# Patient Record
Sex: FEMALE | Race: WHITE | NOT HISPANIC OR LATINO | Employment: OTHER | URBAN - METROPOLITAN AREA
[De-identification: names, ages, dates, MRNs, and addresses within clinical notes are randomized per-mention and may not be internally consistent; named-entity substitution may affect disease eponyms.]

---

## 2017-04-08 ENCOUNTER — HOSPITAL ENCOUNTER (EMERGENCY)
Facility: HOSPITAL | Age: 77
Discharge: HOME | End: 2017-04-08
Attending: SURGERY
Payer: MEDICARE

## 2017-04-08 VITALS
TEMPERATURE: 99 F | SYSTOLIC BLOOD PRESSURE: 116 MMHG | HEART RATE: 88 BPM | WEIGHT: 189 LBS | BODY MASS INDEX: 28.74 KG/M2 | DIASTOLIC BLOOD PRESSURE: 58 MMHG

## 2017-04-08 DIAGNOSIS — K94.23 MALFUNCTION OF PERCUTANEOUS ENDOSCOPIC GASTROSTOMY (PEG) TUBE: Primary | ICD-10-CM

## 2017-04-08 PROCEDURE — 25500020 PHARM REV CODE 255: Performed by: SURGERY

## 2017-04-08 PROCEDURE — 43760 *HC REPLACEMENT GASTROS TUBE: CPT

## 2017-04-08 PROCEDURE — 99284 EMERGENCY DEPT VISIT MOD MDM: CPT | Mod: 25

## 2017-04-08 RX ORDER — PRAMIPEXOLE DIHYDROCHLORIDE 0.25 MG/1
0.25 TABLET ORAL 3 TIMES DAILY
Status: ON HOLD | COMMUNITY
End: 2017-05-22

## 2017-04-08 RX ORDER — TRAZODONE HYDROCHLORIDE 50 MG/1
50 TABLET ORAL NIGHTLY
Status: ON HOLD | COMMUNITY
End: 2017-05-22

## 2017-04-08 RX ADMIN — DIATRIZOATE MEGLUMINE AND DIATRIZOATE SODIUM 60 ML: 600; 100 SOLUTION ORAL; RECTAL at 08:04

## 2017-04-08 NOTE — ED AVS SNAPSHOT
[facility] [address] [place] [address]               Eddy Cunningham   2017  7:51 PM   ED    Description:  Female : 1940   Department:  [facility]           Your Care was Coordinated By:     Provider Role From To    Ivan Gutierres MD Attending Provider 17 --      Reason for Visit     PEG tube replacement           Diagnoses this Visit        Comments    Malfunction of percutaneous endoscopic gastrostomy (PEG) tube    -  Primary       ED Disposition     ED Disposition Condition Comment    Discharge             To Do List           Follow-up Information     Follow up with Ayaka Pollard MD. Schedule an appointment as soon as possible for a visit in 2 days.    Specialty:  Family Medicine    Contact information:    [address]  [place] [id number] [phone]        [facility]     [facility] -  Assistance  Unless otherwise directed by your provider, please contact [facility], our nurse care line that is available for  assistance.     Registered nurses in the [facility] provide: appointment scheduling, clinical advisement, health education, and other advisory services.  Call: [phone] (toll free)               Medications           Message regarding Medications     Verify the changes and/or additions to your medication regime listed below are the same as discussed with your clinician today.  If any of these changes or additions are incorrect, please notify your healthcare provider.        These medications were administered today        Dose Freq    gastroview 60 mL 60 mL IMG once as needed    Si mLs by Per G Tube route ONCE PRN for contrast.    Class: Normal    Route: Per G Tube      STOP taking these medications     LACTULOSE ORAL Take by mouth once daily.    metolazone (ZAROXOLYN) 5 MG tablet Take 5 mg by mouth once daily.      vitamin E 200 UNIT capsule Take 200  Units by mouth once daily.           Verify that the below list of medications is an accurate representation of the medications you are currently taking.  If none reported, the list may be blank. If incorrect, please contact your healthcare provider. Carry this list with you in case of emergency.           Current Medications     amphetamine-dextroamphetamine (ADDERALL XR) 20 MG 24 hr capsule Take 20 mg by mouth every morning.      aspirin 81 MG Chew Take 1 tablet by mouth Daily.    carbidopa-levodopa  mg (SINEMET)  mg per tablet Take 1 tablet by mouth 5 (five) times daily.     docusate sodium (COLACE) 100 MG capsule Take 100 mg by mouth once daily.    levothyroxine (SYNTHROID) 75 MCG tablet Take 75 mcg by mouth before breakfast.     potassium chloride (KLOR-CON) 10 MEQ TbSR Take 20 mEq by mouth 2 (two) times daily.     pramipexole (MIRAPEX) 0.25 MG tablet Take 0.25 mg by mouth 3 (three) times daily.    rivastigmine (EXELON) 4.6 mg/24 hour PT24 Place 1 patch onto the skin once daily.      senna (SENOKOT) 8.6 mg tablet Take 1 tablet by mouth once daily.    trazodone (DESYREL) 50 MG tablet Take 50 mg by mouth every evening.    carbidopa-levodopa-entacapone 18.75- mg (STALEVO) 18.75- mg Tab Take 1 tablet by mouth 5 (five) times daily.    hydrocodone-acetaminophen 10-325mg (NORCO)  mg Tab Take 1 tablet by mouth every 6 (six) hours as needed.     tramadol (ULTRAM) 50 mg tablet Take 50 mg by mouth every 6 (six) hours as needed for Pain.           Clinical Reference Information           Your Vitals Were     BP Pulse Temp Weight BMI    116/58 (BP Location: Left arm, Patient Position: Sitting) 88 99.4 °F (37.4 °C) (Oral) 85.7 kg (189 lb) 28.74 kg/m2      Allergies as of 4/8/2017        Reactions    Oxycodone     Hallucination    Sulfa (Sulfonamide Antibiotics)     Other reaction(s): when on contact      Immunizations Administered on Date of Encounter - 4/8/2017     None      ED Micro, Lab, POCT      None      ED Imaging Orders     Start Ordered       Status Ordering Provider    04/08/17 1953 04/08/17 1952  XR Gastric Tube Check With Contrast  1 time imaging      In process       Discharge References/Attachments     FEEDING TUBE CARE, GASTROSTOMY: FLUSHING (ENGLISH)      [email or web address] Sign-Up     Activating your [facility] account is as easy as 1-2-3!     1) Visit "[email or web address], select Sign Up Now, enter this activation code and your date of birth, then select Next.  [email or web address]  Expires: 5/23/2017  8:09 PM      2) Create a username and password to use when you visit [facility] in the future and select a security question in case you lose your password and select Next.    3) Enter your e-mail address and click Sign Up!    Additional Information  If you have questions, please e-mail [email or web address] or call [phone] to talk to our [facility] staff. Remember, [facility] is NOT to be used for urgent needs. For medical emergencies, dial 911.         Smoking Cessation     If you would like to quit smoking:   You may be eligible for free services if you are a [place] resident and started smoking cigarettes before September 1, 1988.  Call the Smoking Cessation Trust (SCT) toll free at (269) 710-4715 or (169) 683-8931.   Call -800-QUIT-NOW if you do not meet the above criteria.   Contact us via email: [email or web address]   View our website for more information: [email or web address]         [facility] complies with applicable Federal civil rights laws and does not discriminate on the basis of race, color, national origin, age, disability, or sex.        Language Assistance Services     ATTENTION: Language assistance services are available, free of charge. Please call [phone].      ATENCIÓN: Si habla español, tiene a verma disposición servicios gratuitos de asistencia lingüística. Llame al [phone].     CHÚ Ý: N?u b?n nói Ti?ng Vi?t, có các d?ch v? h? tr? ngôn  ng? mi?n phí dành cho b?n. G?i s? [phone].

## 2017-04-09 NOTE — ED PROVIDER NOTES
[facility]                                        April 8, 2017                   Chief Complaint  76 y.o. female with PEG tube replacement (PEG tube came out)    History of Present Illness  Eddy Cunningham presents to the emergency room for a PEG tube replacement  Patient's PEG tube accidentally felt the nursing home was no complication reported  Patient presents the ER for PEG tube replacement, replaced without issue tonight  X-ray confirms appropriate placement, afebrile stable with a soft abdomen exam    The history is provided by the patient    Past Medical History   -- COPD (chronic obstructive pulmonary disease)    -- Dementia    -- Depression    -- Elevated C-reactive protein (CRP)    -- Falls frequently    -- Homocystinuria    -- Hypertension    -- Hypopotassemia    -- Hypothyroidism    -- Narcolepsy without cataplexy    -- Parkinson disease    -- RLS (restless legs syndrome)    -- Stress incontinence    -- Venous stasis ulcers      Past Surgical History   -- CARPAL TUNNEL RELEASE     -- CHOLECYSTECTOMY     -- PARTIAL HYSTERECTOMY     -- ROTATOR CUFF REPAIR     -- TONSILLECTOMY, ADENOIDECTOMY     -- TOTAL KNEE ARTHROPLASTY        Review of patient's allergies   -- Oxycodone    -- Sulfa (sulfonamide antibiotics)       Review of Systems and Physical Exam     Review of Systems  -- Constitution - no fever, denies fatigue, no weakness, no chills  -- Eyes - no tearing or redness, no visual disturbance  -- Ear, Nose - no tinnitus or earache, no nasal congestion or discharge  -- Mouth,Throat - no sore throat, no toothache, normal voice, normal swallowing  -- Respiratory - denies cough and congestion, no shortness of breath, no PEREZ  -- Cardiovascular - denies chest pain, no palpitations, denies claudication  -- Gastrointestinal - PEG tube fell out, denies abdominal pain, nausea, vomiting  -- Musculoskeletal - denies back pain, negative for myalgias and arthralgias   -- Neurological - no  headache, denies weakness or seizure; no LOC  -- Skin - denies pallor, rash, or changes in skin. no hives or welts noted    Vital Signs  -- Her blood pressure is 116/58 (abnormal) and her pulse is 88.      Physical Exam  -- Nursing note and vitals reviewed  -- Constitutional: Appears well-developed and well-nourished  -- Head: Atraumatic. Normocephalic. No obvious abnormality  -- Eyes: Pupils are equal and reactive to light. Normal conjunctiva and lids  -- Cardiac: Normal rate, regular rhythm and normal heart sounds  -- Pulmonary: Normal respiratory effort, breath sounds clear to auscultation  -- Abdominal: PEG tube skin site is patient and clean and dry  -- Musculoskeletal: Normal range of motion, no effusions. Joints stable   -- Neurological: No focal deficits. Showed good interaction with staff    Emergency Room Course     Treatment and Evaluation  -- New PEG tube reinserted without issue  -- X-ray confirmed its placement  -- Site was verified before the start of the procedure  -- No complications were noted from the procedure  -- The patient tolerated the procedure well     Diagnosis  -- Malfunction of percutaneous endoscopic gastrostomy (PEG) tube.    Disposition and Plan  -- Disposition: home  -- Condition: stable  -- Follow-up: Patient to follow up with Ayaka Pollard MD in 1-2 days.  -- I advised the patient that we have found no life threatening condition today  -- At this time, I believe the patient is clinically stable for discharge.   -- The patient acknowledges that close follow up with a MD is required   -- Patient agrees to comply with all instruction and direction given in the ER    This note is dictated on Dragon Natural Speaking word recognition program.  There are word recognition mistakes that are occasionally missed on review.           Ivan Gutierres MD  04/08/17 [id number]

## 2017-05-16 ENCOUNTER — HOSPITAL ENCOUNTER (INPATIENT)
Facility: HOSPITAL | Age: 77
LOS: 4 days | DRG: 871 | End: 2017-05-22
Attending: EMERGENCY MEDICINE | Admitting: INTERNAL MEDICINE
Payer: MEDICARE

## 2017-05-16 DIAGNOSIS — G20.A1 PARKINSONS: ICD-10-CM

## 2017-05-16 DIAGNOSIS — R13.10 DYSPHAGIA, UNSPECIFIED TYPE: ICD-10-CM

## 2017-05-16 DIAGNOSIS — K57.32 DIVERTICULITIS OF LARGE INTESTINE WITHOUT PERFORATION OR ABSCESS WITHOUT BLEEDING: ICD-10-CM

## 2017-05-16 DIAGNOSIS — K57.92 DIVERTICULITIS OF INTESTINE WITHOUT PERFORATION OR ABSCESS WITHOUT BLEEDING, UNSPECIFIED PART OF INTESTINAL TRACT: ICD-10-CM

## 2017-05-16 DIAGNOSIS — K56.60 INTESTINAL OBSTRUCTION, UNSPECIFIED TYPE: Primary | ICD-10-CM

## 2017-05-16 PROBLEM — K56.609 INTESTINAL OBSTRUCTION: Status: ACTIVE | Noted: 2017-05-16

## 2017-05-16 LAB
ALBUMIN SERPL BCP-MCNC: 4 G/DL
ALP SERPL-CCNC: 85 U/L
ALT SERPL W/O P-5'-P-CCNC: 6 U/L
AMYLASE SERPL-CCNC: 69 U/L
ANION GAP SERPL CALC-SCNC: 15 MMOL/L
AST SERPL-CCNC: 15 U/L
BASOPHILS # BLD AUTO: 0.02 K/UL
BASOPHILS NFR BLD: 0.1 %
BILIRUB SERPL-MCNC: 0.6 MG/DL
BUN SERPL-MCNC: 20 MG/DL
CALCIUM SERPL-MCNC: 10.2 MG/DL
CHLORIDE SERPL-SCNC: 101 MMOL/L
CO2 SERPL-SCNC: 28 MMOL/L
CREAT SERPL-MCNC: 0.8 MG/DL
DIFFERENTIAL METHOD: ABNORMAL
EOSINOPHIL # BLD AUTO: 0 K/UL
EOSINOPHIL NFR BLD: 0.1 %
ERYTHROCYTE [DISTWIDTH] IN BLOOD BY AUTOMATED COUNT: 13.1 %
EST. GFR  (AFRICAN AMERICAN): >60 ML/MIN/1.73 M^2
EST. GFR  (NON AFRICAN AMERICAN): >60 ML/MIN/1.73 M^2
GLUCOSE SERPL-MCNC: 164 MG/DL
HCT VFR BLD AUTO: 42.2 %
HGB BLD-MCNC: 13.7 G/DL
LYMPHOCYTES # BLD AUTO: 0.5 K/UL
LYMPHOCYTES NFR BLD: 3.5 %
MCH RBC QN AUTO: 30.2 PG
MCHC RBC AUTO-ENTMCNC: 32.5 %
MCV RBC AUTO: 93 FL
MONOCYTES # BLD AUTO: 0.8 K/UL
MONOCYTES NFR BLD: 5.2 %
NEUTROPHILS # BLD AUTO: 14.1 K/UL
NEUTROPHILS NFR BLD: 91.1 %
PLATELET # BLD AUTO: 310 K/UL
PMV BLD AUTO: 9.6 FL
POTASSIUM SERPL-SCNC: 4.3 MMOL/L
PROT SERPL-MCNC: 7.5 G/DL
RBC # BLD AUTO: 4.54 M/UL
SODIUM SERPL-SCNC: 144 MMOL/L
WBC # BLD AUTO: 15.42 K/UL

## 2017-05-16 PROCEDURE — 80053 COMPREHEN METABOLIC PANEL: CPT

## 2017-05-16 PROCEDURE — G0378 HOSPITAL OBSERVATION PER HR: HCPCS

## 2017-05-16 PROCEDURE — 82150 ASSAY OF AMYLASE: CPT

## 2017-05-16 PROCEDURE — 99285 EMERGENCY DEPT VISIT HI MDM: CPT | Mod: 25

## 2017-05-16 PROCEDURE — 85025 COMPLETE CBC W/AUTO DIFF WBC: CPT

## 2017-05-16 PROCEDURE — 94640 AIRWAY INHALATION TREATMENT: CPT

## 2017-05-16 PROCEDURE — 96375 TX/PRO/DX INJ NEW DRUG ADDON: CPT

## 2017-05-16 PROCEDURE — 63600175 PHARM REV CODE 636 W HCPCS: Performed by: EMERGENCY MEDICINE

## 2017-05-16 PROCEDURE — 25000003 PHARM REV CODE 250

## 2017-05-16 PROCEDURE — 27000339 *HC DAILY SUPPLY KIT

## 2017-05-16 PROCEDURE — 96376 TX/PRO/DX INJ SAME DRUG ADON: CPT

## 2017-05-16 PROCEDURE — 36415 COLL VENOUS BLD VENIPUNCTURE: CPT

## 2017-05-16 PROCEDURE — 96365 THER/PROPH/DIAG IV INF INIT: CPT

## 2017-05-16 PROCEDURE — 25000003 PHARM REV CODE 250: Performed by: EMERGENCY MEDICINE

## 2017-05-16 PROCEDURE — 96361 HYDRATE IV INFUSION ADD-ON: CPT

## 2017-05-16 RX ORDER — LEVALBUTEROL INHALATION SOLUTION 1.25 MG/3ML
1.25 SOLUTION RESPIRATORY (INHALATION)
Status: DISCONTINUED | OUTPATIENT
Start: 2017-05-16 | End: 2017-05-16

## 2017-05-16 RX ORDER — ONDANSETRON 2 MG/ML
4 INJECTION INTRAMUSCULAR; INTRAVENOUS
Status: COMPLETED | OUTPATIENT
Start: 2017-05-16 | End: 2017-05-16

## 2017-05-16 RX ORDER — LEVALBUTEROL 1.25 MG/.5ML
1.25 SOLUTION, CONCENTRATE RESPIRATORY (INHALATION) ONCE
Status: COMPLETED | OUTPATIENT
Start: 2017-05-16 | End: 2017-05-16

## 2017-05-16 RX ORDER — LEVALBUTEROL 1.25 MG/.5ML
SOLUTION, CONCENTRATE RESPIRATORY (INHALATION)
Status: COMPLETED
Start: 2017-05-16 | End: 2017-05-16

## 2017-05-16 RX ORDER — CIPROFLOXACIN 2 MG/ML
400 INJECTION, SOLUTION INTRAVENOUS
Status: COMPLETED | OUTPATIENT
Start: 2017-05-17 | End: 2017-05-17

## 2017-05-16 RX ORDER — METRONIDAZOLE 500 MG/100ML
500 INJECTION, SOLUTION INTRAVENOUS
Status: COMPLETED | OUTPATIENT
Start: 2017-05-17 | End: 2017-05-17

## 2017-05-16 RX ADMIN — LEVALBUTEROL 1.25 MG: 1.25 SOLUTION, CONCENTRATE RESPIRATORY (INHALATION) at 09:05

## 2017-05-16 RX ADMIN — IOHEXOL 15 ML: 350 INJECTION, SOLUTION INTRAVENOUS at 10:05

## 2017-05-16 RX ADMIN — MORPHINE SULFATE 2 MG: 2 INJECTION, SOLUTION INTRAMUSCULAR; INTRAVENOUS at 08:05

## 2017-05-16 RX ADMIN — IOHEXOL 75 ML: 350 INJECTION, SOLUTION INTRAVENOUS at 11:05

## 2017-05-16 RX ADMIN — SODIUM CHLORIDE 500 ML: 0.9 INJECTION, SOLUTION INTRAVENOUS at 08:05

## 2017-05-16 RX ADMIN — MORPHINE SULFATE 2 MG: 2 INJECTION, SOLUTION INTRAMUSCULAR; INTRAVENOUS at 10:05

## 2017-05-16 RX ADMIN — ONDANSETRON 4 MG: 2 INJECTION INTRAMUSCULAR; INTRAVENOUS at 08:05

## 2017-05-17 PROBLEM — A41.50 SEPSIS DUE TO GRAM NEGATIVE BACTERIA: Status: ACTIVE | Noted: 2017-05-17

## 2017-05-17 PROBLEM — T17.908A ASPIRATION INTO AIRWAY: Status: ACTIVE | Noted: 2017-05-17

## 2017-05-17 PROBLEM — A41.89 SEPSIS, GRAM POSITIVE: Status: ACTIVE | Noted: 2017-05-17

## 2017-05-17 PROBLEM — A41.9 SEVERE SEPSIS: Status: ACTIVE | Noted: 2017-05-17

## 2017-05-17 PROBLEM — R13.19 ESOPHAGEAL DYSPHAGIA: Status: ACTIVE | Noted: 2017-05-17

## 2017-05-17 PROBLEM — R65.20 SEVERE SEPSIS: Status: ACTIVE | Noted: 2017-05-17

## 2017-05-17 PROBLEM — N30.90 CYSTITIS: Status: ACTIVE | Noted: 2017-05-17

## 2017-05-17 LAB
ALBUMIN SERPL BCP-MCNC: 3.2 G/DL
ALP SERPL-CCNC: 64 U/L
ALT SERPL W/O P-5'-P-CCNC: 10 U/L
ANION GAP SERPL CALC-SCNC: 9 MMOL/L
AST SERPL-CCNC: 12 U/L
BACTERIA #/AREA URNS HPF: ABNORMAL /HPF
BASOPHILS # BLD AUTO: 0.01 K/UL
BASOPHILS NFR BLD: 0.1 %
BILIRUB SERPL-MCNC: 0.7 MG/DL
BILIRUB UR QL STRIP: NEGATIVE
BUN SERPL-MCNC: 34 MG/DL
CALCIUM SERPL-MCNC: 9.1 MG/DL
CHLORIDE SERPL-SCNC: 107 MMOL/L
CLARITY UR: CLEAR
CO2 SERPL-SCNC: 26 MMOL/L
COLOR UR: YELLOW
CREAT SERPL-MCNC: 0.7 MG/DL
DIFFERENTIAL METHOD: ABNORMAL
EOSINOPHIL # BLD AUTO: 0 K/UL
EOSINOPHIL NFR BLD: 0 %
ERYTHROCYTE [DISTWIDTH] IN BLOOD BY AUTOMATED COUNT: 13.1 %
EST. GFR  (AFRICAN AMERICAN): >60 ML/MIN/1.73 M^2
EST. GFR  (NON AFRICAN AMERICAN): >60 ML/MIN/1.73 M^2
GLUCOSE SERPL-MCNC: 141 MG/DL
GLUCOSE UR QL STRIP: NEGATIVE
HCT VFR BLD AUTO: 38.6 %
HGB BLD-MCNC: 12 G/DL
HGB UR QL STRIP: ABNORMAL
KETONES UR QL STRIP: NEGATIVE
LACTATE SERPL-SCNC: 0.8 MMOL/L
LACTATE SERPL-SCNC: 1.1 MMOL/L
LEUKOCYTE ESTERASE UR QL STRIP: ABNORMAL
LYMPHOCYTES # BLD AUTO: 1.4 K/UL
LYMPHOCYTES NFR BLD: 7.2 %
MCH RBC QN AUTO: 29.6 PG
MCHC RBC AUTO-ENTMCNC: 31.1 %
MCV RBC AUTO: 95 FL
MICROSCOPIC COMMENT: ABNORMAL
MONOCYTES # BLD AUTO: 1.6 K/UL
MONOCYTES NFR BLD: 8 %
NEUTROPHILS # BLD AUTO: 16.8 K/UL
NEUTROPHILS NFR BLD: 84.9 %
NITRITE UR QL STRIP: NEGATIVE
PH UR STRIP: 5 [PH] (ref 5–8)
PLATELET # BLD AUTO: 301 K/UL
PMV BLD AUTO: 9.6 FL
POTASSIUM SERPL-SCNC: 4.3 MMOL/L
PROT SERPL-MCNC: 6.2 G/DL
PROT UR QL STRIP: NEGATIVE
RBC # BLD AUTO: 4.05 M/UL
RBC #/AREA URNS HPF: 0 /HPF (ref 0–4)
SODIUM SERPL-SCNC: 142 MMOL/L
SP GR UR STRIP: 1.01 (ref 1–1.03)
SQUAMOUS #/AREA URNS HPF: 2 /HPF
URN SPEC COLLECT METH UR: ABNORMAL
UROBILINOGEN UR STRIP-ACNC: NEGATIVE EU/DL
WBC # BLD AUTO: 19.81 K/UL
WBC #/AREA URNS HPF: 30 /HPF (ref 0–5)

## 2017-05-17 PROCEDURE — 25000003 PHARM REV CODE 250: Performed by: INTERNAL MEDICINE

## 2017-05-17 PROCEDURE — 87040 BLOOD CULTURE FOR BACTERIA: CPT

## 2017-05-17 PROCEDURE — 36415 COLL VENOUS BLD VENIPUNCTURE: CPT

## 2017-05-17 PROCEDURE — 25000003 PHARM REV CODE 250: Performed by: NURSE PRACTITIONER

## 2017-05-17 PROCEDURE — 63600175 PHARM REV CODE 636 W HCPCS: Performed by: NURSE PRACTITIONER

## 2017-05-17 PROCEDURE — G0378 HOSPITAL OBSERVATION PER HR: HCPCS

## 2017-05-17 PROCEDURE — 25000003 PHARM REV CODE 250: Performed by: EMERGENCY MEDICINE

## 2017-05-17 PROCEDURE — 81000 URINALYSIS NONAUTO W/SCOPE: CPT

## 2017-05-17 PROCEDURE — 83605 ASSAY OF LACTIC ACID: CPT

## 2017-05-17 PROCEDURE — G8996 SWALLOW CURRENT STATUS: HCPCS | Mod: CN

## 2017-05-17 PROCEDURE — 97110 THERAPEUTIC EXERCISES: CPT

## 2017-05-17 PROCEDURE — 25500020 PHARM REV CODE 255: Performed by: EMERGENCY MEDICINE

## 2017-05-17 PROCEDURE — 87086 URINE CULTURE/COLONY COUNT: CPT

## 2017-05-17 PROCEDURE — G8978 MOBILITY CURRENT STATUS: HCPCS | Mod: CM

## 2017-05-17 PROCEDURE — 96367 TX/PROPH/DG ADDL SEQ IV INF: CPT

## 2017-05-17 PROCEDURE — 25000003 PHARM REV CODE 250

## 2017-05-17 PROCEDURE — 80053 COMPREHEN METABOLIC PANEL: CPT

## 2017-05-17 PROCEDURE — 96361 HYDRATE IV INFUSION ADD-ON: CPT

## 2017-05-17 PROCEDURE — G8997 SWALLOW GOAL STATUS: HCPCS | Mod: CM

## 2017-05-17 PROCEDURE — 96366 THER/PROPH/DIAG IV INF ADDON: CPT

## 2017-05-17 PROCEDURE — 99900035 HC TECH TIME PER 15 MIN (STAT)

## 2017-05-17 PROCEDURE — 85025 COMPLETE CBC W/AUTO DIFF WBC: CPT

## 2017-05-17 PROCEDURE — G8979 MOBILITY GOAL STATUS: HCPCS | Mod: CL

## 2017-05-17 PROCEDURE — 92610 EVALUATE SWALLOWING FUNCTION: CPT

## 2017-05-17 PROCEDURE — 63600175 PHARM REV CODE 636 W HCPCS: Performed by: EMERGENCY MEDICINE

## 2017-05-17 PROCEDURE — 96365 THER/PROPH/DIAG IV INF INIT: CPT

## 2017-05-17 PROCEDURE — 94761 N-INVAS EAR/PLS OXIMETRY MLT: CPT

## 2017-05-17 PROCEDURE — 94760 N-INVAS EAR/PLS OXIMETRY 1: CPT

## 2017-05-17 PROCEDURE — 99223 1ST HOSP IP/OBS HIGH 75: CPT | Mod: AI,,, | Performed by: FAMILY MEDICINE

## 2017-05-17 PROCEDURE — 27000339 *HC DAILY SUPPLY KIT

## 2017-05-17 PROCEDURE — 94640 AIRWAY INHALATION TREATMENT: CPT

## 2017-05-17 PROCEDURE — 27000221 HC OXYGEN, UP TO 24 HOURS

## 2017-05-17 PROCEDURE — 97162 PT EVAL MOD COMPLEX 30 MIN: CPT

## 2017-05-17 RX ORDER — ONDANSETRON 2 MG/ML
4 INJECTION INTRAMUSCULAR; INTRAVENOUS EVERY 12 HOURS PRN
Status: DISCONTINUED | OUTPATIENT
Start: 2017-05-17 | End: 2017-05-22 | Stop reason: HOSPADM

## 2017-05-17 RX ORDER — DEXTROSE MONOHYDRATE AND SODIUM CHLORIDE 5; .9 G/100ML; G/100ML
INJECTION, SOLUTION INTRAVENOUS CONTINUOUS
Status: DISCONTINUED | OUTPATIENT
Start: 2017-05-17 | End: 2017-05-17

## 2017-05-17 RX ORDER — CIPROFLOXACIN 2 MG/ML
400 INJECTION, SOLUTION INTRAVENOUS
Status: DISCONTINUED | OUTPATIENT
Start: 2017-05-17 | End: 2017-05-17

## 2017-05-17 RX ORDER — METRONIDAZOLE 500 MG/100ML
500 INJECTION, SOLUTION INTRAVENOUS
Status: DISCONTINUED | OUTPATIENT
Start: 2017-05-17 | End: 2017-05-17

## 2017-05-17 RX ORDER — DEXTROSE MONOHYDRATE AND SODIUM CHLORIDE 5; .9 G/100ML; G/100ML
INJECTION, SOLUTION INTRAVENOUS CONTINUOUS
Status: DISCONTINUED | OUTPATIENT
Start: 2017-05-17 | End: 2017-05-20

## 2017-05-17 RX ORDER — LEVALBUTEROL 1.25 MG/.5ML
SOLUTION, CONCENTRATE RESPIRATORY (INHALATION)
Status: COMPLETED
Start: 2017-05-17 | End: 2017-05-17

## 2017-05-17 RX ORDER — LEVALBUTEROL 1.25 MG/.5ML
1.25 SOLUTION, CONCENTRATE RESPIRATORY (INHALATION)
Status: DISCONTINUED | OUTPATIENT
Start: 2017-05-17 | End: 2017-05-22 | Stop reason: HOSPADM

## 2017-05-17 RX ADMIN — LEVALBUTEROL 1.25 MG: 1.25 SOLUTION, CONCENTRATE RESPIRATORY (INHALATION) at 01:05

## 2017-05-17 RX ADMIN — PIPERACILLIN AND TAZOBACTAM 4.5 G: 4; .5 INJECTION, POWDER, LYOPHILIZED, FOR SOLUTION INTRAVENOUS; PARENTERAL at 05:05

## 2017-05-17 RX ADMIN — DEXTROSE AND SODIUM CHLORIDE: 5; .9 INJECTION, SOLUTION INTRAVENOUS at 12:05

## 2017-05-17 RX ADMIN — DEXTROSE AND SODIUM CHLORIDE: 5; .9 INJECTION, SOLUTION INTRAVENOUS at 10:05

## 2017-05-17 RX ADMIN — LEVALBUTEROL 1.25 MG: 1.25 SOLUTION, CONCENTRATE RESPIRATORY (INHALATION) at 08:05

## 2017-05-17 RX ADMIN — LEVALBUTEROL 1.25 MG: 1.25 SOLUTION, CONCENTRATE RESPIRATORY (INHALATION) at 03:05

## 2017-05-17 RX ADMIN — METRONIDAZOLE 500 MG: 500 INJECTION, SOLUTION INTRAVENOUS at 12:05

## 2017-05-17 RX ADMIN — CIPROFLOXACIN 400 MG: 2 INJECTION INTRAVENOUS at 12:05

## 2017-05-17 RX ADMIN — PIPERACILLIN AND TAZOBACTAM 4.5 G: 4; .5 INJECTION, POWDER, LYOPHILIZED, FOR SOLUTION INTRAVENOUS; PARENTERAL at 10:05

## 2017-05-17 NOTE — PLAN OF CARE
Dicussed plan of care with Dr. Sandoval, patient and family present.  No issues or concerns voiced.

## 2017-05-17 NOTE — PT/OT/SLP EVAL
"Speech Language Pathology  Evaluation    Eddy Cunningham   MRN: [id number]   Admitting Diagnosis: <principal problem not specified>    Diet recommendations: Solid Diet Level: NPO  Liquid Diet Level: NPO     SLP Treatment Date: 05/17/17  Speech Start Time: 0958     Speech Stop Time: 1014     Speech Total (min): 16 min       TREATMENT BILLABLE MINUTES:  Eval Swallow and Oral Function 16 minutes    Diagnosis: <principal problem not specified>  Eddy Cunningham is a 77 y.o. female with Parkinson's who presented from [facility] on 05/16/17 with coffee ground emesis.   According to EPIC review and speaking with Pts daughter, she had a swallow study done while at an inpatient rehab unit in [place] approximately 11/2014.  It was recommended at that time that she be NPO and a PEG was placed.  Pt later returned to  and later was started on pureed diet with "thickened" liquids and was on this diet at the time of admit with no supplemental PEG feedings.  Daughter reported that she does not think that another swallow study was done prior to initiation of PO.  1 view CXR revealed bilateral chronic lung markings, calcified atheromatous disease affects the aorta and age-appropriate degenerative changes affect the skeleton.  According to chart and nursing report Pt currently being treated for sepsis, dehydration, and to r/o bowel obstruction/diverticulitis.       Past Medical History:   Diagnosis Date    COPD (chronic obstructive pulmonary disease)     Dementia     Depression     Elevated C-reactive protein (CRP)     Falls frequently     GERD (gastroesophageal reflux disease)     Homocystinuria     Hypertension     Hypopotassemia     Hypothyroidism     Narcolepsy without cataplexy     Parkinson disease     RLS (restless legs syndrome)     Stress incontinence     Venous stasis ulcers      Past Surgical History:   Procedure Laterality Date    CARPAL TUNNEL RELEASE      Right    CHOLECYSTECTOMY      " "GASTROSTOMY TUBE PLACEMENT      PARTIAL HYSTERECTOMY      ROTATOR CUFF REPAIR      TONSILLECTOMY, ADENOIDECTOMY      tonsiles    TOTAL KNEE ARTHROPLASTY         Has the patient been evaluated by SLP for swallowing? : Yes  Keep patient NPO?: Yes   General Precautions: Standard, aspiration, fall          Social Hx: Patient lives in NH.    Prior diet: pureed diet with "thickened liquids," PEG not in use on admit.     Occupational/hobbies/homemaking: none reported.    Subjective:  Pt was awake and alert throughout the evaluation; however, keeps eyes closed and head leaning to the left.  Questioned Pt and daughter about this and both agreed that this is her baseline.  Pt answered all questions accurately with minimally delayed response time.      Patient goals: none stated.    Pain Ratin/10    Objective:   Patient found with:  (daughter present)    Oral Musculature Evaluation  Oral Musculature: general weakness  Secretion Management: problems swallowing secretions (reported)  Oral Labial Strength and Mobility: impaired seal  Lingual Strength and Mobility: impaired protrusion  Buccal Strength and Mobility: flaccid  Volitional Cough: very weak  Volitional Swallow: delayed-problems initiating  Voice Prior to PO Intake: weak, breathy, wet     Bedside Swallow Eval:  Consistencies Assessed: Puree - Pt given a small amount of pudding from the tip of the teaspoon  Oral Phase: obvious lingual protrusion/pumping prior to oral transit, excess chewing and slow oral transit time, trace oral residue along tongue  Pharyngeal Phase: baseline wet vocal quality was notedly worse after swallow.  Pt was encouraged to swallow multiple times.  This required max cueing and lingual pumping and protrusion again noted.       Assessment:  Eddy Cunningham is a 77 y.o. female with a medical diagnosis of <principal problem not specified> and presents with significant signs of aspiration.  Recommend continuation of NPO at this time with use " of PEG for primary nutrition.  Will continue to follow Pt and if she begins to improve and demonstrate any ability to protect airway, will consider MBSS.  This will likely serve more to determine if pleasure feedings may be safe/appropriate rather than resume full PO. This was discussed with Pt and daughter and both verbalized understanding.     Do you have any cultural, spiritual, Gnosticism conflicts, given your current situation?: none verbalized     Discharge recommendations: Discharge Facility/Level Of Care Needs: nursing facility, basic     Goals:   SLP Goals        Problem: SLP Goal    Goal Priority Disciplines Outcome   SLP Goal     SLP Revised   Description:    1) Swallow assessment is ongoing  2) Will complete MBSS once Pt more appropriate, with a better chance for success.                    Plan:   Patient to be seen Therapy Frequency: 3 x/week, 2 x/week   Plan of Care expires: 05/31/17  Plan of Care reviewed with: patient, daughter  SLP Follow-up?: Yes  SLP - Next Visit Date: 05/18/17      SLP G-Codes  Functional Limitations: Swallowing  Swallow Current Status (): CN  Swallow Goal Status (): ZACHARIAH Lynn, CCC-SLP  05/17/2017

## 2017-05-17 NOTE — PLAN OF CARE
Problem: Patient Care Overview  Goal: Plan of Care Review  Outcome: Ongoing (interventions implemented as appropriate)  Pt admitted from nursing home with possible intestinal obstruction. Pt denies abdominal pain; abdomen slightly distended but soft on palpation. Denies nausea and vomiting today. G tube hooked to gravity drainage bag per MD request; 600cc dark brown drainage noted. Breath sounds coarse throughout. Pt unable to clear own secretions. Oxygen sats  on 2L nasal cannula. Placed NPO. Head of bed elevated. Suction at bedside. Blood cultures drawn for probable aspiration pneumonia; placed on IV zosyn. ST consulted; recs keep NPO. Remains on IV fluids. Mouth care done. Surgery consulted regarding possible SBO; no surgical intervention at this time. PT eval ordered. Turning pt every 2 hours. UTI noted; culture ordered. Vitals stable. Call bell within reach. Reviewed plan of care with pt and daughters, state agreement.

## 2017-05-17 NOTE — CONSULTS
DATE OF CONSULTATION:  05/17/2017    HISTORY OF PRESENT ILLNESS:  The patient is a 77-year-old nursing home patient   with Parkinson disease who presented to the Emergency Department with abdominal   distention, abdominal pain and vomiting.  The patient had several episodes of   vomiting prior to admission.  The patient has not had any vomiting since   admission.  The patient states that she has mild nausea along with mild   abdominal pain, which has improved since admission.  No other concerns were   expressed per nursing home staff upon presentation.  The patient at this time   denies abdominal pain.  She denies any further nausea or vomiting.  The patient   has not had a bowel movement in several days.  She says she usually has to take   some laxatives or stool softeners.  Nurses noted upon placing the G-tube to   gravity that there was some dark bloody drainage, but it has since cleared and   is now bilious.  The patient states that her G-tube was placed several months   ago, but she has subsequently begun on a pureed diet.    PAST MEDICAL HISTORY:  Significant for COPD, dementia, depression, hypertension,   hypothyroidism, and Parkinson disease.    PAST SURGICAL HISTORY:  Carpal tunnel surgery, cholecystectomy, partial   hysterectomy, shoulder surgery, tonsillectomy and knee surgery.    SOCIAL HISTORY:  The patient is a former smoker.  The patient lives in a nursing   home.    REVIEW OF SYSTEMS:  The patient denies fever or chills.  She denies chest pain.    She denies shortness of breath.    PHYSICAL EXAMINATION:  GENERAL:  The patient is an elderly female.  VITAL SIGNS:  Her temperature is 98.5, her blood pressure is 123/57.  Her pulse   is 91.  She is saturating 99% on room air.  GENERAL:  The patient is sitting up in bed.  She does not appear in any acute   distress.  She appears to be having trouble with her secretions.  ABDOMEN:  Mildly distended.  She has a G-tube in place.  There is no significant    drainage around the tube.  There are no signs of infection around the tube.    She has bilious aspirate in the drainage bag.  There is no significant abdominal   tenderness.    LABORATORY DATA:  Her white count is 19.8, her hemoglobin 12, hematocrit 39,   platelet counts 301.  Sodium 142, potassium 4.3, chloride 107, CO2 of 26, BUN   34, creatinine is 0.7, glucose is 141.  Urinalysis shows moderate bacteria,   negative nitrite, negative leukocyte esterase.  Blood cultures are pending.  CT   scan of the abdomen and pelvis was performed.  The stomach is distended.  There   is a gastrostomy tube in place.  There is thickening of the distal esophagus and   stomach.  There is questionable distention of the duodenum.  There is   diverticulosis present with possible diverticulitis.  There is a   complex-appearing right renal cyst.  Oral contrast makes it to the colon.    ASSESSMENT AND PLAN:  A 77-year-old female with nausea and vomiting.  At this   time, the patient does not appear to have a bowel obstruction.  She does have a   dilated stomach and this may be a chronic issue.  We would just leave her G-tube   to gravity at this time.  Recommend further workup as far as swallowing study   prior to any oral feeding.  The patient may need to be restarted on gastrostomy   tube feeding.  The patient can have stool softeners and laxatives to help with   bowel movement.  She does not appear to have mechanical obstruction at this   time.  Her contrast makes it all the way to her colon.  No indications for any   surgical intervention at this time.      BM/HN  dd: 05/17/2017 09:01:00 (CDT)  td: 05/17/2017 10:06:41 (CDT)  Doc ID   #[id number]  Job ID #[id number]    CC:

## 2017-05-17 NOTE — PLAN OF CARE
Problem: Patient Care Overview  Goal: Plan of Care Review  Outcome: Ongoing (interventions implemented as appropriate)  Pt doing well on 2L/min O2 by nasal cannula with SpO2 98%. MA tx scheduled Q6 with Xopenex, BBS coarse/rhonchi. Oral sx with Preeti at bedside, white/clear sputum. No distress noted at this time.

## 2017-05-17 NOTE — PLAN OF CARE
05/17/17 1036   Discharge Assessment   Assessment Type Discharge Planning Assessment   Confirmed/corrected address and phone number on facesheet? Yes   Assessment information obtained from? Other  (Daughter Vanessa)   Expected Length of Stay (days) 2   Communicated expected length of stay with patient/caregiver yes   Type of Healthcare [place]   If Healthcare Directive is received, is it scanned into Epic? no (comment)  (Just received)   Prior to hospitilization cognitive status: Alert/Oriented   Prior to hospitalization functional status: Needs Assistance;Wheelchair Bound   Current cognitive status: Alert/Oriented   Current Functional Status: Needs Assistance;Wheelchair Bound   Arrived From [facility] care facility   Lives With facility resident   Able to Return to Prior Arrangements yes   Is patient able to care for self after discharge? Yes  (Lives at [facility])   How many people do you have in your home that can help with your care after discharge? other (see comments)  (Christian Douglas)   Who are your caregiver(s) and their phone number(s)? DaughterVanessa [phone]   Patient's perception of discharge disposition nursing home   Readmission Within The Last 30 Days no previous admission in last 30 days   Patient currently being followed by outpatient case management? No   Patient currently receives home health services? No   Does the patient currently use HME? Yes   Name and contact number for HME provider: Christian Douglas   Patient currently receives private duty nursing? No   Patient currently receives any other outside agency services? No   Equipment Currently Used at Home wheelchair   Do you have any problems affording any of your prescribed medications? No   Is the patient taking medications as prescribed? yes   Do you have any financial concerns preventing you from receiving the healthcare you need? No   Does the patient have transportation to healthcare appointments? Yes    Transportation Available agency transportation   On Dialysis? No   Does the patient receive services at the Coumadin Clinic? No   Are there any open cases? No   Discharge Plan A Return to nursing home   Discharge Plan B Return to Nursing Home   Patient/Family In Agreement With Plan yes

## 2017-05-17 NOTE — PT/OT/SLP PROGRESS
Physical Therapy  Treatment    Eddy Cunningham   MRN: [id number]   Admitting Diagnosis: <principal problem not specified>    PT Received On: 17  PT Start Time: 1325     PT Stop Time: 1340    PT Total Time (min): 15 min       Billable Minutes:  Therapeutic Exercise 15 minutes    Treatment Type: Treatment  PT/PTA: PT             General Precautions: Standard, fall  Orthopedic Precautions: N/A   Braces:           Subjective:  Communicated with NSG prior to session.    Pain Ratin/10                   Objective:   Patient found with: peripheral IV, oxygen, SCD    Functional Mobility:    Therapeutic Activities and Exercises:  Pt tolerated PROM to BUE/BLE at all joints in available planes of motion with rest breaks as needed to prevent contractures and promote healthy fluid dynamics.     AM-PAC 6 CLICK MOBILITY  How much help from another person does this patient currently need?   1 = Unable, Total/Dependent Assistance  2 = A lot, Maximum/Moderate Assistance  3 = A little, Minimum/Contact Guard/Supervision  4 = None, Modified [place]/Independent    Turning over in bed (including adjusting bedclothes, sheets and blankets)?: 1  Sitting down on and standing up from a chair with arms (e.g., wheelchair, bedside commode, etc.): 1  Moving from lying on back to sitting on the side of the bed?: 1  Moving to and from a bed to a chair (including a wheelchair)?: 1  Need to walk in hospital room?: 1  Climbing 3-5 steps with a railing?: 1  Total Score: 6    AM-PAC Raw Score CMS G-Code Modifier Level of Impairment Assistance   6 % Total / Unable   7 - 9 CM 80 - 100% Maximal Assist   10 - 14 CL 60 - 80% Moderate Assist   15 - 19 CK 40 - 60% Moderate Assist   20 - 22 CJ 20 - 40% Minimal Assist   23 CI 1-20% SBA / CGA   24 CH 0% Independent/ Mod I     Patient left supine with all lines intact, call button in reach and NSG notified.    Assessment:  Eddy Cunningham is a 77 y.o. female with a medical diagnosis of  <principal problem not specified> and presents with inability to arouse pt this PM. Pt with no active participation with PT.    Rehab identified problem list/impairments: Rehab identified problem list/impairments: weakness, impaired endurance, impaired self care skills, impaired functional mobilty, impaired cognition, impaired balance, decreased lower extremity function, decreased upper extremity function, decreased safety awareness    Rehab potential is fair.    Activity tolerance: Fair    Discharge recommendations: Discharge Facility/Level Of Care Needs: nursing facility, basic     Barriers to discharge: Barriers to Discharge: None    Equipment recommendations: Equipment Needed After Discharge: none     GOALS:   Physical Therapy Goals        Problem: Physical Therapy Goal    Goal Priority Disciplines Outcome Goal Variances Interventions   Physical Therapy Goal     PT/OT, PT      Description:  Goals to be met by: upon D/C from facility     Patient will increase functional independence with mobility by performin. Supine to sit with MInimal Assistance  2. Sit to supine with MInimal Assistance  3. Bed to chair transfer with Maximum Assistance using Rolling Walker                PLAN:    Patient to be seen  (1-2x/day Monday-Friday; PRN Weekends/Holidays)  to address the above listed problems via therapeutic activities, therapeutic exercises  Plan of Care expires:  (upon D/C from facility)  Plan of Care reviewed with: patient    PT G-Codes  Functional Assessment Tool Used: AMPAC  Score: 8  Functional Limitation: Mobility: Walking and moving around  Mobility: Walking and Moving Around Current Status (): CM  Mobility: Walking and Moving Around Goal Status (): GETACHEW Benoit, PT  2017

## 2017-05-17 NOTE — PROGRESS NOTES
Dr. Khalil notified patient appears to be short of breath, patient states she gets like this every night. Wet cough noted, coarse rhonchi throughout pulse ox 93% on room air. New orders noted.

## 2017-05-17 NOTE — PLAN OF CARE
05/17/17 1043   Medicare Message   Important Message from Medicare regarding Discharge Appeal Rights Given to patient/caregiver;Explained to patient/caregiver;Signed/date by patient/caregiver   Date IMM was signed 05/17/17   Time IMM was signed 1017   HU Message   Medicare Outpatient and Observation Notification regarding financial responsibility Given to patient/caregiver;Explained to patient/caregiver;Signed/date by patient/caregiver   Date HU was signed 05/17/17   Time HU was signed 1020

## 2017-05-17 NOTE — ED PROVIDER NOTES
Encounter Date: 5/16/2017       History     Chief Complaint   Patient presents with    Emesis    Abdominal Pain     Review of patient's allergies indicates:   Allergen Reactions    Oxycodone      Hallucination    Sulfa (sulfonamide antibiotics)      Other reaction(s): when on contact     Patient is a 77 y.o. female presenting with the following complaint: abdominal pain. The history is provided by the patient and the EMS personnel.   Abdominal Pain   The current episode started today. The onset of the illness was gradual. The abdominal pain is generalized. The abdominal pain does not radiate. The severity of the abdominal pain is 3/10. The abdominal pain is relieved by being still. The abdominal pain is exacerbated by movement. The other symptoms of the illness include nausea and vomiting. The other symptoms of the illness do not include fever, jaundice, melena, shortness of breath, diarrhea, dysuria, hematemesis, hematochezia or vaginal bleeding.   Nausea began today. The nausea is exacerbated by motion.   The vomiting began today. Vomiting occurs 2 to 5 times per day. The emesis contains malodorous material.   The patient has not had a change in bowel habit. Symptoms associated with the illness do not include chills, anorexia, diaphoresis, heartburn, constipation, urgency, hematuria, frequency or back pain.     Past Medical History:   Diagnosis Date    COPD (chronic obstructive pulmonary disease)     Dementia     Depression     Elevated C-reactive protein (CRP)     Falls frequently     Homocystinuria     Hypertension     Hypopotassemia     Hypothyroidism     Narcolepsy without cataplexy     Parkinson disease     RLS (restless legs syndrome)     Stress incontinence     Venous stasis ulcers      Past Surgical History:   Procedure Laterality Date    CARPAL TUNNEL RELEASE      Right    CHOLECYSTECTOMY      PARTIAL HYSTERECTOMY      ROTATOR CUFF REPAIR      TONSILLECTOMY, ADENOIDECTOMY       tonsiles    TOTAL KNEE ARTHROPLASTY       History reviewed. No pertinent family history.  Social History   Substance Use Topics    Smoking status: Former Smoker    Smokeless tobacco: None    Alcohol use No     Review of Systems   Constitutional: Negative for chills, diaphoresis and fever.   HENT: Negative for ear discharge, ear pain and facial swelling.    Eyes: Negative for pain, discharge, redness and itching.   Respiratory: Negative for cough, shortness of breath, wheezing and stridor.    Cardiovascular: Negative for chest pain.   Gastrointestinal: Positive for abdominal pain, nausea and vomiting. Negative for anorexia, constipation, diarrhea, heartburn, hematemesis, hematochezia, jaundice and melena.   Genitourinary: Negative for dysuria, frequency, hematuria, urgency and vaginal bleeding.   Musculoskeletal: Negative for back pain, neck pain and neck stiffness.   Skin: Negative for color change, pallor, rash and wound.   Neurological: Negative for tremors, syncope and weakness.       Physical Exam   Initial Vitals   BP Pulse Resp Temp SpO2   05/16/17 1957 05/16/17 1957 05/16/17 1957 05/16/17 1957 05/16/17 1957   177/90 96 20 100.5 °F (38.1 °C) 93 %     Physical Exam    Nursing note and vitals reviewed.  Constitutional: She appears well-developed and well-nourished. She is not diaphoretic. No distress.   HENT:   Head: Normocephalic and atraumatic.   Mouth/Throat: No oropharyngeal exudate.   Eyes: Conjunctivae and EOM are normal. Pupils are equal, round, and reactive to light. Right eye exhibits no discharge. Left eye exhibits no discharge. No scleral icterus.   Neck: Normal range of motion. Neck supple. No JVD present.   Pulmonary/Chest: No stridor. No respiratory distress. She has no wheezes. She has rhonchi. She has no rales.   Abdominal: She exhibits distension. There is tenderness.       Musculoskeletal: Normal range of motion. She exhibits no edema or tenderness.   Neurological: She is alert. No sensory  deficit.         ED Course   Procedures  Labs Reviewed   CBC W/ AUTO DIFFERENTIAL   COMPREHENSIVE METABOLIC PANEL   AMYLASE   URINALYSIS                               ED Course     Clinical Impression:   The primary encounter diagnosis was Intestinal obstruction, unspecified type. A diagnosis of Diverticulitis of intestine without perforation or abscess without bleeding, unspecified part of intestinal tract was also pertinent to this visit.    Disposition:   Disposition: Placed in Observation  Condition: Stable       Sean Guillaume MD  05/17/17 0000

## 2017-05-17 NOTE — ASSESSMENT & PLAN NOTE
She had temp, elevated HR, elevated RR, elevated WBC with diverticulitis as source of infection. Her bp is stable so not shock. She was started on cipro and flagyl in ER. Reordered this am with blood cultures and lactate level, repeat ordered in 6 hours    She was admitted via ER last night. Sepsis protocol was initated this am after the identification of diverticulitis and poss aspiration. Therefore that is why blood cultures were drawn this am.

## 2017-05-17 NOTE — H&P
[facility] Medicine  History & Physical    Patient Name: Eddy Cunningham  MRN: [id number]  Admission Date: 5/16/2017  Attending Physician: Hayder Khalil MD   Primary Care Provider: Ayaka Pollard MD         Patient information was obtained from patient and ER records.     Subjective:     Principal Problem:<principal problem not specified>    Chief Complaint:   Chief Complaint   Patient presents with    Emesis    Abdominal Pain        HPI: Pt presented to ER last night with c/o vomiting, fever and abd pain since yesterday. She is a resident of [facility]. She reports that she recently started on puree diet at nursing home. Has g tube. Last meal was yesterday. She has her daughter at bedside and reports that she asked for something Sunday to settle her stomach. She also reports that she has terrible constipation. She can not remember her last BM. She had CT in ER which shows obstruction and diverticulitis. She was given cipro and flagyl in ER. She moved up to floor last night. Still febrile. BP stable on IVF. WBC increasing [id number]. Lactate and blood cultures pending    She has slightly elevated RR and audible congestion with breathing. Concern for aspiration. CXR looks ok but portable.       Past Medical History:   Diagnosis Date    COPD (chronic obstructive pulmonary disease)     Dementia     Depression     Elevated C-reactive protein (CRP)     Falls frequently     GERD (gastroesophageal reflux disease)     Homocystinuria     Hypertension     Hypopotassemia     Hypothyroidism     Narcolepsy without cataplexy     Parkinson disease     RLS (restless legs syndrome)     Stress incontinence     Venous stasis ulcers        Past Surgical History:   Procedure Laterality Date    CARPAL TUNNEL RELEASE      Right    CHOLECYSTECTOMY      GASTROSTOMY TUBE PLACEMENT      PARTIAL HYSTERECTOMY      ROTATOR CUFF REPAIR      TONSILLECTOMY, ADENOIDECTOMY      tonsiles     TOTAL KNEE ARTHROPLASTY         Review of patient's allergies indicates:   Allergen Reactions    Oxycodone      Hallucination    Sulfa (sulfonamide antibiotics)      Other reaction(s): when on contact       No current facility-administered medications on file prior to encounter.      Current Outpatient Prescriptions on File Prior to Encounter   Medication Sig    aspirin 81 MG Chew Take 1 tablet by mouth Daily.    docusate sodium (COLACE) 100 MG capsule Take 100 mg by mouth once daily.    hydrocodone-acetaminophen 10-325mg (NORCO)  mg Tab Take 1 tablet by mouth every 6 (six) hours as needed.     levothyroxine (SYNTHROID) 75 MCG tablet Take 75 mcg by mouth before breakfast.     potassium chloride (KLOR-CON) 10 MEQ TbSR Take 20 mEq by mouth 2 (two) times daily.     pramipexole (MIRAPEX) 0.25 MG tablet Take 0.25 mg by mouth 3 (three) times daily.    rivastigmine (EXELON) 4.6 mg/24 hour PT24 Place 1 patch onto the skin once daily.      senna (SENOKOT) 8.6 mg tablet Take 1 tablet by mouth once daily.    tramadol (ULTRAM) 50 mg tablet Take 50 mg by mouth every 6 (six) hours as needed for Pain.    trazodone (DESYREL) 50 MG tablet Take 50 mg by mouth every evening.    amphetamine-dextroamphetamine (ADDERALL XR) 20 MG 24 hr capsule Take 20 mg by mouth every morning.      carbidopa-levodopa  mg (SINEMET)  mg per tablet Take 1 tablet by mouth 5 (five) times daily.     carbidopa-levodopa-entacapone 18.75- mg (STALEVO) 18.75- mg Tab Take 1 tablet by mouth 5 (five) times daily.     Family History     None        Social History Main Topics    Smoking status: Former Smoker    Smokeless tobacco: Not on file    Alcohol use No    Drug use: No    Sexual activity: Not on file     Review of Systems   Constitutional: Positive for fever. Negative for activity change, appetite change, chills, diaphoresis, fatigue and unexpected weight change.   HENT: Negative.  Negative for congestion,  dental problem, drooling, ear discharge, ear pain, facial swelling, hearing loss, mouth sores, nosebleeds, postnasal drip, rhinorrhea, sinus pressure, sneezing, sore throat, tinnitus, trouble swallowing and voice change.    Eyes: Negative.  Negative for photophobia, pain, discharge, redness, itching and visual disturbance.   Respiratory: Positive for cough. Negative for apnea, choking, chest tightness, shortness of breath and wheezing.    Cardiovascular: Negative.  Negative for chest pain, palpitations and leg swelling.   Gastrointestinal: Positive for abdominal pain, constipation, nausea and vomiting. Negative for abdominal distention, anal bleeding, blood in stool, diarrhea and rectal pain.   Genitourinary: Negative.  Negative for difficulty urinating, dyspareunia, dysuria, enuresis, flank pain, frequency, hematuria, menstrual problem, pelvic pain, urgency, vaginal bleeding, vaginal discharge and vaginal pain.        Urinary incontinence    Musculoskeletal: Negative.  Negative for arthralgias, back pain, gait problem, joint swelling, myalgias, neck pain and neck stiffness.   Skin: Negative.  Negative for color change, pallor, rash and wound.   Neurological: Positive for tremors, speech difficulty and weakness. Negative for dizziness, seizures, syncope, facial asymmetry, light-headedness, numbness and headaches.   Hematological: Negative for adenopathy. Does not bruise/bleed easily.   Psychiatric/Behavioral: Negative.  Negative for agitation, behavioral problems, confusion, decreased concentration, dysphoric mood, hallucinations, self-injury, sleep disturbance and suicidal ideas. The patient is not nervous/anxious and is not hyperactive.      Objective:     Vital Signs (Most Recent):  Temp: 98.1 °F (36.7 °C) (05/17/17 0422)  Pulse: 92 (05/17/17 0600)  Resp: 20 (05/17/17 0422)  BP: (!) 140/73 (05/17/17 0422)  SpO2: 100 % (05/17/17 0422) Vital Signs (24h Range):  Temp:  [98.1 °F (36.7 °C)-100.5 °F (38.1 °C)] 98.1 °F  (36.7 °C)  Pulse:  [] 92  Resp:  [20-26] 20  SpO2:  [93 %-100 %] 100 %  BP: (134-179)/(63-90) 140/73     Weight: 85.8 kg (189 lb 3.2 oz)  Body mass index is 28.77 kg/(m^2).    Physical Exam   Constitutional: She is oriented to person, place, and time. She appears well-developed and well-nourished.   HENT:   Head: Normocephalic and atraumatic.   Right Ear: External ear normal.   Left Ear: External ear normal.   Nose: Nose normal.   Mouth/Throat: Oropharynx is clear and moist.   Eyes: EOM are normal. Pupils are equal, round, and reactive to light.   Neck: Normal range of motion. Neck supple. No JVD present. No tracheal deviation present. No thyromegaly present.   Cardiovascular: Normal rate, normal heart sounds and intact distal pulses.    No murmur heard.  Pulmonary/Chest: Effort normal. No respiratory distress. She has no wheezes. She has no rales. She exhibits no tenderness.   Diffuse rhonchi    Abdominal: Soft. Bowel sounds are normal. She exhibits no distension and no mass. There is no tenderness. There is no rebound and no guarding.   G tube LUQ with dark brown drainage   Musculoskeletal: Normal range of motion. She exhibits no edema or tenderness.   Lymphadenopathy:     She has no cervical adenopathy.   Neurological: She is alert and oriented to person, place, and time. She exhibits abnormal muscle tone.   Cogwheel rigidity    Skin: Skin is warm and dry. No rash noted. No erythema. No pallor.   Psychiatric: She has a normal mood and affect. Her behavior is normal. Judgment and thought content normal.        Significant Labs:   CBC:   Recent Labs  Lab 05/16/17  2030 05/17/17  0425   WBC 15.42* 19.81*   HGB 13.7 12.0   HCT 42.2 38.6    301     CMP:   Recent Labs  Lab 05/16/17  2030 05/17/17  0425    142   K 4.3 4.3    107   CO2 28 26   * 141*   BUN 20 34*   CREATININE 0.8 0.7   CALCIUM 10.2 9.1   PROT 7.5 6.2   ALBUMIN 4.0 3.2*   BILITOT 0.6 0.7   ALKPHOS 85 64   AST 15 12   ALT 6*  10   ANIONGAP 15 9   EGFRNONAA >60 >60     Amylase 69  Significant Imaging:     CT abd and pelvis   A gastrostomy tube is in place.  There is fluid distention of distal esophagus, stomach and the duodenum up to the level of the SMA suggesting SMA syndrome.  Additionally, there is thickening of the walls of the distal esophagus suggesting esophagitis.    Diverticulosis coli.  There is mild thickening of the walls of the distal left colon with surrounding pericolonic inflammatory stranding suggestive for a mild diverticulitis.  No abscess formation or perforation.    Complex appearing cyst of the upper pole of the right kidney.  Consider further evaluation with a renal ultrasound.    Mild thickening of the walls of the urinary bladder which could suggest a cystitis.  Correlation with urinalysis is recommended.    Additional nonemergent findings as above.      CXR   Chronic lung markings are seen bilaterally.  No consolidation or pleural effusions.  The heart is normal in size.  Calcified atheromatous disease affects the aorta.  Age-appropriate degenerative changes affect the skeleton.    Assessment/Plan:     Parkinsons  Sinemet at home with Exelon on hold while NPO    Intestinal obstruction  Ct shows obstruction and diverticulitis  general surgery consulted  Zosyn abx  NPO with IVF  G tube to drainage this am- yellow,  no suction at this time (No n/v)          Sepsis due to Gram negative bacteria  She had temp, elevated HR, elevated RR, elevated WBC with diverticulitis as source of infection. Her bp is stable so not shock. She was started on cipro and flagyl in ER. Reordered this am with blood cultures and lactate level, repeat ordered in 6 hours    She was admitted via ER last night. Sepsis protocol was initated this am after the identification of diverticulitis and poss aspiration. Therefore that is why blood cultures were drawn this am.       Esophageal dysphagia  She was placed NPO at some point and had G tube  placed. She reports that recently she was restarted on puree at NH. Will have speech re evaluate while here.      Aspiration into airway  Change abx to zosyn for broader coverage. Speech to assess swallow.       Sepsis, Gram positive    She had temp, elevated HR, elevated RR, elevated WBC with diverticulitis as source of infection. Her bp is stable so not shock. She was started on cipro and flagyl in ER. Reordered this am with blood cultures and lactate level, repeat ordered in 6 hours    She was admitted via ER last night. Severe sepsis was initated this am after the identification of diverticulitis and poss aspiration. Therefore that is why blood cultures were drawn this am.     Cystitis  Check U/a and culture, not done in ER due to incontinence       VTE Risk Mitigation         Ordered     Place sequential compression device  Until discontinued      05/17/17 0802     Medium Risk of VTE  Once      05/17/17 0050     Place NETTA hose  Until discontinued      05/17/17 0050        Kunal Sandoval MD  Department of Hospital Medicine   [facility]

## 2017-05-17 NOTE — PLAN OF CARE
Problem: Patient Care Overview  Goal: Plan of Care Review  Outcome: Ongoing (interventions implemented as appropriate)  Patient with no nausea or vomiting since admit from ER. Daughter at bedside. Plan of care reviewed with patient and daughter verbalize understanding.

## 2017-05-17 NOTE — ASSESSMENT & PLAN NOTE
Ct shows obstruction and diverticulitis  general surgery consulted  Zosyn abx  NPO with IVF  G tube to drainage this am- yellow,  no suction at this time (No n/v)

## 2017-05-17 NOTE — ASSESSMENT & PLAN NOTE
She had temp, elevated HR, elevated RR, elevated WBC with diverticulitis as source of infection. Her bp is stable so not shock. She was started on cipro and flagyl in ER. Reordered this am with blood cultures and lactate level, repeat ordered in 6 hours    She was admitted via ER last night. Severe sepsis was initated this am after the identification of diverticulitis and poss aspiration. Therefore that is why blood cultures were drawn this am.

## 2017-05-17 NOTE — SUBJECTIVE & OBJECTIVE
Past Medical History:   Diagnosis Date    COPD (chronic obstructive pulmonary disease)     Dementia     Depression     Elevated C-reactive protein (CRP)     Falls frequently     GERD (gastroesophageal reflux disease)     Homocystinuria     Hypertension     Hypopotassemia     Hypothyroidism     Narcolepsy without cataplexy     Parkinson disease     RLS (restless legs syndrome)     Stress incontinence     Venous stasis ulcers        Past Surgical History:   Procedure Laterality Date    CARPAL TUNNEL RELEASE      Right    CHOLECYSTECTOMY      GASTROSTOMY TUBE PLACEMENT      PARTIAL HYSTERECTOMY      ROTATOR CUFF REPAIR      TONSILLECTOMY, ADENOIDECTOMY      tonsiles    TOTAL KNEE ARTHROPLASTY         Review of patient's allergies indicates:   Allergen Reactions    Oxycodone      Hallucination    Sulfa (sulfonamide antibiotics)      Other reaction(s): when on contact       No current facility-administered medications on file prior to encounter.      Current Outpatient Prescriptions on File Prior to Encounter   Medication Sig    aspirin 81 MG Chew Take 1 tablet by mouth Daily.    docusate sodium (COLACE) 100 MG capsule Take 100 mg by mouth once daily.    hydrocodone-acetaminophen 10-325mg (NORCO)  mg Tab Take 1 tablet by mouth every 6 (six) hours as needed.     levothyroxine (SYNTHROID) 75 MCG tablet Take 75 mcg by mouth before breakfast.     potassium chloride (KLOR-CON) 10 MEQ TbSR Take 20 mEq by mouth 2 (two) times daily.     pramipexole (MIRAPEX) 0.25 MG tablet Take 0.25 mg by mouth 3 (three) times daily.    rivastigmine (EXELON) 4.6 mg/24 hour PT24 Place 1 patch onto the skin once daily.      senna (SENOKOT) 8.6 mg tablet Take 1 tablet by mouth once daily.    tramadol (ULTRAM) 50 mg tablet Take 50 mg by mouth every 6 (six) hours as needed for Pain.    trazodone (DESYREL) 50 MG tablet Take 50 mg by mouth every evening.    amphetamine-dextroamphetamine (ADDERALL XR) 20 MG 24 hr  capsule Take 20 mg by mouth every morning.      carbidopa-levodopa  mg (SINEMET)  mg per tablet Take 1 tablet by mouth 5 (five) times daily.     carbidopa-levodopa-entacapone 18.75- mg (STALEVO) 18.75- mg Tab Take 1 tablet by mouth 5 (five) times daily.     Family History     None        Social History Main Topics    Smoking status: Former Smoker    Smokeless tobacco: Not on file    Alcohol use No    Drug use: No    Sexual activity: Not on file     Review of Systems   Constitutional: Positive for fever. Negative for activity change, appetite change, chills, diaphoresis, fatigue and unexpected weight change.   HENT: Negative.  Negative for congestion, dental problem, drooling, ear discharge, ear pain, facial swelling, hearing loss, mouth sores, nosebleeds, postnasal drip, rhinorrhea, sinus pressure, sneezing, sore throat, tinnitus, trouble swallowing and voice change.    Eyes: Negative.  Negative for photophobia, pain, discharge, redness, itching and visual disturbance.   Respiratory: Positive for cough. Negative for apnea, choking, chest tightness, shortness of breath and wheezing.    Cardiovascular: Negative.  Negative for chest pain, palpitations and leg swelling.   Gastrointestinal: Positive for abdominal pain, constipation, nausea and vomiting. Negative for abdominal distention, anal bleeding, blood in stool, diarrhea and rectal pain.   Genitourinary: Negative.  Negative for difficulty urinating, dyspareunia, dysuria, enuresis, flank pain, frequency, hematuria, menstrual problem, pelvic pain, urgency, vaginal bleeding, vaginal discharge and vaginal pain.        Urinary incontinence    Musculoskeletal: Negative.  Negative for arthralgias, back pain, gait problem, joint swelling, myalgias, neck pain and neck stiffness.   Skin: Negative.  Negative for color change, pallor, rash and wound.   Neurological: Positive for tremors, speech difficulty and weakness. Negative for dizziness,  seizures, syncope, facial asymmetry, light-headedness, numbness and headaches.   Hematological: Negative for adenopathy. Does not bruise/bleed easily.   Psychiatric/Behavioral: Negative.  Negative for agitation, behavioral problems, confusion, decreased concentration, dysphoric mood, hallucinations, self-injury, sleep disturbance and suicidal ideas. The patient is not nervous/anxious and is not hyperactive.      Objective:     Vital Signs (Most Recent):  Temp: 98.1 °F (36.7 °C) (05/17/17 0422)  Pulse: 92 (05/17/17 0600)  Resp: 20 (05/17/17 0422)  BP: (!) 140/73 (05/17/17 0422)  SpO2: 100 % (05/17/17 0422) Vital Signs (24h Range):  Temp:  [98.1 °F (36.7 °C)-100.5 °F (38.1 °C)] 98.1 °F (36.7 °C)  Pulse:  [] 92  Resp:  [20-26] 20  SpO2:  [93 %-100 %] 100 %  BP: (134-179)/(63-90) 140/73     Weight: 85.8 kg (189 lb 3.2 oz)  Body mass index is 28.77 kg/(m^2).    Physical Exam   Constitutional: She is oriented to person, place, and time. She appears well-developed and well-nourished.   HENT:   Head: Normocephalic and atraumatic.   Right Ear: External ear normal.   Left Ear: External ear normal.   Nose: Nose normal.   Mouth/Throat: Oropharynx is clear and moist.   Eyes: EOM are normal. Pupils are equal, round, and reactive to light.   Neck: Normal range of motion. Neck supple. No JVD present. No tracheal deviation present. No thyromegaly present.   Cardiovascular: Normal rate, normal heart sounds and intact distal pulses.    No murmur heard.  Pulmonary/Chest: Effort normal. No respiratory distress. She has no wheezes. She has no rales. She exhibits no tenderness.   Diffuse rhonchi    Abdominal: Soft. Bowel sounds are normal. She exhibits no distension and no mass. There is no tenderness. There is no rebound and no guarding.   G tube LUQ with dark brown drainage   Musculoskeletal: Normal range of motion. She exhibits no edema or tenderness.   Lymphadenopathy:     She has no cervical adenopathy.   Neurological: She is  alert and oriented to person, place, and time. She exhibits abnormal muscle tone.   Cogwheel rigidity    Skin: Skin is warm and dry. No rash noted. No erythema. No pallor.   Psychiatric: She has a normal mood and affect. Her behavior is normal. Judgment and thought content normal.        Significant Labs:   CBC:   Recent Labs  Lab 05/16/17  2030 05/17/17  0425   WBC 15.42* 19.81*   HGB 13.7 12.0   HCT 42.2 38.6    301     CMP:   Recent Labs  Lab 05/16/17 2030 05/17/17  0425    142   K 4.3 4.3    107   CO2 28 26   * 141*   BUN 20 34*   CREATININE 0.8 0.7   CALCIUM 10.2 9.1   PROT 7.5 6.2   ALBUMIN 4.0 3.2*   BILITOT 0.6 0.7   ALKPHOS 85 64   AST 15 12   ALT 6* 10   ANIONGAP 15 9   EGFRNONAA >60 >60     Amylase 69  Significant Imaging:     CT abd and pelvis   A gastrostomy tube is in place.  There is fluid distention of distal esophagus, stomach and the duodenum up to the level of the SMA suggesting SMA syndrome.  Additionally, there is thickening of the walls of the distal esophagus suggesting esophagitis.    Diverticulosis coli.  There is mild thickening of the walls of the distal left colon with surrounding pericolonic inflammatory stranding suggestive for a mild diverticulitis.  No abscess formation or perforation.    Complex appearing cyst of the upper pole of the right kidney.  Consider further evaluation with a renal ultrasound.    Mild thickening of the walls of the urinary bladder which could suggest a cystitis.  Correlation with urinalysis is recommended.    Additional nonemergent findings as above.      CXR   Chronic lung markings are seen bilaterally.  No consolidation or pleural effusions.  The heart is normal in size.  Calcified atheromatous disease affects the aorta.  Age-appropriate degenerative changes affect the skeleton.

## 2017-05-17 NOTE — PLAN OF CARE
Problem: Patient Care Overview  Goal: Plan of Care Review  Outcome: Ongoing (interventions implemented as appropriate)  Educate patient/family/cargiver about administration of aerosolized medications via the inhalation route to aid in bronchial hygiene & deliver medications.  Patient educated on use, benefits, & safety of oxygen use. Respiratory treatments just ordered Q6 while awake since pt. Is complaining of shortness of breath. Pt placed on NC @ 2lpm. Sao2 98% on oxygen. Pt has a very weak loose cough, but unable to spit it out. Patricia suctioned the back of the throat when the pt. Coughed and suctioned up moderate amount of thick white secretions. RNRachel notified. Pt. States she feels a little bit better.

## 2017-05-17 NOTE — PT/OT/SLP EVAL
Physical Therapy  Evaluation    Eddy Cunningham   MRN: [id number]   Admitting Diagnosis: <principal problem not specified>    PT Received On: 17  PT Start Time: 1050     PT Stop Time: 1115    PT Total Time (min): 25 min       Billable Minutes:  Evaluation 15 minutes and Therapeutic Exercise 10 minutes    Diagnosis: <principal problem not specified>      Past Medical History:   Diagnosis Date    COPD (chronic obstructive pulmonary disease)     Dementia     Depression     Elevated C-reactive protein (CRP)     Falls frequently     GERD (gastroesophageal reflux disease)     Homocystinuria     Hypertension     Hypopotassemia     Hypothyroidism     Narcolepsy without cataplexy     Parkinson disease     RLS (restless legs syndrome)     Stress incontinence     Venous stasis ulcers       Past Surgical History:   Procedure Laterality Date    CARPAL TUNNEL RELEASE      Right    CHOLECYSTECTOMY      GASTROSTOMY TUBE PLACEMENT      PARTIAL HYSTERECTOMY      ROTATOR CUFF REPAIR      TONSILLECTOMY, ADENOIDECTOMY      tonsiles    TOTAL KNEE ARTHROPLASTY         Referring physician: Lorenza Cantor NP  Date referred to PT: 17    General Precautions: Standard, fall, respiratory  Orthopedic Precautions: N/A     Patient History:  Lives With: facility resident  Equipment Currently Used at Home: wheelchair  DME owned (not currently used): wheelchair    Previous Level of Function:  Ambulation Skills: unable to perform  Transfer Skills: needs device and assist  ADL Skills: unable to perform  Work/Leisure Activity: unable to perform    Subjective:  Communicated with NSG prior to session.    Chief Complaint: Pt with no c/o. Pt with difficulty arousing this AM despite multiple attempts.  Patient goals: Pt unable to state goals.    Pain Ratin/10                    Objective:   Patient found with: peripheral IV, oxygen, SCD     Cognitive Exam:  Oriented to: Person    Follows Commands/attention: Follows  one-step commands  Communication: unable to assess secondary to alertness  Safety awareness/insight to disability: unable to assess secondary to alertness    Physical Exam:  Skin integrity: Visible skin intact  Edema: None noted     Sensation:   Intact    Upper Extremity Range of Motion:  Right Upper Extremity: WFL except shoulder flexion to 90  Left Upper Extremity: WFL except shoulder flexion to 90    Upper Extremity Strength:  Right Upper Extremity: Deficits: grossly 3/5  Left Upper Extremity: Deficits: grossly 3/5    Lower Extremity Range of Motion:  Right Lower Extremity: WFL  Left Lower Extremity: WFL    Lower Extremity Strength:  Right Lower Extremity: Deficits: grossly 2/5  Left Lower Extremity: Deficits: grossly 2/5    Functional Mobility:  Bed Mobility:  Rolling/Turning to Left: Total assistance  Rolling/Turning Right: Total assistance  Scooting/Bridging: Total Assistance  Supine to Sit: Total Assistance  Sit to Supine: Total Assistance    Transfers: Pt unable to T/F upon evaluation       Gait: Pt non-ambulatory prior to pt admit     Balance:   Static Sit: POOR+: Needs MINIMAL assist to maintain  Dynamic Sit: POOR: N/A    Therapeutic Activities and Exercises:  Pt tolerated PROM to BUE/BLE at all joints in available planes of motion with rest breaks as needed to prevent contractures and promote health fluid dynamics.    AM-PAC 6 CLICK MOBILITY  How much help from another person does this patient currently need?   1 = Unable, Total/Dependent Assistance  2 = A lot, Maximum/Moderate Assistance  3 = A little, Minimum/Contact Guard/Supervision  4 = None, Modified Turner/Independent    Turning over in bed (including adjusting bedclothes, sheets and blankets)?: 2  Sitting down on and standing up from a chair with arms (e.g., wheelchair, bedside commode, etc.): 1  Moving from lying on back to sitting on the side of the bed?: 2  Moving to and from a bed to a chair (including a wheelchair)?: 1  Need to walk in  hospital room?: 1  Climbing 3-5 steps with a railing?: 1  Total Score: 8     AM-PAC Raw Score CMS G-Code Modifier Level of Impairment Assistance   6 % Total / Unable   7 - 9 CM 80 - 100% Maximal Assist   10 - 14 CL 60 - 80% Moderate Assist   15 - 19 CK 40 - 60% Moderate Assist   20 - 22 CJ 20 - 40% Minimal Assist   23 CI 1-20% SBA / CGA   24 CH 0% Independent/ Mod I     Patient left supine with all lines intact, call button in reach and NSG notified.    Assessment:       History  Co-morbidities and personal factors that may impact the plan of care Examination  Body Structures and Functions, activity limitations and participation restrictions that may impact the plan of care Clinical Presentation   Decision Making/ Complexity Score   Co-morbidities:   [] Time since onset of injury / illness / exacerbation  [x] Status of current condition  [x]Patient's cognitive status and safety concerns    [x] Multiple Medical Problems (see med hx)  Personal Factors:   [x] Patient's age  [x] Prior Level of function   [] Patient's home situation (environment and family support)  [x] Patient's level of motivation  [x] Expected progression of patient      HISTORY:(criteria)    [] 40130 - no personal factors/history    [] 45546 - has 1-2 personal factor/comorbidity     [x] 69636 - has >3 personal factor/comorbidity     Body Regions:  [x] Objective examination findings  [] Head     []  Neck  [] Trunk   [x] Upper Extremity  [x] Lower Extremity    Body Systems:  [x] For communication ability, affect, cognition, language, and learning style: the assessment of the ability to make needs known, consciousness, orientation (person, place, and time), expected emotional /behavioral responses, and learning preferences (eg, learning barriers, education  needs)  [x] For the neuromuscular system: a general assessment of gross coordinated movement (eg, balance, gait, locomotion, transfers, and transitions) and motor function  (motor control and  motor learning)  [x] For the musculoskeletal system: the assessment of gross symmetry, gross range of motion, gross strength, height, and weight  [] For the integumentary system: the assessment of pliability(texture), presence of scar formation, skin color, and skin integrity  [] For cardiovascular/pulmonary system: the assessment of heart rate, respiratory rate, blood pressure, and edema     Activity limitations:    [x] Patient's cognitive status and saf ety concerns          [x] Status of current condition      [] Weight bearing restriction  [] Cardiopulmunary Restriction    Participation Restrictions:   [] Goals and goal agreement with the patient     [x] Rehab potential (prognosis) and probable outcome      Examination of Body System: (criteria)    [] 66382 - addressing 1-2 elements    [] 64138 - addressing a total of 3 or more elements     [x] 21418 -  Addressing a total of 4 or more elements         Clinical Presentation: (criteria)  Evolving - 49465     On examination of body system using standardized tests and measures patient presents with 4 or more elements from any of the following: body structures and functions, activity limitations, and/or participation restrictions.  Leading to a clinical presentation that is considered evolving with changing characteristics                              Clinical Decision Making  (Eval Complexity):  Moderate - 73184       Eddy Cunningham is a 77 y.o. female with a medical diagnosis of <principal problem not specified> and presents with decreased ROM in BUE. Pt with weakness in all 4 extremities. Pt with decreased alertness and ability to actively participate with PT upon evaluation. Pt requires max assist with all bed mobility skills. Pt unable to T/F upon evaluation. Pt with increased fall risk as well.    Rehab identified problem list/impairments: Rehab identified problem list/impairments: weakness, impaired endurance, impaired self care skills, impaired functional  mobilty, impaired balance, decreased lower extremity function, decreased upper extremity function, decreased safety awareness, decreased coordination, decreased ROM    Rehab potential is fair.    Activity tolerance: Fair    Discharge recommendations: Discharge Facility/Level Of Care Needs: nursing facility, basic     Barriers to discharge: Barriers to Discharge: None    Equipment recommendations: Equipment Needed After Discharge: none     GOALS:   Physical Therapy Goals        Problem: Physical Therapy Goal    Goal Priority Disciplines Outcome Goal Variances Interventions   Physical Therapy Goal     PT/OT, PT      Description:  Goals to be met by: upon D/C from facility     Patient will increase functional independence with mobility by performin. Supine to sit with MInimal Assistance  2. Sit to supine with MInimal Assistance  3. Bed to chair transfer with Maximum Assistance using Rolling Walker                PLAN:    Patient to be seen  (1-2x/day Monday-Friday; PRN Weekends/Holidays) to address the above listed problems via therapeutic activities, therapeutic exercises  Plan of Care expires:  (upon D/C from facility)  Plan of Care reviewed with: patient          Nellie Cy, PT  2017

## 2017-05-18 PROBLEM — E87.6 HYPOKALEMIA: Status: ACTIVE | Noted: 2017-05-18

## 2017-05-18 LAB
ALBUMIN SERPL BCP-MCNC: 3 G/DL
ALP SERPL-CCNC: 52 U/L
ALT SERPL W/O P-5'-P-CCNC: 8 U/L
ANION GAP SERPL CALC-SCNC: 11 MMOL/L
AST SERPL-CCNC: 11 U/L
BACTERIA UR CULT: NORMAL
BACTERIA UR CULT: NORMAL
BASOPHILS # BLD AUTO: 0.04 K/UL
BASOPHILS NFR BLD: 0.5 %
BILIRUB SERPL-MCNC: 0.6 MG/DL
BUN SERPL-MCNC: 24 MG/DL
CALCIUM SERPL-MCNC: 8.7 MG/DL
CHLORIDE SERPL-SCNC: 108 MMOL/L
CO2 SERPL-SCNC: 25 MMOL/L
CREAT SERPL-MCNC: 0.7 MG/DL
DIFFERENTIAL METHOD: ABNORMAL
EOSINOPHIL # BLD AUTO: 0.1 K/UL
EOSINOPHIL NFR BLD: 1.7 %
ERYTHROCYTE [DISTWIDTH] IN BLOOD BY AUTOMATED COUNT: 13.1 %
EST. GFR  (AFRICAN AMERICAN): >60 ML/MIN/1.73 M^2
EST. GFR  (NON AFRICAN AMERICAN): >60 ML/MIN/1.73 M^2
GLUCOSE SERPL-MCNC: 109 MG/DL
HCT VFR BLD AUTO: 33.5 %
HGB BLD-MCNC: 10.4 G/DL
LYMPHOCYTES # BLD AUTO: 1.1 K/UL
LYMPHOCYTES NFR BLD: 14.8 %
MCH RBC QN AUTO: 29.5 PG
MCHC RBC AUTO-ENTMCNC: 31 %
MCV RBC AUTO: 95 FL
MONOCYTES # BLD AUTO: 0.6 K/UL
MONOCYTES NFR BLD: 8.3 %
NEUTROPHILS # BLD AUTO: 5.6 K/UL
NEUTROPHILS NFR BLD: 74.7 %
PLATELET # BLD AUTO: 248 K/UL
PMV BLD AUTO: 9.9 FL
POTASSIUM SERPL-SCNC: 3.1 MMOL/L
PROT SERPL-MCNC: 5.6 G/DL
RBC # BLD AUTO: 3.53 M/UL
SODIUM SERPL-SCNC: 144 MMOL/L
WBC # BLD AUTO: 7.45 K/UL

## 2017-05-18 PROCEDURE — 94640 AIRWAY INHALATION TREATMENT: CPT

## 2017-05-18 PROCEDURE — 85025 COMPLETE CBC W/AUTO DIFF WBC: CPT

## 2017-05-18 PROCEDURE — 99900035 HC TECH TIME PER 15 MIN (STAT)

## 2017-05-18 PROCEDURE — 96366 THER/PROPH/DIAG IV INF ADDON: CPT

## 2017-05-18 PROCEDURE — 27000221 HC OXYGEN, UP TO 24 HOURS

## 2017-05-18 PROCEDURE — 11000001 HC ACUTE MED/SURG PRIVATE ROOM

## 2017-05-18 PROCEDURE — 25000003 PHARM REV CODE 250: Performed by: NURSE PRACTITIONER

## 2017-05-18 PROCEDURE — 92526 ORAL FUNCTION THERAPY: CPT

## 2017-05-18 PROCEDURE — 97110 THERAPEUTIC EXERCISES: CPT

## 2017-05-18 PROCEDURE — 36415 COLL VENOUS BLD VENIPUNCTURE: CPT

## 2017-05-18 PROCEDURE — 27000339 *HC DAILY SUPPLY KIT

## 2017-05-18 PROCEDURE — 25000003 PHARM REV CODE 250: Performed by: INTERNAL MEDICINE

## 2017-05-18 PROCEDURE — 25000003 PHARM REV CODE 250: Performed by: FAMILY MEDICINE

## 2017-05-18 PROCEDURE — 63600175 PHARM REV CODE 636 W HCPCS: Performed by: NURSE PRACTITIONER

## 2017-05-18 PROCEDURE — 99233 SBSQ HOSP IP/OBS HIGH 50: CPT | Mod: ,,, | Performed by: FAMILY MEDICINE

## 2017-05-18 PROCEDURE — 96361 HYDRATE IV INFUSION ADD-ON: CPT

## 2017-05-18 PROCEDURE — 27200120 HC KIT IV START (RUSH ONLY)

## 2017-05-18 PROCEDURE — 80053 COMPREHEN METABOLIC PANEL: CPT

## 2017-05-18 PROCEDURE — 63600175 PHARM REV CODE 636 W HCPCS: Performed by: EMERGENCY MEDICINE

## 2017-05-18 PROCEDURE — 94761 N-INVAS EAR/PLS OXIMETRY MLT: CPT

## 2017-05-18 RX ORDER — POTASSIUM CHLORIDE 14.9 MG/ML
40 INJECTION INTRAVENOUS ONCE
Status: COMPLETED | OUTPATIENT
Start: 2017-05-18 | End: 2017-05-18

## 2017-05-18 RX ADMIN — MORPHINE SULFATE 2 MG: 2 INJECTION, SOLUTION INTRAMUSCULAR; INTRAVENOUS at 05:05

## 2017-05-18 RX ADMIN — DEXTROSE AND SODIUM CHLORIDE: 5; .9 INJECTION, SOLUTION INTRAVENOUS at 12:05

## 2017-05-18 RX ADMIN — MORPHINE SULFATE 2 MG: 2 INJECTION, SOLUTION INTRAMUSCULAR; INTRAVENOUS at 12:05

## 2017-05-18 RX ADMIN — LEVALBUTEROL 1.25 MG: 1.25 SOLUTION, CONCENTRATE RESPIRATORY (INHALATION) at 01:05

## 2017-05-18 RX ADMIN — POTASSIUM CHLORIDE 40 MEQ: 200 INJECTION, SOLUTION INTRAVENOUS at 09:05

## 2017-05-18 RX ADMIN — LEVALBUTEROL 1.25 MG: 1.25 SOLUTION, CONCENTRATE RESPIRATORY (INHALATION) at 07:05

## 2017-05-18 RX ADMIN — DEXTROSE AND SODIUM CHLORIDE: 5; .9 INJECTION, SOLUTION INTRAVENOUS at 09:05

## 2017-05-18 RX ADMIN — PIPERACILLIN AND TAZOBACTAM 4.5 G: 4; .5 INJECTION, POWDER, LYOPHILIZED, FOR SOLUTION INTRAVENOUS; PARENTERAL at 01:05

## 2017-05-18 RX ADMIN — PIPERACILLIN AND TAZOBACTAM 4.5 G: 4; .5 INJECTION, POWDER, LYOPHILIZED, FOR SOLUTION INTRAVENOUS; PARENTERAL at 05:05

## 2017-05-18 RX ADMIN — PIPERACILLIN AND TAZOBACTAM 4.5 G: 4; .5 INJECTION, POWDER, LYOPHILIZED, FOR SOLUTION INTRAVENOUS; PARENTERAL at 09:05

## 2017-05-18 NOTE — ASSESSMENT & PLAN NOTE
She was placed NPO at some point and had G tube placed. She reports that recently she was restarted on puree at NH. Will have speech re evaluate while here. NPO per baljinder's rec for now, will cont to follow and reassess daily. If KUB okay, BM passes, may consider trying to tube feed and check residuals.

## 2017-05-18 NOTE — ASSESSMENT & PLAN NOTE
Ct shows obstruction and diverticulitis  general surgery consulted- no mechanical obstruction  Zosyn abx - to cover diverticulitis, cystitis and pna. Improving  NPO with IVF for now. Will have ST following.  G tube to drainage this am- yellow,  no suction at this time (No n/v/abd pain) ?SMA syndrome. Recheck KUB  Still with a lot of drainage. Clamp and see how things go. If she has n/v/abd pain will replace to gravity. LBM 5/14/17

## 2017-05-18 NOTE — ASSESSMENT & PLAN NOTE
She had temp, elevated HR, elevated RR, elevated WBC with diverticulitis as source of infection. Her bp is stable so not shock. She was started on cipro and flagyl in ER. Changed to zosyn for better coverage of poss asp pneumonia (CXR okay), UTI and abd process    She was admitted via ER 5/16. Sepsis protocol was initated after the identification of diverticulitis and poss aspiration. Therefore that is why blood cultures were drawn 5/17 am.

## 2017-05-18 NOTE — SUBJECTIVE & OBJECTIVE
Interval note: She seems better today. Less gargling. No acute distress. Afebrile. Resolved elevated WBC. Still draining a lot from g tube for NPO status. No abd pain or nausea complaints    Review of Systems   Constitutional: Negative for chills and fever.   HENT: Negative for congestion, ear pain, postnasal drip, rhinorrhea, sore throat and trouble swallowing.    Eyes: Negative for redness and itching.   Respiratory: Positive for cough. Negative for shortness of breath and wheezing.    Cardiovascular: Negative for chest pain and palpitations.   Gastrointestinal: Positive for abdominal pain, constipation and nausea. Negative for diarrhea and vomiting.   Genitourinary: Negative for dysuria and frequency.   Skin: Negative for rash.   Neurological: Positive for tremors and speech difficulty. Negative for weakness and headaches.     Objective:     Vital Signs (Most Recent):  Temp: 97.6 °F (36.4 °C) (05/18/17 0702)  Pulse: 78 (05/18/17 0709)  Resp: 18 (05/18/17 0709)  BP: (!) 150/65 (05/18/17 0702)  SpO2: 98 % (05/18/17 0709) Vital Signs (24h Range):  Temp:  [97.6 °F (36.4 °C)-98.6 °F (37 °C)] 97.6 °F (36.4 °C)  Pulse:  [] 78  Resp:  [18-20] 18  SpO2:  [85 %-99 %] 98 %  BP: (111-150)/(56-77) 150/65     Weight: 85.8 kg (189 lb 3.2 oz)  Body mass index is 28.77 kg/(m^2).    Physical Exam   Constitutional: She is oriented to person, place, and time. She appears well-developed. No distress.   HENT:   Head: Normocephalic and atraumatic.   Nose: Nose normal.   Mouth/Throat: Oropharynx is clear and moist.   NC in place   Eyes: EOM are normal. Pupils are equal, round, and reactive to light.   Neck: Normal range of motion. Neck supple.   Cardiovascular: Normal rate, normal heart sounds and intact distal pulses.    No murmur heard.  Pulmonary/Chest: Effort normal. No respiratory distress. She has no wheezes. She has no rales. She exhibits no tenderness.   Diffuse rhonchi    Abdominal: Soft. Bowel sounds are normal. She  exhibits no distension and no mass. There is no tenderness. There is no rebound and no guarding.   G tube LUQ with yellowish green drainage   Musculoskeletal: Normal range of motion. She exhibits no edema or tenderness.   Neurological: She is alert and oriented to person, place, and time. She exhibits abnormal muscle tone.   Cogwheel rigidity   Baseline resting tremor   Skin: Skin is warm and dry. No rash noted. No erythema. No pallor.   Psychiatric: She has a normal mood and affect. Her behavior is normal. Judgment and thought content normal.        Significant Labs:   CBC:     Recent Labs  Lab 05/16/17 2030 05/17/17 0425 05/18/17 0512   WBC 15.42* 19.81* 7.45   HGB 13.7 12.0 10.4*   HCT 42.2 38.6 33.5*    301 248     CMP:     Recent Labs  Lab 05/16/17 2030 05/17/17 0425 05/18/17 0512    142 144   K 4.3 4.3 3.1*    107 108   CO2 28 26 25   * 141* 109   BUN 20 34* 24*   CREATININE 0.8 0.7 0.7   CALCIUM 10.2 9.1 8.7   PROT 7.5 6.2 5.6*   ALBUMIN 4.0 3.2* 3.0*   BILITOT 0.6 0.7 0.6   ALKPHOS 85 64 52*   AST 15 12 11   ALT 6* 10 8*   ANIONGAP 15 9 11   EGFRNONAA >60 >60 >60     Amylase 69  Significant Imaging:     CT abd and pelvis   A gastrostomy tube is in place.  There is fluid distention of distal esophagus, stomach and the duodenum up to the level of the SMA suggesting SMA syndrome.  Additionally, there is thickening of the walls of the distal esophagus suggesting esophagitis.    Diverticulosis coli.  There is mild thickening of the walls of the distal left colon with surrounding pericolonic inflammatory stranding suggestive for a mild diverticulitis.  No abscess formation or perforation.    Complex appearing cyst of the upper pole of the right kidney.  Consider further evaluation with a renal ultrasound.    Mild thickening of the walls of the urinary bladder which could suggest a cystitis.  Correlation with urinalysis is recommended.    Additional nonemergent findings as  above.      CXR   Chronic lung markings are seen bilaterally.  No consolidation or pleural effusions.  The heart is normal in size.  Calcified atheromatous disease affects the aorta.  Age-appropriate degenerative changes affect the skeleton.    Repeat cxr today

## 2017-05-18 NOTE — PLAN OF CARE
Problem: Patient Care Overview  Goal: Plan of Care Review  Outcome: Ongoing (interventions implemented as appropriate)  Fairly quiet shift. Pneumonia and bowel obstruction ruled out. Tolerating IV fluids and IVPB antibiotics. Turned every 2 hours. Call bell in reach. Plan of care discussed during shift and verbalizes understanding. RAMIREZ Wong RN

## 2017-05-18 NOTE — ASSESSMENT & PLAN NOTE
Sinemet at home on hold while NPO. exelon patch in place  Hopefully as she gets better we can restart at least meds via g tube

## 2017-05-18 NOTE — PLAN OF CARE
Discussed plan of care with Dr Marvin and patient. Patient will transition back to nursing home when course of treatment complete.

## 2017-05-18 NOTE — PT/OT/SLP PROGRESS
Physical Therapy  Treatment    Eddy Cunningham   MRN: [id number]   Admitting Diagnosis: <principal problem not specified>    PT Received On: 17  PT Start Time: 1042     PT Stop Time: 1107    PT Total Time (min): 25 min       Billable Minutes:  Therapeutic Exercise 25 minutes    Treatment Type: Treatment  PT/PTA: PT             General Precautions: Standard, fall  Orthopedic Precautions: N/A   Braces: N/A         Subjective:  Communicated with patient prior to session.  Minimal patient verbalization this a.m.    Pain Ratin/10              Pain Rating Post-Intervention: 0/10    Objective:   Patient found with: peripheral IV, oxygen, SCD    Functional Mobility:  Bed Mobility:   Rolling/Turning to Left: Total assistance  Rolling/Turning Right: Total assistance  Scooting/Bridging: Total Assistance              Therapeutic Activities and Exercises:  There. Ex.:  Pt. Performed gentle A/A exercises of all 4 extemities, while supine in bed, for N.M. Tone and strength and fluid dynamics. 1-on-1 BLE HC stretches performed with pt.  Bed mobility tasks performed with assistance as noted.  BLE: ankle pumps, heel slides, hip abd/add (4x15) performed with pt.     AM-PAC 6 CLICK MOBILITY  How much help from another person does this patient currently need?   1 = Unable, Total/Dependent Assistance  2 = A lot, Maximum/Moderate Assistance  3 = A little, Minimum/Contact Guard/Supervision  4 = None, Modified [place]/Independent    Turning over in bed (including adjusting bedclothes, sheets and blankets)?: 1  Sitting down on and standing up from a chair with arms (e.g., wheelchair, bedside commode, etc.): 1  Moving from lying on back to sitting on the side of the bed?: 1  Moving to and from a bed to a chair (including a wheelchair)?: 1  Need to walk in hospital room?: 1  Climbing 3-5 steps with a railing?: 1  Total Score: 6    AM-PAC Raw Score CMS G-Code Modifier Level of Impairment Assistance   6 % Total / Unable   7  - 9 CM 80 - 100% Maximal Assist   10 - 14 CL 60 - 80% Moderate Assist   15 - 19 CK 40 - 60% Moderate Assist   20 - 22 CJ 20 - 40% Minimal Assist   23 CI 1-20% SBA / CGA   24 CH 0% Independent/ Mod I     Patient left HOB elevated with all lines intact, call button in reach, RN notified and family present.    Assessment:  Eddy Cunningham is a 77 y.o. female with a medical diagnosis of <principal problem not specified> and presents with decreased endurance and mobility deficits.  Pt. Is progressing towards PT POC goals.    Rehab identified problem list/impairments: Rehab identified problem list/impairments: weakness, impaired balance, impaired endurance, impaired functional mobilty, gait instability, decreased lower extremity function, decreased safety awareness, impaired self care skills, decreased upper extremity function    Rehab potential is fair.    Activity tolerance: Fair    Discharge recommendations: Discharge Facility/Level Of Care Needs: nursing facility, basic     Barriers to discharge: Barriers to Discharge: None    Equipment recommendations: Equipment Needed After Discharge: none     GOALS:   Physical Therapy Goals        Problem: Physical Therapy Goal    Goal Priority Disciplines Outcome Goal Variances Interventions   Physical Therapy Goal     PT/OT, PT      Description:  Goals to be met by: upon D/C from facility     Patient will increase functional independence with mobility by performin. Supine to sit with MInimal Assistance  2. Sit to supine with MInimal Assistance  3. Bed to chair transfer with Maximum Assistance using Rolling Walker                PLAN:    Patient to be seen  (1-2x/day(M-F); PRN(Sat.,Sun.))  to address the above listed problems via therapeutic activities, therapeutic exercises  Plan of Care expires:  (upon D/C from facility)  Plan of Care reviewed with: patient         Karlo Gonsalez, PT  2017

## 2017-05-18 NOTE — PLAN OF CARE
Problem: Patient Care Overview  Goal: Plan of Care Review  Outcome: Ongoing (interventions implemented as appropriate)  Pt admitted from NH after vomiting and abdominal pain. Probable SBO. GTube clamped today; drained 1700cc yesterday to gravity. Pt tolerating clamped tube well; denies nausea, vomiting or abdominal pain. Passing gas; abdomen soft and non tender. Pt had elevated WBC and febrile on admit. WBC negative and afebrile today. Breath sounds remain coarse; but improved from admit. Oxygen sats remain upper 90's on room air. Speech therapy working with pt for possible aspiration pneumonia. Remains NPO at this time; IV fluids @ 100cc/hr. Zosyn continued. Physical therapy consulted. Turning patient every 2 hours. Sinus rhythm on telemetry. Potassium rider today for 3.1 on morning labs. Call bell within reach. Bed in low, locked position. Reviewed plan of care with pt and daughters, state agreement.

## 2017-05-18 NOTE — PT/OT/SLP PROGRESS
Speech Language Pathology  Treatment    Eddy Cunningham   MRN: [id number]   Admitting Diagnosis: <principal problem not specified>    Diet recommendations: Solid Diet Level: NPO  Liquid Diet Level: NPO     SLP Treatment Date: 05/17/17  Speech Start Time: 0958     Speech Stop Time: 1014     Speech Total (min): 16 min       TREATMENT BILLABLE MINUTES:  Treatment Swallowing Dysfunction 16 minutes    Has the patient been evaluated by SLP for swallowing? : Yes  Keep patient NPO?: Yes   General Precautions: Standard, aspiration, fall          Subjective:  Pt was more alert today with her eyes open and participating in conversation     Objective:   Pt with very weak, breathy vocal quality though dry sounding.  Pt achieved improved phonation with minimal cueing/model.   Pt took a small amount of pudding from the tip of the teaspoon.  She demonstrated a very delayed swallow with a notable vocal quality change immediately after/louder and wet.      Assessment:  Eddy Cunningham is a 77 y.o. female with a medical diagnosis of <principal problem not specified> and presents with continued signs of aspiration with PO trials.  Recommend continuation of NPO at this time and assess tomorrow.     Discharge recommendations: Discharge Facility/Level Of Care Needs: nursing facility, basic     Goals:   SLP Goals        Problem: SLP Goal    Goal Priority Disciplines Outcome   SLP Goal     SLP Ongoing (interventions implemented as appropriate)   Description:    1) Swallow assessment is ongoing  2) Will complete MBSS once Pt more appropriate, with a better chance for success.                  Plan:   Patient to be seen Therapy Frequency: 2 x/week, 3 x/week   Plan of Care expires: 05/31/17  Plan of Care reviewed with: patient, daughter  SLP Follow-up?: Yes  SLP - Next Visit Date: 05/19/17      ZACHARIAH Mojica, CCC-SLP  5/18/2017

## 2017-05-18 NOTE — H&P
Ms. Cunningham was admitted with possible sepsis.  Dr. Pratt saw her.  She   appears to be improving.  She had a large amount of G tube output, but her abdomen   is now soft.  The G tube was clamped this morning.  It appears that she may have had   a urinary tract infection as on CT scan, she had a thickened bladder as well as   some thickened diverticular area, so she is being treated for either one of   these.  Her white count has come back down to normal.  Subjectively, she has no   real complaints at this time.    PHYSICAL EXAMINATION:  Her abdomen is soft.  The gastrostomy tube is clamped.    There is no real tenderness to her examination.    White count is down to 7.45, but initially it was 19,000 yesterday.  Hematocrit   came down from 42 to 33, but this is probably related to dilution, for which she   is probably somewhat dehydrated because of the presenting problem.    I would advance her diet maybe tomorrow.  She tolerates clamping today.  We will   follow along.  If we need to be reconsulted, we can come back and see her   tomorrow or the next day, but at this point, she progresses well.  I think her   most likely problem was a UTI, which is being treated with antibiotics   appropriately.  She needs no surgical therapy at this time.      BGL/HN  dd: 05/18/2017 10:23:57 (CDT)  td: 05/18/2017 10:57:46 (CDT)  Doc ID   #[id number]  Job ID #[id number]    CC:

## 2017-05-18 NOTE — ASSESSMENT & PLAN NOTE
Check U/a 30WBC and 1+ leuko and culture, not done in ER due to incontinence   Collected 5/17  On zosyn

## 2017-05-18 NOTE — ASSESSMENT & PLAN NOTE
She had temp, elevated HR, elevated RR, elevated WBC with diverticulitis as source of infection. Her bp is stable so not shock. She was started on cipro and flagyl in ER. Changed to zosyn for better coverage of poss asp pneumonia, UTI and abd process    She was admitted via ER last night. Severe sepsis was initated this am after the identification of diverticulitis and poss aspiration. Therefore that is why blood cultures were drawn 5/17 am.

## 2017-05-18 NOTE — PROGRESS NOTES
[facility] Hospital Medicine  Progress Note    Patient Name: Eddy Cunningham  MRN: [id number]  Patient Class: IP- Inpatient   Admission Date: 5/16/2017  Length of Stay: 0 days  Attending Physician: Hayder Khalil MD  Primary Care Provider: Ayaka Pollard MD        Subjective:     Principal Problem:<principal problem not specified>    HPI:  Pt presented to ER last night with c/o vomiting, fever and abd pain since yesterday. She is a resident of [facility]. She reports that she recently started on puree diet at nursing home. Has g tube. Last meal was yesterday. She has her daughter at bedside and reports that she asked for something Sunday to settle her stomach. She also reports that she has terrible constipation. She can not remember her last BM (daughter says Sunday). She had CT in ER which shows obstruction and diverticulitis. She was given cipro and flagyl in ER. She moved up to floor 5/16. Still febrile. BP stable on IVF. WBC increased [id number]>[id number]. Lactate and blood cultures pending    She has slightly elevated RR and audible congestion with breathing. Concern for aspiration. CXR looks ok but portable.       Hospital Course:  5/18  Pt did well over the day yesterday. Remained with g tube to gravity with 1700ml drainage over the day. Surgery saw her and did not think that she had mechanical bowel obstruction and daughter came yesterday reporting a large BM on Sunday. ++ gas when up to bedside commode. NPO status remains as speech saw her yesterday and patient was witnessed choking on her syliva. Meds on hold. Has IVF. VSS- afebrile    She had elevated WBC and fever on admission. WBC much better today 16572>>7000. With aspiration and audible gargling we started treating her for poss aspiration pneumonia with zosyn day 2. This will also cross cover for UTI, noted on CT and dirty U/a. Culture in progress. Blood cultures drawn and pending. Lactate was normal. She is 95% pox on 2L NC. Has  nebs scheduled every 6hr. Repeat cxr ordered for today. Admit CXR without consolidation.     She is able to transfer to chair at NH. PT started yesterday to prevent further debility      Interval note: She seems better today. Less gargling. No acute distress. Afebrile. Resolved elevated WBC. Still draining a lot from g tube for NPO status. No abd pain or nausea complaints    Review of Systems   Constitutional: Negative for chills and fever.   HENT: Negative for congestion, ear pain, postnasal drip, rhinorrhea, sore throat and trouble swallowing.    Eyes: Negative for redness and itching.   Respiratory: Positive for cough. Negative for shortness of breath and wheezing.    Cardiovascular: Negative for chest pain and palpitations.   Gastrointestinal: Positive for abdominal pain, constipation and nausea. Negative for diarrhea and vomiting.   Genitourinary: Negative for dysuria and frequency.   Skin: Negative for rash.   Neurological: Positive for tremors and speech difficulty. Negative for weakness and headaches.     Objective:     Vital Signs (Most Recent):  Temp: 97.6 °F (36.4 °C) (05/18/17 0702)  Pulse: 78 (05/18/17 0709)  Resp: 18 (05/18/17 0709)  BP: (!) 150/65 (05/18/17 0702)  SpO2: 98 % (05/18/17 0709) Vital Signs (24h Range):  Temp:  [97.6 °F (36.4 °C)-98.6 °F (37 °C)] 97.6 °F (36.4 °C)  Pulse:  [] 78  Resp:  [18-20] 18  SpO2:  [85 %-99 %] 98 %  BP: (111-150)/(56-77) 150/65     Weight: 85.8 kg (189 lb 3.2 oz)  Body mass index is 28.77 kg/(m^2).    Physical Exam   Constitutional: She is oriented to person, place, and time. She appears well-developed. No distress.   HENT:   Head: Normocephalic and atraumatic.   Nose: Nose normal.   Mouth/Throat: Oropharynx is clear and moist.   NC in place   Eyes: EOM are normal. Pupils are equal, round, and reactive to light.   Neck: Normal range of motion. Neck supple.   Cardiovascular: Normal rate, normal heart sounds and intact distal pulses.    No murmur  heard.  Pulmonary/Chest: Effort normal. No respiratory distress. She has no wheezes. She has no rales. She exhibits no tenderness.   Diffuse rhonchi    Abdominal: Soft. Bowel sounds are normal. She exhibits no distension and no mass. There is no tenderness. There is no rebound and no guarding.   G tube LUQ with yellowish green drainage   Musculoskeletal: Normal range of motion. She exhibits no edema or tenderness.   Neurological: She is alert and oriented to person, place, and time. She exhibits abnormal muscle tone.   Cogwheel rigidity   Baseline resting tremor   Skin: Skin is warm and dry. No rash noted. No erythema. No pallor.   Psychiatric: She has a normal mood and affect. Her behavior is normal. Judgment and thought content normal.        Significant Labs:   CBC:     Recent Labs  Lab 05/16/17 2030 05/17/17 0425 05/18/17 0512   WBC 15.42* 19.81* 7.45   HGB 13.7 12.0 10.4*   HCT 42.2 38.6 33.5*    301 248     CMP:     Recent Labs  Lab 05/16/17 2030 05/17/17 0425 05/18/17 0512    142 144   K 4.3 4.3 3.1*    107 108   CO2 28 26 25   * 141* 109   BUN 20 34* 24*   CREATININE 0.8 0.7 0.7   CALCIUM 10.2 9.1 8.7   PROT 7.5 6.2 5.6*   ALBUMIN 4.0 3.2* 3.0*   BILITOT 0.6 0.7 0.6   ALKPHOS 85 64 52*   AST 15 12 11   ALT 6* 10 8*   ANIONGAP 15 9 11   EGFRNONAA >60 >60 >60     Amylase 69  Significant Imaging:     CT abd and pelvis   A gastrostomy tube is in place.  There is fluid distention of distal esophagus, stomach and the duodenum up to the level of the SMA suggesting SMA syndrome.  Additionally, there is thickening of the walls of the distal esophagus suggesting esophagitis.    Diverticulosis coli.  There is mild thickening of the walls of the distal left colon with surrounding pericolonic inflammatory stranding suggestive for a mild diverticulitis.  No abscess formation or perforation.    Complex appearing cyst of the upper pole of the right kidney.  Consider further evaluation with a  renal ultrasound.    Mild thickening of the walls of the urinary bladder which could suggest a cystitis.  Correlation with urinalysis is recommended.    Additional nonemergent findings as above.      CXR   Chronic lung markings are seen bilaterally.  No consolidation or pleural effusions.  The heart is normal in size.  Calcified atheromatous disease affects the aorta.  Age-appropriate degenerative changes affect the skeleton.    Repeat cxr today    Assessment/Plan:      Parkinsons  Sinemet at home on hold while NPO. exelon patch in place  Hopefully as she gets better we can restart at least meds via g tube    Intestinal obstruction  Ct shows obstruction and diverticulitis  general surgery consulted- no mechanical obstruction  Zosyn abx - to cover diverticulitis, cystitis and pna. Improving  NPO with IVF for now. Will have ST following.  G tube to drainage this am- yellow,  no suction at this time (No n/v/abd pain) ?SMA syndrome. Recheck KUB  Still with a lot of drainage. Clamp and see how things go. If she has n/v/abd pain will replace to gravity. LBM 5/14/17          Sepsis due to Gram negative bacteria  She had temp, elevated HR, elevated RR, elevated WBC with diverticulitis as source of infection. Her bp is stable so not shock. She was started on cipro and flagyl in ER. Changed to zosyn for better coverage of poss asp pneumonia (CXR okay), UTI and abd process    She was admitted via ER 5/16. Sepsis protocol was initated after the identification of diverticulitis and poss aspiration. Therefore that is why blood cultures were drawn 5/17 am.       Esophageal dysphagia  She was placed NPO at some point and had G tube placed. She reports that recently she was restarted on puree at NH. Will have speech re evaluate while here. NPO per baljinder's rec for now, will cont to follow and reassess daily. If KUB okay, BM passes, may consider trying to tube feed and check residuals.      Aspiration into airway  Change abx to  zosyn for broader coverage. Speech to assess swallow. NPO per recs. Will cont to follow daily and make changes as needed.       Sepsis, Gram positive  She had temp, elevated HR, elevated RR, elevated WBC with diverticulitis as source of infection. Her bp is stable so not shock. She was started on cipro and flagyl in ER. Changed to zosyn for better coverage of poss asp pneumonia, UTI and abd process    She was admitted via ER last night. Severe sepsis was initated this am after the identification of diverticulitis and poss aspiration. Therefore that is why blood cultures were drawn 5/17 am.     acute Cystitis  Check U/a 30WBC and 1+ leuko and culture, not done in ER due to incontinence   Collected 5/17  On zosyn      Hypokalemia  Potassium rider today      Insomnia, resolved as of 5/18/2017        VTE Risk Mitigation         Ordered     Place sequential compression device  Until discontinued      05/17/17 0802     Medium Risk of VTE  Once      05/17/17 0050     Place NETTA hose  Until discontinued      05/17/17 0050          Tessy Marvin MD  Department of Hospital Medicine   [facility]

## 2017-05-18 NOTE — PT/OT/SLP PROGRESS
Physical Therapy      Eddy Cunningham  MRN: [id number]    Patient not seen 5/18/2017 p.m. secondary to Nursing care. Will follow-up 5/19/2017.    Karlo Gonsalez, PT

## 2017-05-18 NOTE — ASSESSMENT & PLAN NOTE
Change abx to zosyn for broader coverage. Speech to assess swallow. NPO per recs. Will cont to follow daily and make changes as needed.

## 2017-05-18 NOTE — NURSING
Report received and assessment completed.awake and alert and able to communicate with difficulty secondary to constant clearing of throat. HOB elevated and oral suction at bedside. Oriented to person and place and not time and reoriented. G tube to drainage bag with green/yellow drainage. BBS with coarse sounds and rhonchi with exp wheezes right  lower lobe. IV patent to right arm and IVPB abs. and tolerating well. O2 per N/C at 2l/min with sat 94%. NPO. Plan of care reviewed with patient and verbalizes understanding. Will need reinforcements.  Call bell in reach and bed alarm in use RAMIREZ Wong RN

## 2017-05-19 PROBLEM — A41.89 SEPSIS, GRAM POSITIVE: Status: RESOLVED | Noted: 2017-05-17 | Resolved: 2017-05-19

## 2017-05-19 PROBLEM — K59.09 CHRONIC CONSTIPATION: Status: ACTIVE | Noted: 2017-05-19

## 2017-05-19 PROBLEM — R53.2 FUNCTIONAL QUADRIPLEGIA: Status: ACTIVE | Noted: 2017-05-19

## 2017-05-19 PROBLEM — K57.32 DIVERTICULITIS OF LARGE INTESTINE WITHOUT PERFORATION OR ABSCESS WITHOUT BLEEDING: Status: ACTIVE | Noted: 2017-05-16

## 2017-05-19 PROBLEM — N30.90 CYSTITIS: Status: RESOLVED | Noted: 2017-05-17 | Resolved: 2017-05-19

## 2017-05-19 LAB
ALBUMIN SERPL BCP-MCNC: 2.9 G/DL
ALP SERPL-CCNC: 47 U/L
ALT SERPL W/O P-5'-P-CCNC: 9 U/L
ANION GAP SERPL CALC-SCNC: 8 MMOL/L
AST SERPL-CCNC: 11 U/L
BASOPHILS # BLD AUTO: 0.04 K/UL
BASOPHILS NFR BLD: 0.7 %
BILIRUB SERPL-MCNC: 0.7 MG/DL
BUN SERPL-MCNC: 15 MG/DL
CALCIUM SERPL-MCNC: 8.3 MG/DL
CHLORIDE SERPL-SCNC: 110 MMOL/L
CO2 SERPL-SCNC: 25 MMOL/L
CREAT SERPL-MCNC: 0.6 MG/DL
DIFFERENTIAL METHOD: ABNORMAL
EOSINOPHIL # BLD AUTO: 0.2 K/UL
EOSINOPHIL NFR BLD: 2.9 %
ERYTHROCYTE [DISTWIDTH] IN BLOOD BY AUTOMATED COUNT: 12.7 %
EST. GFR  (AFRICAN AMERICAN): >60 ML/MIN/1.73 M^2
EST. GFR  (NON AFRICAN AMERICAN): >60 ML/MIN/1.73 M^2
GLUCOSE SERPL-MCNC: 108 MG/DL
HCT VFR BLD AUTO: 30.9 %
HGB BLD-MCNC: 9.8 G/DL
LYMPHOCYTES # BLD AUTO: 0.9 K/UL
LYMPHOCYTES NFR BLD: 15.7 %
MAGNESIUM SERPL-MCNC: 1.7 MG/DL
MCH RBC QN AUTO: 29.8 PG
MCHC RBC AUTO-ENTMCNC: 31.7 %
MCV RBC AUTO: 94 FL
MONOCYTES # BLD AUTO: 0.6 K/UL
MONOCYTES NFR BLD: 11.4 %
NEUTROPHILS # BLD AUTO: 3.8 K/UL
NEUTROPHILS NFR BLD: 69.3 %
OB PNL STL: POSITIVE
PLATELET # BLD AUTO: 216 K/UL
PMV BLD AUTO: 9.9 FL
POTASSIUM SERPL-SCNC: 3.2 MMOL/L
PROT SERPL-MCNC: 5.4 G/DL
RBC # BLD AUTO: 3.29 M/UL
SODIUM SERPL-SCNC: 143 MMOL/L
WBC # BLD AUTO: 5.43 K/UL

## 2017-05-19 PROCEDURE — 27000221 HC OXYGEN, UP TO 24 HOURS

## 2017-05-19 PROCEDURE — 82272 OCCULT BLD FECES 1-3 TESTS: CPT

## 2017-05-19 PROCEDURE — 27000339 *HC DAILY SUPPLY KIT

## 2017-05-19 PROCEDURE — 25000003 PHARM REV CODE 250: Performed by: NURSE PRACTITIONER

## 2017-05-19 PROCEDURE — 36415 COLL VENOUS BLD VENIPUNCTURE: CPT

## 2017-05-19 PROCEDURE — 25000003 PHARM REV CODE 250: Performed by: INTERNAL MEDICINE

## 2017-05-19 PROCEDURE — 25000003 PHARM REV CODE 250: Performed by: FAMILY MEDICINE

## 2017-05-19 PROCEDURE — 99232 SBSQ HOSP IP/OBS MODERATE 35: CPT | Mod: ,,, | Performed by: FAMILY MEDICINE

## 2017-05-19 PROCEDURE — 11000001 HC ACUTE MED/SURG PRIVATE ROOM

## 2017-05-19 PROCEDURE — 83735 ASSAY OF MAGNESIUM: CPT

## 2017-05-19 PROCEDURE — 85025 COMPLETE CBC W/AUTO DIFF WBC: CPT

## 2017-05-19 PROCEDURE — 97110 THERAPEUTIC EXERCISES: CPT

## 2017-05-19 PROCEDURE — 94761 N-INVAS EAR/PLS OXIMETRY MLT: CPT

## 2017-05-19 PROCEDURE — 80053 COMPREHEN METABOLIC PANEL: CPT

## 2017-05-19 PROCEDURE — 27200120 HC KIT IV START (RUSH ONLY)

## 2017-05-19 PROCEDURE — 94640 AIRWAY INHALATION TREATMENT: CPT

## 2017-05-19 PROCEDURE — 63600175 PHARM REV CODE 636 W HCPCS: Performed by: NURSE PRACTITIONER

## 2017-05-19 PROCEDURE — 92526 ORAL FUNCTION THERAPY: CPT

## 2017-05-19 RX ORDER — TRAMADOL HYDROCHLORIDE 50 MG/1
50 TABLET ORAL EVERY 6 HOURS PRN
Status: DISCONTINUED | OUTPATIENT
Start: 2017-05-19 | End: 2017-05-19

## 2017-05-19 RX ORDER — TRAZODONE HYDROCHLORIDE 50 MG/1
50 TABLET ORAL NIGHTLY
Status: DISCONTINUED | OUTPATIENT
Start: 2017-05-19 | End: 2017-05-19

## 2017-05-19 RX ORDER — POTASSIUM CHLORIDE 20 MEQ/15ML
20 SOLUTION ORAL 2 TIMES DAILY
Status: DISCONTINUED | OUTPATIENT
Start: 2017-05-19 | End: 2017-05-20

## 2017-05-19 RX ORDER — SENNOSIDES 8.6 MG/1
1 TABLET ORAL DAILY
Status: DISCONTINUED | OUTPATIENT
Start: 2017-05-19 | End: 2017-05-22 | Stop reason: HOSPADM

## 2017-05-19 RX ORDER — LEVOTHYROXINE SODIUM 75 UG/1
75 TABLET ORAL
Status: DISCONTINUED | OUTPATIENT
Start: 2017-05-20 | End: 2017-05-22 | Stop reason: HOSPADM

## 2017-05-19 RX ORDER — DOCUSATE SODIUM 100 MG/1
100 CAPSULE, LIQUID FILLED ORAL DAILY
Status: DISCONTINUED | OUTPATIENT
Start: 2017-05-19 | End: 2017-05-19

## 2017-05-19 RX ORDER — CARBIDOPA AND LEVODOPA 25; 100 MG/1; MG/1
1 TABLET ORAL
Status: DISCONTINUED | OUTPATIENT
Start: 2017-05-19 | End: 2017-05-19

## 2017-05-19 RX ORDER — TRAZODONE HYDROCHLORIDE 50 MG/1
50 TABLET ORAL NIGHTLY
Status: DISCONTINUED | OUTPATIENT
Start: 2017-05-19 | End: 2017-05-22 | Stop reason: HOSPADM

## 2017-05-19 RX ORDER — CARBIDOPA AND LEVODOPA 25; 100 MG/1; MG/1
1 TABLET ORAL
Status: DISCONTINUED | OUTPATIENT
Start: 2017-05-19 | End: 2017-05-22 | Stop reason: HOSPADM

## 2017-05-19 RX ORDER — PRAMIPEXOLE DIHYDROCHLORIDE 0.12 MG/1
0.25 TABLET ORAL 3 TIMES DAILY
Status: DISCONTINUED | OUTPATIENT
Start: 2017-05-19 | End: 2017-05-19

## 2017-05-19 RX ORDER — BISACODYL 10 MG
10 SUPPOSITORY, RECTAL RECTAL DAILY PRN
Status: DISCONTINUED | OUTPATIENT
Start: 2017-05-19 | End: 2017-05-22 | Stop reason: HOSPADM

## 2017-05-19 RX ORDER — PRAMIPEXOLE DIHYDROCHLORIDE 0.12 MG/1
0.25 TABLET ORAL 3 TIMES DAILY
Status: DISCONTINUED | OUTPATIENT
Start: 2017-05-19 | End: 2017-05-22 | Stop reason: HOSPADM

## 2017-05-19 RX ORDER — POTASSIUM CHLORIDE 20 MEQ/1
20 TABLET, EXTENDED RELEASE ORAL 2 TIMES DAILY
Status: DISCONTINUED | OUTPATIENT
Start: 2017-05-19 | End: 2017-05-19

## 2017-05-19 RX ORDER — LEVOTHYROXINE SODIUM 75 UG/1
75 TABLET ORAL
Status: DISCONTINUED | OUTPATIENT
Start: 2017-05-20 | End: 2017-05-19

## 2017-05-19 RX ORDER — NAPROXEN SODIUM 220 MG/1
81 TABLET, FILM COATED ORAL DAILY
Status: DISCONTINUED | OUTPATIENT
Start: 2017-05-19 | End: 2017-05-19

## 2017-05-19 RX ORDER — TRAMADOL HYDROCHLORIDE 50 MG/1
50 TABLET ORAL EVERY 6 HOURS PRN
Status: DISCONTINUED | OUTPATIENT
Start: 2017-05-19 | End: 2017-05-22 | Stop reason: HOSPADM

## 2017-05-19 RX ORDER — NAPROXEN SODIUM 220 MG/1
81 TABLET, FILM COATED ORAL DAILY
Status: DISCONTINUED | OUTPATIENT
Start: 2017-05-19 | End: 2017-05-22 | Stop reason: HOSPADM

## 2017-05-19 RX ORDER — DOCUSATE SODIUM 100 MG/1
100 CAPSULE, LIQUID FILLED ORAL DAILY
Status: DISCONTINUED | OUTPATIENT
Start: 2017-05-19 | End: 2017-05-22 | Stop reason: HOSPADM

## 2017-05-19 RX ADMIN — PIPERACILLIN AND TAZOBACTAM 4.5 G: 4; .5 INJECTION, POWDER, LYOPHILIZED, FOR SOLUTION INTRAVENOUS; PARENTERAL at 09:05

## 2017-05-19 RX ADMIN — DEXTROSE AND SODIUM CHLORIDE: 5; .9 INJECTION, SOLUTION INTRAVENOUS at 06:05

## 2017-05-19 RX ADMIN — SENNA 1 TABLET: 8.6 TABLET, COATED ORAL at 10:05

## 2017-05-19 RX ADMIN — LEVALBUTEROL 1.25 MG: 1.25 SOLUTION, CONCENTRATE RESPIRATORY (INHALATION) at 08:05

## 2017-05-19 RX ADMIN — TRAMADOL HYDROCHLORIDE 50 MG: 50 TABLET, FILM COATED ORAL at 06:05

## 2017-05-19 RX ADMIN — CARBIDOPA AND LEVODOPA 1 TABLET: 25; 100 TABLET ORAL at 06:05

## 2017-05-19 RX ADMIN — CARBIDOPA AND LEVODOPA 1 TABLET: 25; 100 TABLET ORAL at 09:05

## 2017-05-19 RX ADMIN — PIPERACILLIN AND TAZOBACTAM 4.5 G: 4; .5 INJECTION, POWDER, LYOPHILIZED, FOR SOLUTION INTRAVENOUS; PARENTERAL at 06:05

## 2017-05-19 RX ADMIN — POTASSIUM CHLORIDE 20 MEQ: 1.5 SOLUTION ORAL at 02:05

## 2017-05-19 RX ADMIN — DEXTROSE AND SODIUM CHLORIDE: 5; .9 INJECTION, SOLUTION INTRAVENOUS at 07:05

## 2017-05-19 RX ADMIN — PIPERACILLIN AND TAZOBACTAM 4.5 G: 4; .5 INJECTION, POWDER, LYOPHILIZED, FOR SOLUTION INTRAVENOUS; PARENTERAL at 01:05

## 2017-05-19 RX ADMIN — ASPIRIN 81 MG 81 MG: 81 TABLET ORAL at 09:05

## 2017-05-19 RX ADMIN — PRAMIPEXOLE DIHYDROCHLORIDE 0.25 MG: 0.12 TABLET ORAL at 02:05

## 2017-05-19 RX ADMIN — TRAZODONE HYDROCHLORIDE 50 MG: 50 TABLET ORAL at 09:05

## 2017-05-19 RX ADMIN — DOCUSATE SODIUM 100 MG: 100 CAPSULE, LIQUID FILLED ORAL at 09:05

## 2017-05-19 RX ADMIN — CARBIDOPA AND LEVODOPA 1 TABLET: 25; 100 TABLET ORAL at 02:05

## 2017-05-19 RX ADMIN — POTASSIUM CHLORIDE 20 MEQ: 1.5 SOLUTION ORAL at 09:05

## 2017-05-19 RX ADMIN — LEVALBUTEROL 1.25 MG: 1.25 SOLUTION, CONCENTRATE RESPIRATORY (INHALATION) at 01:05

## 2017-05-19 RX ADMIN — PRAMIPEXOLE DIHYDROCHLORIDE 0.25 MG: 0.12 TABLET ORAL at 09:05

## 2017-05-19 RX ADMIN — LEVALBUTEROL 1.25 MG: 1.25 SOLUTION, CONCENTRATE RESPIRATORY (INHALATION) at 07:05

## 2017-05-19 RX ADMIN — BISACODYL 10 MG: 10 SUPPOSITORY RECTAL at 10:05

## 2017-05-19 NOTE — ASSESSMENT & PLAN NOTE
Check U/a 30WBC and 1+ leuko and culture, not done in ER due to incontinence   Collected 5/17 - U cx with multiple organisms  On zosyn

## 2017-05-19 NOTE — PLAN OF CARE
Problem: Patient Care Overview  Goal: Plan of Care Review  Outcome: Ongoing (interventions implemented as appropriate)  Quiet shift. Awake more than asleep. Speech much improved. Tolerating IV solutions and IVPB well. Turned every 2 hour. G tube patent and flushed with water and done and tolerated well. Congested cough but does not have be be suctioned. Call bell in reach and use. Plan of care discussed and verbalizes understanding. O 2 and cardiac monitoring in progress. RAMIREZ Lopez RN

## 2017-05-19 NOTE — SUBJECTIVE & OBJECTIVE
Interval note: Tired this am. Less gargling. No acute distress. Afebrile. G-tube has been clamped. No abd pain or nausea complaints. No bm, but reports she isn't eating. Pt is officially NPO and should be per speech.     Review of Systems   Constitutional: Positive for fatigue. Negative for chills, diaphoresis and fever.   HENT: Negative for congestion, ear pain, postnasal drip, rhinorrhea, sore throat and trouble swallowing.    Respiratory: Positive for cough. Negative for shortness of breath and wheezing.    Cardiovascular: Negative for chest pain and palpitations.   Gastrointestinal: Positive for constipation. Negative for abdominal pain, diarrhea, nausea and vomiting.   Genitourinary: Negative for dysuria and frequency.   Skin: Negative for rash.   Neurological: Positive for tremors and speech difficulty. Negative for weakness and headaches.     Objective:     Vital Signs (Most Recent):  Temp: 97.3 °F (36.3 °C) (05/19/17 0353)  Pulse: 72 (05/19/17 0723)  Resp: 18 (05/19/17 0723)  BP: (!) 117/58 (05/19/17 0353)  SpO2: 96 % (05/19/17 0723) Vital Signs (24h Range):  Temp:  [97.3 °F (36.3 °C)-98.5 °F (36.9 °C)] 97.3 °F (36.3 °C)  Pulse:  [68-85] 72  Resp:  [16-20] 18  SpO2:  [96 %-98 %] 96 %  BP: (107-121)/(52-60) 117/58     Weight: 85.8 kg (189 lb 3.2 oz)  Body mass index is 28.77 kg/(m^2).    Physical Exam   Constitutional: She is oriented to person, place, and time. She appears well-developed. No distress.   HENT:   Head: Normocephalic and atraumatic.   Nose: Nose normal.   Mouth/Throat: Oropharynx is clear and moist.   NC in place   Eyes: EOM are normal. Pupils are equal, round, and reactive to light.   Neck: Normal range of motion. Neck supple.   Cardiovascular: Normal rate, normal heart sounds and intact distal pulses.    No murmur heard.  Pulmonary/Chest: Effort normal. No respiratory distress. She has no wheezes. She has no rales. She exhibits no tenderness.   Diffuse rhonchi    Abdominal: Soft. Bowel sounds  are normal. She exhibits no distension and no mass. There is no tenderness. There is no rebound and no guarding.   G tube clamped.    No drainage around the tube   Musculoskeletal: Normal range of motion. She exhibits no edema or tenderness.   Neurological: She is alert and oriented to person, place, and time. She exhibits abnormal muscle tone.   Cogwheel rigidity   Baseline resting tremor   Skin: Skin is warm and dry. No rash noted. No erythema. No pallor.   Psychiatric: She has a normal mood and affect. Her behavior is normal. Judgment and thought content normal.        Significant Labs:   CBC:     Recent Labs  Lab 05/18/17 0512 05/19/17 0521   WBC 7.45 5.43   HGB 10.4* 9.8*   HCT 33.5* 30.9*    216     CMP:     Recent Labs  Lab 05/18/17 0512 05/19/17 0521    143   K 3.1* 3.2*    110   CO2 25 25    108   BUN 24* 15   CREATININE 0.7 0.6   CALCIUM 8.7 8.3*   PROT 5.6* 5.4*   ALBUMIN 3.0* 2.9*   BILITOT 0.6 0.7   ALKPHOS 52* 47*   AST 11 11   ALT 8* 9*   ANIONGAP 11 8   EGFRNONAA >60 >60     UA with + 30 wbcs, culture with mult organisms.     Amylase 69    Significant Imaging:     CT abd and pelvis   A gastrostomy tube is in place.  There is fluid distention of distal esophagus, stomach and the duodenum up to the level of the SMA suggesting SMA syndrome.  Additionally, there is thickening of the walls of the distal esophagus suggesting esophagitis.    Diverticulosis coli.  There is mild thickening of the walls of the distal left colon with surrounding pericolonic inflammatory stranding suggestive for a mild diverticulitis.  No abscess formation or perforation.    Complex appearing cyst of the upper pole of the right kidney.  Consider further evaluation with a renal ultrasound.    Mild thickening of the walls of the urinary bladder which could suggest a cystitis.  Correlation with urinalysis is recommended.    Additional nonemergent findings as above.    CXR   Chronic lung markings are  seen bilaterally.  No consolidation or pleural effusions.  The heart is normal in size.  Calcified atheromatous disease affects the aorta.  Age-appropriate degenerative changes affect the skeleton.    Repeat cxr with chronic changes only.

## 2017-05-19 NOTE — NURSING
Report received and assumed care of patient. Awake, and alert and oriented except for time and reoriented. Speech improved. Gastrostomy tube intact with dressing in place. Cardiac monitor, bed alarms, NETTA hose, call bell katie use and bed in low and locked position. Denies nausea or pain. Abd.  Soft and non tender. Coarse breath sounds with suctioning not needed at present. NPO at present and understands. Tolerating IV fluids and antibiotics. Plan of care reviewed and verbalizes  Understanding. RAMIREZ Lopze RN

## 2017-05-19 NOTE — PT/OT/SLP PROGRESS
"Speech Language Pathology  Treatment    Eddy Cunningham   MRN: [id number]   Admitting Diagnosis: Diverticulitis of large intestine without perforation or abscess without bleeding    Diet recommendations: Solid Diet Level: NPO  Liquid Diet Level: NPO     SLP Treatment Date: 17  Speech Start Time: 1340     Speech Stop Time: 1403     Speech Total (min): 23 min       TREATMENT BILLABLE MINUTES:  Treatment Swallowing Dysfunction 23 minutes    Has the patient been evaluated by SLP for swallowing? : Yes  Keep patient NPO?: Yes   General Precautions: Standard, aspiration, fall  Current Respiratory Status: nasal cannula       Subjective:  "Damn Parkinson's"    Pain Ratin/10    Objective:   Patient found with: peripheral IV  Pt was seen at bedside.  PCT present to reposition.  Pt began to cough/choke and her face turned red.  Pt was encouraged to cough and Yankauer was used to remove a small amount of secretions.  Pt's color and breathing returned to normal.  Nursing was notified.  Once Pt was calm she participated in conversation well and demonstrated improved vocalization today.  Pts vocal quality was weak but stronger than yesterday and clear/dry.  Pt was given a very small amount of pudding as part of ongoing assessment.  Pt demonstrated delayed swallow/bolus propulsion as well as wet vocal quality.   Assisted Pt as well by wiping the left side of her neck where dirt was noted.  PCT noted and agreed to put powder as needed.      Assessment:  Eddy Cunningham is a 77 y.o. female with a medical diagnosis of Diverticulitis of large intestine without perforation or abscess without bleeding and presents with continued significant signs of aspiration and difficulty managing secretions.  Recommend continuation of NPO status with excellent oral care.      Discharge recommendations: Discharge Facility/Level Of Care Needs: nursing facility, skilled     Goals:   SLP Goals        Problem: SLP Goal    Goal Priority " Disciplines Outcome   SLP Goal     SLP Ongoing (interventions implemented as appropriate)   Description:    1) Swallow assessment is ongoing  2) Will complete MBSS once Pt more appropriate, with a better chance for success.                    Plan:   Patient to be seen Therapy Frequency: 2 x/week, 3 x/week, 4 x/week   Plan of Care expires: 05/31/17  Plan of Care reviewed with: patient  SLP Follow-up?: Yes  SLP - Next Visit Date: 05/22/17        ZACHARIAH Mojica, CCC-SLP  05/19/2017

## 2017-05-19 NOTE — ASSESSMENT & PLAN NOTE
Ct shows obstruction and diverticulitis  general surgery consulted- no mechanical obstruction  Zosyn abx - to cover diverticulitis. Improving  NPO with IVF for now. Will have ST following.  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 Recheck KUB - non obstructive, but with slow transit  Will give suppository and start gravity feeds to gtube.

## 2017-05-19 NOTE — PLAN OF CARE
Problem: Patient Care Overview  Goal: Plan of Care Review  Outcome: Ongoing (interventions implemented as appropriate)  Nutrition Recommendation/Intervention:   1.) TF: Initiate continuous feed via PEG of Peptamen 1.5 at 10ml/hr slowly increasing as tolerated to goal rate of 40ml/hr with 115ml water flushes every 4 hrs; providing 1440 calories (100% EEN) and 65gm protein (96%EPN).Monitor tolerance and residuals; Hold feeds with residuals > 400ml   2.) Discontinue D5% IVF once PEG feeds advanced as TF will meet pt's calorie and CHO intake.    RD to monitor     Goals: advancement energy intake  Nutrition Goal Status: new  Communication of RD Recs: reviewed with RN     Nutrition Discharge Planning: too soon to determine     Continuum of Care Plan     Referral to Outpatient Services:  (too soon to determine)

## 2017-05-19 NOTE — ASSESSMENT & PLAN NOTE
Will resume Colace per G-tube today, but will also give dulcolax supp to hopefully stimulate a bm today.

## 2017-05-19 NOTE — CONSULTS
[facility]  Adult Nutrition  Consult Note    SUMMARY     Recommendations    Recommendation/Intervention:   1.) TF: Initiate continuous feed via PEG of Peptamen 1.5 at 10ml/hr slowly increasing as tolerated to goal rate of 40ml/hr with 115ml water flushes every 4 hrs; providing 1440 calories (100% EEN) and 65gm protein (96%EPN).Monitor tolerance and residuals; Hold feeds with residuals > 400ml   2.) Discontinue D5% IVF once PEG feeds advanced as TF will meet pt's calorie and CHO intake.    RD to monitor    Goals: advancement energy intake  Nutrition Goal Status: new  Communication of RD Recs: reviewed with RN    Nutrition Discharge Planning: too soon to determine    Continuum of Care Plan    Referral to Outpatient Services:  (too soon to determine)    Reason for Assessment    Reason for Assessment: nurse/nurse practitioner consult  Diagnosis: gastrointestinal disease  Relevent Medical History: Dementia, GERD, Parkinson, HTN, COPD, Elevated CRP         General Information Comments: Pt from NH, pneumonia & Bowel obstruction ruled out; was recently advanced to puree diet at NH; has PEG was draining now clamped with no N/V; given suppository with +BM; ST following recs NPO; okay to start TF through PEG    Nutrition Prescription Ordered    Current Diet Order: NPO  Nutrition Order Comments: no intake at this time    Evaluation of Received Nutrients/Fluid Intake    IV Fluid (mL): 2400  Comments: D5 IVF= 120gm CHO= 408calories        Nutrition Risk Screen     Nutrition Risk Screen: tube feeding or parenteral nutrition, dysphagia or difficulty swallowing    Nutrition/Diet History    Patient Reported Diet/Restrictions/Preferences:  (puree)  Factors Affecting Nutritional Intake: abdominal pain, abdominal distention, impaired cognitive status/motor control, difficulty/impaired swallowing, nausea/vomiting    Labs/Tests/Procedures/Meds    Diagnostic Test/Procedure Review: reviewed  Pertinent Labs Reviewed:  reviewed  Pertinent Labs Comments: K 3.1L, BUN 24H, Alb 3.0L, Alk Phos 52L, ALT 8L  Pertinent Medications Reviewed: reviewed  Pertinent Medications Comments: D5IVF    Physical Findings    Overall Physical Appearance: overweight (functional quadriplegia)  Tubes: gastrostomy tube     Skin: intact    Anthropometrics       Height (inches): 67.99 in  Weight Method: Stated  Weight (kg): 85.8 kg     Ideal Body Weight (IBW), Female: 139.95 lb     % Ideal Body Weight, Female (lb): 135.16 lb  BMI (kg/m2): 28.77  BMI Grade: 25 - 29.9 - overweight    Estimated/Assessed Needs    Weight Used For Calorie Calculations: 85.8 kg (189 lb 2.5 oz)   Height (cm): 172.7 cm     Energy Need Method: Van Zandt-St Jeor (No AF - 1.2) 8993-6763 calories     RMR (Van Zandt-St. Jeor Equation): 1401.6        Weight Used For Protein Calculations: 85.8 kg (189 lb 2.5 oz)  Protein Requirements: 68-85 (0.8-1.0)  0.8 gm Protein (gm): 68.78 and 1.0 gm Protein (gm): 85.98  Fluid Need Method: RDA Method (1ml/kcal or per MD)    Assessment and Plan    Esophageal dysphagia  Nutrition Problem:   Inadequate energy intake    Etiology/Related to:   Dysphagia    As Evidenced By:  ST recs NPO per swallow eval, need for PEG feeds    Treatment Strategy:   See recs    Nutrition Diagnosis Status:   New        Monitor and Evaluation    Food and Nutrient Intake: energy intake  Food and Nutrient Adminstration: enteral and parenteral nutrition administration     Physical Activity and Function: nutrition-related ADLs and IADLs  Anthropometric Measurements: weight, weight change  Biochemical Data, Medical Tests and Procedures: electrolyte and renal panel, gastrointestinal profile  Nutrition-Focused Physical Findings: overall appearance  % Intake of Estimated Energy Needs: Other: NPO  % Meal Intake: NPO    Nutrition Risk    Level of Risk:  (F/U 2x/wk)    Nutrition Follow-Up    RD Follow-up?: Yes (5/22/2017)

## 2017-05-19 NOTE — PROGRESS NOTES
Staff Handoff  Bedside report done per ERINN Lamb. Pt resting, answers questions appropriately,denies c/o. O2 in use, PEG dressing D/I, IVF's infusing well, bed alarm on, with side rails up X3. Call bell in reach.      Resident Handoff

## 2017-05-19 NOTE — ASSESSMENT & PLAN NOTE
Nutrition Problem:   Inadequate energy intake    Etiology/Related to:   Dysphagia    As Evidenced By:  ST recs NPO per swallow eval, need for PEG feeds    Treatment Strategy:   See recs    Nutrition Diagnosis Status:   New

## 2017-05-19 NOTE — PLAN OF CARE
Problem: Patient Care Overview  Goal: Plan of Care Review  Outcome: Ongoing (interventions implemented as appropriate)  Pt is awake, alert, able to make needs known, but voice is soft. Failed swallow study with ST (3rd time). Recommends keeping pt NPO at this time. Tube feedingsof peptamen 1.5 prebio started at 10ml/hr, with goal rate of 40ml/hr. No residuals during the day. Potassium started via PEG due to potassium 3.2.  Dulcolax supp given, large BM noted X3 today.

## 2017-05-19 NOTE — ASSESSMENT & PLAN NOTE
She had temp, elevated HR, elevated RR, elevated WBC with diverticulitis as source of infection. Her bp is stable so not shock. She was started on cipro and flagyl in ER. Changed to zosyn for better coverage of poss asp pneumonia (CXR okay), UTI and abd process    She was admitted via ER 5/16. Sepsis protocol was initated after the identification of diverticulitis and poss aspiration. Therefore that is why blood cultures were drawn 5/17 am. Blood cultures negative to date.     SEPSIS IS RESOLVED.

## 2017-05-19 NOTE — PT/OT/SLP PROGRESS
Physical Therapy  Treatment    Eddy Cunningham   MRN: [id number]   Admitting Diagnosis: Diverticulitis of large intestine without perforation or abscess without bleeding    PT Received On: 17  PT Start Time: 1415     PT Stop Time: 1430    PT Total Time (min): 15 min       Billable Minutes:  Therapeutic Exercise 15 minutes    Treatment Type: Treatment  PT/PTA: PT             General Precautions: Standard, fall  Orthopedic Precautions: N/A   Braces:           Subjective:  Communicated with patient prior to session.    Pain Ratin/10                   Objective:   Patient found with: peripheral IV, oxygen, SCD    Functional Mobility:  Therapeutic Activities and Exercises:  Pt tolerated A/A exercises on BUE/BLE at all joints in available planes of motion with rest breaks as needed to increase strength and endurance with all gross mobility skills and prevent contractures.      AM-PAC 6 CLICK MOBILITY  How much help from another person does this patient currently need?   1 = Unable, Total/Dependent Assistance  2 = A lot, Maximum/Moderate Assistance  3 = A little, Minimum/Contact Guard/Supervision  4 = None, Modified [place]/Independent    Turning over in bed (including adjusting bedclothes, sheets and blankets)?: 2  Sitting down on and standing up from a chair with arms (e.g., wheelchair, bedside commode, etc.): 1  Moving from lying on back to sitting on the side of the bed?: 1  Moving to and from a bed to a chair (including a wheelchair)?: 1  Need to walk in hospital room?: 1  Climbing 3-5 steps with a railing?: 1  Total Score: 7    AM-PAC Raw Score CMS G-Code Modifier Level of Impairment Assistance   6 % Total / Unable   7 - 9 CM 80 - 100% Maximal Assist   10 - 14 CL 60 - 80% Moderate Assist   15 - 19 CK 40 - 60% Moderate Assist   20 - 22 CJ 20 - 40% Minimal Assist   23 CI 1-20% SBA / CGA   24 CH 0% Independent/ Mod I     Patient left supine with all lines intact, call button in reach and NSG  notified.    Assessment:  Eddy Cunningham is a 77 y.o. female with a medical diagnosis of Diverticulitis of large intestine without perforation or abscess without bleeding and presents with good tolerance of treatment session without c/o.    Rehab identified problem list/impairments: Rehab identified problem list/impairments: weakness, impaired endurance, impaired self care skills, impaired functional mobilty, gait instability, impaired balance, decreased lower extremity function, decreased upper extremity function    Rehab potential is fair.    Activity tolerance: Good    Discharge recommendations: Discharge Facility/Level Of Care Needs: nursing facility, basic     Barriers to discharge: Barriers to Discharge: None    Equipment recommendations: Equipment Needed After Discharge: none     GOALS:   Physical Therapy Goals        Problem: Physical Therapy Goal    Goal Priority Disciplines Outcome Goal Variances Interventions   Physical Therapy Goal     PT/OT, PT      Description:  Goals to be met by: upon D/C from facility     Patient will increase functional independence with mobility by performin. Supine to sit with MInimal Assistance  2. Sit to supine with MInimal Assistance  3. Bed to chair transfer with Maximum Assistance using Rolling Walker                PLAN:    Patient to be seen  (1-2x/day Monday-Friday; PRN Weekends/Holidays)  to address the above listed problems via therapeutic activities, therapeutic exercises  Plan of Care expires:  (upon D/C from facility)  Plan of Care reviewed with: patient         Nellie Cy, PT  2017

## 2017-05-19 NOTE — PT/OT/SLP PROGRESS
Physical Therapy  Treatment    Eddy Cunningham   MRN: [id number]   Admitting Diagnosis: Diverticulitis of large intestine without perforation or abscess without bleeding    PT Received On: 17  PT Start Time: 1000     PT Stop Time: 1015    PT Total Time (min): 15 min       Billable Minutes:  Therapeutic Exercise 15 minutes    Treatment Type: Treatment  PT/PTA: PT             General Precautions: Standard, fall  Orthopedic Precautions: N/A   Braces:           Subjective:  Communicated with patient prior to session.    Pain Ratin/10                   Objective:   Patient found with: oxygen, peripheral IV, SCD    Functional Mobility:  Therapeutic Activities and Exercises:  Pt tolerated A/A exercises on BUE/BLE at all joints in available planes of motion with rest breaks as needed to increase strength and prevent contractures.      AM-PAC 6 CLICK MOBILITY  How much help from another person does this patient currently need?   1 = Unable, Total/Dependent Assistance  2 = A lot, Maximum/Moderate Assistance  3 = A little, Minimum/Contact Guard/Supervision  4 = None, Modified [place]/Independent    Turning over in bed (including adjusting bedclothes, sheets and blankets)?: 2  Sitting down on and standing up from a chair with arms (e.g., wheelchair, bedside commode, etc.): 1  Moving from lying on back to sitting on the side of the bed?: 1  Moving to and from a bed to a chair (including a wheelchair)?: 1  Need to walk in hospital room?: 1  Climbing 3-5 steps with a railing?: 1  Total Score: 7    AM-PAC Raw Score CMS G-Code Modifier Level of Impairment Assistance   6 % Total / Unable   7 - 9 CM 80 - 100% Maximal Assist   10 - 14 CL 60 - 80% Moderate Assist   15 - 19 CK 40 - 60% Moderate Assist   20 - 22 CJ 20 - 40% Minimal Assist   23 CI 1-20% SBA / CGA   24 CH 0% Independent/ Mod I     Patient left supine with all lines intact, call button in reach and NSG notified.    Assessment:  Eddy Cunningham is a 77  y.o. female with a medical diagnosis of Diverticulitis of large intestine without perforation or abscess without bleeding and presents with fair tolerance of treatment session.    Rehab identified problem list/impairments: Rehab identified problem list/impairments: weakness, impaired endurance, impaired balance, decreased lower extremity function, decreased upper extremity function, impaired self care skills, impaired functional mobilty    Rehab potential is fair.    Activity tolerance: Fair    Discharge recommendations: Discharge Facility/Level Of Care Needs: nursing facility, basic     Barriers to discharge: Barriers to Discharge: None    Equipment recommendations: Equipment Needed After Discharge: none     GOALS:   Physical Therapy Goals        Problem: Physical Therapy Goal    Goal Priority Disciplines Outcome Goal Variances Interventions   Physical Therapy Goal     PT/OT, PT      Description:  Goals to be met by: upon D/C from facility     Patient will increase functional independence with mobility by performin. Supine to sit with MInimal Assistance  2. Sit to supine with MInimal Assistance  3. Bed to chair transfer with Maximum Assistance using Rolling Walker                PLAN:    Patient to be seen  (1-2x/day Monday-Friday; PRN Weekends/Holidays)  to address the above listed problems via therapeutic activities, therapeutic exercises  Plan of Care expires:  (upon D/C from facility)  Plan of Care reviewed with: patient         Nellie Benoit, PT  2017

## 2017-05-19 NOTE — PROGRESS NOTES
[facility] Medicine  Progress Note    Patient Name: Eddy Cunningham  MRN: [id number]  Patient Class: IP- Inpatient   Admission Date: 5/16/2017  Length of Stay: 1 days  Attending Physician: Hayder Khalil MD  Primary Care Provider: Ayaka Pollard MD        Subjective:     Principal Problem:Diverticulitis of large intestine without perforation or abscess without bleeding    HPI:  Pt presented to ER last night with c/o vomiting, fever and abd pain since yesterday. She is a resident of [facility]. She reports that she recently started on puree diet at nursing home. Has g tube. Last meal was yesterday. She has her daughter at bedside and reports that she asked for something Sunday to settle her stomach. She also reports that she has terrible constipation. She can not remember her last BM (daughter says Sunday). She had CT in ER which shows obstruction and diverticulitis. She was given cipro and flagyl in ER. She moved up to floor 5/16. Still febrile. BP stable on IVF. WBC increased [id number]. Lactate and blood cultures done and negative.    She has slightly elevated RR and audible congestion with breathing. Concern for aspiration. CXR looks ok but portable.       Hospital Course:  5/18  Pt did well over the day yesterday. Remained with g tube to gravity with 1700ml drainage over the day. Surgery saw her and did not think that she had mechanical bowel obstruction and daughter came yesterday reporting a large BM on Sunday. ++ gas when up to bedside commode. NPO status remains as speech saw her yesterday and patient was witnessed choking on her syliva. Meds on hold. Has IVF. VSS- afebrile    She had elevated WBC and fever on admission. WBC much better today [id number]. With aspiration and audible gargling we started treating her for poss aspiration pneumonia with zosyn day 2. This will also cross cover for UTI, noted on CT and dirty U/a. Culture in progress. Blood cultures drawn and  pending. Lactate was normal. She is 95% pox on 2L NC. Has nebs scheduled every 6hr. Repeat cxr ordered, still with no infiltrate noted. Admit CXR without consolidation.     She is able to transfer to chair at NH. PT started yesterday to prevent further debility. Right now she is total care. Pt currently on zosyn for expanded coverage to assure coverage for ? Aspiration pna with diverticulitis.       Interval note: Tired this am. Less gargling. No acute distress. Afebrile. G-tube has been clamped. No abd pain or nausea complaints. No bm, but reports she isn't eating. Pt is officially NPO and should be per speech.     Review of Systems   Constitutional: Positive for fatigue. Negative for chills, diaphoresis and fever.   HENT: Negative for congestion, ear pain, postnasal drip, rhinorrhea, sore throat and trouble swallowing.    Respiratory: Positive for cough. Negative for shortness of breath and wheezing.    Cardiovascular: Negative for chest pain and palpitations.   Gastrointestinal: Positive for constipation. Negative for abdominal pain, diarrhea, nausea and vomiting.   Genitourinary: Negative for dysuria and frequency.   Skin: Negative for rash.   Neurological: Positive for tremors and speech difficulty. Negative for weakness and headaches.     Objective:     Vital Signs (Most Recent):  Temp: 97.3 °F (36.3 °C) (05/19/17 0353)  Pulse: 72 (05/19/17 0723)  Resp: 18 (05/19/17 0723)  BP: (!) 117/58 (05/19/17 0353)  SpO2: 96 % (05/19/17 0723) Vital Signs (24h Range):  Temp:  [97.3 °F (36.3 °C)-98.5 °F (36.9 °C)] 97.3 °F (36.3 °C)  Pulse:  [68-85] 72  Resp:  [16-20] 18  SpO2:  [96 %-98 %] 96 %  BP: (107-121)/(52-60) 117/58     Weight: 85.8 kg (189 lb 3.2 oz)  Body mass index is 28.77 kg/(m^2).    Physical Exam   Constitutional: She is oriented to person, place, and time. She appears well-developed. No distress.   HENT:   Head: Normocephalic and atraumatic.   Nose: Nose normal.   Mouth/Throat: Oropharynx is clear and moist.    NC in place   Eyes: EOM are normal. Pupils are equal, round, and reactive to light.   Neck: Normal range of motion. Neck supple.   Cardiovascular: Normal rate, normal heart sounds and intact distal pulses.    No murmur heard.  Pulmonary/Chest: Effort normal. No respiratory distress. She has no wheezes. She has no rales. She exhibits no tenderness.   Diffuse rhonchi    Abdominal: Soft. Bowel sounds are normal. She exhibits no distension and no mass. There is no tenderness. There is no rebound and no guarding.   G tube clamped.    No drainage around the tube   Musculoskeletal: Normal range of motion. She exhibits no edema or tenderness.   Neurological: She is alert and oriented to person, place, and time. She exhibits abnormal muscle tone.   Cogwheel rigidity   Baseline resting tremor   Skin: Skin is warm and dry. No rash noted. No erythema. No pallor.   Psychiatric: She has a normal mood and affect. Her behavior is normal. Judgment and thought content normal.        Significant Labs:   CBC:     Recent Labs  Lab 05/18/17  0512 05/19/17  0521   WBC 7.45 5.43   HGB 10.4* 9.8*   HCT 33.5* 30.9*    216     CMP:     Recent Labs  Lab 05/18/17  0512 05/19/17  0521    143   K 3.1* 3.2*    110   CO2 25 25    108   BUN 24* 15   CREATININE 0.7 0.6   CALCIUM 8.7 8.3*   PROT 5.6* 5.4*   ALBUMIN 3.0* 2.9*   BILITOT 0.6 0.7   ALKPHOS 52* 47*   AST 11 11   ALT 8* 9*   ANIONGAP 11 8   EGFRNONAA >60 >60     UA with + 30 wbcs, culture with mult organisms.     Amylase 69    Significant Imaging:     CT abd and pelvis   A gastrostomy tube is in place.  There is fluid distention of distal esophagus, stomach and the duodenum up to the level of the SMA suggesting SMA syndrome.  Additionally, there is thickening of the walls of the distal esophagus suggesting esophagitis.    Diverticulosis coli.  There is mild thickening of the walls of the distal left colon with surrounding pericolonic inflammatory stranding  suggestive for a mild diverticulitis.  No abscess formation or perforation.    Complex appearing cyst of the upper pole of the right kidney.  Consider further evaluation with a renal ultrasound.    Mild thickening of the walls of the urinary bladder which could suggest a cystitis.  Correlation with urinalysis is recommended.    Additional nonemergent findings as above.    CXR   Chronic lung markings are seen bilaterally.  No consolidation or pleural effusions.  The heart is normal in size.  Calcified atheromatous disease affects the aorta.  Age-appropriate degenerative changes affect the skeleton.    Repeat cxr with chronic changes only.     Assessment/Plan:      * Diverticulitis of large intestine without perforation or abscess without bleeding  Ct shows obstruction and diverticulitis  general surgery consulted- no mechanical obstruction  Zosyn abx - to cover diverticulitis. Improving  NPO with IVF for now. Will have ST following.  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 Recheck KUB - non obstructive, but with slow transit  Will give suppository and start gravity feeds to gtube.          Parkinsons  Sinemet at home on hold while NPO. exelon patch in place  Will try to give med per G-tube    Sepsis due to Gram negative bacteria  She had temp, elevated HR, elevated RR, elevated WBC with diverticulitis as source of infection. Her bp is stable so not shock. She was started on cipro and flagyl in ER. Changed to zosyn for better coverage of poss asp pneumonia (CXR okay), UTI and abd process    She was admitted via ER 5/16. Sepsis protocol was initated after the identification of diverticulitis and poss aspiration. Therefore that is why blood cultures were drawn 5/17 am. Blood cultures negative to date.     SEPSIS IS RESOLVED.      Esophageal dysphagia  She was placed NPO at some point and had G tube placed. She reports that recently she was restarted on puree  at NH. Will have speech re evaluate while here. NPO per baljinder's rec for now, will cont to follow and reassess daily. If KUB okay, BM passes, may consider trying to tube feed and check residuals.      Aspiration into airway  Change abx to zosyn for broader coverage. Speech to assess swallow. NPO per recs. Will cont to follow daily and make changes as needed.       Hypokalemia  3.1->3.2; Potassium rider again today      Chronic constipation  Will resume Colace per G-tube today, but will also give dulcolax supp to hopefully stimulate a bm today.       Functional quadriplegia  Chronic secondary to parkinson's      Sepsis, Gram positive, resolved as of 5/19/2017  She had temp, elevated HR, elevated RR, elevated WBC with diverticulitis as source of infection. Her bp is stable so not shock. She was started on cipro and flagyl in ER. Changed to zosyn for better coverage of poss asp pneumonia, UTI and abd process    She was admitted via ER last night. Severe sepsis was initated this am after the identification of diverticulitis and poss aspiration. Therefore that is why blood cultures were drawn 5/17 am.     acute Cystitis, resolved as of 5/19/2017  Check U/a 30WBC and 1+ leuko and culture, not done in ER due to incontinence   Collected 5/17 - U cx with multiple organisms  On zosyn      VTE Risk Mitigation         Ordered     Place sequential compression device  Until discontinued      05/17/17 0802     Medium Risk of VTE  Once      05/17/17 0050     Place NETTA hose  Until discontinued      05/17/17 0050          Tessy Marvin MD  Department of Hospital Medicine   [facility]

## 2017-05-20 PROBLEM — D62 ACUTE BLOOD LOSS ANEMIA: Status: ACTIVE | Noted: 2017-05-20

## 2017-05-20 LAB
ALBUMIN SERPL BCP-MCNC: 2.4 G/DL
ALP SERPL-CCNC: 49 U/L
ALT SERPL W/O P-5'-P-CCNC: 7 U/L
ANION GAP SERPL CALC-SCNC: 6 MMOL/L
AST SERPL-CCNC: 21 U/L
BASOPHILS # BLD AUTO: 0.02 K/UL
BASOPHILS NFR BLD: 0.3 %
BILIRUB SERPL-MCNC: 0.5 MG/DL
BUN SERPL-MCNC: 10 MG/DL
CALCIUM SERPL-MCNC: 7.3 MG/DL
CHLORIDE SERPL-SCNC: 112 MMOL/L
CO2 SERPL-SCNC: 24 MMOL/L
CREAT SERPL-MCNC: 0.7 MG/DL
DIFFERENTIAL METHOD: ABNORMAL
EOSINOPHIL # BLD AUTO: 0.1 K/UL
EOSINOPHIL NFR BLD: 1.1 %
ERYTHROCYTE [DISTWIDTH] IN BLOOD BY AUTOMATED COUNT: 12.7 %
EST. GFR  (AFRICAN AMERICAN): >60 ML/MIN/1.73 M^2
EST. GFR  (NON AFRICAN AMERICAN): >60 ML/MIN/1.73 M^2
GLUCOSE SERPL-MCNC: 466 MG/DL
HCT VFR BLD AUTO: 30.7 %
HGB BLD-MCNC: 9.5 G/DL
LYMPHOCYTES # BLD AUTO: 0.4 K/UL
LYMPHOCYTES NFR BLD: 5 %
MCH RBC QN AUTO: 29.4 PG
MCHC RBC AUTO-ENTMCNC: 30.9 %
MCV RBC AUTO: 95 FL
MONOCYTES # BLD AUTO: 0.5 K/UL
MONOCYTES NFR BLD: 6.4 %
NEUTROPHILS # BLD AUTO: 6.4 K/UL
NEUTROPHILS NFR BLD: 87.2 %
PLATELET # BLD AUTO: 212 K/UL
PMV BLD AUTO: 10.1 FL
POCT GLUCOSE: 155 MG/DL (ref 70–110)
POTASSIUM SERPL-SCNC: 3.1 MMOL/L
PROT SERPL-MCNC: 4.9 G/DL
RBC # BLD AUTO: 3.23 M/UL
SODIUM SERPL-SCNC: 142 MMOL/L
WBC # BLD AUTO: 7.37 K/UL

## 2017-05-20 PROCEDURE — 11000001 HC ACUTE MED/SURG PRIVATE ROOM

## 2017-05-20 PROCEDURE — 80053 COMPREHEN METABOLIC PANEL: CPT

## 2017-05-20 PROCEDURE — 25000003 PHARM REV CODE 250: Performed by: NURSE PRACTITIONER

## 2017-05-20 PROCEDURE — 85025 COMPLETE CBC W/AUTO DIFF WBC: CPT

## 2017-05-20 PROCEDURE — 27000339 *HC DAILY SUPPLY KIT

## 2017-05-20 PROCEDURE — 94761 N-INVAS EAR/PLS OXIMETRY MLT: CPT

## 2017-05-20 PROCEDURE — 82962 GLUCOSE BLOOD TEST: CPT

## 2017-05-20 PROCEDURE — 97110 THERAPEUTIC EXERCISES: CPT

## 2017-05-20 PROCEDURE — C9113 INJ PANTOPRAZOLE SODIUM, VIA: HCPCS | Performed by: FAMILY MEDICINE

## 2017-05-20 PROCEDURE — 94640 AIRWAY INHALATION TREATMENT: CPT

## 2017-05-20 PROCEDURE — 63600175 PHARM REV CODE 636 W HCPCS: Performed by: NURSE PRACTITIONER

## 2017-05-20 PROCEDURE — 25000003 PHARM REV CODE 250: Performed by: FAMILY MEDICINE

## 2017-05-20 PROCEDURE — 36415 COLL VENOUS BLD VENIPUNCTURE: CPT

## 2017-05-20 PROCEDURE — 25000003 PHARM REV CODE 250: Performed by: INTERNAL MEDICINE

## 2017-05-20 PROCEDURE — 99232 SBSQ HOSP IP/OBS MODERATE 35: CPT | Mod: ,,, | Performed by: FAMILY MEDICINE

## 2017-05-20 PROCEDURE — 63600175 PHARM REV CODE 636 W HCPCS: Performed by: EMERGENCY MEDICINE

## 2017-05-20 PROCEDURE — 63600175 PHARM REV CODE 636 W HCPCS: Performed by: FAMILY MEDICINE

## 2017-05-20 PROCEDURE — 27000221 HC OXYGEN, UP TO 24 HOURS

## 2017-05-20 RX ORDER — POTASSIUM CHLORIDE 20 MEQ/15ML
40 SOLUTION ORAL 2 TIMES DAILY
Status: DISCONTINUED | OUTPATIENT
Start: 2017-05-20 | End: 2017-05-22 | Stop reason: HOSPADM

## 2017-05-20 RX ADMIN — LEVALBUTEROL 1.25 MG: 1.25 SOLUTION, CONCENTRATE RESPIRATORY (INHALATION) at 01:05

## 2017-05-20 RX ADMIN — SENNA 1 TABLET: 8.6 TABLET, COATED ORAL at 09:05

## 2017-05-20 RX ADMIN — POTASSIUM CHLORIDE 40 MEQ: 1.5 SOLUTION ORAL at 09:05

## 2017-05-20 RX ADMIN — DEXTROSE 8 MG/HR: 50 INJECTION, SOLUTION INTRAVENOUS at 07:05

## 2017-05-20 RX ADMIN — DOCUSATE SODIUM 100 MG: 100 CAPSULE, LIQUID FILLED ORAL at 09:05

## 2017-05-20 RX ADMIN — PIPERACILLIN AND TAZOBACTAM 4.5 G: 4; .5 INJECTION, POWDER, LYOPHILIZED, FOR SOLUTION INTRAVENOUS; PARENTERAL at 05:05

## 2017-05-20 RX ADMIN — CARBIDOPA AND LEVODOPA 1 TABLET: 25; 100 TABLET ORAL at 09:05

## 2017-05-20 RX ADMIN — DEXTROSE AND SODIUM CHLORIDE 100 ML/HR: 5; .9 INJECTION, SOLUTION INTRAVENOUS at 03:05

## 2017-05-20 RX ADMIN — CARBIDOPA AND LEVODOPA 1 TABLET: 25; 100 TABLET ORAL at 02:05

## 2017-05-20 RX ADMIN — ASPIRIN 81 MG 81 MG: 81 TABLET ORAL at 09:05

## 2017-05-20 RX ADMIN — LEVALBUTEROL 1.25 MG: 1.25 SOLUTION, CONCENTRATE RESPIRATORY (INHALATION) at 07:05

## 2017-05-20 RX ADMIN — PRAMIPEXOLE DIHYDROCHLORIDE 0.25 MG: 0.12 TABLET ORAL at 09:05

## 2017-05-20 RX ADMIN — PIPERACILLIN AND TAZOBACTAM 4.5 G: 4; .5 INJECTION, POWDER, LYOPHILIZED, FOR SOLUTION INTRAVENOUS; PARENTERAL at 12:05

## 2017-05-20 RX ADMIN — PRAMIPEXOLE DIHYDROCHLORIDE 0.25 MG: 0.12 TABLET ORAL at 02:05

## 2017-05-20 RX ADMIN — DEXTROSE 8 MG/HR: 50 INJECTION, SOLUTION INTRAVENOUS at 09:05

## 2017-05-20 RX ADMIN — CARBIDOPA AND LEVODOPA 1 TABLET: 25; 100 TABLET ORAL at 05:05

## 2017-05-20 RX ADMIN — DEXTROSE 8 MG/HR: 50 INJECTION, SOLUTION INTRAVENOUS at 02:05

## 2017-05-20 RX ADMIN — TRAMADOL HYDROCHLORIDE 50 MG: 50 TABLET, FILM COATED ORAL at 08:05

## 2017-05-20 RX ADMIN — ONDANSETRON 4 MG: 2 INJECTION INTRAMUSCULAR; INTRAVENOUS at 03:05

## 2017-05-20 RX ADMIN — TRAZODONE HYDROCHLORIDE 50 MG: 50 TABLET ORAL at 08:05

## 2017-05-20 RX ADMIN — PIPERACILLIN AND TAZOBACTAM 4.5 G: 4; .5 INJECTION, POWDER, LYOPHILIZED, FOR SOLUTION INTRAVENOUS; PARENTERAL at 09:05

## 2017-05-20 RX ADMIN — POTASSIUM CHLORIDE 40 MEQ: 1.5 SOLUTION ORAL at 08:05

## 2017-05-20 NOTE — NURSING
Dr. Marvin aware of Pt nausea and brownish emesis.  Pt had no Bm this shift.  Tube feeding stopped per MD order.  VS stable.  IV zofran given for nausea.  Yanker suction used to help pt with secretions.   Head of bed remains elevated.  Call bell in reach.  Will continue to monitor.

## 2017-05-20 NOTE — ASSESSMENT & PLAN NOTE
Will resume Colace per G-tube today, but will also give dulcolax supp to hopefully stimulate a bm today - has had 3 BMs

## 2017-05-20 NOTE — ASSESSMENT & PLAN NOTE
She had temp, elevated HR, elevated RR, elevated WBC with diverticulitis as source of infection. Her bp is stable so not shock. She was started on cipro and flagyl in ER. Changed to zosyn to initially cover all causes of possible infection. At this time diverticulitis seems to be the predominant cause of her sepsis and is well treated and sepsis has resolved.

## 2017-05-20 NOTE — PROGRESS NOTES
Feeding restarted at 10 ML/hr per MD. To reach goal, follow order. Dressing changed, and tube secured with tape.

## 2017-05-20 NOTE — PT/OT/SLP PROGRESS
Physical Therapy  Treatment    Eddy Cunningham   MRN: [id number]   Admitting Diagnosis: Diverticulitis of large intestine without perforation or abscess without bleeding    PT Received On: 05/20/17  PT Start Time: 0830     PT Stop Time: 0847    PT Total Time (min): 17 min       Billable Minutes:  Therapeutic Exercise 17 min    Treatment Type: Treatment  PT/PTA: PT             General Precautions: Standard, fall  Orthopedic Precautions: N/A   Braces:           Subjective:  Communicated with patient prior to session.  Verbalizes multiple symptoms                        Objective:        Functional Mobility:  Bed Mobility:        Transfers:       Gait:   Gait Distance: N /A    Stairs:      Balance:   Static Sit: N/A  Unable to asssit to day  Dynamic Sit: POOR: N/A  Static Stand:   Dynamic stand:      Therapeutic Activities and Exercises:  Multiple passive and therapeutic exercises for all four extremities, to all planes of  Motion As tolerated. Cautions for pain and extremity contractures   Attempting to improve  NM control and dynacmics.  .    AM-PAC 6 CLICK MOBILITY  How much help from another person does this patient currently need?   1 = Unable, Total/Dependent Assistance  2 = A lot, Maximum/Moderate Assistance  3 = A little, Minimum/Contact Guard/Supervision  4 = None, Modified [place]/Independent         AM-PAC Raw Score CMS G-Code Modifier Level of Impairment Assistance   6 % Total / Unable   7 - 9 CM 80 - 100% Maximal Assist   10 - 14 CL 60 - 80% Moderate Assist   15 - 19 CK 40 - 60% Moderate Assist   20 - 22 CJ 20 - 40% Minimal Assist   23 CI 1-20% SBA / CGA   24 CH 0% Independent/ Mod I     Patient left right sidelying with all lines intact and RN staff present for hygiene. present.    Assessment:  Eddy Cunningham is a 77 y.o. female with a medical diagnosis of Diverticulitis of large intestine without perforation or abscess without bleeding and presents with debility and muscle  weakness.    Rehab identified problem list/impairments:      Rehab potential is fair.    Activity tolerance: Fair    Discharge recommendations:       Barriers to discharge:      Equipment recommendations:       GOALS:   Physical Therapy Goals        Problem: Physical Therapy Goal    Goal Priority Disciplines Outcome Goal Variances Interventions   Physical Therapy Goal     PT/OT, PT      Description:  Goals to be met by: upon D/C from facility     Patient will increase functional independence with mobility by performin. Supine to sit with MInimal Assistance  2. Sit to supine with MInimal Assistance  3. Bed to chair transfer with Maximum Assistance using Rolling Walker                PLAN:    Patient to be seen  (1-2x/day(M-F); PRN(Sat.,Sun.))  to address the above listed problems via therapeutic activities, therapeutic exercises  Plan of Care expires:  (upon D/C from facility)  Plan of Care reviewed with:           Juarez Gonsalez, PT  2017

## 2017-05-20 NOTE — NURSING
Dr. Marvin notified of pt being nauseated and c/o heartburn, Zofran 4 mg given.  Pt cough up approximately 20cc of brownish secretions.  Dr Marvin asked about flatus/bowel movement, pt has had neither.

## 2017-05-20 NOTE — ASSESSMENT & PLAN NOTE
Ct shows obstruction and diverticulitis  general surgery consulted- no mechanical obstruction  Zosyn abx - to cover diverticulitis. Improving   KUB - non obstructive, but with slow transit  Will give suppository and start gravity feeds to gtube as symptoms of diverticulitis are improving.  No new fevers. Abdominal pain has completely resolved.  Complete 7 days of abx.

## 2017-05-20 NOTE — ASSESSMENT & PLAN NOTE
No issues on admit however there was a question about black output from G tube.  Currently h/h continues to fall. +fobt yesterday and +black emesis today.  Will start protonix drip, stop fluids and check h/h tomorrow. If stable, will consider sending her home on protonix peg and following h/h at home with gi f/u.

## 2017-05-20 NOTE — PLAN OF CARE
Problem: Patient Care Overview  Goal: Plan of Care Review  Outcome: Ongoing (interventions implemented as appropriate)  Pt complained of nausea this shift.  Pt vomited small amount of brown emesis this shift. IV Zofran given for nausea and controlled it well.  Call kang in reach  Shavonne May aware and Tube feeding stopped.  Pt had no residual in tube feeding.  Pt had no BM.  Pt incontinent of urine.  IV fluids infusing as ordered.  Yanker suction to mouth  As needed. VS stable.  Will continue to monitor.

## 2017-05-20 NOTE — PLAN OF CARE
Problem: Patient Care Overview  Goal: Plan of Care Review  Outcome: Ongoing (interventions implemented as appropriate)  Vitals stable, afebrile, no signs of distress noted. Pt denies any pain today. Lung sounds have Rhonchi throughout. IV fluids stopped and continuous Protonix drip started. IV abx remain. Cardiac monitoring. G tube secured with tape to abdomen, and dressing changed today. Tube feeding restarted. NPO. No vomiting or nausea today. Suction at bedside. Fall precautions, call bell in reach. Pt agrees with plan of care, no questions at this time.

## 2017-05-20 NOTE — SUBJECTIVE & OBJECTIVE
Interval History: vomited this morning 20 cc of black liquid.  +fobt yesterday    Review of Systems   Constitutional: Negative for chills and fever.   HENT: Negative for congestion, ear pain, postnasal drip, rhinorrhea, sore throat and trouble swallowing.    Eyes: Negative for redness and itching.   Respiratory: Negative for cough, shortness of breath and wheezing.    Cardiovascular: Negative for chest pain and palpitations.   Gastrointestinal: Positive for constipation (had 3 BMs yesterday) and vomiting. Negative for abdominal pain, diarrhea and nausea.   Genitourinary: Negative for dysuria and frequency.   Skin: Negative for rash.   Neurological: Positive for weakness. Negative for headaches.     Objective:     Vital Signs (Most Recent):  Temp: 96.6 °F (35.9 °C) (05/20/17 0820)  Pulse: 74 (05/20/17 0820)  Resp: 20 (05/20/17 0820)  BP: (!) 115/55 (05/20/17 0820)  SpO2: 98 % (05/20/17 0820) Vital Signs (24h Range):  Temp:  [96.6 °F (35.9 °C)-99.3 °F (37.4 °C)] 96.6 °F (35.9 °C)  Pulse:  [63-82] 74  Resp:  [15-20] 20  SpO2:  [96 %-100 %] 98 %  BP: (110-168)/(55-85) 115/55     Weight: 85.8 kg (189 lb 3.2 oz)  Body mass index is 28.77 kg/(m^2).    Intake/Output Summary (Last 24 hours) at 05/20/17 0934  Last data filed at 05/20/17 0446   Gross per 24 hour   Intake             2565 ml   Output               20 ml   Net             2545 ml      Physical Exam   Constitutional: She is oriented to person, place, and time. She appears well-developed. No distress.   HENT:   Head: Normocephalic and atraumatic.   Nose: Nose normal.   Mouth/Throat: Oropharynx is clear and moist.   NC in place   Eyes: Conjunctivae and EOM are normal. Pupils are equal, round, and reactive to light.   Neck: Normal range of motion. Neck supple. No thyromegaly present.   Cardiovascular: Normal rate, regular rhythm, normal heart sounds and intact distal pulses.    No murmur heard.  Pulmonary/Chest: Effort normal and breath sounds normal. No respiratory  distress. She has no wheezes. She has no rales. She exhibits no tenderness.   Diffuse rhonchi    Abdominal: Soft. Bowel sounds are normal. She exhibits no distension and no mass. There is no tenderness. There is no rebound and no guarding.   G tube clamped.    No drainage around the tube   Musculoskeletal: Normal range of motion. She exhibits no edema or tenderness.   Lymphadenopathy:     She has no cervical adenopathy.   Neurological: She is alert and oriented to person, place, and time. She exhibits abnormal muscle tone.   Cogwheel rigidity   Baseline resting tremor   Skin: Skin is warm and dry. No rash noted. No erythema. No pallor.   Psychiatric: She has a normal mood and affect. Her behavior is normal. Judgment and thought content normal.   Nursing note and vitals reviewed.      Significant Labs:   BMP:   Recent Labs  Lab 05/19/17  0521 05/20/17  0611    466*    142   K 3.2* 3.1*    112*   CO2 25 24   BUN 15 10   CREATININE 0.6 0.7   CALCIUM 8.3* 7.3*   MG 1.7  --      CBC:   Recent Labs  Lab 05/19/17  0521 05/20/17  0612   WBC 5.43 7.37   HGB 9.8* 9.5*   HCT 30.9* 30.7*    212       Significant Imaging: I have reviewed all pertinent imaging results/findings within the past 24 hours.

## 2017-05-20 NOTE — PROGRESS NOTES
Critical value called for blood glucose, 466. MD on floor and states to to a accu  Check. Accu check is 155. MD aware.     MD also states it is ok to dive medications per G tube. Feedings will be held until re-evaluated this afternoon. IV fluids will be stopped per order and Protonix drip will be initiated per order.

## 2017-05-20 NOTE — PROGRESS NOTES
Nursing Notes  Bedside report received. Pt stable, and has no complaints. IV fluids infusing. G tube is clamped at this time.      Huddle Comments

## 2017-05-20 NOTE — PROGRESS NOTES
[facility] Medicine  Progress Note    Patient Name: Eddy Cunningham  MRN: [id number]  Patient Class: IP- Inpatient   Admission Date: 5/16/2017  Length of Stay: 2 days  Attending Physician: Hayder Khalil MD  Primary Care Provider: Ayaka Pollard MD        Subjective:     Principal Problem:Diverticulitis of large intestine without perforation or abscess without bleeding    HPI:  Pt presented to ER last night with c/o vomiting, fever and abd pain since yesterday. She is a resident of [facility]. She reports that she recently started on puree diet at nursing home. Has g tube. Last meal was yesterday. She has her daughter at bedside and reports that she asked for something Sunday to settle her stomach. She also reports that she has terrible constipation. She can not remember her last BM (daughter says Sunday). She had CT in ER which shows obstruction and diverticulitis. She was given cipro and flagyl in ER. She moved up to floor 5/16. Still febrile. BP stable on IVF. WBC increased 00120>62067. Lactate and blood cultures done and negative.    She has slightly elevated RR and audible congestion with breathing. Concern for aspiration. CXR looks ok but portable.       Hospital Course:  5/18  Pt did well over the day yesterday. Remained with g tube to gravity with 1700ml drainage over the day. Surgery saw her and did not think that she had mechanical bowel obstruction and daughter came yesterday reporting a large BM on Sunday. ++ gas when up to bedside commode. NPO status remains as speech saw her yesterday and patient was witnessed choking on her syliva. Meds on hold. Has IVF. VSS- afebrile    She had elevated WBC and fever on admission. WBC much better today 37100>>7000. With aspiration and audible gargling we started treating her for poss aspiration pneumonia with zosyn day 2. This will also cross cover for UTI, noted on CT and dirty U/a. Culture in progress. Blood cultures drawn and  pending. Lactate was normal. She is 95% pox on 2L NC. Has nebs scheduled every 6hr. Repeat cxr ordered, still with no infiltrate noted. Admit CXR without consolidation.     She is able to transfer to chair at NH. PT started to prevent further debility. Right now she is total care. Pt currently on zosyn for expanded coverage to assure coverage for aspiration pna and diverticulitis.    5/20  Yesterday, after no nausea or abdominal pain, feeds were restarted via g tube. Patient tolerated this well all day long. This morning, she complained of burning in her esophagus and nausea and vomited about 20cc of dark brown/black emesis. She had a BM yesterday and had + FOBT. She has had a slightly decreasing h/h over the last few days but does not feel weak secondary to this.      Interval History: vomited this morning 20 cc of black liquid.  +fobt yesterday    Review of Systems   Constitutional: Negative for chills and fever.   HENT: Negative for congestion, ear pain, postnasal drip, rhinorrhea, sore throat and trouble swallowing.    Eyes: Negative for redness and itching.   Respiratory: Negative for cough, shortness of breath and wheezing.    Cardiovascular: Negative for chest pain and palpitations.   Gastrointestinal: Positive for constipation (had 3 BMs yesterday) and vomiting. Negative for abdominal pain, diarrhea and nausea.   Genitourinary: Negative for dysuria and frequency.   Skin: Negative for rash.   Neurological: Positive for weakness. Negative for headaches.     Objective:     Vital Signs (Most Recent):  Temp: 96.6 °F (35.9 °C) (05/20/17 0820)  Pulse: 74 (05/20/17 0820)  Resp: 20 (05/20/17 0820)  BP: (!) 115/55 (05/20/17 0820)  SpO2: 98 % (05/20/17 0820) Vital Signs (24h Range):  Temp:  [96.6 °F (35.9 °C)-99.3 °F (37.4 °C)] 96.6 °F (35.9 °C)  Pulse:  [63-82] 74  Resp:  [15-20] 20  SpO2:  [96 %-100 %] 98 %  BP: (110-168)/(55-85) 115/55     Weight: 85.8 kg (189 lb 3.2 oz)  Body mass index is 28.77  kg/(m^2).    Intake/Output Summary (Last 24 hours) at 05/20/17 0934  Last data filed at 05/20/17 0446   Gross per 24 hour   Intake             2565 ml   Output               20 ml   Net             2545 ml      Physical Exam   Constitutional: She is oriented to person, place, and time. She appears well-developed. No distress.   HENT:   Head: Normocephalic and atraumatic.   Nose: Nose normal.   Mouth/Throat: Oropharynx is clear and moist.   NC in place   Eyes: Conjunctivae and EOM are normal. Pupils are equal, round, and reactive to light.   Neck: Normal range of motion. Neck supple. No thyromegaly present.   Cardiovascular: Normal rate, regular rhythm, normal heart sounds and intact distal pulses.    No murmur heard.  Pulmonary/Chest: Effort normal and breath sounds normal. No respiratory distress. She has no wheezes. She has no rales. She exhibits no tenderness.   Diffuse rhonchi    Abdominal: Soft. Bowel sounds are normal. She exhibits no distension and no mass. There is no tenderness. There is no rebound and no guarding.   G tube clamped.    No drainage around the tube   Musculoskeletal: Normal range of motion. She exhibits no edema or tenderness.   Lymphadenopathy:     She has no cervical adenopathy.   Neurological: She is alert and oriented to person, place, and time. She exhibits abnormal muscle tone.   Cogwheel rigidity   Baseline resting tremor   Skin: Skin is warm and dry. No rash noted. No erythema. No pallor.   Psychiatric: She has a normal mood and affect. Her behavior is normal. Judgment and thought content normal.   Nursing note and vitals reviewed.      Significant Labs:   BMP:   Recent Labs  Lab 05/19/17  0521 05/20/17  0611    466*    142   K 3.2* 3.1*    112*   CO2 25 24   BUN 15 10   CREATININE 0.6 0.7   CALCIUM 8.3* 7.3*   MG 1.7  --      CBC:   Recent Labs  Lab 05/19/17  0521 05/20/17  0612   WBC 5.43 7.37   HGB 9.8* 9.5*   HCT 30.9* 30.7*    212       Significant  Imaging: I have reviewed all pertinent imaging results/findings within the past 24 hours.    Assessment/Plan:      * Diverticulitis of large intestine without perforation or abscess without bleeding  Ct shows obstruction and diverticulitis  general surgery consulted- no mechanical obstruction  Zosyn abx - to cover diverticulitis. Improving   KUB - non obstructive, but with slow transit  Will give suppository and start gravity feeds to gtube as symptoms of diverticulitis are improving.  No new fevers. Abdominal pain has completely resolved.  Complete 7 days of abx.          Parkinsons  Sinemet at home  exelon patch in place  Will try to give med per G-tube    Sepsis due to Gram negative bacteria  She had temp, elevated HR, elevated RR, elevated WBC with diverticulitis as source of infection. Her bp is stable so not shock. She was started on cipro and flagyl in ER. Changed to zosyn to initially cover all causes of possible infection. At this time diverticulitis seems to be the predominant cause of her sepsis and is well treated and sepsis has resolved.        Esophageal dysphagia  She was placed NPO at some point and had G tube placed. She reports that recently she was restarted on puree at NH. Will have speech re evaluate while here.     Still being kept NPO per ST.    Feeding via G-tube with continuous slow feeds      Aspiration into airway  Change abx to zosyn for broader coverage. Speech to assess swallow. NPO per recs. Will cont to follow daily and make changes as needed.       Hypokalemia  3.1->3.2; Potassium rider again today    Replace with 80meq via peg.      Chronic constipation  Will resume Colace per G-tube today, but will also give dulcolax supp to hopefully stimulate a bm today - has had 3 BMs      Functional quadriplegia  Chronic secondary to parkinson's  PT ordered    Acute blood loss anemia  No issues on admit however there was a question about black output from G tube.  Currently h/h continues to  fall. +fobt yesterday and +black emesis today.  Will start protonix drip, stop fluids and check h/h tomorrow. If stable, will consider sending her home on protonix peg and following h/h at home with gi f/u.      VTE Risk Mitigation         Ordered     Medium Risk of VTE  Once      05/17/17 0050     Place NETTA [place]  Until discontinued      05/17/17 0050          Tessy Marvin MD  Department of Hospital Medicine   [facility]

## 2017-05-20 NOTE — ASSESSMENT & PLAN NOTE
She was placed NPO at some point and had G tube placed. She reports that recently she was restarted on puree at NH. Will have speech re evaluate while here.     Still being kept NPO per ST.    Feeding via G-tube with continuous slow feeds

## 2017-05-21 LAB
ALBUMIN SERPL BCP-MCNC: 2.8 G/DL
ALP SERPL-CCNC: 56 U/L
ALT SERPL W/O P-5'-P-CCNC: 8 U/L
ANION GAP SERPL CALC-SCNC: 8 MMOL/L
AST SERPL-CCNC: 17 U/L
BASOPHILS # BLD AUTO: 0.05 K/UL
BASOPHILS NFR BLD: 1 %
BILIRUB SERPL-MCNC: 0.6 MG/DL
BUN SERPL-MCNC: 8 MG/DL
CALCIUM SERPL-MCNC: 8.6 MG/DL
CHLORIDE SERPL-SCNC: 105 MMOL/L
CO2 SERPL-SCNC: 26 MMOL/L
CREAT SERPL-MCNC: 0.6 MG/DL
DIFFERENTIAL METHOD: ABNORMAL
EOSINOPHIL # BLD AUTO: 0.2 K/UL
EOSINOPHIL NFR BLD: 4.9 %
ERYTHROCYTE [DISTWIDTH] IN BLOOD BY AUTOMATED COUNT: 12.8 %
EST. GFR  (AFRICAN AMERICAN): >60 ML/MIN/1.73 M^2
EST. GFR  (NON AFRICAN AMERICAN): >60 ML/MIN/1.73 M^2
GLUCOSE SERPL-MCNC: 95 MG/DL
HCT VFR BLD AUTO: 31.2 %
HGB BLD-MCNC: 9.8 G/DL
LYMPHOCYTES # BLD AUTO: 1.1 K/UL
LYMPHOCYTES NFR BLD: 21.9 %
MCH RBC QN AUTO: 29.4 PG
MCHC RBC AUTO-ENTMCNC: 31.4 %
MCV RBC AUTO: 94 FL
MONOCYTES # BLD AUTO: 0.5 K/UL
MONOCYTES NFR BLD: 10.7 %
NEUTROPHILS # BLD AUTO: 3 K/UL
NEUTROPHILS NFR BLD: 61.5 %
PLATELET # BLD AUTO: 220 K/UL
PMV BLD AUTO: 10 FL
POTASSIUM SERPL-SCNC: 3.7 MMOL/L
PROT SERPL-MCNC: 5.5 G/DL
RBC # BLD AUTO: 3.33 M/UL
SODIUM SERPL-SCNC: 139 MMOL/L
WBC # BLD AUTO: 4.88 K/UL

## 2017-05-21 PROCEDURE — 97110 THERAPEUTIC EXERCISES: CPT

## 2017-05-21 PROCEDURE — 25000003 PHARM REV CODE 250: Performed by: NURSE PRACTITIONER

## 2017-05-21 PROCEDURE — 99239 HOSP IP/OBS DSCHRG MGMT >30: CPT | Mod: ,,, | Performed by: FAMILY MEDICINE

## 2017-05-21 PROCEDURE — 36415 COLL VENOUS BLD VENIPUNCTURE: CPT

## 2017-05-21 PROCEDURE — 94640 AIRWAY INHALATION TREATMENT: CPT

## 2017-05-21 PROCEDURE — 27000339 *HC DAILY SUPPLY KIT

## 2017-05-21 PROCEDURE — 11000001 HC ACUTE MED/SURG PRIVATE ROOM

## 2017-05-21 PROCEDURE — 25000003 PHARM REV CODE 250: Performed by: INTERNAL MEDICINE

## 2017-05-21 PROCEDURE — 63600175 PHARM REV CODE 636 W HCPCS: Performed by: FAMILY MEDICINE

## 2017-05-21 PROCEDURE — 80053 COMPREHEN METABOLIC PANEL: CPT

## 2017-05-21 PROCEDURE — C9113 INJ PANTOPRAZOLE SODIUM, VIA: HCPCS | Performed by: FAMILY MEDICINE

## 2017-05-21 PROCEDURE — 27000221 HC OXYGEN, UP TO 24 HOURS

## 2017-05-21 PROCEDURE — 25000003 PHARM REV CODE 250: Performed by: FAMILY MEDICINE

## 2017-05-21 PROCEDURE — 63600175 PHARM REV CODE 636 W HCPCS: Performed by: NURSE PRACTITIONER

## 2017-05-21 PROCEDURE — 94761 N-INVAS EAR/PLS OXIMETRY MLT: CPT

## 2017-05-21 PROCEDURE — 85025 COMPLETE CBC W/AUTO DIFF WBC: CPT

## 2017-05-21 RX ADMIN — DOCUSATE SODIUM 100 MG: 100 CAPSULE, LIQUID FILLED ORAL at 09:05

## 2017-05-21 RX ADMIN — TRAMADOL HYDROCHLORIDE 50 MG: 50 TABLET, FILM COATED ORAL at 08:05

## 2017-05-21 RX ADMIN — PIPERACILLIN AND TAZOBACTAM 4.5 G: 4; .5 INJECTION, POWDER, LYOPHILIZED, FOR SOLUTION INTRAVENOUS; PARENTERAL at 05:05

## 2017-05-21 RX ADMIN — PRAMIPEXOLE DIHYDROCHLORIDE 0.25 MG: 0.12 TABLET ORAL at 05:05

## 2017-05-21 RX ADMIN — PIPERACILLIN AND TAZOBACTAM 4.5 G: 4; .5 INJECTION, POWDER, LYOPHILIZED, FOR SOLUTION INTRAVENOUS; PARENTERAL at 01:05

## 2017-05-21 RX ADMIN — PRAMIPEXOLE DIHYDROCHLORIDE 0.25 MG: 0.12 TABLET ORAL at 09:05

## 2017-05-21 RX ADMIN — PRAMIPEXOLE DIHYDROCHLORIDE 0.25 MG: 0.12 TABLET ORAL at 02:05

## 2017-05-21 RX ADMIN — LEVOTHYROXINE SODIUM 75 MCG: 75 TABLET ORAL at 05:05

## 2017-05-21 RX ADMIN — CARBIDOPA AND LEVODOPA 1 TABLET: 25; 100 TABLET ORAL at 09:05

## 2017-05-21 RX ADMIN — LEVALBUTEROL 1.25 MG: 1.25 SOLUTION, CONCENTRATE RESPIRATORY (INHALATION) at 07:05

## 2017-05-21 RX ADMIN — SENNA 1 TABLET: 8.6 TABLET, COATED ORAL at 09:05

## 2017-05-21 RX ADMIN — POTASSIUM CHLORIDE 40 MEQ: 1.5 SOLUTION ORAL at 08:05

## 2017-05-21 RX ADMIN — CARBIDOPA AND LEVODOPA 1 TABLET: 25; 100 TABLET ORAL at 02:05

## 2017-05-21 RX ADMIN — CARBIDOPA AND LEVODOPA 1 TABLET: 25; 100 TABLET ORAL at 05:05

## 2017-05-21 RX ADMIN — DEXTROSE 8 MG/HR: 50 INJECTION, SOLUTION INTRAVENOUS at 08:05

## 2017-05-21 RX ADMIN — LEVALBUTEROL 1.25 MG: 1.25 SOLUTION, CONCENTRATE RESPIRATORY (INHALATION) at 01:05

## 2017-05-21 RX ADMIN — PIPERACILLIN AND TAZOBACTAM 4.5 G: 4; .5 INJECTION, POWDER, LYOPHILIZED, FOR SOLUTION INTRAVENOUS; PARENTERAL at 09:05

## 2017-05-21 RX ADMIN — DEXTROSE 8 MG/HR: 50 INJECTION, SOLUTION INTRAVENOUS at 12:05

## 2017-05-21 RX ADMIN — POTASSIUM CHLORIDE 40 MEQ: 1.5 SOLUTION ORAL at 09:05

## 2017-05-21 RX ADMIN — TRAZODONE HYDROCHLORIDE 50 MG: 50 TABLET ORAL at 08:05

## 2017-05-21 RX ADMIN — ASPIRIN 81 MG 81 MG: 81 TABLET ORAL at 09:05

## 2017-05-21 RX ADMIN — DEXTROSE 8 MG/HR: 50 INJECTION, SOLUTION INTRAVENOUS at 02:05

## 2017-05-21 NOTE — PLAN OF CARE
Problem: Fall Risk (Adult)  Goal: Absence of Falls  Patient will demonstrate the desired outcomes by discharge/transition of care.   Outcome: Ongoing (interventions implemented as appropriate)  Fall precautions maintained.     Problem: Patient Care Overview  Goal: Plan of Care Review  Outcome: Ongoing (interventions implemented as appropriate)  Plan of care reviewed with patient and she agrees with the plan of care. No nausea or vomiting this shift. Feedings continue at 10ml/hr. Tolerating well. Protonix continues. Telemetry monitoring continues. Remains NPO. Zosyn continues without any adverse reactions. Oxygen continues at 2l per nasal cannula without any adverse reactions.     Problem: Pressure Ulcer Risk (Luca Scale) (Adult,Obstetrics,Pediatric)  Goal: Skin Integrity  Patient will demonstrate the desired outcomes by discharge/transition of care.   Outcome: Ongoing (interventions implemented as appropriate)  Patient turned and repositioned every 2 hours.

## 2017-05-21 NOTE — PT/OT/SLP PROGRESS
Physical Therapy  Treatment    Eddy Cunningham   MRN: [id number]   Admitting Diagnosis: Diverticulitis of large intestine without perforation or abscess without bleeding    PT Received On: 17  PT Start Time: 805     PT Stop Time: 820    PT Total Time (min): 15 min       Billable Minutes:  Therapeutic Exercise 15 minutes    Treatment Type: Treatment  PT/PTA: PT             General Precautions: Standard, fall  Orthopedic Precautions: N/A   Braces:           Subjective:  Communicated with patient prior to session.    Pain Ratin/10                   Objective:        Functional Mobility:    Therapeutic Activities and Exercises:  Pt performed A/A exercises on BUE/BLE at all joints in available planes of motion with rest breaks as needed to increase strength and prevent contractures.      AM-PAC 6 CLICK MOBILITY  How much help from another person does this patient currently need?   1 = Unable, Total/Dependent Assistance  2 = A lot, Maximum/Moderate Assistance  3 = A little, Minimum/Contact Guard/Supervision  4 = None, Modified [place]/Independent    Turning over in bed (including adjusting bedclothes, sheets and blankets)?: 1  Sitting down on and standing up from a chair with arms (e.g., wheelchair, bedside commode, etc.): 1  Moving from lying on back to sitting on the side of the bed?: 1  Moving to and from a bed to a chair (including a wheelchair)?: 1  Need to walk in hospital room?: 1  Climbing 3-5 steps with a railing?: 1  Total Score: 6    AM-PAC Raw Score CMS G-Code Modifier Level of Impairment Assistance   6 % Total / Unable   7 - 9 CM 80 - 100% Maximal Assist   10 - 14 CL 60 - 80% Moderate Assist   15 - 19 CK 40 - 60% Moderate Assist   20 - 22 CJ 20 - 40% Minimal Assist   23 CI 1-20% SBA / CGA   24 CH 0% Independent/ Mod I     Patient left supine with all lines intact, call button in reach and NSG notified.    Assessment:  Eddy Cunningham is a 77 y.o. female with a medical diagnosis of  Diverticulitis of large intestine without perforation or abscess without bleeding and presents with good tolerance of treatment session with no c/o.    Rehab identified problem list/impairments: Rehab identified problem list/impairments: weakness, impaired endurance, impaired self care skills, impaired functional mobilty, gait instability, decreased upper extremity function, decreased lower extremity function    Rehab potential is poor.    Activity tolerance: Good    Discharge recommendations: Discharge Facility/Level Of Care Needs: nursing facility, basic     Barriers to discharge: Barriers to Discharge: None    Equipment recommendations: Equipment Needed After Discharge: none     GOALS:    Physical Therapy Goals        Problem: Physical Therapy Goal    Goal Priority Disciplines Outcome Goal Variances Interventions   Physical Therapy Goal     PT/OT, PT      Description:  Goals to be met by: upon D/C from facility     Patient will increase functional independence with mobility by performin. Supine to sit with MInimal Assistance  2. Sit to supine with MInimal Assistance  3. Bed to chair transfer with Maximum Assistance using Rolling Walker                      PLAN:    Patient to be seen  (1-2x/day Monday-Friday; PRN Weekends/Holidays)  to address the above listed problems via therapeutic exercises  Plan of Care expires:  (upon D/C from facility)  Plan of Care reviewed with: patient         Nellie Cy, PT  2017

## 2017-05-21 NOTE — PROGRESS NOTES
Tube feeding increased to 20 ML/hr. MD states if pt tolerates feeding, in 6 hours to increase by 10 ML more. Will continue to monitor.

## 2017-05-21 NOTE — HPI
Pt presented to ER last night with c/o vomiting, fever and abd pain since yesterday. She is a resident of [facility]. She reports that she recently started on puree diet at nursing home. Has g tube. Last meal was yesterday. She has her daughter at bedside and reports that she asked for something Sunday to settle her stomach. She also reports that she has terrible constipation. She can not remember her last BM (daughter says Sunday). She had CT in ER which shows obstruction and diverticulitis. She was given cipro and flagyl in ER. She moved up to floor 5/16. Still febrile. BP stable on IVF. WBC increased 62690>67197. Lactate and blood cultures done and negative.    She has slightly elevated RR and audible congestion with breathing. Concern for aspiration. CXR looks ok but portable.

## 2017-05-21 NOTE — SUBJECTIVE & OBJECTIVE
Interval History: no vomiting overnight. Tolerated continuous feeds. Doing well.     Review of Systems   Constitutional: Negative for chills and fever.   HENT: Negative for congestion, ear pain, postnasal drip, rhinorrhea, sore throat and trouble swallowing.    Eyes: Negative for redness and itching.   Respiratory: Negative for cough, shortness of breath and wheezing.    Cardiovascular: Negative for chest pain and palpitations.   Gastrointestinal: Positive for constipation. Negative for abdominal pain, diarrhea, nausea and vomiting.   Genitourinary: Negative for dysuria and frequency.   Skin: Negative for rash.   Neurological: Negative for weakness and headaches.     Objective:     Vital Signs (Most Recent):  Temp: 96.6 °F (35.9 °C) (05/21/17 0441)  Pulse: 71 (05/21/17 0600)  Resp: 18 (05/21/17 0441)  BP: 136/62 (05/21/17 0441)  SpO2: 100 % (05/21/17 0441) Vital Signs (24h Range):  Temp:  [96.6 °F (35.9 °C)-98.4 °F (36.9 °C)] 96.6 °F (35.9 °C)  Pulse:  [63-91] 71  Resp:  [18-20] 18  SpO2:  [96 %-100 %] 100 %  BP: (115-136)/(53-89) 136/62     Weight: 78.3 kg (172 lb 9.9 oz)  Body mass index is 26.25 kg/m².    Intake/Output Summary (Last 24 hours) at 05/21/17 0705  Last data filed at 05/21/17 0549   Gross per 24 hour   Intake             1031 ml   Output              200 ml   Net              831 ml      Physical Exam   Constitutional: She is oriented to person, place, and time. She appears well-developed. No distress.   HENT:   Head: Normocephalic and atraumatic.   Nose: Nose normal.   Mouth/Throat: Oropharynx is clear and moist.   NC in place   Eyes: Conjunctivae and EOM are normal. Pupils are equal, round, and reactive to light.   Neck: Normal range of motion. Neck supple. No thyromegaly present.   Cardiovascular: Normal rate, regular rhythm, normal heart sounds and intact distal pulses.    No murmur heard.  Pulmonary/Chest: Effort normal and breath sounds normal. No respiratory distress. She has no wheezes. She  has no rales. She exhibits no tenderness.   Diffuse rhonchi    Abdominal: Soft. Bowel sounds are normal. She exhibits no distension and no mass. There is no tenderness. There is no rebound and no guarding.   G tube clamped.    No drainage around the tube   Musculoskeletal: Normal range of motion. She exhibits no edema or tenderness.   Lymphadenopathy:     She has no cervical adenopathy.   Neurological: She is alert and oriented to person, place, and time. She exhibits abnormal muscle tone.   Cogwheel rigidity   Baseline resting tremor   Skin: Skin is warm and dry. No rash noted. No erythema. No pallor.   Psychiatric: She has a normal mood and affect. Her behavior is normal. Judgment and thought content normal.   Nursing note and vitals reviewed.      Significant Labs:   BMP:   Recent Labs  Lab 05/20/17  0611   *      K 3.1*   *   CO2 24   BUN 10   CREATININE 0.7   CALCIUM 7.3*     CBC:   Recent Labs  Lab 05/20/17  0612 05/21/17  0617   WBC 7.37 4.88   HGB 9.5* 9.8*   HCT 30.7* 31.2*    220       Significant Imaging: I have reviewed all pertinent imaging results/findings within the past 24 hours.

## 2017-05-21 NOTE — PROGRESS NOTES
Tube feeding clamped, and feeding paused. Residual checked and it as 0 MLs. Pt denies any nausea/vomiting. Medications given. Tubing for feeding changed along with irrigation tray and syringe. All are dated and timed. Tube unclamped and  feeding increased to 30 MLs/hr and restarted. In 6 hours if pt tolerates feeding, will increase to goal of 40Ml/hr.

## 2017-05-21 NOTE — ASSESSMENT & PLAN NOTE
She was placed NPO at some point and had G tube placed. She reports that recently she was restarted on puree at NH. Will have speech re evaluate while here.     Still being kept NPO per ST. However, patient desires eating.    Feeding via G-tube with continuous slow feeds and tolerating at 40cc/hr at this point. Will keep NPO per ST recs with exception of occasional pleasure feeds of pudding per speech or per family. Can have 4-5 bites with 4 swallows after each bite. Did well with speech this am.

## 2017-05-21 NOTE — PLAN OF CARE
Problem: Patient Care Overview  Goal: Plan of Care Review  Outcome: Ongoing (interventions implemented as appropriate)  Vitals stable, afebrile, no signs of distress. Pt denies any pain, and nausea today. Pt tube feeding is up to 30 ML/hr. Last residual checked was 30ML of milky content. Pt is still NPO. Pt turned every 2 hours to prevent breakdown. Pt had bowel movement today. Incontinence care done when needed. IV Protonix continued. IV abx continued. Fall precautions, call bell in reach. Pt agrees with plan of care, no questions at this time.

## 2017-05-21 NOTE — ASSESSMENT & PLAN NOTE
No anemia issues on admit however there was a question about black output from G tube.  Currently h/h continues to fall. +fobt yesterday and +black emesis today.  Will start protonix drip, stop fluids and check h/h tomorrow. If stable, will consider sending her home on protonix peg and following h/h at home with gi f/u.    Patient has had improvement of h/h with stopping IVF. Seems stable. Continue protonix and monitor h/h with cbc in 1 week at NH.

## 2017-05-21 NOTE — ASSESSMENT & PLAN NOTE
Change abx to zosyn for broader coverage - complete 7 day course here. Speech to assess swallow. NPO per recs. Will cont to follow daily and make changes as needed.

## 2017-05-21 NOTE — PROGRESS NOTES
[facility] Medicine  Progress Note    Patient Name: Eddy Cunningham  MRN: [id number]  Patient Class: IP- Inpatient   Admission Date: 5/16/2017  Length of Stay: 3 days  Attending Physician: Hayder Khalil MD  Primary Care Provider: Ayaka Pollard MD        Subjective:     Principal Problem:Diverticulitis of large intestine without perforation or abscess without bleeding    HPI:  Pt presented to ER last night with c/o vomiting, fever and abd pain since yesterday. She is a resident of [facility]. She reports that she recently started on puree diet at nursing home. Has g tube. Last meal was yesterday. She has her daughter at bedside and reports that she asked for something Sunday to settle her stomach. She also reports that she has terrible constipation. She can not remember her last BM (daughter says Sunday). She had CT in ER which shows obstruction and diverticulitis. She was given cipro and flagyl in ER. She moved up to floor 5/16. Still febrile. BP stable on IVF. WBC increased [id number]>[id number]. Lactate and blood cultures done and negative.    She has slightly elevated RR and audible congestion with breathing. Concern for aspiration. CXR looks ok but portable.     Hospital Course:  5/18  Pt did well over the day yesterday. Remained with g tube to gravity with 1700ml drainage over the day. Surgery saw her and did not think that she had mechanical bowel obstruction and daughter came yesterday reporting a large BM on Sunday. ++ gas when up to bedside commode. NPO status remains as speech saw her yesterday and patient was witnessed choking on her syliva. Meds on hold. Has IVF. VSS- afebrile    She had elevated WBC and fever on admission. WBC much better today 44204>>7000. With aspiration and audible gargling we started treating her for poss aspiration pneumonia with zosyn day 2. This will also cross cover for UTI, noted on CT and dirty U/a. Culture in progress. Blood cultures drawn and  pending. Lactate was normal. She is 95% pox on 2L NC. Has nebs scheduled every 6hr. Repeat cxr ordered, still with no infiltrate noted. Admit CXR without consolidation.     She is able to transfer to chair at NH. PT started to prevent further debility. Right now she is total care. Pt currently on zosyn for expanded coverage to assure coverage for aspiration pna and diverticulitis.    5/20  Yesterday, after no nausea or abdominal pain, feeds were restarted via g tube. Patient tolerated this well all day long. This morning, she complained of burning in her esophagus and nausea and vomited about 20cc of dark brown/black emesis. She had a BM yesterday and had + FOBT. She has had a slightly decreasing h/h over the last few days but does not feel weak secondary to this.    5/21  Patient has done well over the last 24 hours. She has had no more vomiting. She is tolerating continuous feeds and she has no more abdominal pain. No fevers. Meds have been resumed PGT. Given protonix yesterday with improvement of abdominal burning and h/h remains stable today.       No new subjective & objective note has been filed under this hospital service since the last note was generated.    Interval History: no vomiting overnight. Tolerated continuous feeds. Doing well.   Review of Systems   Constitutional: Negative for chills and fever.   HENT: Negative for congestion, ear pain, postnasal drip, rhinorrhea, sore throat and trouble swallowing.   Eyes: Negative for redness and itching.   Respiratory: Negative for cough, shortness of breath and wheezing.   Cardiovascular: Negative for chest pain and palpitations.   Gastrointestinal: Positive for constipation. Negative for abdominal pain, diarrhea, nausea and vomiting.   Genitourinary: Negative for dysuria and frequency.   Skin: Negative for rash.   Neurological: Negative for weakness and headaches.     Objective:     Vital Signs (Most Recent):   Temp: 96.6 °F (35.9 °C) (05/21/17 0441)   Pulse:  71 (05/21/17 0600)   Resp: 18 (05/21/17 0441)   BP: 136/62 (05/21/17 0441)   SpO2: 100 % (05/21/17 0441) Vital Signs (24h Range):   Temp: [96.6 °F (35.9 °C)-98.4 °F (36.9 °C)] 96.6 °F (35.9 °C)   Pulse: [63-91] 71   Resp: [18-20] 18   SpO2: [96 %-100 %] 100 %   BP: (115-136)/(53-89) 136/62   Weight: 78.3 kg (172 lb 9.9 oz)   Body mass index is 26.25 kg/m².     Intake/Output Summary (Last 24 hours) at 05/21/17 0705  Last data filed at 05/21/17 0549    Gross per 24 hour   Intake 1031 ml   Output 200 ml   Net 831 ml     Physical Exam   Constitutional: She is oriented to person, place, and time. She appears well-developed. No distress.   HENT:   Head: Normocephalic and atraumatic.   Nose: Nose normal.   Mouth/Throat: Oropharynx is clear and moist.   NC in place   Eyes: Conjunctivae and EOM are normal. Pupils are equal, round, and reactive to light.   Neck: Normal range of motion. Neck supple. No thyromegaly present.   Cardiovascular: Normal rate, regular rhythm, normal heart sounds and intact distal pulses.   No murmur heard.   Pulmonary/Chest: Effort normal and breath sounds normal. No respiratory distress. She has no wheezes. She has no rales. She exhibits no tenderness.   Diffuse rhonchi   Abdominal: Soft. Bowel sounds are normal. She exhibits no distension and no mass. There is no tenderness. There is no rebound and no guarding.   G tube clamped.    No drainage around the tube   Musculoskeletal: Normal range of motion. She exhibits no edema or tenderness.   Lymphadenopathy:   She has no cervical adenopathy.   Neurological: She is alert and oriented to person, place, and time. She exhibits abnormal muscle tone.   Cogwheel rigidity   Baseline resting tremor   Skin: Skin is warm and dry. No rash noted. No erythema. No pallor.   Psychiatric: She has a normal mood and affect. Her behavior is normal. Judgment and thought content normal.   Nursing note and vitals reviewed.      Assessment/Plan:      Acute blood loss anemia     No anemia issues on admit however there was a question about black output from G tube.  Currently h/h continues to fall. +fobt yesterday and +black emesis today.  Will start protonix drip, stop fluids and check h/h tomorrow. If stable, will consider sending her home on protonix peg and following h/h at home with gi f/u.    Patient has had improvement of h/h with stopping IVF. Seems stable. Continue protonix and monitor h/h with cbc in 1 week at NH.          Functional quadriplegia    Chronic secondary to parkinson's  PT ordered        Chronic constipation    Will resume Colace per G-tube today, but will also give dulcolax supp to hopefully stimulate a bm today - has had 3 BMs  Needs normal BM regimen.  Will initiate this.  Colace daily, Miralax if no BM in 1 day, dulcolax suppository if no BM by day 2, Fleets by day 3        Hypokalemia    3.1->3.2; Potassium rider again today    Replace with 80meq via peg daily.    Recheck BMP this morning - now resolved.          Aspiration into airway    Change abx to zosyn for broader coverage - complete 7 day course here. Speech to assess swallow. NPO per recs. Will cont to follow daily and make changes as needed. Will have ST see patient in AM prior to d/c back to NH          Esophageal dysphagia    She was placed NPO at some point and had G tube placed. She reports that recently she was restarted on puree at NH. Will have speech re evaluate while here.     Still being kept NPO per ST. However, patient desires eating.    Feeding via G-tube with continuous slow feeds and tolerating at 10cc/hr at this point. Increase to 20 and then up to goal. Will keep NPO per ST recs.          Sepsis due to Gram negative bacteria    She had temp, elevated HR, elevated RR, elevated WBC with diverticulitis as source of infection. Her bp is stable so not shock. She was started on cipro and flagyl in ER. Changed to zosyn to initially cover all causes of possible infection. At this time  diverticulitis seems to be the predominant cause of her sepsis and is well treated and sepsis has resolved.            Parkinsons    Sinemet at home  exelon patch in place  Giving meds PGT        * Diverticulitis of large intestine without perforation or abscess without bleeding    Ct shows obstruction and diverticulitis  general surgery consulted- no mechanical obstruction  Zosyn abx - to cover diverticulitis. Improving   KUB - non obstructive, but with slow transit  Will give suppository and start gravity feeds to gtube as symptoms of diverticulitis are improving.  No new fevers. Abdominal pain has completely resolved.  Complete 7 days of abx - completing 7 day course of iv here                VTE Risk Mitigation         Ordered     Medium Risk of VTE  Once      05/17/17 0050     Place NETTA hose  Until discontinued      05/17/17 0050          Tessy Marvin MD  Department of Hospital Medicine   [facility]

## 2017-05-21 NOTE — ASSESSMENT & PLAN NOTE
Ct shows obstruction and diverticulitis  general surgery consulted- no mechanical obstruction  Zosyn abx - to cover diverticulitis. Improving   KUB - non obstructive, but with slow transit  Will give suppository and start gravity feeds to gtube as symptoms of diverticulitis are improving.  No new fevers. Abdominal pain has completely resolved.  Complete 7 days of abx - transition to augmentin for d/c vs completing 7 day course of iv here

## 2017-05-21 NOTE — HOSPITAL COURSE
5/18  Pt did well over the day yesterday. Remained with g tube to gravity with 1700ml drainage over the day. Surgery saw her and did not think that she had mechanical bowel obstruction and daughter came yesterday reporting a large BM on Sunday. ++ gas when up to bedside commode. NPO status remains as speech saw her yesterday and patient was witnessed choking on her syliva. Meds on hold. Has IVF. VSS- afebrile    She had elevated WBC and fever on admission. WBC much better today [id number]>>7000. With aspiration and audible gargling we started treating her for poss aspiration pneumonia with zosyn day 2. This will also cross cover for UTI, noted on CT and dirty U/a. Culture in progress. Blood cultures drawn and pending. Lactate was normal. She is 95% pox on 2L NC. Has nebs scheduled every 6hr. Repeat cxr ordered, still with no infiltrate noted. Admit CXR without consolidation.     She is able to transfer to chair at NH. PT started to prevent further debility. Right now she is total care. Pt currently on zosyn for expanded coverage to assure coverage for aspiration pna and diverticulitis.    5/20  Yesterday, after no nausea or abdominal pain, feeds were restarted via g tube. Patient tolerated this well all day long. This morning, she complained of burning in her esophagus and nausea and vomited about 20cc of dark brown/black emesis. She had a BM yesterday and had + FOBT. She has had a slightly decreasing h/h over the last few days but does not feel weak secondary to this.    5/21  Patient has done well over the last 24 hours. She has had no more vomiting. She is tolerating continuous feeds and she has no more abdominal pain. No fevers. Meds have been resumed PGT. Given protonix yesterday with improvement of abdominal burning and h/h remains stable today.     5/22  Pt doing well with full strength peptamen at 40 ml/hour with no residual. Continues on protonix IV drip.

## 2017-05-21 NOTE — ASSESSMENT & PLAN NOTE
Will resume Colace per G-tube today, but will also give dulcolax supp to hopefully stimulate a bm today - has had 3 BMs  Needs normal BM regimen.  Will initiate this.  Colace daily, Miralax if no BM in 1 day, dulcolax suppository if no BM by day 2, Fleets by day 3

## 2017-05-21 NOTE — ASSESSMENT & PLAN NOTE
3.1->3.2; Potassium rider again today    Replace with 80meq via peg daily.    Recheck BMP this morning - now resolved.

## 2017-05-21 NOTE — PROGRESS NOTES
Nursing Notes  Bedside report received. Pt stable, and has no complaints. Continuous Protonix infusing. G tube is unclamped and continuous feed is infusing.      Huddle Comments

## 2017-05-21 NOTE — ASSESSMENT & PLAN NOTE
Ct shows obstruction and diverticulitis  general surgery consulted- no mechanical obstruction  Zosyn abx - to cover diverticulitis. Improving  KUB - non obstructive, but with slow transit  Will give suppository and start gravity feeds to gtube as symptoms of diverticulitis are improving.  No new fevers. Abdominal pain has completely resolved.  Complete 7 days of abx - completing 7 day course of iv here; completed today.

## 2017-05-21 NOTE — ASSESSMENT & PLAN NOTE
She was placed NPO at some point and had G tube placed. She reports that recently she was restarted on puree at NH. Will have speech re evaluate while here.     Still being kept NPO per ST.    Feeding via G-tube with continuous slow feeds and tolerating. Will keep NPO per ST recs.

## 2017-05-21 NOTE — ASSESSMENT & PLAN NOTE
3.1->3.2; Potassium rider again today    Replace with 80meq via peg daily.    Recheck BMP this morning

## 2017-05-22 VITALS
OXYGEN SATURATION: 100 % | TEMPERATURE: 97 F | WEIGHT: 172.63 LBS | DIASTOLIC BLOOD PRESSURE: 49 MMHG | BODY MASS INDEX: 26.16 KG/M2 | HEART RATE: 82 BPM | RESPIRATION RATE: 18 BRPM | SYSTOLIC BLOOD PRESSURE: 98 MMHG | HEIGHT: 68 IN

## 2017-05-22 LAB
ALBUMIN SERPL BCP-MCNC: 2.6 G/DL
ALP SERPL-CCNC: 57 U/L
ALT SERPL W/O P-5'-P-CCNC: 8 U/L
ANION GAP SERPL CALC-SCNC: 8 MMOL/L
AST SERPL-CCNC: 14 U/L
BACTERIA BLD CULT: NORMAL
BACTERIA BLD CULT: NORMAL
BASOPHILS # BLD AUTO: 0.05 K/UL
BASOPHILS NFR BLD: 1.1 %
BILIRUB SERPL-MCNC: 0.4 MG/DL
BUN SERPL-MCNC: 6 MG/DL
CALCIUM SERPL-MCNC: 8.7 MG/DL
CHLORIDE SERPL-SCNC: 103 MMOL/L
CO2 SERPL-SCNC: 28 MMOL/L
CREAT SERPL-MCNC: 0.7 MG/DL
DIFFERENTIAL METHOD: ABNORMAL
EOSINOPHIL # BLD AUTO: 0.3 K/UL
EOSINOPHIL NFR BLD: 5.9 %
ERYTHROCYTE [DISTWIDTH] IN BLOOD BY AUTOMATED COUNT: 12.8 %
EST. GFR  (AFRICAN AMERICAN): >60 ML/MIN/1.73 M^2
EST. GFR  (NON AFRICAN AMERICAN): >60 ML/MIN/1.73 M^2
GLUCOSE SERPL-MCNC: 119 MG/DL
HCT VFR BLD AUTO: 31.9 %
HGB BLD-MCNC: 10.2 G/DL
LYMPHOCYTES # BLD AUTO: 0.9 K/UL
LYMPHOCYTES NFR BLD: 20.7 %
MCH RBC QN AUTO: 29.6 PG
MCHC RBC AUTO-ENTMCNC: 32 %
MCV RBC AUTO: 93 FL
MONOCYTES # BLD AUTO: 0.6 K/UL
MONOCYTES NFR BLD: 12.7 %
NEUTROPHILS # BLD AUTO: 2.6 K/UL
NEUTROPHILS NFR BLD: 59.6 %
PLATELET # BLD AUTO: 239 K/UL
PMV BLD AUTO: 10 FL
POTASSIUM SERPL-SCNC: 4 MMOL/L
PROT SERPL-MCNC: 5.2 G/DL
RBC # BLD AUTO: 3.45 M/UL
SODIUM SERPL-SCNC: 139 MMOL/L
WBC # BLD AUTO: 4.4 K/UL

## 2017-05-22 PROCEDURE — 80053 COMPREHEN METABOLIC PANEL: CPT

## 2017-05-22 PROCEDURE — 25000003 PHARM REV CODE 250: Performed by: INTERNAL MEDICINE

## 2017-05-22 PROCEDURE — C9113 INJ PANTOPRAZOLE SODIUM, VIA: HCPCS | Performed by: FAMILY MEDICINE

## 2017-05-22 PROCEDURE — 25000003 PHARM REV CODE 250: Performed by: FAMILY MEDICINE

## 2017-05-22 PROCEDURE — G8979 MOBILITY GOAL STATUS: HCPCS | Mod: CL

## 2017-05-22 PROCEDURE — 94640 AIRWAY INHALATION TREATMENT: CPT

## 2017-05-22 PROCEDURE — 36415 COLL VENOUS BLD VENIPUNCTURE: CPT

## 2017-05-22 PROCEDURE — 85025 COMPLETE CBC W/AUTO DIFF WBC: CPT

## 2017-05-22 PROCEDURE — 92526 ORAL FUNCTION THERAPY: CPT

## 2017-05-22 PROCEDURE — 99232 SBSQ HOSP IP/OBS MODERATE 35: CPT | Mod: ,,, | Performed by: INTERNAL MEDICINE

## 2017-05-22 PROCEDURE — G8980 MOBILITY D/C STATUS: HCPCS | Mod: CM

## 2017-05-22 PROCEDURE — 63600175 PHARM REV CODE 636 W HCPCS: Performed by: FAMILY MEDICINE

## 2017-05-22 PROCEDURE — 25000003 PHARM REV CODE 250: Performed by: NURSE PRACTITIONER

## 2017-05-22 PROCEDURE — 63600175 PHARM REV CODE 636 W HCPCS: Performed by: NURSE PRACTITIONER

## 2017-05-22 RX ORDER — PRAMIPEXOLE DIHYDROCHLORIDE 0.25 MG/1
0.25 TABLET ORAL 3 TIMES DAILY
Start: 2017-05-22

## 2017-05-22 RX ORDER — SENNOSIDES 8.6 MG/1
1 TABLET ORAL DAILY
COMMUNITY
Start: 2017-05-22

## 2017-05-22 RX ORDER — LEVOTHYROXINE SODIUM 75 UG/1
75 TABLET ORAL
Start: 2017-05-22

## 2017-05-22 RX ORDER — POTASSIUM CHLORIDE 20 MEQ/15ML
20 SOLUTION ORAL 2 TIMES DAILY
Qty: 3800 ML | Refills: 0 | Status: ON HOLD
Start: 2017-05-22 | End: 2019-12-08 | Stop reason: CLARIF

## 2017-05-22 RX ORDER — NAPROXEN SODIUM 220 MG/1
81 TABLET, FILM COATED ORAL DAILY
Refills: 0 | Status: ON HOLD | COMMUNITY
Start: 2017-05-22 | End: 2019-12-08 | Stop reason: CLARIF

## 2017-05-22 RX ORDER — DOCUSATE SODIUM 100 MG/1
100 CAPSULE, LIQUID FILLED ORAL DAILY
Refills: 0 | Status: ON HOLD | COMMUNITY
Start: 2017-05-22 | End: 2019-12-08 | Stop reason: CLARIF

## 2017-05-22 RX ORDER — TRAZODONE HYDROCHLORIDE 50 MG/1
50 TABLET ORAL NIGHTLY
Start: 2017-05-22

## 2017-05-22 RX ORDER — TRAMADOL HYDROCHLORIDE 50 MG/1
50 TABLET ORAL EVERY 6 HOURS PRN
Status: ON HOLD
Start: 2017-05-22 | End: 2018-07-31 | Stop reason: HOSPADM

## 2017-05-22 RX ORDER — OMEPRAZOLE 40 MG/1
CAPSULE, DELAYED RELEASE ORAL
Qty: 30 CAPSULE | Refills: 5
Start: 2017-05-22 | End: 2018-04-18

## 2017-05-22 RX ORDER — CARBIDOPA AND LEVODOPA 25; 100 MG/1; MG/1
1 TABLET ORAL
Start: 2017-05-22

## 2017-05-22 RX ADMIN — CARBIDOPA AND LEVODOPA 1 TABLET: 25; 100 TABLET ORAL at 02:05

## 2017-05-22 RX ADMIN — PIPERACILLIN AND TAZOBACTAM 4.5 G: 4; .5 INJECTION, POWDER, LYOPHILIZED, FOR SOLUTION INTRAVENOUS; PARENTERAL at 09:05

## 2017-05-22 RX ADMIN — PIPERACILLIN AND TAZOBACTAM 4.5 G: 4; .5 INJECTION, POWDER, LYOPHILIZED, FOR SOLUTION INTRAVENOUS; PARENTERAL at 01:05

## 2017-05-22 RX ADMIN — CARBIDOPA AND LEVODOPA 1 TABLET: 25; 100 TABLET ORAL at 09:05

## 2017-05-22 RX ADMIN — SENNA 1 TABLET: 8.6 TABLET, COATED ORAL at 09:05

## 2017-05-22 RX ADMIN — ASPIRIN 81 MG 81 MG: 81 TABLET ORAL at 09:05

## 2017-05-22 RX ADMIN — DOCUSATE SODIUM 100 MG: 100 CAPSULE, LIQUID FILLED ORAL at 09:05

## 2017-05-22 RX ADMIN — LEVALBUTEROL 1.25 MG: 1.25 SOLUTION, CONCENTRATE RESPIRATORY (INHALATION) at 07:05

## 2017-05-22 RX ADMIN — POTASSIUM CHLORIDE 40 MEQ: 1.5 SOLUTION ORAL at 09:05

## 2017-05-22 RX ADMIN — PRAMIPEXOLE DIHYDROCHLORIDE 0.25 MG: 0.12 TABLET ORAL at 02:05

## 2017-05-22 RX ADMIN — LEVOTHYROXINE SODIUM 75 MCG: 75 TABLET ORAL at 05:05

## 2017-05-22 RX ADMIN — CARBIDOPA AND LEVODOPA 1 TABLET: 25; 100 TABLET ORAL at 05:05

## 2017-05-22 RX ADMIN — LEVALBUTEROL 1.25 MG: 1.25 SOLUTION, CONCENTRATE RESPIRATORY (INHALATION) at 01:05

## 2017-05-22 RX ADMIN — DEXTROSE 8 MG/HR: 50 INJECTION, SOLUTION INTRAVENOUS at 08:05

## 2017-05-22 RX ADMIN — PRAMIPEXOLE DIHYDROCHLORIDE 0.25 MG: 0.12 TABLET ORAL at 05:05

## 2017-05-22 RX ADMIN — DEXTROSE 8 MG/HR: 50 INJECTION, SOLUTION INTRAVENOUS at 02:05

## 2017-05-22 NOTE — PT/OT/SLP PROGRESS
"Speech Language Pathology  Treatment    Eddy Cunningham   MRN: [id number]   Admitting Diagnosis: Diverticulitis of large intestine without perforation or abscess without bleeding    Diet recommendations: Solid Diet Level: Pleasure Feeding (with speech or family only/after education)  Liquid Diet Level: NPO     SLP Treatment Date: 17  Speech Start Time: 945     Speech Stop Time: 1005     Speech Total (min): 20 min       TREATMENT BILLABLE MINUTES:  Treatment Swallowing Dysfunction 20 minutes    Has the patient been evaluated by SLP for swallowing? : Yes  Keep patient NPO?: Yes   General Precautions: Standard, fall, aspiration  Current Respiratory Status: nasal cannula       Subjective:  "Are you going to give me a sample then throw it away again?"    Pain Ratin/10    Objective:   Patient found with: peripheral IV (Cont TF)  Pt was seen at bedside.  Pts vocal quality continues to be weak but was much louder than Friday.  Pt was given a very small amount of pudding and ice cream.  Pt demonstrated delayed swallow/bolus propulsion but with clear vocal quality immediately after with all presentations.  Pt was cued to elicit additional swallows.  Pt agreed to swallow multiple times with future feedings.  Also discussed all recommendations with Pt and she agreed.        Assessment:  Eddy Cunningham is a 77 y.o. female with a medical diagnosis of Diverticulitis of large intestine without perforation or abscess without bleeding and presented today with improved swallow function.  Recommend continue primary nutrition via PEG and begin pleasure feedings of teaspoons of puddings/purees and ice cream with the following guidelines:  · One small bite at a time (no larger than the tip of the teaspoon)  · Swallow 2-4 additional times after each presentation  · No more than 5 presentations at a time.   · PO with SLP only or family after education completed.   Discharge recommendations: Discharge Facility/Level Of Care " Needs: nursing facility, basic     Goals:    SLP Goals        Problem: SLP Goal    Goal Priority Disciplines Outcome   SLP Goal     SLP Ongoing (interventions implemented as appropriate)   Description:    1) Swallow assessment is ongoing  2) Will complete MBSS once Pt more appropriate, with a better chance for success.                        Plan:   Patient to be seen Therapy Frequency: 3 x/week, 2 x/week, 4 x/week   Plan of Care expires: 05/31/17  Plan of Care reviewed with: patient  SLP Follow-up?: Yes  SLP - Next Visit Date: 05/23/17        ZACHARIAH Mojica, CCC-SLP  05/22/2017

## 2017-05-22 NOTE — SUBJECTIVE & OBJECTIVE
Interval History: doing well with feeds and PPI drip    Review of Systems   Constitutional: Positive for appetite change. Negative for chills and fever.   HENT: Negative for congestion, ear pain, postnasal drip, rhinorrhea, sore throat and trouble swallowing.    Eyes: Negative for redness and itching.   Respiratory: Negative for cough, shortness of breath and wheezing.    Cardiovascular: Negative for chest pain and palpitations.   Gastrointestinal: Negative for abdominal pain, constipation, diarrhea, nausea and vomiting.   Genitourinary: Negative for dysuria and frequency.   Skin: Negative for rash.   Neurological: Negative for weakness and headaches.     Objective:     Vital Signs (Most Recent):  Temp: 96.6 °F (35.9 °C) (05/22/17 0400)  Pulse: 70 (05/22/17 0701)  Resp: 17 (05/22/17 0701)  BP: (!) 110/54 (05/22/17 0400)  SpO2: (!) 94 % (05/22/17 0701) Vital Signs (24h Range):  Temp:  [96 °F (35.6 °C)-97.8 °F (36.6 °C)] 96.6 °F (35.9 °C)  Pulse:  [64-83] 70  Resp:  [17-18] 17  SpO2:  [94 %-100 %] 94 %  BP: (104-139)/(54-74) 110/54     Weight: 78.3 kg (172 lb 9.9 oz)  Body mass index is 26.25 kg/m².    Intake/Output Summary (Last 24 hours) at 05/22/17 0804  Last data filed at 05/22/17 0600   Gross per 24 hour   Intake             1437 ml   Output              300 ml   Net             1137 ml      Physical Exam   Constitutional: She is oriented to person, place, and time. She appears well-developed. No distress.   HENT:   Head: Normocephalic and atraumatic.   Nose: Nose normal.   Mouth/Throat: Oropharynx is clear and moist.   Eyes: Conjunctivae and EOM are normal. Pupils are equal, round, and reactive to light.   Neck: Normal range of motion. Neck supple. No thyromegaly present.   Cardiovascular: Normal rate, regular rhythm, normal heart sounds and intact distal pulses.    No murmur heard.  Pulmonary/Chest: Effort normal and breath sounds normal. No respiratory distress. She has no wheezes. She has no rales. She  exhibits no tenderness.   Abdominal: Soft. Bowel sounds are normal. She exhibits no distension and no mass. There is no tenderness. There is no rebound and no guarding.   No drainage around the tube   Musculoskeletal: Normal range of motion. She exhibits no edema or tenderness.   Lymphadenopathy:     She has no cervical adenopathy.   Neurological: She is alert and oriented to person, place, and time. She exhibits normal muscle tone.   contracted  Baseline resting tremor   Skin: Skin is warm and dry. No rash noted. No erythema. No pallor.   Psychiatric: She has a normal mood and affect. Her behavior is normal. Judgment and thought content normal.   Nursing note and vitals reviewed.      Significant Labs:   BMP:     Recent Labs  Lab 05/22/17  0521   *      K 4.0      CO2 28   BUN 6*   CREATININE 0.7   CALCIUM 8.7     CBC:     Recent Labs  Lab 05/21/17  0617 05/22/17  0521   WBC 4.88 4.40   HGB 9.8* 10.2*   HCT 31.2* 31.9*    239       Significant Imaging: I have reviewed all pertinent imaging results/findings within the past 24 hours.

## 2017-05-22 NOTE — PT/OT/SLP DISCHARGE
Physical Therapy Discharge Summary    Eddy Cunningham  MRN: [id number]   Diverticulitis of large intestine without perforation or abscess without bleeding   Patient Discharged from acute Physical Therapy on 2017.  Please refer to prior PT noted date on 2017 for functional status.     Assessment:   Patient has not met goals.  GOALS:    Physical Therapy Goals     Not on file          Multidisciplinary Problems (Resolved)        Problem: Physical Therapy Goal    Goal Priority Disciplines Outcome Goal Variances Interventions   Physical Therapy Goal   (Resolved)     PT/OT, PT Outcome(s) achieved     Description:  Goals to be met by: upon D/C from facility     Patient will increase functional independence with mobility by performin. Supine to sit with MInimal Assistance -- NOT MET  2. Sit to supine with MInimal Assistance -- NOT MET  3. Bed to chair transfer with Maximum Assistance using Rolling Walker -- NOT MET                     Reasons for Discontinuation of Therapy Services  Transfer to alternate level of care.      Plan:  Patient Discharged to: Basic Nursing Facility.

## 2017-05-22 NOTE — ASSESSMENT & PLAN NOTE
No anemia issues on admit however there was a question about black output from G tube.  Currently h/h continues to fall. +fobt yesterday and +black emesis today.  Will start protonix drip, stop fluids and check h/h tomorrow. If stable, will consider sending her home on protonix peg and following h/h at home with gi f/u.    Patient has had improvement of h/h with stopping IVF. Seems stable. Continue protonix and monitor h/h with cbc in 1 week at NH.    Will d/c home with omeprazole capsule vs suspension.

## 2017-05-22 NOTE — PLAN OF CARE
Problem: Patient Care Overview  Goal: Plan of Care Review  Outcome: Ongoing (interventions implemented as appropriate)  Educate patient/family/cargiver about administration of aerosolized medications via the inhalation route to aid in bronchial hygiene & deliver medications.    Pt. Is on room air with Sao2 99%. No distress noted. Pt. Has weak loose cough. No oral suctioning needed at this time.

## 2017-05-22 NOTE — NURSING
Notified [facility] of discharge to [facility]

## 2017-05-22 NOTE — PLAN OF CARE
05/22/17 1121   Final Note   Assessment Type Final Discharge Note   Discharge Disposition Nurse   Discharge planning education complete? Yes   Hospital Follow Up  Appt(s) scheduled? Yes   Discharge plans and expectations educations in teach back method with documentation complete? Yes   Offered [email or web address] Bedside Delivery? Yes   Discharge/Hospital Encounter Summary to ([facility]) PCP n/a   Referral to Outpatient Case Management complete? n/a   30 day supply of medicines given at discharge, if documented non-compliance / non-adherence? n/a   Any social issues identified prior to discharge? n/a   Did you assess the readiness or willingness of the family or caregiver to support self management of care? Yes   Right Care Referral Info   Facility Name [facility]   Patient discharged back to the nursing home.  Called Fernando at the [facility] about the fax is not picking up.  Tried [phone] and [phone].  Will continue to try.

## 2017-05-22 NOTE — PROGRESS NOTES
[facility] Medicine  Progress Note    Patient Name: Eddy Cunningham  MRN: [id number]  Patient Class: IP- Inpatient   Admission Date: 5/16/2017  Length of Stay: 4 days  Attending Physician: Hayder Khalil MD  Primary Care Provider: Ayaka Pollard MD        Subjective:     Principal Problem:Diverticulitis of large intestine without perforation or abscess without bleeding    HPI:  Pt presented to ER last night with c/o vomiting, fever and abd pain since yesterday. She is a resident of [facility]. She reports that she recently started on puree diet at nursing home. Has g tube. Last meal was yesterday. She has her daughter at bedside and reports that she asked for something Sunday to settle her stomach. She also reports that she has terrible constipation. She can not remember her last BM (daughter says Sunday). She had CT in ER which shows obstruction and diverticulitis. She was given cipro and flagyl in ER. She moved up to floor 5/16. Still febrile. BP stable on IVF. WBC increased [id number]. Lactate and blood cultures done and negative.    She has slightly elevated RR and audible congestion with breathing. Concern for aspiration. CXR looks ok but portable.     Hospital Course:  5/18  Pt did well over the day yesterday. Remained with g tube to gravity with 1700ml drainage over the day. Surgery saw her and did not think that she had mechanical bowel obstruction and daughter came yesterday reporting a large BM on Sunday. ++ gas when up to bedside commode. NPO status remains as speech saw her yesterday and patient was witnessed choking on her syliva. Meds on hold. Has IVF. VSS- afebrile    She had elevated WBC and fever on admission. WBC much better today [id number]. With aspiration and audible gargling we started treating her for poss aspiration pneumonia with zosyn day 2. This will also cross cover for UTI, noted on CT and dirty U/a. Culture in progress. Blood cultures drawn and  pending. Lactate was normal. She is 95% pox on 2L NC. Has nebs scheduled every 6hr. Repeat cxr ordered, still with no infiltrate noted. Admit CXR without consolidation.     She is able to transfer to chair at NH. PT started to prevent further debility. Right now she is total care. Pt currently on zosyn for expanded coverage to assure coverage for aspiration pna and diverticulitis.    5/20  Yesterday, after no nausea or abdominal pain, feeds were restarted via g tube. Patient tolerated this well all day long. This morning, she complained of burning in her esophagus and nausea and vomited about 20cc of dark brown/black emesis. She had a BM yesterday and had + FOBT. She has had a slightly decreasing h/h over the last few days but does not feel weak secondary to this.    5/21  Patient has done well over the last 24 hours. She has had no more vomiting. She is tolerating continuous feeds and she has no more abdominal pain. No fevers. Meds have been resumed PGT. Given protonix yesterday with improvement of abdominal burning and h/h remains stable today.     5/22  Pt doing well with full strength peptamen at 40 ml/hour with no residual. Continues on protonix IV drip.     Interval History: doing well with feeds and PPI drip    Review of Systems   Constitutional: Positive for appetite change. Negative for chills and fever.   HENT: Negative for congestion, ear pain, postnasal drip, rhinorrhea, sore throat and trouble swallowing.    Eyes: Negative for redness and itching.   Respiratory: Negative for cough, shortness of breath and wheezing.    Cardiovascular: Negative for chest pain and palpitations.   Gastrointestinal: Negative for abdominal pain, constipation, diarrhea, nausea and vomiting.   Genitourinary: Negative for dysuria and frequency.   Skin: Negative for rash.   Neurological: Negative for weakness and headaches.     Objective:     Vital Signs (Most Recent):  Temp: 96.6 °F (35.9 °C) (05/22/17 0400)  Pulse: 70 (05/22/17  0701)  Resp: 17 (05/22/17 0701)  BP: (!) 110/54 (05/22/17 0400)  SpO2: (!) 94 % (05/22/17 0701) Vital Signs (24h Range):  Temp:  [96 °F (35.6 °C)-97.8 °F (36.6 °C)] 96.6 °F (35.9 °C)  Pulse:  [64-83] 70  Resp:  [17-18] 17  SpO2:  [94 %-100 %] 94 %  BP: (104-139)/(54-74) 110/54     Weight: 78.3 kg (172 lb 9.9 oz)  Body mass index is 26.25 kg/m².    Intake/Output Summary (Last 24 hours) at 05/22/17 0804  Last data filed at 05/22/17 0600   Gross per 24 hour   Intake             1437 ml   Output              300 ml   Net             1137 ml      Physical Exam   Constitutional: She is oriented to person, place, and time. She appears well-developed. No distress.   HENT:   Head: Normocephalic and atraumatic.   Nose: Nose normal.   Mouth/Throat: Oropharynx is clear and moist.   Eyes: Conjunctivae and EOM are normal. Pupils are equal, round, and reactive to light.   Neck: Normal range of motion. Neck supple. No thyromegaly present.   Cardiovascular: Normal rate, regular rhythm, normal heart sounds and intact distal pulses.    No murmur heard.  Pulmonary/Chest: Effort normal and breath sounds normal. No respiratory distress. She has no wheezes. She has no rales. She exhibits no tenderness.   Abdominal: Soft. Bowel sounds are normal. She exhibits no distension and no mass. There is no tenderness. There is no rebound and no guarding.   No drainage around the tube   Musculoskeletal: Normal range of motion. She exhibits no edema or tenderness.   Lymphadenopathy:     She has no cervical adenopathy.   Neurological: She is alert and oriented to person, place, and time. She exhibits normal muscle tone.   contracted  Baseline resting tremor   Skin: Skin is warm and dry. No rash noted. No erythema. No pallor.   Psychiatric: She has a normal mood and affect. Her behavior is normal. Judgment and thought content normal.   Nursing note and vitals reviewed.      Significant Labs:   BMP:     Recent Labs  Lab 05/22/17  0521   *   NA  139   K 4.0      CO2 28   BUN 6*   CREATININE 0.7   CALCIUM 8.7     CBC:     Recent Labs  Lab 05/21/17  0617 05/22/17  0521   WBC 4.88 4.40   HGB 9.8* 10.2*   HCT 31.2* 31.9*    239       Significant Imaging: I have reviewed all pertinent imaging results/findings within the past 24 hours.    Assessment/Plan:      Acute blood loss anemia    No anemia issues on admit however there was a question about black output from G tube.  Currently h/h continues to fall. +fobt yesterday and +black emesis today.  Will start protonix drip, stop fluids and check h/h tomorrow. If stable, will consider sending her home on protonix peg and following h/h at home with gi f/u.    Patient has had improvement of h/h with stopping IVF. Seems stable. Continue protonix and monitor h/h with cbc in 1 week at NH.    Will d/c home with omeprazole capsule vs suspension.         Functional quadriplegia    Chronic secondary to parkinson's  PT ordered        Chronic constipation    Will resume Colace per G-tube today, but will also give dulcolax supp to hopefully stimulate a bm today - has had 3 BMs  Needs normal BM regimen.  Will initiate this.  Colace daily, Miralax if no BM in 1 day, dulcolax suppository if no BM by day 2, Fleets by day 3        Hypokalemia    3.1->3.2; Potassium rider again today    Replace with 80meq via peg daily.    Recheck BMP this morning - now resolved.          Aspiration into airway    Change abx to zosyn for broader coverage - complete 7 day course here. Speech to assess swallow. NPO per recs. Will cont to follow daily and make changes as needed.          Esophageal dysphagia    She was placed NPO at some point and had G tube placed. She reports that recently she was restarted on puree at NH. Will have speech re evaluate while here.     Still being kept NPO per ST. However, patient desires eating.    Feeding via G-tube with continuous slow feeds and tolerating at 40cc/hr at this point. Will keep NPO per ST  recs with exception of occasional pleasure feeds of pudding per speech or per family. Can have 4-5 bites with 4 swallows after each bite. Did well with speech this am.           Sepsis due to Gram negative bacteria    She had temp, elevated HR, elevated RR, elevated WBC with diverticulitis as source of infection. Her bp is stable so not shock. She was started on cipro and flagyl in ER. Changed to zosyn to initially cover all causes of possible infection. At this time diverticulitis seems to be the predominant cause of her sepsis and is well treated and sepsis has resolved.            Parkinsons    Sinemet at home  exelon patch in place  Giving meds PGT        * Diverticulitis of large intestine without perforation or abscess without bleeding    Ct shows obstruction and diverticulitis  general surgery consulted- no mechanical obstruction  Zosyn abx - to cover diverticulitis. Improving  KUB - non obstructive, but with slow transit  Will give suppository and start gravity feeds to gtube as symptoms of diverticulitis are improving.  No new fevers. Abdominal pain has completely resolved.  Complete 7 days of abx - completing 7 day course of iv here; completed today.                 VTE Risk Mitigation         Ordered     Medium Risk of VTE  Once      05/17/17 0050     Place NETTA [place]  Until discontinued      05/17/17 0050          Kristin Pinto MD  Department of Hospital Medicine   [facility]

## 2017-05-22 NOTE — NURSING
Report called to Nolvia at [facility]. All questions answered. Awaiting transportation.

## 2017-05-22 NOTE — PLAN OF CARE
Problem: Fall Risk (Adult)  Goal: Absence of Falls  Patient will demonstrate the desired outcomes by discharge/transition of care.   Outcome: Ongoing (interventions implemented as appropriate)  Fall precautions maintained. Bed alarm engaged at all times    Problem: Patient Care Overview  Goal: Plan of Care Review  Outcome: Ongoing (interventions implemented as appropriate)  Plan of care reviewed with patient and she agrees with the plan of care. Remains NPO. Protonix drip continues. Peptamen continues at goal rate of 40ml/hr. Tolerating well without c/o nausea or vomiting. No residual noted. Zosyn continues without any adverse reactions. Tramadol given for c/o pain and was effective. Telemetry monitoring continues.     Problem: Pressure Ulcer Risk (Luca Scale) (Adult,Obstetrics,Pediatric)  Goal: Skin Integrity  Patient will demonstrate the desired outcomes by discharge/transition of care.   Outcome: Ongoing (interventions implemented as appropriate)  Patient turned and repositioned every 2 hours.

## 2017-05-22 NOTE — NURSING
Report received. Pt awake and alert. SR up X3, call bell in reach. Peptomen 1.5 infusing at 40ml/hr per PEG. Protonix drip infusing at 20ml/hr. Sx at bedisde. Voices no c/o.

## 2017-05-22 NOTE — PROGRESS NOTES
Food & Nutrition Services  Tube Feed Recs    Current TF:   Frequency: Continuous (24hrs)  Formula: Peptamen 1.5 Prebio  Goal Rate: 40ml/hr  Flushes: 115ml every 4hrs         Pt is tolerating feeds at goal rate at this time. To be discharged to NH today. NH does not have Peptamen on their formulary. Pt will be receiving Jevity 1.0 at NH.        TF Recs for Jevity 1.0:  Frequency: Continuous (24hrs)  Goal Rate: 60ml/hr  Flushes: 60ml every 4 hrs  Provides: 1440 calories (100% EEN), and 64gm Protein (95% EPN)        Thanks!  Nolvia Myers MS, RDN, LDN

## 2017-05-22 NOTE — DISCHARGE SUMMARY
[facility] Medicine  Discharge Summary      Patient Name: Eddy Cunningham  MRN: [id number]  Admission Date: 5/16/2017  Hospital Length of Stay: 4 days  Discharge Date and Time:  05/22/2017 10:51 AM  Attending Physician: Hayder Khalil MD   Discharging Provider: Aleja Myers NP  Primary Care Provider: Ayaka Pollard MD      HPI:   Pt presented to ER last night with c/o vomiting, fever and abd pain since yesterday. She is a resident of [facility] [place]. She reports that she recently started on puree diet at nursing home. Has g tube. Last meal was yesterday. She has her daughter at bedside and reports that she asked for something Sunday to settle her stomach. She also reports that she has terrible constipation. She can not remember her last BM (daughter says Sunday). She had CT in ER which shows obstruction and diverticulitis. She was given cipro and flagyl in ER. She moved up to floor 5/16. Still febrile. BP stable on IVF. WBC increased [id number]. Lactate and blood cultures done and negative.    She has slightly elevated RR and audible congestion with breathing. Concern for aspiration. CXR looks ok but portable.     * No surgery found *      Indwelling Lines/Drains at time of discharge:   Lines/Drains/Airways     Drain                 Gastrostomy/Enterostomy feeding;other (see comments) [id number] days              Hospital Course:   5/18  Pt did well over the day yesterday. Remained with g tube to gravity with 1700ml drainage over the day. Surgery saw her and did not think that she had mechanical bowel obstruction and daughter came yesterday reporting a large BM on Sunday. ++ gas when up to bedside commode. NPO status remains as speech saw her yesterday and patient was witnessed choking on her syliva. Meds on hold. Has IVF. VSS- afebrile    She had elevated WBC and fever on admission. WBC much better today 48109>>7000. With aspiration and audible gargling we started treating her  for poss aspiration pneumonia with zosyn day 2. This will also cross cover for UTI, noted on CT and dirty U/a. Culture in progress. Blood cultures drawn and pending. Lactate was normal. She is 95% pox on 2L NC. Has nebs scheduled every 6hr. Repeat cxr ordered, still with no infiltrate noted. Admit CXR without consolidation.     She is able to transfer to chair at NH. PT started to prevent further debility. Right now she is total care. Pt currently on zosyn for expanded coverage to assure coverage for aspiration pna and diverticulitis.    5/20  Yesterday, after no nausea or abdominal pain, feeds were restarted via g tube. Patient tolerated this well all day long. This morning, she complained of burning in her esophagus and nausea and vomited about 20cc of dark brown/black emesis. She had a BM yesterday and had + FOBT. She has had a slightly decreasing h/h over the last few days but does not feel weak secondary to this.    5/21  Patient has done well over the last 24 hours. She has had no more vomiting. She is tolerating continuous feeds and she has no more abdominal pain. No fevers. Meds have been resumed PGT. Given protonix yesterday with improvement of abdominal burning and h/h remains stable today.     5/22  Pt doing well with full strength peptamen at 40 ml/hour with no residual. Continues on protonix IV drip.      Consults:   Consults         Status Ordering Provider     Inpatient consult to Dietary  Once     Provider:  (Not yet assigned)    Completed MIRIAN AYON     Inpatient consult to General Surgery  Once     Provider:  Scottie Pratt MD    Acknowledged HELEN, MI AVALOS     IP consult to dietary  Once     Provider:  (Not yet assigned)    Completed KIMI THIBODEAUX          Significant Diagnostic Studies: Labs:   CMP   Recent Labs  Lab 05/21/17  0616 05/22/17  0521    139   K 3.7 4.0    103   CO2 26 28   GLU 95 119*   BUN 8 6*   CREATININE 0.6 0.7   CALCIUM 8.6* 8.7   PROT 5.5* 5.2*    ALBUMIN 2.8* 2.6*   BILITOT 0.6 0.4   ALKPHOS 56 57   AST 17 14   ALT 8* 8*   ANIONGAP 8 8   ESTGFRAFRICA >60 >60   EGFRNONAA >60 >60    and CBC   Recent Labs  Lab 05/21/17  0617 05/22/17  0521   WBC 4.88 4.40   HGB 9.8* 10.2*   HCT 31.2* 31.9*    239       Pending Diagnostic Studies:     None        Final Active Diagnoses:    Diagnosis Date Noted POA    PRINCIPAL PROBLEM:  Diverticulitis of large intestine without perforation or abscess without bleeding [K57.32] 05/16/2017 Yes    Acute blood loss anemia [D62] 05/20/2017 No    Chronic constipation [K59.09] 05/19/2017 Yes    Functional quadriplegia [R53.2] 05/19/2017 Yes    Hypokalemia [E87.6] 05/18/2017 No    Sepsis due to Gram negative bacteria [A41.50] 05/17/2017 Yes    Esophageal dysphagia [R13.14] 05/17/2017 Yes    Aspiration into airway [T17.908A] 05/17/2017 Yes    Parkinsons [G20] 11/13/2012 Yes      Problems Resolved During this Admission:    Diagnosis Date Noted Date Resolved POA    Sepsis, Gram positive [A41.89] 05/17/2017 05/19/2017 Yes    acute Cystitis [N30.90] 05/17/2017 05/19/2017 Yes    Insomnia [G47.00] 11/13/2012 05/18/2017 Yes      Problem List           Codes Noted - Resolved     * (Principal)Diverticulitis of large intestine ICD-10-CM: K57.32  ICD-9-CM: 562.11 5/16/2017 - Present     Current Assessment & Plan 5/16/2017 Hospital Encounter Edited 5/22/2017 10:30 AM by Aleja Myers NP      Ct shows obstruction and diverticulitis  general surgery consulted- no mechanical obstruction  Zosyn abx - to cover diverticulitis. Improving  KUB - non obstructive, but with slow transit  Will give suppository and start gravity feeds to gtube as symptoms of diverticulitis are improving.  No new fevers. Abdominal pain has completely resolved.  Complete 7 days of abx - completing 7 day course of iv here; completed today.                 Parkinson disease ICD-10-CM: G20  ICD-9-CM: 332.0 11/13/2012 - Present     Current Assessment & Plan  5/16/2017 Hospital Encounter Written 5/22/2017  8:05 AM by Aleja Myers NP      Sinemet at home  exelon patch in place  Giving meds PGT          RESOLVED: Difficulty falling or staying asleep ICD-10-CM: G47.00  ICD-9-CM: 780.52 11/13/2012 - 5/18/2017     Current Assessment & Plan 5/16/2017 Hospital Encounter Written 5/18/2017  6:46 AM by Lorenza Cantor NP                  Mood problem ICD-10-CM: F39  ICD-9-CM: 296.90 11/13/2012 - Present     Memory problem ICD-10-CM: R41.3  ICD-9-CM: 780.93 3/14/2013 - Present     Calf ulcer ICD-10-CM: L97.209  ICD-9-CM: 707.12 12/2/2013 - Present     Stasis edema with ulcer and inflammation ICD-10-CM: I87.339  ICD-9-CM: 459.33 12/2/2013 - Present     RESOLVED: Falls frequently ICD-10-CM: R29.6  ICD-9-CM: V15.88 2/24/2014 - 9/8/2015     Infection in bloodstream ICD-10-CM: A41.50  ICD-9-CM: 038.40, 995.91 5/17/2017 - Present     Current Assessment & Plan 5/16/2017 Hospital Encounter Written 5/21/2017  9:24 AM by Tessy Marvin MD      She had temp, elevated HR, elevated RR, elevated WBC with diverticulitis as source of infection. Her bp is stable so not shock. She was started on cipro and flagyl in ER. Changed to zosyn to initially cover all causes of possible infection. At this time diverticulitis seems to be the predominant cause of her sepsis and is well treated and sepsis has resolved.              Difficulty swallowing ICD-10-CM: R13.14  ICD-9-CM: 787.24 5/17/2017 - Present     Current Assessment & Plan 5/16/2017 Hospital Encounter Edited 5/22/2017 10:31 AM by Aleja Myers NP      She was placed NPO at some point and had G tube placed. She reports that recently she was restarted on puree at NH. Will have speech re evaluate while here.     Still being kept NPO per ST. However, patient desires eating.    Feeding via G-tube with continuous slow feeds and tolerating at 40cc/hr at this point. Will keep NPO per ST recs with exception of occasional pleasure  feeds of pudding per speech or per family. Can have 4-5 bites with 4 swallows after each bite. Did well with speech this am.             Aspiration into airway ICD-10-CM: T17.908A  ICD-9-CM: 934.9 5/17/2017 - Present     Current Assessment & Plan 5/16/2017 Hospital Encounter Edited 5/22/2017  8:07 AM by Aleja Myers NP      Change abx to zosyn for broader coverage - complete 7 day course here. Speech to assess swallow. NPO per recs. Will cont to follow daily and make changes as needed.            RESOLVED: Infection in bloodstream ICD-10-CM: A41.89  ICD-9-CM: 038.8, 995.91 5/17/2017 - 5/19/2017     Current Assessment & Plan 5/16/2017 Hospital Encounter Written 5/19/2017  7:45 AM by Aleja Myers NP      She had temp, elevated HR, elevated RR, elevated WBC with diverticulitis as source of infection. Her bp is stable so not shock. She was started on cipro and flagyl in ER. Changed to zosyn for better coverage of poss asp pneumonia, UTI and abd process    She was admitted via ER last night. Severe sepsis was initated this am after the identification of diverticulitis and poss aspiration. Therefore that is why blood cultures were drawn 5/17 am.           RESOLVED: acute Cystitis ICD-10-CM: N30.90  ICD-9-CM: 595.9 5/17/2017 - 5/19/2017     Current Assessment & Plan 5/16/2017 Hospital Encounter Edited 5/19/2017 10:20 AM by Tessy Marvin MD      Check U/a 30WBC and 1+ leuko and culture, not done in ER due to incontinence   Collected 5/17 - U cx with multiple organisms  On zosyn            Low blood potassium ICD-10-CM: E87.6  ICD-9-CM: 276.8 5/18/2017 - Present     Current Assessment & Plan 5/16/2017 Hospital Encounter Written 5/21/2017  9:23 AM by Tessy Marvin MD      3.1->3.2; Potassium rider again today    Replace with 80meq via peg daily.    Recheck BMP this morning - now resolved.            Chronic constipation ICD-10-CM: K59.09  ICD-9-CM: 564.00 5/19/2017 - Present     Current Assessment &  Plan 5/16/2017 Hospital Encounter Written 5/22/2017  8:09 AM by Aleja Myers NP      Will resume Colace per G-tube today, but will also give dulcolax supp to hopefully stimulate a bm today - has had 3 BMs  Needs normal BM regimen.  Will initiate this.  Colace daily, Miralax if no BM in 1 day, dulcolax suppository if no BM by day 2, Fleets by day 3          Functional quadriplegia ICD-10-CM: R53.2  ICD-9-CM: 780.72 5/19/2017 - Present     Current Assessment & Plan 5/16/2017 Hospital Encounter Written 5/22/2017  8:09 AM by Aleja Myers NP      Chronic secondary to parkinson's  PT ordered          Anemia due to acute blood loss ICD-10-CM: D62  ICD-9-CM: 285.1 5/20/2017 - Present     Current Assessment & Plan 5/16/2017 Hospital Encounter Edited 5/22/2017 10:49 AM by Aleja Myers NP      No anemia issues on admit however there was a question about black output from G tube.  Currently h/h continues to fall. +fobt yesterday and +black emesis today.  Will start protonix drip, stop fluids and check h/h tomorrow. If stable, will consider sending her home on protonix peg and following h/h at home with gi f/u.    Patient has had improvement of h/h with stopping IVF. Seems stable. Continue protonix and monitor h/h with cbc in 1 week at NH.    Will d/c home with omeprazole capsule vs suspension.                Discharged Condition: good    Disposition: Nursing Facility    Follow Up:  Follow-up Information     [facility].    Why:  Nursing Home  Contact information:  [address]  [facility] [id number]             Ayaka Pollard MD In 1 week.    Specialty:  Family Medicine  Contact information:  [id number]   [address] [place] [id number] [phone]                 Patient Instructions:     Activity as tolerated       Medications:  Reconciled Home Medications:   Current Discharge Medication List      START taking these medications    Details   omeprazole (PRILOSEC) 40 MG capsule Open 40 mg  capsule into g-tube once daily  Qty: 30 capsule, Refills: 5      potassium chloride 10% (KAYCIEL) 20 mEq/15 mL solution 15 mLs (20 mEq total) by Per G Tube route 2 (two) times daily.  Qty: 3800 mL, Refills: 0         CONTINUE these medications which have CHANGED    Details   aspirin 81 MG Chew 1 tablet (81 mg total) by Per G Tube route once daily.  Refills: 0      carbidopa-levodopa  mg (SINEMET)  mg per tablet 1 tablet by Per G Tube route 5 (five) times daily.      docusate sodium (COLACE) 100 MG capsule 1 capsule (100 mg total) by Per G Tube route once daily.  Refills: 0      levothyroxine (SYNTHROID) 75 MCG tablet 1 tablet (75 mcg total) by Per G Tube route before breakfast.      pramipexole (MIRAPEX) 0.25 MG tablet 1 tablet (0.25 mg total) by Per G Tube route 3 (three) times daily.      senna (SENOKOT) 8.6 mg tablet 1 tablet by Per G Tube route once daily.      tramadol (ULTRAM) 50 mg tablet 1 tablet (50 mg total) by Per G Tube route every 6 (six) hours as needed for Pain.      trazodone (DESYREL) 50 MG tablet 1 tablet (50 mg total) by Per G Tube route every evening.         CONTINUE these medications which have NOT CHANGED    Details   rivastigmine (EXELON) 4.6 mg/24 hour PT24 Place 1 patch onto the skin once daily.           STOP taking these medications       hydrocodone-acetaminophen 10-325mg (NORCO)  mg Tab Comments:   Reason for Stopping:         potassium chloride (KLOR-CON) 10 MEQ TbSR Comments:   Reason for Stopping:         amphetamine-dextroamphetamine (ADDERALL XR) 20 MG 24 hr capsule Comments:   Reason for Stopping:         carbidopa-levodopa-entacapone 18.75- mg (STALEVO) 18.75- mg Tab Comments:   Reason for Stopping:             Time spent on the discharge of patient: 40 minutes    Aleja Myers NP  Department of Hospital Medicine  [facility]

## 2017-05-24 NOTE — PHYSICIAN QUERY
PT Name: Eddy Cunningham  MR #: [id number]     Physician Query Form - Diagnosis Clarification      CDS/: MARIVEL Corea, RN, CDI               Contact information: [phone]    This form is a permanent document in the medical record.     Query Date: May 24, 2017    By submitting this query, we are merely seeking further clarification of documentation.  Please utilize your independent clinical judgment when addressing the question(s) below.     The medical record contains the following:      Findings Supporting Clinical Information Location in Medical Record     Sepsis, Gram positive    She was started on cipro and flagyl in ER. Changed to zosyn for better coverage of poss asp pneumonia, UTI and abd process . At this time diverticulitis seems to be the predominant cause of her sepsis and is well treated and sepsis has resolved.   DC Summary: 5/22     Please clarify if the ____Sepsis, Gram positive ___________ diagnosis has been:    [  ] Ruled In  [  ] Ruled In, Now Resolved  [  ] Resolved Prior to My Assessment  [X  ] Ruled Out  [  ] Clinically insignificant  [  ] Clinically undetermined  [  ] Other/Clarification of findings (please specify)_______________________________    Please document in your progress notes daily for the duration of treatment, until resolved, and include in your discharge summary.

## 2017-08-17 ENCOUNTER — HOSPITAL ENCOUNTER (EMERGENCY)
Facility: HOSPITAL | Age: 77
End: 2017-08-17
Attending: FAMILY MEDICINE
Payer: MEDICARE

## 2017-08-17 VITALS
TEMPERATURE: 98 F | WEIGHT: 190 LBS | RESPIRATION RATE: 18 BRPM | OXYGEN SATURATION: 96 % | BODY MASS INDEX: 28.79 KG/M2 | DIASTOLIC BLOOD PRESSURE: 60 MMHG | HEART RATE: 77 BPM | HEIGHT: 68 IN | SYSTOLIC BLOOD PRESSURE: 130 MMHG

## 2017-08-17 DIAGNOSIS — K94.23 MALFUNCTION OF PERCUTANEOUS ENDOSCOPIC GASTROSTOMY (PEG) TUBE: Primary | ICD-10-CM

## 2017-08-17 PROCEDURE — 43760 *HC REPLACEMENT GASTROS TUBE: CPT

## 2017-08-17 PROCEDURE — 99284 EMERGENCY DEPT VISIT MOD MDM: CPT | Mod: 25

## 2017-08-17 NOTE — PROVIDER PROGRESS NOTES - EMERGENCY DEPT.
Encounter Date: 8/17/2017    ED Physician Progress Notes           Gastrostomy tube #18 placed.  Without difficulty.  Patient tolerated procedure well.  Area injected into the lumen and confirmed inside the stomach.  Patient will be discharged back to the nursing home.

## 2017-08-17 NOTE — ED PROVIDER NOTES
Encounter Date: 8/17/2017       History     Chief Complaint   Patient presents with    Peg Tube Problem     The history is provided by the patient and the nursing home. No  was used.   General Illness    The current episode started just prior to arrival. The problem has been unchanged. Pertinent negatives include no diarrhea, no nausea and no vomiting. She has received no recent medical care.     Patient came in from nursing home because her PEG tube fell out accidentally.  She has no complaints.  She just was a tube replaced.  No nausea vomiting or diarrhea.    Review of patient's allergies indicates:   Allergen Reactions    Oxycodone      Hallucination    Sulfa (sulfonamide antibiotics)      Other reaction(s): when on contact     Past Medical History:   Diagnosis Date    COPD (chronic obstructive pulmonary disease)     Dementia     Depression     Elevated C-reactive protein (CRP)     Falls frequently     GERD (gastroesophageal reflux disease)     Homocystinuria     Hypertension     Hypopotassemia     Hypothyroidism     Narcolepsy without cataplexy     Parkinson disease     RLS (restless legs syndrome)     Stress incontinence     Venous stasis ulcers      Past Surgical History:   Procedure Laterality Date    CARPAL TUNNEL RELEASE      Right    CHOLECYSTECTOMY      GASTROSTOMY TUBE PLACEMENT      PARTIAL HYSTERECTOMY      ROTATOR CUFF REPAIR      TONSILLECTOMY, ADENOIDECTOMY      tonsiles    TOTAL KNEE ARTHROPLASTY       History reviewed. No pertinent family history.  Social History   Substance Use Topics    Smoking status: Former Smoker    Smokeless tobacco: Not on file    Alcohol use No     Review of Systems   Gastrointestinal: Negative for diarrhea, nausea and vomiting.       Physical Exam     Initial Vitals [08/17/17 0946]   BP Pulse Resp Temp SpO2   132/61 76 16 97.7 °F (36.5 °C) 97 %      MAP       84.67         Physical Exam    Nursing note and vitals  reviewed.  Constitutional: She appears well-developed and well-nourished.   Elderly female in no distress.   HENT:   Head: Normocephalic.   Eyes: Pupils are equal, round, and reactive to light.   Abdominal: Soft. Bowel sounds are normal. She exhibits no distension. There is no tenderness. There is no rebound and no guarding.   Neurological: She is alert.         ED Course   Procedures  Labs Reviewed - No data to display                            ED Course     Clinical Impression:   The encounter diagnosis was Malfunction of percutaneous endoscopic gastrostomy (PEG) tube.                           Maged Acevedo MD  08/17/17 1031

## 2017-08-17 NOTE — ED TRIAGE NOTES
Patient presents to the ER via ambulance for replacement of peg tube which was pulled out by patient at nursing home.

## 2017-08-17 NOTE — ED NOTES
Discharged to home/self care.    - Condition at discharge: Good  - Mode of Discharge: wheelchair with nursing home transport  - The patient left the ED accompanied by .  - The discharge instructions were discussed with nursing home staff.  - They state an understanding of the discharge instructions.  - Walked pt to the discharge station.

## 2017-10-03 ENCOUNTER — OFFICE VISIT (OUTPATIENT)
Dept: NEUROLOGY | Facility: CLINIC | Age: 77
End: 2017-10-03
Payer: MEDICARE

## 2017-10-03 VITALS
BODY MASS INDEX: 28.95 KG/M2 | DIASTOLIC BLOOD PRESSURE: 64 MMHG | HEIGHT: 68 IN | HEART RATE: 88 BPM | SYSTOLIC BLOOD PRESSURE: 104 MMHG | WEIGHT: 191 LBS

## 2017-10-03 DIAGNOSIS — G20.A1 PARKINSONS: Primary | ICD-10-CM

## 2017-10-03 DIAGNOSIS — G24.3 CERVICAL DYSTONIA: ICD-10-CM

## 2017-10-03 DIAGNOSIS — R13.10 DYSPHAGIA, UNSPECIFIED TYPE: ICD-10-CM

## 2017-10-03 PROCEDURE — 99999 PR PBB SHADOW E&M-EST. PATIENT-LVL III: CPT | Mod: PBBFAC,,, | Performed by: PSYCHIATRY & NEUROLOGY

## 2017-10-03 PROCEDURE — 99213 OFFICE O/P EST LOW 20 MIN: CPT | Mod: PBBFAC | Performed by: PSYCHIATRY & NEUROLOGY

## 2017-10-03 PROCEDURE — 99999 PR STA SHADOW: CPT | Mod: PBBFAC,,, | Performed by: PSYCHIATRY & NEUROLOGY

## 2017-10-03 PROCEDURE — 99214 OFFICE O/P EST MOD 30 MIN: CPT | Mod: S$PBB | Performed by: PSYCHIATRY & NEUROLOGY

## 2017-10-03 RX ORDER — DEXTROAMPHETAMINE SACCHARATE, AMPHETAMINE ASPARTATE MONOHYDRATE, DEXTROAMPHETAMINE SULFATE AND AMPHETAMINE SULFATE 3.75; 3.75; 3.75; 3.75 MG/1; MG/1; MG/1; MG/1
15 CAPSULE, EXTENDED RELEASE ORAL EVERY MORNING
COMMUNITY
End: 2018-04-18

## 2017-10-03 RX ORDER — TIZANIDINE 2 MG/1
4 TABLET ORAL NIGHTLY PRN
Qty: 30 TABLET | Refills: 5 | Status: SHIPPED | OUTPATIENT
Start: 2017-10-03 | End: 2017-10-13

## 2017-10-13 ENCOUNTER — HOSPITAL ENCOUNTER (EMERGENCY)
Facility: HOSPITAL | Age: 77
Discharge: HOME | End: 2017-10-13
Attending: SURGERY
Payer: MEDICARE

## 2017-10-13 VITALS
BODY MASS INDEX: 29.04 KG/M2 | RESPIRATION RATE: 16 BRPM | SYSTOLIC BLOOD PRESSURE: 120 MMHG | WEIGHT: 191 LBS | DIASTOLIC BLOOD PRESSURE: 70 MMHG | HEART RATE: 70 BPM | OXYGEN SATURATION: 98 % | TEMPERATURE: 97 F

## 2017-10-13 DIAGNOSIS — Z43.1 PEG (PERCUTANEOUS ENDOSCOPIC GASTROSTOMY) ADJUSTMENT/REPLACEMENT/REMOVAL: Primary | ICD-10-CM

## 2017-10-13 PROCEDURE — 43760 *HC REPLACEMENT GASTROS TUBE: CPT

## 2017-10-13 PROCEDURE — 99284 EMERGENCY DEPT VISIT MOD MDM: CPT | Mod: 25

## 2017-10-13 PROCEDURE — 25500020 PHARM REV CODE 255: Performed by: SURGERY

## 2017-10-13 RX ORDER — HYDROCODONE BITARTRATE AND ACETAMINOPHEN 10; 325 MG/1; MG/1
1 TABLET ORAL EVERY 6 HOURS PRN
COMMUNITY
End: 2018-04-18

## 2017-10-13 RX ADMIN — DIATRIZOATE MEGLUMINE AND DIATRIZOATE SODIUM 20 ML: 660; 100 LIQUID ORAL; RECTAL at 10:10

## 2017-10-14 NOTE — ED TRIAGE NOTES
77 y.o. female presents to ER ED 02/ED 02A   Chief Complaint   Patient presents with    Peg tube change   transferred to ER by EMS for peg tube change. Christian Douglas reports peg tube fell out, but they put it back.

## 2017-10-14 NOTE — ED PROVIDER NOTES
[facility]                                     October 13, 2017                   Chief Complaint  77 y.o. female with Peg tube change    History of Present Illness  Eddy Cunningham presents to the emergency room for a PEG tube complication tonight  Patient's PEG tube fell at the nursing home, nursing home staff quickly put it back in PTA  Physical staff states the PEG tube was getting clogged and malfunctioning this past week  We decided to replace the patient's PEG tube, performed without difficulty tonight in the ER  X-ray with Gastrografin confirmed correct placement, patient actually had no complaint     The history is provided by the patient     Past Medical History   -- COPD (chronic obstructive pulmonary disease)     -- Dementia     -- Depression     -- Elevated C-reactive protein (CRP)     -- Falls frequently     -- Homocystinuria     -- Hypertension     -- Hypopotassemia     -- Hypothyroidism     -- Narcolepsy without cataplexy     -- Parkinson disease     -- RLS (restless legs syndrome)     -- Stress incontinence     -- Venous stasis ulcers        Past Surgical History   -- CARPAL TUNNEL RELEASE       -- CHOLECYSTECTOMY       -- PARTIAL HYSTERECTOMY       -- ROTATOR CUFF REPAIR       -- TONSILLECTOMY, ADENOIDECTOMY       -- TOTAL KNEE ARTHROPLASTY          Review of patient's allergies   -- Oxycodone     -- Sulfa (sulfonamide antibiotics)      Review of Systems and Physical Exam     Review of Systems  -- Constitution - no fever, denies fatigue, no weakness, no chills  -- Eyes - no tearing or redness, no visual disturbance  -- Ear, Nose - no tinnitus or earache, no nasal congestion or discharge  -- Mouth,Throat - no sore throat, no toothache, normal voice, normal swallowing  -- Respiratory - denies cough and congestion, no shortness of breath, no PEREZ  -- Cardiovascular - denies chest pain, no palpitations, denies claudication  -- Gastrointestinal - denies abdominal pain, nausea,  vomiting, or diarrhea  -- Musculoskeletal - denies back pain, negative for myalgias and arthralgias   -- Neurological - no headache, denies weakness or seizure; no LOC  -- Skin - denies pallor, rash, or changes in skin. no hives or welts noted    Vital Signs  -- Her blood pressure is 120/69 and her pulse is 74.   -- Her respiration is 19 and oxygen saturation is 98%.      Physical Exam  -- Nursing note and vitals reviewed  -- Head: Atraumatic. Normocephalic. No obvious abnormality  -- Eyes: Pupils are equal and reactive to light. Normal conjunctiva and lids  -- Cardiac: Normal rate, regular rhythm and normal heart sounds  -- Pulmonary: Normal respiratory effort, breath sounds clear to auscultation  -- Abdominal: Peg tube skin site clean dry and intact  -- Musculoskeletal: Normal range of motion, no effusions. Joints stable   -- Neurological: No focal deficits. Showed good interaction with staff  -- Vascular: Posterior tibial, dorsalis pedis and radial pulses 2+ bilaterally      Emergency Room Course     Treatment and Evaluation  -- PEG tube replaced through the previous skin site  -- X-ray with Gastrografin confirmed placement  -- Site was verified before the start of the procedure  -- No complications were noted from the procedure  -- The patient tolerated the procedure well     Diagnosis  -- PEG tube complication    Disposition and Plan  -- Disposition: home  -- Condition: stable  -- Follow-up: Patient to follow up with Ayaka Pollard MD in 1-2 days.  -- I advised the patient that we have found no life threatening condition today  -- At this time, I believe the patient is clinically stable for discharge.   -- The patient acknowledges that close follow up with a MD is required   -- Patient agrees to comply with all instruction and direction given in the ER    This note is dictated on Dragon Natural Speaking word recognition program.  There are word recognition mistakes that are occasionally missed on  review.           Ivan Gutierres MD  10/14/17 0109

## 2017-10-14 NOTE — ED NOTES
Report given to Mirian at [facility]. Reports will send wheel chair van for patient.

## 2017-12-24 ENCOUNTER — HOSPITAL ENCOUNTER (EMERGENCY)
Facility: HOSPITAL | Age: 77
Discharge: HOME | End: 2017-12-24
Attending: SURGERY
Payer: MEDICARE

## 2017-12-24 VITALS
OXYGEN SATURATION: 98 % | HEART RATE: 78 BPM | BODY MASS INDEX: 29.04 KG/M2 | RESPIRATION RATE: 17 BRPM | TEMPERATURE: 98 F | SYSTOLIC BLOOD PRESSURE: 148 MMHG | WEIGHT: 191 LBS | DIASTOLIC BLOOD PRESSURE: 80 MMHG
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
## 2017-12-24 PROCEDURE — 43760 *HC REPLACEMENT GASTROS TUBE: CPT

## 2017-12-24 PROCEDURE — 99284 EMERGENCY DEPT VISIT MOD MDM: CPT | Mod: 25

## 2017-12-24 NOTE — ED PROVIDER NOTES
[facility]                                       December 24, 2017                   Chief Complaint  77 y.o. female with Fall and Pulled out PEG tube.    History of Present Illness  Eddy Cunningham presents to the emergency room with slip and fall to nursing home  Patient fell back at the nursing home, states she had no loss of consciousness there  Other she fell, the patient actually has no current complaints regarding that fall tonight  The patient however during the fall did accidentally pull out her PEG tube per the staff  Patient has no obvious signs of bruising or trauma, previous PEG site clean and patent    The history is provided by the patient     Past Medical History   -- COPD (chronic obstructive pulmonary disease)     -- Dementia     -- Depression     -- Elevated C-reactive protein (CRP)     -- Falls frequently     -- Homocystinuria     -- Hypertension     -- Hypopotassemia     -- Hypothyroidism     -- Narcolepsy without cataplexy     -- Parkinson disease     -- RLS (restless legs syndrome)     -- Stress incontinence     -- Venous stasis ulcers        Past Surgical History   -- CARPAL TUNNEL RELEASE       -- CHOLECYSTECTOMY       -- PARTIAL HYSTERECTOMY       -- ROTATOR CUFF REPAIR       -- TONSILLECTOMY, ADENOIDECTOMY       -- TOTAL KNEE ARTHROPLASTY          Review of patient's allergies   -- Oxycodone     -- Sulfa (sulfonamide antibiotics)      Review of Systems and Physical Exam     Review of Systems  -- Constitution - no fever, denies fatigue, no weakness, no chills  -- Eyes - no tearing or redness, no visual disturbance  -- Ear, Nose - no tinnitus or earache, no nasal congestion or discharge  -- Mouth,Throat - no sore throat, no toothache, normal voice, normal swallowing  -- Respiratory - denies cough and congestion, no shortness of breath, no PEREZ  -- Cardiovascular - denies chest pain, no palpitations, denies claudication  -- Gastrointestinal - denies abdominal pain,  nausea, vomiting, or diarrhea  -- Genitourinary - no dysuria, no denies flank pain, no hematuria or frequency   -- Musculoskeletal - denies back pain, negative for myalgias and arthralgias   -- Neurological - no headache, denies weakness or seizure; no LOC  -- Skin - denies pallor, rash, or changes in skin. no hives or welts noted    Vital Signs  -- Her oral temperature is 98.2 °F (36.8 °C).   -- Her blood pressure is 148/80 and her pulse is 78.   -- Her respiration is 17 and oxygen saturation is 98%.      Physical Exam  -- Nursing note and vitals reviewed  -- Head: Atraumatic. Normocephalic. No obvious abnormality  -- Eyes: Pupils are equal and reactive to light. Normal conjunctiva and lids  -- Cardiac: Normal rate, regular rhythm and normal heart sounds  -- Pulmonary: Normal respiratory effort, breath sounds clear to auscultation  -- Abdominal: Previous PEG tube skin site clean and dry and intact  -- Musculoskeletal: Normal range of motion, no effusions. Joints stable   -- Neurological: No focal deficits. Showed good interaction with staff  -- Vascular: Posterior tibial, dorsalis pedis and radial pulses 2+ bilaterally    -- Lymphatics: No cervical or peripheral lymphadenopathy. No edema noted    Emergency Room Course     Treatment and Evaluation  -- The CT of the head performed in the ER today was negative for acute pathology  -- The CT C-spine performed in the ER today was negative for acute fracture or dislocation   -- Chest x-ray showed no infiltrate and showed no acute pathology   -- Pelvis x-ray showed no evidence of fracture or dislocation  -- Gastrografin x-ray shows PEG tube in place    PEG tube replacement  -- PEG tube replaced in the original site without difficulty  -- Confirmed by Gastrografin, PEG tube in place  -- Site was verified before the start of the procedure  -- No complications were noted from the procedure  -- The patient tolerated the procedure well     Diagnosis  -- The encounter diagnosis  was Fall.    Disposition and Plan  -- Disposition: home  -- Condition: stable  -- Follow-up: Patient to follow up with Ayaka Pollard MD in 1-2 days.  -- I advised the patient that we have found no life threatening condition today  -- At this time, I believe the patient is clinically stable for discharge.   -- The patient acknowledges that close follow up with a MD is required   -- Patient agrees to comply with all instruction and direction given in the ER    This note is dictated on Dragon Natural Speaking word recognition program.  There are word recognition mistakes that are occasionally missed on review.           Ivan Gutierres MD  12/24/17 0731

## 2018-01-18 ENCOUNTER — HOSPITAL ENCOUNTER (EMERGENCY)
Facility: HOSPITAL | Age: 78
Discharge: HOME | End: 2018-01-18
Attending: EMERGENCY MEDICINE
Payer: MEDICARE

## 2018-01-18 VITALS
DIASTOLIC BLOOD PRESSURE: 78 MMHG | TEMPERATURE: 98 F | BODY MASS INDEX: 28.45 KG/M2 | WEIGHT: 177 LBS | OXYGEN SATURATION: 99 % | HEART RATE: 86 BPM | RESPIRATION RATE: 20 BRPM | SYSTOLIC BLOOD PRESSURE: 123 MMHG | HEIGHT: 66 IN

## 2018-01-18 DIAGNOSIS — T17.308A CHOKING: Primary | ICD-10-CM

## 2018-01-18 LAB
ALBUMIN SERPL BCP-MCNC: 3.5 G/DL
ALP SERPL-CCNC: 248 U/L
ALT SERPL W/O P-5'-P-CCNC: 14 U/L
ANION GAP SERPL CALC-SCNC: 8 MMOL/L
AST SERPL-CCNC: 38 U/L
BACTERIA #/AREA URNS HPF: ABNORMAL /HPF
BASOPHILS # BLD AUTO: 0.05 K/UL
BASOPHILS NFR BLD: 0.6 %
BILIRUB SERPL-MCNC: 0.5 MG/DL
BILIRUB UR QL STRIP: NEGATIVE
BUN SERPL-MCNC: 16 MG/DL
CALCIUM SERPL-MCNC: 9.2 MG/DL
CHLORIDE SERPL-SCNC: 100 MMOL/L
CLARITY UR: ABNORMAL
CO2 SERPL-SCNC: 29 MMOL/L
COLOR UR: YELLOW
CREAT SERPL-MCNC: 0.6 MG/DL
DIFFERENTIAL METHOD: ABNORMAL
EOSINOPHIL # BLD AUTO: 0.3 K/UL
EOSINOPHIL NFR BLD: 3.7 %
ERYTHROCYTE [DISTWIDTH] IN BLOOD BY AUTOMATED COUNT: 13.2 %
EST. GFR  (AFRICAN AMERICAN): >60 ML/MIN/1.73 M^2
EST. GFR  (NON AFRICAN AMERICAN): >60 ML/MIN/1.73 M^2
GLUCOSE SERPL-MCNC: 113 MG/DL
GLUCOSE UR QL STRIP: NEGATIVE
HCT VFR BLD AUTO: 39.1 %
HGB BLD-MCNC: 12.6 G/DL
HGB UR QL STRIP: NEGATIVE
KETONES UR QL STRIP: NEGATIVE
LEUKOCYTE ESTERASE UR QL STRIP: ABNORMAL
LYMPHOCYTES # BLD AUTO: 1.2 K/UL
LYMPHOCYTES NFR BLD: 15 %
MCH RBC QN AUTO: 29.9 PG
MCHC RBC AUTO-ENTMCNC: 32.2 G/DL
MCV RBC AUTO: 93 FL
MICROSCOPIC COMMENT: ABNORMAL
MONOCYTES # BLD AUTO: 0.7 K/UL
MONOCYTES NFR BLD: 8 %
NEUTROPHILS # BLD AUTO: 5.9 K/UL
NEUTROPHILS NFR BLD: 72.7 %
NITRITE UR QL STRIP: POSITIVE
PH UR STRIP: 7 [PH] (ref 5–8)
PLATELET # BLD AUTO: 340 K/UL
PMV BLD AUTO: 9.2 FL
POTASSIUM SERPL-SCNC: 4.5 MMOL/L
PROT SERPL-MCNC: 6.6 G/DL
PROT UR QL STRIP: ABNORMAL
RBC # BLD AUTO: 4.21 M/UL
SODIUM SERPL-SCNC: 137 MMOL/L
SP GR UR STRIP: 1.02 (ref 1–1.03)
SQUAMOUS #/AREA URNS HPF: 5 /HPF
URN SPEC COLLECT METH UR: ABNORMAL
UROBILINOGEN UR STRIP-ACNC: ABNORMAL EU/DL
WBC # BLD AUTO: 8.09 K/UL
WBC #/AREA URNS HPF: 85 /HPF (ref 0–5)
WBC CLUMPS URNS QL MICRO: ABNORMAL

## 2018-01-18 PROCEDURE — 87077 CULTURE AEROBIC IDENTIFY: CPT

## 2018-01-18 PROCEDURE — 93005 ELECTROCARDIOGRAM TRACING: CPT

## 2018-01-18 PROCEDURE — 87086 URINE CULTURE/COLONY COUNT: CPT

## 2018-01-18 PROCEDURE — 87088 URINE BACTERIA CULTURE: CPT

## 2018-01-18 PROCEDURE — 80053 COMPREHEN METABOLIC PANEL: CPT

## 2018-01-18 PROCEDURE — 36415 COLL VENOUS BLD VENIPUNCTURE: CPT

## 2018-01-18 PROCEDURE — 81000 URINALYSIS NONAUTO W/SCOPE: CPT

## 2018-01-18 PROCEDURE — 87186 SC STD MICRODIL/AGAR DIL: CPT

## 2018-01-18 PROCEDURE — 99284 EMERGENCY DEPT VISIT MOD MDM: CPT

## 2018-01-18 PROCEDURE — 85025 COMPLETE CBC W/AUTO DIFF WBC: CPT

## 2018-01-18 RX ORDER — TIZANIDINE HYDROCHLORIDE 2 MG/1
4 CAPSULE, GELATIN COATED ORAL NIGHTLY PRN
COMMUNITY

## 2018-01-18 NOTE — ED NOTES
Daughter requesting to check pt for a UTI. MD made aware. 8 Fr In and out catheter per sterile technique. Pt tolerated well. Specimen sent to lab

## 2018-01-18 NOTE — DISCHARGE INSTRUCTIONS
1.  Patient appears stable at this time with no evidence of acute aspiration.  Please monitor for signs of pneumonia such as persistent cough, low oxygen saturation, fever.  2.  Patient has an elevated alkaline phosphatase level but does not have symptoms at this time.  She will need to be reevaluated by her primary care doctor in 1 week with repeat blood work.  3.  For any concerns, please call the patient's primary care doctor.  For any immediate worsening of symptoms please return to the ER.

## 2018-01-18 NOTE — ED PROVIDER NOTES
"[facility]                                                  Chief Complaint  77 y.o. female with Cough (sent for evaluation of episode SOB; resp unlabored; P.O. 98 of RA; NAD)    History of Present Illness  Eddy Cunningham presents to the emergency room via EMS on request of the patient's nursing home.  Complaint from the nursing home was the saturations are 90% and choking.  Patient has a PEG tube.  The patient states he does not take any food or liquid by mouth, however this history is unclear.  Patient is currently resting comfortably, satting appropriately, and does not appear to be in any distress.  When prompted she reports that she has pain in her legs and back like she "always does ", but denies shortness of breath, chest pain, abdominal pain.      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)     Parkinson disease     RLS (restless legs syndrome)     Stress incontinence     Venous stasis ulcers      Past Surgical History:   Procedure Laterality Date    CARPAL TUNNEL RELEASE      Right    CHOLECYSTECTOMY      GASTROSTOMY TUBE PLACEMENT      PARTIAL HYSTERECTOMY      ROTATOR CUFF REPAIR      TONSILLECTOMY, ADENOIDECTOMY      tonsiles    TOTAL KNEE ARTHROPLASTY        Review of patient's allergies indicates:   Allergen Reactions    Oxycodone      Hallucination    Sulfa (sulfonamide antibiotics)      Other reaction(s): when on contact        Review of Systems and Physical Exam     Review of Systems  -- Constitution - no fever, denies fatigue, no weakness, no chills  -- Eyes - no tearing or redness, no visual disturbance  -- Ear, Nose - no tinnitus or earache, no nasal congestion or " "discharge  -- Mouth,Throat - no sore throat, no toothache, normal voice, normal swallowing  -- Respiratory - denies cough and congestion, no shortness of breath, no wheezing  -- Cardiovascular - denies chest pain, no palpitations, denies claudication  -- Gastrointestinal - denies abdominal pain, nausea, vomiting, or diarrhea  -- Genitourinary - no dysuria, no denies flank pain, no hematuria or frequency   -- Musculoskeletal - reports chronic leg and back pain, negative for myalgias and arthralgias   -- Neurological - no headache, denies weakness or seizure; no LOC  -- Skin - denies pallor, rash, or changes in skin. no hives or welts noted    Vital Signs   height is 5' 6" (1.676 m) and weight is 80.3 kg (177 lb). Her oral temperature is 98.4 °F (36.9 °C). Her blood pressure is 123/78 and her pulse is 86. Her respiration is 20 and oxygen saturation is 99%.      Physical Exam  -- Nursing note and vitals reviewed  -- Constitutional: Appears well-developed and well-nourished, patient is awake and alert and responds to most questions  -- Head: Atraumatic. Normocephalic. No obvious abnormality  -- Eyes: Pupils are equal and reactive to light. Normal conjunctiva and lids  -- Nose: Nose normal in appearance, nares grossly normal. No discharge  -- Throat: Mucous membranes moist, pharynx normal, normal tonsils. No lesions   -- Ears: External ears and TM normal bilaterally. Normal hearing and no drainage  -- Neck: Normal range of motion. Neck supple. No masses, trachea midline  -- Cardiac: Normal rate, regular rhythm and normal heart sounds  -- Pulmonary: Normal respiratory effort, breath sounds clear to auscultation  -- Abdominal: Soft, no tenderness, PEG tube appears in place, clean, dry. Normal bowel sounds. Normal liver edge  -- Musculoskeletal: Normal range of motion, no effusions. Joints stable no extremity edema  -- Neurological: No focal deficits. Showed good interaction with staff  -- Vascular: Posterior tibial, " dorsalis pedis and radial pulses 2+ bilaterally    -- Lymphatics: No cervical or peripheral lymphadenopathy. No edema noted  -- Skin: Warm and dry. No evidence of rash or cellulitis  -- Psychiatric: Normal mood and affect. Bedside behavior is appropriate    Emergency Room Course     Treatment and Evaluation  1.  Physical exam Unremarkable, pulmonary exam clear bilaterally  2.  The patient is satting 99% on room air without oxygen or intervention  3.  Chest x-ray portable shows no acute process and is unchanged from prior exams  3.  CBC/CMP within normal limits, patient needs to follow up outpatient for elevated alkaline phosphatase.  Currently asymptomatic.  4.  EKG normal sinus rhythm and no evidence of ischemia or arrhythmia  5.  UA consistent with UTI called in Macrobid to nursing home.  Culture pending  6. DC to nursing home, follow up primary care    Abnormal lab values  Labs Reviewed   CBC W/ AUTO DIFFERENTIAL   COMPREHENSIVE METABOLIC PANEL         Diagnosis  -- Choking incident  --UTI    Disposition and Plan  -- Disposition: home  -- Condition: stable  -- Follow-up: Patient to follow up with Ayaka Pollard MD in 1-2 days.  -- I advised the patient that we have found no life threatening condition today  -- At this time, I believe the patient is clinically stable for discharge.   -- The patient acknowledges that close follow up with a MD is required   -- Patient agrees to comply with all instruction and direction given in the ER  -- Patient counseled on strict return precautions as discussed       Xi Vaca MD  01/18/18 0824       Xi Vaca MD  01/18/18 [id number]

## 2018-01-21 LAB — BACTERIA UR CULT: NORMAL

## 2018-03-14 ENCOUNTER — HOSPITAL ENCOUNTER (EMERGENCY)
Facility: HOSPITAL | Age: 78
Discharge: HOME | End: 2018-03-15
Attending: EMERGENCY MEDICINE
Payer: MEDICARE

## 2018-03-14 DIAGNOSIS — R79.89 PRERENAL AZOTEMIA: ICD-10-CM

## 2018-03-14 DIAGNOSIS — E86.0 DEHYDRATION: ICD-10-CM

## 2018-03-14 DIAGNOSIS — K94.23 PEG TUBE MALFUNCTION: Primary | ICD-10-CM

## 2018-03-14 LAB
ALBUMIN SERPL BCP-MCNC: 3.8 G/DL
ALP SERPL-CCNC: 96 U/L
ALT SERPL W/O P-5'-P-CCNC: 39 U/L
ANION GAP SERPL CALC-SCNC: 9 MMOL/L
AST SERPL-CCNC: 69 U/L
BASOPHILS # BLD AUTO: 0.03 K/UL
BASOPHILS NFR BLD: 0.5 %
BILIRUB SERPL-MCNC: 0.8 MG/DL
BUN SERPL-MCNC: 31 MG/DL
CALCIUM SERPL-MCNC: 9.5 MG/DL
CHLORIDE SERPL-SCNC: 103 MMOL/L
CO2 SERPL-SCNC: 28 MMOL/L
CREAT SERPL-MCNC: 0.7 MG/DL
DIFFERENTIAL METHOD: ABNORMAL
EOSINOPHIL # BLD AUTO: 0.2 K/UL
EOSINOPHIL NFR BLD: 4.3 %
ERYTHROCYTE [DISTWIDTH] IN BLOOD BY AUTOMATED COUNT: 14 %
EST. GFR  (AFRICAN AMERICAN): >60 ML/MIN/1.73 M^2
EST. GFR  (NON AFRICAN AMERICAN): >60 ML/MIN/1.73 M^2
GLUCOSE SERPL-MCNC: 85 MG/DL
HCT VFR BLD AUTO: 39.8 %
HGB BLD-MCNC: 12.7 G/DL
INR PPP: 1.1
LYMPHOCYTES # BLD AUTO: 1.3 K/UL
LYMPHOCYTES NFR BLD: 22.6 %
MCH RBC QN AUTO: 30.2 PG
MCHC RBC AUTO-ENTMCNC: 31.9 G/DL
MCV RBC AUTO: 95 FL
MONOCYTES # BLD AUTO: 0.5 K/UL
MONOCYTES NFR BLD: 9.4 %
NEUTROPHILS # BLD AUTO: 3.5 K/UL
NEUTROPHILS NFR BLD: 63.2 %
PLATELET # BLD AUTO: 238 K/UL
PMV BLD AUTO: 10 FL
POTASSIUM SERPL-SCNC: 4.1 MMOL/L
PROT SERPL-MCNC: 6.8 G/DL
PROTHROMBIN TIME: 11.2 SEC
RBC # BLD AUTO: 4.21 M/UL
SODIUM SERPL-SCNC: 140 MMOL/L
WBC # BLD AUTO: 5.61 K/UL

## 2018-03-14 PROCEDURE — 25000003 PHARM REV CODE 250: Performed by: EMERGENCY MEDICINE

## 2018-03-14 PROCEDURE — 96360 HYDRATION IV INFUSION INIT: CPT | Mod: 59

## 2018-03-14 PROCEDURE — 25500020 PHARM REV CODE 255: Performed by: EMERGENCY MEDICINE

## 2018-03-14 PROCEDURE — 43760 *HC REPLACEMENT GASTROS TUBE: CPT

## 2018-03-14 PROCEDURE — 80053 COMPREHEN METABOLIC PANEL: CPT

## 2018-03-14 PROCEDURE — 85610 PROTHROMBIN TIME: CPT

## 2018-03-14 PROCEDURE — 85025 COMPLETE CBC W/AUTO DIFF WBC: CPT

## 2018-03-14 PROCEDURE — 99284 EMERGENCY DEPT VISIT MOD MDM: CPT | Mod: 25

## 2018-03-14 RX ORDER — LIDOCAINE HYDROCHLORIDE 20 MG/ML
5 SOLUTION OROPHARYNGEAL
Status: COMPLETED | OUTPATIENT
Start: 2018-03-14 | End: 2018-03-14

## 2018-03-14 RX ADMIN — SODIUM CHLORIDE 1000 ML: 0.9 INJECTION, SOLUTION INTRAVENOUS at 10:03

## 2018-03-14 RX ADMIN — LIDOCAINE HYDROCHLORIDE 5 ML: 20 SOLUTION ORAL; TOPICAL at 09:03

## 2018-03-14 RX ADMIN — DIATRIZOATE MEGLUMINE AND DIATRIZOATE SODIUM 60 ML: 660; 100 LIQUID ORAL; RECTAL at 10:03

## 2018-03-15 VITALS
WEIGHT: 192 LBS | HEART RATE: 72 BPM | RESPIRATION RATE: 18 BRPM | TEMPERATURE: 99 F | DIASTOLIC BLOOD PRESSURE: 55 MMHG | BODY MASS INDEX: 30.99 KG/M2 | SYSTOLIC BLOOD PRESSURE: 115 MMHG | OXYGEN SATURATION: 97 %

## 2018-03-15 NOTE — DISCHARGE INSTRUCTIONS
18 [place] PEG tube was placed. Balloon filled with 15 cc of NS.    JOSHUA Zuñiga M.D. 10:28 PM 3/14/2018

## 2018-03-15 NOTE — ED TRIAGE NOTES
Pt presents to ED via EMS from [facility] with the need for PEG tube replacement. Pt has had 16 Fr. urinary catheter in ostomy since Sunday when the PEG tube was displaced.

## 2018-03-31 ENCOUNTER — HOSPITAL ENCOUNTER (EMERGENCY)
Facility: HOSPITAL | Age: 78
Discharge: HOME | End: 2018-03-31
Attending: EMERGENCY MEDICINE
Payer: MEDICARE

## 2018-03-31 VITALS
DIASTOLIC BLOOD PRESSURE: 60 MMHG | RESPIRATION RATE: 17 BRPM | OXYGEN SATURATION: 98 % | HEART RATE: 70 BPM | TEMPERATURE: 97 F | WEIGHT: 192 LBS | SYSTOLIC BLOOD PRESSURE: 130 MMHG | BODY MASS INDEX: 30.99 KG/M2

## 2018-03-31 DIAGNOSIS — K94.23 PEG TUBE MALFUNCTION: ICD-10-CM

## 2018-03-31 DIAGNOSIS — Z43.1 PEG (PERCUTANEOUS ENDOSCOPIC GASTROSTOMY) ADJUSTMENT/REPLACEMENT/REMOVAL: ICD-10-CM

## 2018-03-31 PROCEDURE — 43760 *HC REPLACEMENT GASTROS TUBE: CPT

## 2018-03-31 PROCEDURE — 25500020 PHARM REV CODE 255: Performed by: EMERGENCY MEDICINE

## 2018-03-31 PROCEDURE — 99285 EMERGENCY DEPT VISIT HI MDM: CPT | Mod: 25

## 2018-03-31 RX ADMIN — DIATRIZOATE MEGLUMINE AND DIATRIZOATE SODIUM 30 ML: 600; 100 SOLUTION ORAL; RECTAL at 11:03

## 2018-04-01 ENCOUNTER — HOSPITAL ENCOUNTER (EMERGENCY)
Facility: HOSPITAL | Age: 78
Discharge: HOME | End: 2018-04-01
Attending: EMERGENCY MEDICINE
Payer: MEDICARE

## 2018-04-01 VITALS
WEIGHT: 192 LBS | SYSTOLIC BLOOD PRESSURE: 113 MMHG | BODY MASS INDEX: 30.99 KG/M2 | OXYGEN SATURATION: 98 % | DIASTOLIC BLOOD PRESSURE: 68 MMHG | RESPIRATION RATE: 16 BRPM | HEART RATE: 78 BPM | TEMPERATURE: 97 F

## 2018-04-01 DIAGNOSIS — R05.9 COUGH: ICD-10-CM

## 2018-04-01 DIAGNOSIS — J00 ACUTE NASOPHARYNGITIS: Primary | ICD-10-CM

## 2018-04-01 LAB
ALBUMIN SERPL BCP-MCNC: 3.7 G/DL
ALP SERPL-CCNC: 90 U/L
ALT SERPL W/O P-5'-P-CCNC: <5 U/L
ANION GAP SERPL CALC-SCNC: 10 MMOL/L
AST SERPL-CCNC: 20 U/L
BASOPHILS # BLD AUTO: 0.03 K/UL
BASOPHILS NFR BLD: 0.4 %
BILIRUB SERPL-MCNC: 0.6 MG/DL
BNP SERPL-MCNC: 144 PG/ML
BUN SERPL-MCNC: 21 MG/DL
CALCIUM SERPL-MCNC: 9.5 MG/DL
CHLORIDE SERPL-SCNC: 102 MMOL/L
CK MB SERPL-MCNC: 2.6 NG/ML
CK MB SERPL-MCNC: 2.8 NG/ML
CK MB SERPL-RTO: 1 %
CK MB SERPL-RTO: 4.1 %
CK SERPL-CCNC: 251 U/L
CK SERPL-CCNC: 251 U/L
CK SERPL-CCNC: 68 U/L
CK SERPL-CCNC: 68 U/L
CO2 SERPL-SCNC: 28 MMOL/L
CREAT SERPL-MCNC: 0.6 MG/DL
DIFFERENTIAL METHOD: ABNORMAL
EOSINOPHIL # BLD AUTO: 0.1 K/UL
EOSINOPHIL NFR BLD: 1.7 %
ERYTHROCYTE [DISTWIDTH] IN BLOOD BY AUTOMATED COUNT: 13.9 %
EST. GFR  (AFRICAN AMERICAN): >60 ML/MIN/1.73 M^2
EST. GFR  (NON AFRICAN AMERICAN): >60 ML/MIN/1.73 M^2
FLUAV AG SPEC QL IA: NEGATIVE
FLUBV AG SPEC QL IA: NEGATIVE
GLUCOSE SERPL-MCNC: 105 MG/DL
HCT VFR BLD AUTO: 40.4 %
HGB BLD-MCNC: 13 G/DL
LYMPHOCYTES # BLD AUTO: 0.9 K/UL
LYMPHOCYTES NFR BLD: 10.7 %
MCH RBC QN AUTO: 30.4 PG
MCHC RBC AUTO-ENTMCNC: 32.2 G/DL
MCV RBC AUTO: 95 FL
MONOCYTES # BLD AUTO: 0.5 K/UL
MONOCYTES NFR BLD: 6.2 %
NEUTROPHILS # BLD AUTO: 6.7 K/UL
NEUTROPHILS NFR BLD: 81 %
PLATELET # BLD AUTO: 254 K/UL
PMV BLD AUTO: 9.9 FL
POTASSIUM SERPL-SCNC: 4.1 MMOL/L
PROT SERPL-MCNC: 6.9 G/DL
RBC # BLD AUTO: 4.27 M/UL
SODIUM SERPL-SCNC: 140 MMOL/L
SPECIMEN SOURCE: NORMAL
TROPONIN I SERPL DL<=0.01 NG/ML-MCNC: <0.006 NG/ML
TROPONIN I SERPL DL<=0.01 NG/ML-MCNC: <0.006 NG/ML
WBC # BLD AUTO: 8.29 K/UL

## 2018-04-01 PROCEDURE — 83880 ASSAY OF NATRIURETIC PEPTIDE: CPT

## 2018-04-01 PROCEDURE — 82550 ASSAY OF CK (CPK): CPT | Mod: 91

## 2018-04-01 PROCEDURE — 63600175 PHARM REV CODE 636 W HCPCS: Performed by: SURGERY

## 2018-04-01 PROCEDURE — 80053 COMPREHEN METABOLIC PANEL: CPT

## 2018-04-01 PROCEDURE — 84484 ASSAY OF TROPONIN QUANT: CPT | Mod: 91

## 2018-04-01 PROCEDURE — 99285 EMERGENCY DEPT VISIT HI MDM: CPT

## 2018-04-01 PROCEDURE — 93005 ELECTROCARDIOGRAM TRACING: CPT

## 2018-04-01 PROCEDURE — 87400 INFLUENZA A/B EACH AG IA: CPT | Mod: 59

## 2018-04-01 PROCEDURE — 82553 CREATINE MB FRACTION: CPT | Mod: 91

## 2018-04-01 PROCEDURE — 96374 THER/PROPH/DIAG INJ IV PUSH: CPT

## 2018-04-01 PROCEDURE — 99284 EMERGENCY DEPT VISIT MOD MDM: CPT | Mod: 25,27

## 2018-04-01 PROCEDURE — 36415 COLL VENOUS BLD VENIPUNCTURE: CPT

## 2018-04-01 PROCEDURE — 85025 COMPLETE CBC W/AUTO DIFF WBC: CPT

## 2018-04-01 RX ORDER — BENZONATATE 100 MG/1
200 CAPSULE ORAL 3 TIMES DAILY PRN
Qty: 20 CAPSULE | Refills: 0 | Status: SHIPPED | OUTPATIENT
Start: 2018-04-01 | End: 2018-04-11

## 2018-04-01 RX ORDER — LEVOFLOXACIN 500 MG/1
500 TABLET, FILM COATED ORAL DAILY
Qty: 7 TABLET | Refills: 0 | Status: SHIPPED | OUTPATIENT
Start: 2018-04-01 | End: 2018-04-08

## 2018-04-01 RX ORDER — LEVOFLOXACIN 5 MG/ML
500 INJECTION, SOLUTION INTRAVENOUS
Status: COMPLETED | OUTPATIENT
Start: 2018-04-01 | End: 2018-04-01

## 2018-04-01 RX ADMIN — LEVOFLOXACIN 500 MG: 5 INJECTION, SOLUTION INTRAVENOUS at 11:04

## 2018-04-01 NOTE — ED PROVIDER NOTES
[facility]                                                 Chief Complaint  77 y.o. female with Nasal Congestion    History of Present Illness  Eddy Cunningham presents to the emergency room with nasal congestion today  Nursing home staff reports cold symptoms this early morning, no fever on ER triage  Patient insists that she was humming and not coughing, clear lung sounds today  Patient on exam has minor clear nasal drainage or clear lung sounds all fields today  Pt denies any nausea vomiting diarrhea, denies any flulike symptoms on interview    The history is provided by the patient     Past Medical History   -- COPD (chronic obstructive pulmonary disease)     -- Dementia     -- Depression     -- Elevated C-reactive protein (CRP)     -- Falls frequently     -- Homocystinuria     -- Hypertension     -- Hypopotassemia     -- Hypothyroidism     -- Narcolepsy without cataplexy     -- Parkinson disease     -- RLS (restless legs syndrome)     -- Stress incontinence     -- Venous stasis ulcers        Past Surgical History   -- CARPAL TUNNEL RELEASE       -- CHOLECYSTECTOMY       -- PARTIAL HYSTERECTOMY       -- ROTATOR CUFF REPAIR       -- TONSILLECTOMY, ADENOIDECTOMY       -- TOTAL KNEE ARTHROPLASTY          Review of patient's allergies   -- Oxycodone     -- Sulfa (sulfonamide antibiotics)      Review of Systems and Physical Exam      Review of Systems  -- Constitution - no fever, denies fatigue, no weakness, no chills  -- Eyes - no tearing or redness, no visual disturbance  -- Ear, Nose - sneezing, nasal congestion and clear discharge   -- Mouth,Throat - no sore throat, no toothache, normal voice, normal swallowing  -- Respiratory - cough and congestion, no shortness of breath, no PEREZ  -- Cardiovascular - denies chest pain, no palpitations, denies claudication  -- Gastrointestinal - denies abdominal pain, nausea, vomiting, or diarrhea  -- Genitourinary - no dysuria, no denies flank pain, no  hematuria or frequency   -- Musculoskeletal - denies back pain, negative for myalgias and arthralgias   -- Neurological - no headache, denies weakness or seizure; no LOC  -- Skin - denies pallor, rash, or changes in skin. no hives or welts noted    BP (!) 115/51   Pulse 76   Temp 97.4 °F (36.3 °C) (Oral)   Resp 20  SpO2 98%     Physical Exam  -- Nursing note and vitals reviewed  -- Constitutional: Appears well-developed and well-nourished  -- Head: Atraumatic. Normocephalic. No obvious abnormality  -- Eyes: Pupils are equal and reactive to light. Normal conjunctiva and lids  -- Nose: nasal mucosa erythema and edema; clear nasal discharge noted   -- Throat: Mucous membranes moist, pharynx normal, normal tonsils. No lesions   -- Ears: External ears and TM normal bilaterally. Normal hearing and no drainage  -- Neck: Normal range of motion. Neck supple. No masses, trachea midline  -- Cardiac: Normal rate, regular rhythm and normal heart sounds  -- Pulmonary: Normal respiratory effort, breath sounds clear to auscultation  -- Abdominal: Soft, no tenderness. Normal bowel sounds. Normal liver edge  -- Musculoskeletal: Normal range of motion, no effusions. Joints stable   -- Neurological: No focal deficits. Showed good interaction with staff  -- Vascular: Posterior tibial, dorsalis pedis and radial pulses 2+ bilaterally      Emergency Room Course      Labs     K 4.1      CO2 28   BUN 21   CREATININE 0.6      ALKPHOS 90   AST 20   ALT <5 (L)   BILITOT 0.6   ALBUMIN 3.7   PROT 6.9   WBC 8.29   HGB 13.0   HCT 40.4       (H)    (H)   CPKMB 2.6   TROPONINI <0.006   INR 1.1    (H)     EKG  -- The EKG findings today were without concerning findings from baseline  -- The troponin drawn in the ER today was within normal limits  -- The 2nd troponin drawn in the ER today was within normal limits     Radiology  -- Chest x-ray showed no infiltrate and showed no acute  pathology    Additional Work up  -- The influenza screen was negative    Medications Given  -- levoFLOXacin 500 mg/100 mL IVPB 500 mg      Medical Decision Making  -- Diagnosis management comments: 77 y.o. female with nasal congestion/cough  -- No fever, normal white count, negative chest x-ray in the ER  -- No signs of overt congestive heart failure, normal labs and the ER  -- Stable EKG with 2 negative sets of cardiac enzymes as a precaution  -- First dose of Levaquin given IV in the ER prior to discharge  -- Follow-up with PCP in the next 2-3 days    Diagnosis  -- The primary encounter diagnosis was Acute nasopharyngitis.   -- A diagnosis of Cough was also pertinent to this visit.    Disposition and Plan  -- Disposition: home  -- Condition: stable  -- Follow-up: Patient to follow up with Ayaka Pollard MD in 1-2 days.  -- I advised the patient that we have found no life threatening condition today  -- At this time, I believe the patient is clinically stable for discharge.   -- The patient acknowledges that close follow up with a MD is required   -- Patient agrees to comply with all instruction and direction given in the ER    This note is dictated on Dragon Natural Speaking word recognition program.  There are word recognition mistakes that are occasionally missed on review.           Ivan Gutierres MD  04/01/18 1128

## 2018-04-01 NOTE — ED PROVIDER NOTES
Encounter Date: 3/31/2018       History     Chief Complaint   Patient presents with    PEG tube replacement     77-year-old female was sent from the nursing home because her PEG tube and,.  She status post stroke but is awake and alert and conversant.  She has no complaints.          Review of patient's allergies indicates:   Allergen Reactions    Oxycodone      Hallucination    Sulfa (sulfonamide antibiotics)      Other reaction(s): when on contact     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)     Parkinson disease     RLS (restless legs syndrome)     Stress incontinence     Venous stasis ulcers      Past Surgical History:   Procedure Laterality Date    CARPAL TUNNEL RELEASE      Right    CHOLECYSTECTOMY      GASTROSTOMY TUBE PLACEMENT      PARTIAL HYSTERECTOMY      ROTATOR CUFF REPAIR      TONSILLECTOMY, ADENOIDECTOMY      tonsiles    TOTAL KNEE ARTHROPLASTY       History reviewed. No pertinent family history.  Social History   Substance Use Topics    Smoking status: Former Smoker    Smokeless tobacco: Never Used    Alcohol use No     Review of Systems   All other systems reviewed and are negative.      Physical Exam     Initial Vitals [03/31/18 2203]   BP Pulse Resp Temp SpO2   134/66 72 19 96.4 °F (35.8 °C) --      MAP       88.67         Physical Exam    Nursing note and vitals reviewed.  Constitutional: She appears well-developed and well-nourished.   HENT:   Mouth/Throat: Oropharynx is clear and moist.   Eyes: Conjunctivae are normal. Pupils are equal, round, and reactive to light.   Neck: Normal range of motion. Neck supple.   Cardiovascular: Normal heart sounds and intact distal pulses.    Pulmonary/Chest: Breath sounds normal. No respiratory distress. She has no wheezes.   Abdominal: Soft. Bowel sounds are normal.   Musculoskeletal: Normal range of motion.   Neurological: She is alert and oriented to person, place, and time.   Hemiparesis with contractures on the left   Skin: Skin is warm and dry.   Psychiatric: She has a normal mood and affect.         ED Course   Feeding Tube  Date/Time: 3/31/2018 10:35 PM  [facility]  Performed by: SHAILA MAX  Authorized by: SHAILA MAX   Pre-operative diagnosis: Place feeding tube  Post-operative diagnosis: Replace feeding tube  Consent: Verbal consent not obtained. Written consent not obtained.  Consent given by: patient  Patient understanding: patient states understanding of the procedure being performed        Labs Reviewed - No data to display                               Clinical Impression:   Diagnoses of PEG (percutaneous endoscopic gastrostomy) adjustment/replacement/removal and PEG tube malfunction were pertinent to this visit.                           Shaila Max MD  03/31/18 2602

## 2018-04-01 NOTE — PROVIDER PROGRESS NOTES - EMERGENCY DEPT.
Encounter Date: 3/31/2018  KUB with Gastrografin shows a feeding tube to be in place.  ED Physician Progress Notes

## 2018-04-01 NOTE — ED TRIAGE NOTES
[facility]  When asked why she was making that noise, patient states, "What noise?  I'm humming."  "

## 2018-04-01 NOTE — ED NOTES
ERMD sutured PEG tube inplace. Gauze dressing applied to PEG tube site. PEG tube placement verified by X ray.

## 2018-04-01 NOTE — ED NOTES
Contacted [facility] for transfer back to [facility].

## 2018-04-17 ENCOUNTER — HOSPITAL ENCOUNTER (OUTPATIENT)
Facility: HOSPITAL | Age: 78
Discharge: HOME | End: 2018-04-18
Attending: EMERGENCY MEDICINE | Admitting: INTERNAL MEDICINE
Payer: MEDICARE

## 2018-04-17 DIAGNOSIS — R07.9 CHEST PAIN: ICD-10-CM

## 2018-04-17 DIAGNOSIS — R06.02 SOB (SHORTNESS OF BREATH): ICD-10-CM

## 2018-04-17 DIAGNOSIS — K59.00 CONSTIPATION, UNSPECIFIED CONSTIPATION TYPE: Primary | ICD-10-CM

## 2018-04-17 LAB
ALBUMIN SERPL BCP-MCNC: 4.3 G/DL
ALLENS TEST: ABNORMAL
ALP SERPL-CCNC: 103 U/L
ALT SERPL W/O P-5'-P-CCNC: 16 U/L
ANION GAP SERPL CALC-SCNC: 11 MMOL/L
AST SERPL-CCNC: 26 U/L
BASOPHILS # BLD AUTO: 0.03 K/UL
BASOPHILS NFR BLD: 0.5 %
BILIRUB SERPL-MCNC: 0.5 MG/DL
BNP SERPL-MCNC: 159 PG/ML
BUN SERPL-MCNC: 22 MG/DL
CALCIUM SERPL-MCNC: 10.3 MG/DL
CHLORIDE SERPL-SCNC: 98 MMOL/L
CO2 SERPL-SCNC: 33 MMOL/L
CREAT SERPL-MCNC: 0.7 MG/DL
DELSYS: ABNORMAL
DIFFERENTIAL METHOD: NORMAL
EOSINOPHIL # BLD AUTO: 0.2 K/UL
EOSINOPHIL NFR BLD: 3.1 %
ERYTHROCYTE [DISTWIDTH] IN BLOOD BY AUTOMATED COUNT: 14.1 %
EST. GFR  (AFRICAN AMERICAN): >60 ML/MIN/1.73 M^2
EST. GFR  (NON AFRICAN AMERICAN): >60 ML/MIN/1.73 M^2
GLUCOSE SERPL-MCNC: 81 MG/DL
HCO3 UR-SCNC: 40.8 MMOL/L (ref 22–26)
HCT VFR BLD AUTO: 43.8 %
HGB BLD-MCNC: 14 G/DL
LACTATE SERPL-SCNC: 0.8 MMOL/L
LYMPHOCYTES # BLD AUTO: 1.3 K/UL
LYMPHOCYTES NFR BLD: 19.8 %
MCH RBC QN AUTO: 30.7 PG
MCHC RBC AUTO-ENTMCNC: 32 G/DL
MCV RBC AUTO: 96 FL
MONOCYTES # BLD AUTO: 0.5 K/UL
MONOCYTES NFR BLD: 7.7 %
NEUTROPHILS # BLD AUTO: 4.4 K/UL
NEUTROPHILS NFR BLD: 68.9 %
PCO2 BLDA: 56 MMHG (ref 35–45)
PH SMN: 7.47 [PH] (ref 7.35–7.45)
PLATELET # BLD AUTO: 253 K/UL
PMV BLD AUTO: 10.2 FL
PO2 BLDA: 127 MMHG (ref 75–100)
POC BE: 14.4 MMOL/L (ref -2–2)
POC COHB: 1.8 % (ref 0–3)
POC METHB: 1.3 % (ref 0–1.5)
POC O2HB ARTERIAL: 96.3 % (ref 94–100)
POC SATURATED O2: 99.4 % (ref 90–100)
POC TCO2: 42.5 MMOL/L
POC THB: 14.1 G/DL (ref 12–18)
POTASSIUM SERPL-SCNC: 4 MMOL/L
PROT SERPL-MCNC: 7.9 G/DL
RBC # BLD AUTO: 4.56 M/UL
SITE: ABNORMAL
SODIUM SERPL-SCNC: 142 MMOL/L
TROPONIN I SERPL DL<=0.01 NG/ML-MCNC: 0.02 NG/ML
WBC # BLD AUTO: 6.35 K/UL

## 2018-04-17 PROCEDURE — 36600 WITHDRAWAL OF ARTERIAL BLOOD: CPT

## 2018-04-17 PROCEDURE — 94761 N-INVAS EAR/PLS OXIMETRY MLT: CPT

## 2018-04-17 PROCEDURE — 85025 COMPLETE CBC W/AUTO DIFF WBC: CPT

## 2018-04-17 PROCEDURE — 36415 COLL VENOUS BLD VENIPUNCTURE: CPT

## 2018-04-17 PROCEDURE — 27000221 HC OXYGEN, UP TO 24 HOURS

## 2018-04-17 PROCEDURE — 83880 ASSAY OF NATRIURETIC PEPTIDE: CPT

## 2018-04-17 PROCEDURE — 84484 ASSAY OF TROPONIN QUANT: CPT

## 2018-04-17 PROCEDURE — 96361 HYDRATE IV INFUSION ADD-ON: CPT

## 2018-04-17 PROCEDURE — 93010 ELECTROCARDIOGRAM REPORT: CPT | Mod: ,,, | Performed by: INTERNAL MEDICINE

## 2018-04-17 PROCEDURE — 87040 BLOOD CULTURE FOR BACTERIA: CPT

## 2018-04-17 PROCEDURE — 83605 ASSAY OF LACTIC ACID: CPT

## 2018-04-17 PROCEDURE — 80053 COMPREHEN METABOLIC PANEL: CPT

## 2018-04-17 PROCEDURE — 93005 ELECTROCARDIOGRAM TRACING: CPT

## 2018-04-17 PROCEDURE — 25000003 PHARM REV CODE 250: Performed by: EMERGENCY MEDICINE

## 2018-04-17 PROCEDURE — 99285 EMERGENCY DEPT VISIT HI MDM: CPT | Mod: 25

## 2018-04-17 PROCEDURE — 82803 BLOOD GASES ANY COMBINATION: CPT | Performed by: EMERGENCY MEDICINE

## 2018-04-17 RX ORDER — SODIUM CHLORIDE 9 MG/ML
125 INJECTION, SOLUTION INTRAVENOUS ONCE
Status: COMPLETED | OUTPATIENT
Start: 2018-04-17 | End: 2018-04-18

## 2018-04-17 RX ADMIN — SODIUM CHLORIDE 125 ML/HR: 0.9 INJECTION, SOLUTION INTRAVENOUS at 10:04

## 2018-04-18 VITALS
SYSTOLIC BLOOD PRESSURE: 110 MMHG | OXYGEN SATURATION: 97 % | BODY MASS INDEX: 27.35 KG/M2 | DIASTOLIC BLOOD PRESSURE: 58 MMHG | HEART RATE: 74 BPM | WEIGHT: 191 LBS | HEIGHT: 70 IN | TEMPERATURE: 98 F | RESPIRATION RATE: 18 BRPM

## 2018-04-18 PROBLEM — K59.00 CONSTIPATION: Status: ACTIVE | Noted: 2018-04-18

## 2018-04-18 PROCEDURE — 93005 ELECTROCARDIOGRAM TRACING: CPT

## 2018-04-18 PROCEDURE — 63600175 PHARM REV CODE 636 W HCPCS: Performed by: INTERNAL MEDICINE

## 2018-04-18 PROCEDURE — G0378 HOSPITAL OBSERVATION PER HR: HCPCS

## 2018-04-18 PROCEDURE — 25000003 PHARM REV CODE 250: Performed by: INTERNAL MEDICINE

## 2018-04-18 PROCEDURE — 99220 PR INITIAL OBSERVATION CARE,LEVL III: CPT | Mod: ,,, | Performed by: FAMILY MEDICINE

## 2018-04-18 PROCEDURE — 27000221 HC OXYGEN, UP TO 24 HOURS

## 2018-04-18 PROCEDURE — 93010 ELECTROCARDIOGRAM REPORT: CPT | Mod: ,,, | Performed by: INTERNAL MEDICINE

## 2018-04-18 PROCEDURE — 96361 HYDRATE IV INFUSION ADD-ON: CPT

## 2018-04-18 PROCEDURE — 96360 HYDRATION IV INFUSION INIT: CPT

## 2018-04-18 PROCEDURE — 96372 THER/PROPH/DIAG INJ SC/IM: CPT | Mod: 59

## 2018-04-18 PROCEDURE — 94761 N-INVAS EAR/PLS OXIMETRY MLT: CPT

## 2018-04-18 PROCEDURE — 25000003 PHARM REV CODE 250: Performed by: NURSE PRACTITIONER

## 2018-04-18 RX ORDER — DOCUSATE SODIUM 100 MG/1
100 CAPSULE, LIQUID FILLED ORAL DAILY
Status: DISCONTINUED | OUTPATIENT
Start: 2018-04-18 | End: 2018-04-18 | Stop reason: HOSPADM

## 2018-04-18 RX ORDER — MAG HYDROX/ALUMINUM HYD/SIMETH 200-200-20
5 SUSPENSION, ORAL (FINAL DOSE FORM) ORAL
Status: COMPLETED | OUTPATIENT
Start: 2018-04-18 | End: 2018-04-18

## 2018-04-18 RX ORDER — LEVOTHYROXINE SODIUM 75 UG/1
75 TABLET ORAL
Status: DISCONTINUED | OUTPATIENT
Start: 2018-04-18 | End: 2018-04-18 | Stop reason: HOSPADM

## 2018-04-18 RX ORDER — OMEPRAZOLE 40 MG/1
CAPSULE, DELAYED RELEASE ORAL
Qty: 30 CAPSULE | Refills: 5
Start: 2018-04-18

## 2018-04-18 RX ORDER — SODIUM CHLORIDE 9 MG/ML
1000 INJECTION, SOLUTION INTRAVENOUS
Status: DISCONTINUED | OUTPATIENT
Start: 2018-04-18 | End: 2018-04-18 | Stop reason: HOSPADM

## 2018-04-18 RX ORDER — PRAMIPEXOLE DIHYDROCHLORIDE 0.12 MG/1
0.25 TABLET ORAL 3 TIMES DAILY
Status: DISCONTINUED | OUTPATIENT
Start: 2018-04-18 | End: 2018-04-18 | Stop reason: HOSPADM

## 2018-04-18 RX ORDER — SENNOSIDES 8.6 MG/1
1 TABLET ORAL DAILY
Status: DISCONTINUED | OUTPATIENT
Start: 2018-04-18 | End: 2018-04-18 | Stop reason: HOSPADM

## 2018-04-18 RX ORDER — TRAMADOL HYDROCHLORIDE 50 MG/1
50 TABLET ORAL EVERY 6 HOURS PRN
Status: DISCONTINUED | OUTPATIENT
Start: 2018-04-18 | End: 2018-04-18 | Stop reason: HOSPADM

## 2018-04-18 RX ORDER — HYDROCODONE BITARTRATE AND ACETAMINOPHEN 7.5; 325 MG/1; MG/1
1 TABLET ORAL EVERY 12 HOURS PRN
Status: ON HOLD | COMMUNITY
End: 2018-07-31

## 2018-04-18 RX ORDER — NAPROXEN SODIUM 220 MG/1
81 TABLET, FILM COATED ORAL DAILY
Status: DISCONTINUED | OUTPATIENT
Start: 2018-04-18 | End: 2018-04-18 | Stop reason: HOSPADM

## 2018-04-18 RX ORDER — POTASSIUM CHLORIDE 20 MEQ/15ML
20 SOLUTION ORAL 2 TIMES DAILY
Status: DISCONTINUED | OUTPATIENT
Start: 2018-04-18 | End: 2018-04-18 | Stop reason: HOSPADM

## 2018-04-18 RX ORDER — HYDROCODONE BITARTRATE AND ACETAMINOPHEN 7.5; 325 MG/1; MG/1
1 TABLET ORAL EVERY 6 HOURS PRN
Status: DISCONTINUED | OUTPATIENT
Start: 2018-04-18 | End: 2018-04-18 | Stop reason: HOSPADM

## 2018-04-18 RX ORDER — LACTULOSE 10 G/15ML
10 SOLUTION ORAL
Status: COMPLETED | OUTPATIENT
Start: 2018-04-18 | End: 2018-04-18

## 2018-04-18 RX ORDER — CARBIDOPA AND LEVODOPA 25; 100 MG/1; MG/1
1 TABLET ORAL
Status: DISCONTINUED | OUTPATIENT
Start: 2018-04-18 | End: 2018-04-18 | Stop reason: HOSPADM

## 2018-04-18 RX ORDER — TRAZODONE HYDROCHLORIDE 50 MG/1
50 TABLET ORAL NIGHTLY
Status: DISCONTINUED | OUTPATIENT
Start: 2018-04-18 | End: 2018-04-18 | Stop reason: HOSPADM

## 2018-04-18 RX ORDER — ENOXAPARIN SODIUM 100 MG/ML
40 INJECTION SUBCUTANEOUS EVERY 24 HOURS
Status: DISCONTINUED | OUTPATIENT
Start: 2018-04-18 | End: 2018-04-18 | Stop reason: HOSPADM

## 2018-04-18 RX ORDER — RIVASTIGMINE 4.6 MG/24H
1 PATCH, EXTENDED RELEASE TRANSDERMAL DAILY
Status: DISCONTINUED | OUTPATIENT
Start: 2018-04-18 | End: 2018-04-18 | Stop reason: HOSPADM

## 2018-04-18 RX ADMIN — LACTULOSE 10 G: 20 SOLUTION ORAL at 02:04

## 2018-04-18 RX ADMIN — ALUMINUM HYDROXIDE, MAGNESIUM HYDROXIDE, AND SIMETHICONE 5 ML: 200; 200; 20 SUSPENSION ORAL at 12:04

## 2018-04-18 RX ADMIN — CARBIDOPA AND LEVODOPA 1 TABLET: 25; 100 TABLET ORAL at 03:04

## 2018-04-18 RX ADMIN — POTASSIUM CHLORIDE 20 MEQ: 20 SOLUTION ORAL at 10:04

## 2018-04-18 RX ADMIN — ASPIRIN 81 MG 81 MG: 81 TABLET ORAL at 10:04

## 2018-04-18 RX ADMIN — CARBIDOPA AND LEVODOPA 1 TABLET: 25; 100 TABLET ORAL at 10:04

## 2018-04-18 RX ADMIN — SODIUM PHOSPHATE, DIBASIC AND SODIUM PHOSPHATE, MONOBASIC 1 ENEMA: 7; 19 ENEMA RECTAL at 11:04

## 2018-04-18 RX ADMIN — PRAMIPEXOLE DIHYDROCHLORIDE 0.25 MG: 0.12 TABLET ORAL at 10:04

## 2018-04-18 RX ADMIN — RIVASTIGMINE 1 PATCH: 4.6 PATCH, EXTENDED RELEASE TRANSDERMAL at 10:04

## 2018-04-18 RX ADMIN — PRAMIPEXOLE DIHYDROCHLORIDE 0.25 MG: 0.12 TABLET ORAL at 03:04

## 2018-04-18 RX ADMIN — LEVOTHYROXINE SODIUM 75 MCG: 75 TABLET ORAL at 05:04

## 2018-04-18 RX ADMIN — DOCUSATE SODIUM 100 MG: 100 CAPSULE, LIQUID FILLED ORAL at 10:04

## 2018-04-18 RX ADMIN — CARBIDOPA AND LEVODOPA 1 TABLET: 25; 100 TABLET ORAL at 05:04

## 2018-04-18 RX ADMIN — SENNA 1 TABLET: 8.6 TABLET, COATED ORAL at 10:04

## 2018-04-18 RX ADMIN — ENOXAPARIN SODIUM 40 MG: 100 INJECTION SUBCUTANEOUS at 10:04

## 2018-04-18 NOTE — H&P
"[facility] Medicine  History & Physical    Patient Name: Eddy Cunningham  MRN: [id number]  Admission Date: 4/17/2018  Attending Physician: Hayder Khalil MD   Primary Care Provider: Ayaka Pollard MD         Patient information was obtained from patient and ER records.     Subjective:     Principal Problem:<principal problem not specified>    Chief Complaint:   Chief Complaint   Patient presents with    Shortness of Breath        HPI: Pt presented to ER with c/o SOB. She is unable to give hx. Daughter at bedside this am and able to give some history. She reports that she has been very congested and "rattling" breath sounds. She report s that in ER they sucteiond a lot of mucus. Cxr without consolidation. No fever. No elevated WBC. (she is on cont peg feeding) daughter reports that patient claims she felt like she had acid reflux. She is not on O2 at nursing home. Tube feeds held and pt placed on floor for observation.     Labs reviewed. CT abd and pelvis shows constipation. Daughter reports that she has not had BM in 1 week. She was given MOM and lactulose with no BM.         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)     Parkinson disease     RLS (restless legs syndrome)     Stress incontinence     Venous stasis ulcers        Past Surgical History:   Procedure Laterality Date    CARPAL TUNNEL RELEASE      Right    CHOLECYSTECTOMY      GASTROSTOMY TUBE PLACEMENT      PARTIAL HYSTERECTOMY      ROTATOR CUFF REPAIR      TONSILLECTOMY, ADENOIDECTOMY      tonsiles    TOTAL KNEE ARTHROPLASTY         Review of patient's allergies indicates:   Allergen Reactions    Oxycodone      " Hallucination    Sulfa (sulfonamide antibiotics)      Other reaction(s): when on contact       No current facility-administered medications on file prior to encounter.      Current Outpatient Prescriptions on File Prior to Encounter   Medication Sig    aspirin 81 MG Chew 1 tablet (81 mg total) by Per G Tube route once daily.    carbidopa-levodopa  mg (SINEMET)  mg per tablet 1 tablet by Per G Tube route 5 (five) times daily.    docusate sodium (COLACE) 100 MG capsule 1 capsule (100 mg total) by Per G Tube route once daily.    levothyroxine (SYNTHROID) 75 MCG tablet 1 tablet (75 mcg total) by Per G Tube route before breakfast.    potassium chloride 10% (KAYCIEL) 20 mEq/15 mL solution 15 mLs (20 mEq total) by Per G Tube route 2 (two) times daily.    pramipexole (MIRAPEX) 0.25 MG tablet 1 tablet (0.25 mg total) by Per G Tube route 3 (three) times daily.    rivastigmine (EXELON) 4.6 mg/24 hour PT24 Place 1 patch onto the skin once daily.      senna (SENOKOT) 8.6 mg tablet 1 tablet by Per G Tube route once daily.    tiZANidine 2 mg Cap Take 4 mg by mouth nightly as needed.    tramadol (ULTRAM) 50 mg tablet 1 tablet (50 mg total) by Per G Tube route every 6 (six) hours as needed for Pain.    trazodone (DESYREL) 50 MG tablet 1 tablet (50 mg total) by Per G Tube route every evening.    LACTOSE-REDUCED FOOD (JEVITY ORAL) 65 mL/hr by PEG Tube route once daily. Via pump on at 6 pm off at 6 am    [DISCONTINUED] dextroamphetamine-amphetamine (ADDERALL XR) 15 MG 24 hr capsule Take 15 mg by mouth every morning.    [DISCONTINUED] hydrocodone-acetaminophen 10-325mg (NORCO)  mg Tab Take 1 tablet by mouth every 6 (six) hours as needed for Pain.    [DISCONTINUED] omeprazole (PRILOSEC) 40 MG capsule Open 40 mg capsule into g-tube once daily     Family History     None        Social History Main Topics    Smoking status: Former Smoker    Smokeless tobacco: Never Used    Alcohol use No    Drug use: No     Sexual activity: Not on file     Review of Systems   Unable to perform ROS: Dementia     Objective:     Vital Signs (Most Recent):  Temp: 98.2 °F (36.8 °C) (04/18/18 0744)  Pulse: 80 (04/18/18 0800)  Resp: 18 (04/18/18 0744)  BP: 126/60 (04/18/18 0744)  SpO2: 100 % (04/18/18 0755) Vital Signs (24h Range):  Temp:  [96.7 °F (35.9 °C)-98.9 °F (37.2 °C)] 98.2 °F (36.8 °C)  Pulse:  [] 80  Resp:  [18-32] 18  SpO2:  [93 %-100 %] 100 %  BP: (126-166)/(59-84) 126/60     Weight: 86.6 kg (191 lb)  Body mass index is 27.41 kg/m².    Physical Exam   Constitutional: She appears well-developed and well-nourished.   HENT:   Head: Normocephalic and atraumatic.   Right Ear: External ear normal.   Left Ear: External ear normal.   Nose: Nose normal.   Mouth/Throat: Oropharynx is clear and moist.   Eyes: EOM are normal. Pupils are equal, round, and reactive to light.   Neck: Normal range of motion. Neck supple. No JVD present. No tracheal deviation present. No thyromegaly present.   Cardiovascular: Normal rate, normal heart sounds and intact distal pulses.    No murmur heard.  Pulmonary/Chest: Effort normal and breath sounds normal. No respiratory distress. She has no wheezes. She has no rales. She exhibits no tenderness.   Diminished breath sounds    Abdominal: Soft. Bowel sounds are normal. She exhibits no distension and no mass. There is no tenderness. There is no rebound and no guarding.   Epigastrium with PEG. Looks good    Musculoskeletal: Normal range of motion. She exhibits no edema or tenderness.   Multiple Dystrophic arthritic joints    Lymphadenopathy:     She has no cervical adenopathy.   Neurological: She has normal reflexes. She displays normal reflexes. No cranial nerve deficit. She exhibits normal muscle tone. Coordination normal.   Pleasantly demented    Skin: Skin is warm and dry. No rash noted. No erythema. No pallor.   Psychiatric: She has a normal mood and affect. Her behavior is normal. Judgment and  thought content normal.         CRANIAL NERVES     CN III, IV, VI   Pupils are equal, round, and reactive to light.  Extraocular motions are normal.        Significant Labs:     Recent Labs  Lab 04/17/18 2202   TROPONINI 0.016     BNP    Recent Labs  Lab 04/17/18 2202   *     BMP  Lab Results   Component Value Date     04/17/2018    K 4.0 04/17/2018    CL 98 04/17/2018    CO2 33 (H) 04/17/2018    BUN 22 04/17/2018    CREATININE 0.7 04/17/2018    CALCIUM 10.3 04/17/2018    ANIONGAP 11 04/17/2018    ESTGFRAFRICA >60 04/17/2018    EGFRNONAA >60 04/17/2018     Lab Results   Component Value Date    ALT 16 04/17/2018    AST 26 04/17/2018    ALKPHOS 103 04/17/2018    BILITOT 0.5 04/17/2018     Lab Results   Component Value Date    WBC 6.35 04/17/2018    HGB 14.0 04/17/2018    HCT 43.8 04/17/2018    MCV 96 04/17/2018     04/17/2018     Blood culture in progress    Significant Imaging:     CT abd and pelvis Suspect constipation.  As above.    CXR No active process    EKG Sinus rhythm with 1st degree A-V block  Left axis deviation  Abnormal ECG  When compared with ECG of 17-APR-2018 21:47,  Premature ventricular complexes are no longer Present    Assessment/Plan:     Constipation    Give n lactulose and MOM  Will give enema prior to d/c today          Aspiration into airway    Suctioned in ER no longer rattling. CXR without infiltrate, no fever, no elevated WBC.   Will slow down her cont tube feeds upon d/c to nursing home and order asp precautions and reflux meds          Parkinsons    Cont home meds            VTE Risk Mitigation         Ordered     enoxaparin injection 40 mg  Daily     Route:  Subcutaneous        04/18/18 0401     IP VTE HIGH RISK PATIENT  Once      04/18/18 0401             Kunal Sandoval MD  Department of Hospital Medicine   [facility]

## 2018-04-18 NOTE — NURSING TRANSFER
Nursing Transfer Note      4/18/2018     Transfer To: 312    Transfer via bed    Transfer with O2, cardiac monitoring    Transported by JUSTYNA Garcia RN    Medicines sent: no    Chart send with patient: Yes    Notified: daughter    Patient reassessed at: 04/18/1800401    Upon arrival to floor: cardiac monitor applied, patient oriented to room, call bell in reach and bed in lowest position. Plan of care reviewed with patient and her daughter and both agree with the plan of care.

## 2018-04-18 NOTE — ASSESSMENT & PLAN NOTE
Given lactulose and MOM  Will give enema prior to d/c today  Cont colace at nursing home scheduled

## 2018-04-18 NOTE — ASSESSMENT & PLAN NOTE
Suctioned in ER no longer rattling. CXR without infiltrate, no fever, no elevated WBC.   Will slow down her cont tube feeds upon d/c to nursing home from 65ml/hr to 50ml/hr and order asp precautions and reflux meds

## 2018-04-18 NOTE — ED NOTES
"Pt calls the nurses to report a "sharp constant pain" to the left chest that hurts like Hell". Will notify MD. CR monitoring continues.  "

## 2018-04-18 NOTE — ED NOTES
Received verbal report from ERINN Traylor.  Pt in ED room ED 01/ED 01B lying in stretcher, HOB 30 degrees. Stretcher is in low, locked position, side rails up x2.  Pt previously connected to continuous cardiac monitor, continuous pulse ox, and automatic BP cuff; alarms set and turned on for monitor, pulse ox and IV pump. The patient is awake, alert and cooperative with a calm affect, patient is aware of environment. Airway is open and patent, respirations are spontaneous, normal respiratory effort and rate noted, skin warm and dry,  appearance: NAD noted, resting comfortably.

## 2018-04-18 NOTE — ASSESSMENT & PLAN NOTE
Suctioned in ER no longer rattling. CXR without infiltrate, no fever, no elevated WBC.   Will slow down her cont tube feeds upon d/c to nursing home and order asp precautions and reflux meds

## 2018-04-18 NOTE — PLAN OF CARE
04/18/18 1029   Medicare Message   Important Message from Medicare regarding Discharge Appeal Rights Given to patient/caregiver;Explained to patient/caregiver;Signed/date by patient/caregiver   Date IMM was signed 04/18/18   Time IMM was signed 1029   HU Message   Medicare Outpatient and Observation Notification regarding financial responsibility Given to patient/caregiver;Explained to patient/caregiver;Signed/date by patient/caregiver   Date HU was signed 04/18/18   Time HU was signed 1028

## 2018-04-18 NOTE — PLAN OF CARE
04/18/18 1212   Discharge Reassessment   Assessment Type Discharge Planning Reassessment     Brigette contacted [facility] to inform that the pt will be d/c. Brigette spoke with Dalila who advised Brigette to fax d/c summary. Brigette will continue to f/up.

## 2018-04-18 NOTE — DISCHARGE INSTRUCTIONS
Constipation (Adult)  Constipation means that you have bowel movements that are less frequent than usual. Stools often become very hard and difficult to pass.  Constipation is very common. At some point in life it affects almost everyone. Since everyone's bowel habits are different, what is constipation to one person may not be to another. Your healthcare provider may do tests to diagnose constipation. It depends on what he or she finds when evaluating you.    Symptoms of constipation include:  · Abdominal pain  · Bloating  · Vomiting  · Painful bowel movements  · Itching, swelling, bleeding, or pain around the anus  Causes  Constipation can have many causes. These include:  · Diet low in fiber  · Too much dairy  · Not drinking enough liquids  · Lack of exercise or physical activity. This is especially true for older adults.  · Changes in lifestyle or daily routine, including pregnancy, aging, work, and travel  · Frequent use or misuse of laxatives  · Ignoring the urge to have a bowel movement or delaying it until later  · Medicines, such as certain prescription pain medicines, iron supplements, antacids, certain antidepressants, and calcium supplements  · Diseases like irritable bowel syndrome, bowel obstructions, stroke, diabetes, thyroid disease, Parkinson disease, hemorrhoids, and colon cancer  Complications  Potential complications of constipation can include:  · Hemorrhoids  · Rectal bleeding from hemorrhoids or anal fissures (skin tears)  · Hernias  · Dependency on laxatives  · Chronic constipation  · Fecal impaction  · Bowel obstruction or perforation  Home care  All treatment should be done after talking with your healthcare provider. This is especially true if you have another medical problems, are taking prescription medicines, or are an older adult. Treatment most often involves lifestyle changes. You may also need medicines. Your healthcare provider will tell you which will work best for you. Follow  the advice below to help avoid this problem in the future.  Lifestyle changes  These lifestyle changes can help prevent constipation:  · Diet. Eat a high-fiber diet, with fresh fruit and vegetables, and reduce dairy intake, meats, and processed foods  · Fluids. It's important to get enough fluids each day. Drink plenty of water when you eat more fiber. If you are on diet that limits the amount of fluid you can have, talk about this with your healthcare provider.  · Regular exercise. Check with your healthcare provider first.  Medications  Take any medicines as directed. Some laxatives are safe to use only every now and then. Others can be taken on a regular basis. Talk with your doctor or pharmacist if you have questions.  Prescription pain medicines can cause constipation. If you are taking this kind of medicine, ask your healthcare provider if you should also take a stool softener.  Medicines you may take to treat constipation include:  · Fiber supplements  · Stool softeners  · Laxatives  · Enemas  · Rectal suppositories  Follow-up care  Follow up with your healthcare provider if symptoms don't get better in the next few days. You may need to have more tests or see a specialist.  Call 911  Call 911 if any of these occur:  · Trouble breathing  · Stiff, rigid abdomen that is severely painful to touch  · Confusion  · Fainting or loss of consciousness  · Rapid heart rate  · Chest pain  When to seek medical advice  Call your healthcare provider right away if any of these occur:  · Fever over 100.4°F (38°C)  · Failure to resume normal bowel movements  · Pain in your abdomen or back gets worse  · Nausea or vomiting  · Swelling in your abdomen  · Blood in the stool  · Black, tarry stool  · Involuntary weight loss  · Weakness  Date Last Reviewed: 12/30/2015  © 1032-6723 [employer]. [address] [place] [address], [place] [address]. All rights reserved. This information is not intended as a substitute for  professional medical care. Always follow your healthcare professional's instructions.

## 2018-04-18 NOTE — PLAN OF CARE
04/18/18 1156   Discharge Assessment   Assessment Type Discharge Planning Assessment   Confirmed/corrected address and phone number on facesheet? Yes   Assessment information obtained from? Patient;Medical Record   Expected Length of Stay (days) 2   Communicated expected length of stay with patient/caregiver yes   Prior to hospitilization cognitive status: Unable to Assess   Prior to hospitalization functional status: Completely Dependent   Current cognitive status: Unable to Assess   Current Functional Status: Completely Dependent   Lives With facility resident  (The pt is a resident at [facility]. )   Able to Return to Prior Arrangements yes   Is patient able to care for self after discharge? No   Who are your caregiver(s) and their phone number(s)? Opal Galindo [phone]   Patient's perception of discharge disposition nursing home   Readmission Within The Last 30 Days no previous admission in last 30 days   Patient currently being followed by outpatient case management? No   Patient currently receives any other outside agency services? No   Equipment Currently Used at Home hospital bed   Do you have any problems affording any of your prescribed medications? No   Is the patient taking medications as prescribed? yes   Does the patient have transportation home? Yes   Transportation Available ambulance   Does the patient receive services at the [facility]? No   Discharge Plan A Return to nursing home   Patient/Family In Agreement With Plan yes     The pt is a 77 year old female who was admitted with constipation. The pt is a resident at [facility]. The pt is bedbound. The pt does not use O2.The pt has a peg tube. The pt has no other SW needs noted at this time. SW will continue to follow and offer support as needed.    Ashley Nance LMSW

## 2018-04-18 NOTE — ED PROVIDER NOTES
Encounter Date: 4/17/2018       History     Chief Complaint   Patient presents with    Shortness of Breath     This 77 show female was brought from the emergency room because of all sudden onset of shortness of breath over the course of the day.  She has a history of COPD and dementia.  She is unable to give an adequate history.          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  Social History   Substance Use Topics    Smoking status: Former Smoker    Smokeless tobacco: Never Used    Alcohol use No     Review of Systems   Unable to perform ROS: Dementia   All other systems reviewed and are negative.      Physical Exam     Initial Vitals [04/17/18 2150]   BP Pulse Resp Temp SpO2   (!) 149/69 88 (!) 26 -- 100 %      MAP       95.67         Physical Exam    Nursing note and vitals reviewed.  Constitutional: She appears well-developed and well-nourished.   HENT:   Head: Normocephalic and atraumatic.   Mouth/Throat: Oropharynx is clear and moist.   Eyes: Conjunctivae are normal. Pupils are equal, round, and reactive to light.   Neck: Normal  range of motion. Neck supple.   Cardiovascular: Intact distal pulses.   Pulmonary/Chest: She has wheezes. She has rhonchi. She has rales.   Abdominal: Soft. Bowel sounds are normal.   Musculoskeletal: Normal range of motion. She exhibits no edema.   Neurological: She is alert. She has normal strength.   Skin: Skin is warm.         ED Course   Procedures  Labs Reviewed   CULTURE, BLOOD   CBC W/ AUTO DIFFERENTIAL   COMPREHENSIVE METABOLIC PANEL   URINALYSIS   LACTIC ACID, PLASMA   TROPONIN I   B-TYPE NATRIURETIC PEPTIDE                                  Clinical Impression:   The primary encounter diagnosis was Constipation, unspecified constipation type. Diagnoses of SOB (shortness of breath) and Chest pain were also pertinent to this visit.                           Jeremiah Meier MD  04/22/18 0606

## 2018-04-18 NOTE — PLAN OF CARE
Problem: Fall Risk (Adult)  Goal: Absence of Falls  Patient will demonstrate the desired outcomes by discharge/transition of care.   Outcome: Ongoing (interventions implemented as appropriate)  Fall precautions maintained. Bed alarm engaged at all times. Family at bedside.     Problem: Patient Care Overview  Goal: Plan of Care Review  Outcome: Ongoing (interventions implemented as appropriate)  Plan of care reviewed with patient and her daughter and both agree with the plan of care. Telemetry monitoring continues. Patient on Ventimask. O2 sat 100% Suction at bedside. No acute distress noted.

## 2018-04-18 NOTE — SUBJECTIVE & OBJECTIVE
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)     Parkinson disease     RLS (restless legs syndrome)     Stress incontinence     Venous stasis ulcers        Past Surgical History:   Procedure Laterality Date    CARPAL TUNNEL RELEASE      Right    CHOLECYSTECTOMY      GASTROSTOMY TUBE PLACEMENT      PARTIAL HYSTERECTOMY      ROTATOR CUFF REPAIR      TONSILLECTOMY, ADENOIDECTOMY      tonsiles    TOTAL KNEE ARTHROPLASTY         Review of patient's allergies indicates:   Allergen Reactions    Oxycodone      Hallucination    Sulfa (sulfonamide antibiotics)      Other reaction(s): when on contact       No current facility-administered medications on file prior to encounter.      Current Outpatient Prescriptions on File Prior to Encounter   Medication Sig    aspirin 81 MG Chew 1 tablet (81 mg total) by Per G Tube route once daily.    carbidopa-levodopa  mg (SINEMET)  mg per tablet 1 tablet by Per G Tube route 5 (five) times daily.    docusate sodium (COLACE) 100 MG capsule 1 capsule (100 mg total) by Per G Tube route once daily.    levothyroxine (SYNTHROID) 75 MCG tablet 1 tablet (75 mcg total) by Per G Tube route before breakfast.    potassium chloride 10% (KAYCIEL) 20 mEq/15 mL solution 15 mLs (20 mEq total) by Per G Tube route 2 (two) times daily.    pramipexole (MIRAPEX) 0.25 MG tablet 1 tablet (0.25 mg total) by Per G Tube route 3 (three) times daily.    rivastigmine (EXELON) 4.6 mg/24 hour PT24 Place 1 patch onto the skin once daily.      senna (SENOKOT) 8.6 mg tablet 1 tablet by Per G Tube route once daily.    tiZANidine 2 mg Cap Take 4 mg by mouth nightly as needed.    tramadol (ULTRAM) 50  mg tablet 1 tablet (50 mg total) by Per G Tube route every 6 (six) hours as needed for Pain.    trazodone (DESYREL) 50 MG tablet 1 tablet (50 mg total) by Per G Tube route every evening.    LACTOSE-REDUCED FOOD (JEVITY ORAL) 65 mL/hr by PEG Tube route once daily. Via pump on at 6 pm off at 6 am    [DISCONTINUED] dextroamphetamine-amphetamine (ADDERALL XR) 15 MG 24 hr capsule Take 15 mg by mouth every morning.    [DISCONTINUED] hydrocodone-acetaminophen 10-325mg (NORCO)  mg Tab Take 1 tablet by mouth every 6 (six) hours as needed for Pain.    [DISCONTINUED] omeprazole (PRILOSEC) 40 MG capsule Open 40 mg capsule into g-tube once daily     Family History     None        Social History Main Topics    Smoking status: Former Smoker    Smokeless tobacco: Never Used    Alcohol use No    Drug use: No    Sexual activity: Not on file     Review of Systems   Unable to perform ROS: Dementia     Objective:     Vital Signs (Most Recent):  Temp: 98.2 °F (36.8 °C) (04/18/18 0744)  Pulse: 80 (04/18/18 0800)  Resp: 18 (04/18/18 0744)  BP: 126/60 (04/18/18 0744)  SpO2: 100 % (04/18/18 0755) Vital Signs (24h Range):  Temp:  [96.7 °F (35.9 °C)-98.9 °F (37.2 °C)] 98.2 °F (36.8 °C)  Pulse:  [] 80  Resp:  [18-32] 18  SpO2:  [93 %-100 %] 100 %  BP: (126-166)/(59-84) 126/60     Weight: 86.6 kg (191 lb)  Body mass index is 27.41 kg/m².    Physical Exam   Constitutional: She appears well-developed and well-nourished.   HENT:   Head: Normocephalic and atraumatic.   Right Ear: External ear normal.   Left Ear: External ear normal.   Nose: Nose normal.   Mouth/Throat: Oropharynx is clear and moist.   Eyes: EOM are normal. Pupils are equal, round, and reactive to light.   Neck: Normal range of motion. Neck supple. No JVD present. No tracheal deviation present. No thyromegaly present.   Cardiovascular: Normal rate, normal heart sounds and intact distal pulses.    No murmur heard.  Pulmonary/Chest: Effort normal and breath sounds  normal. No respiratory distress. She has no wheezes. She has no rales. She exhibits no tenderness.   Diminished breath sounds    Abdominal: Soft. Bowel sounds are normal. She exhibits no distension and no mass. There is no tenderness. There is no rebound and no guarding.   Epigastrium with PEG. Looks good    Musculoskeletal: Normal range of motion. She exhibits no edema or tenderness.   Multiple Dystrophic arthritic joints    Lymphadenopathy:     She has no cervical adenopathy.   Neurological: She has normal reflexes. She displays normal reflexes. No cranial nerve deficit. She exhibits normal muscle tone. Coordination normal.   Pleasantly demented    Skin: Skin is warm and dry. No rash noted. No erythema. No pallor.   Psychiatric: She has a normal mood and affect. Her behavior is normal. Judgment and thought content normal.         CRANIAL NERVES     CN III, IV, VI   Pupils are equal, round, and reactive to light.  Extraocular motions are normal.        Significant Labs:     Recent Labs  Lab 04/17/18 2202   TROPONINI 0.016     BNP    Recent Labs  Lab 04/17/18 2202   *     BMP  Lab Results   Component Value Date     04/17/2018    K 4.0 04/17/2018    CL 98 04/17/2018    CO2 33 (H) 04/17/2018    BUN 22 04/17/2018    CREATININE 0.7 04/17/2018    CALCIUM 10.3 04/17/2018    ANIONGAP 11 04/17/2018    ESTGFRAFRICA >60 04/17/2018    EGFRNONAA >60 04/17/2018     Lab Results   Component Value Date    ALT 16 04/17/2018    AST 26 04/17/2018    ALKPHOS 103 04/17/2018    BILITOT 0.5 04/17/2018     Lab Results   Component Value Date    WBC 6.35 04/17/2018    HGB 14.0 04/17/2018    HCT 43.8 04/17/2018    MCV 96 04/17/2018     04/17/2018     Blood culture in progress    Significant Imaging:     CT abd and pelvis Suspect constipation.  As above.    CXR No active process    EKG Sinus rhythm with 1st degree A-V block  Left axis deviation  Abnormal ECG  When compared with ECG of 17-APR-2018 21:47,  Premature  ventricular complexes are no longer Present

## 2018-04-18 NOTE — DISCHARGE SUMMARY
"[facility] Hospital Medicine  Discharge Summary      Patient Name: Eddy Cunningham  MRN: [id number]  Admission Date: 4/17/2018  Hospital Length of Stay: 0 days  Discharge Date and Time:  04/18/2018 11:32 AM  Attending Physician: Hayder Khalil MD   Discharging Provider: Lorenza Cantor NP  Primary Care Provider: Ayaka Pollard MD      HPI:   Pt presented to ER with c/o SOB. She is unable to give hx. Daughter at bedside this am and able to give some history. She reports that she has been very congested and "rattling" breath sounds. She report s that in ER they sucteiond a lot of mucus. Cxr without consolidation. No fever. No elevated WBC. (she is on cont peg feeding) daughter reports that patient claims she felt like she had acid reflux. She is not on O2 at nursing home. Tube feeds held and pt placed on floor for observation.     Labs reviewed. CT abd and pelvis shows constipation. Daughter reports that she has not had BM in 1 week. She was given MOM and lactulose with no BM.         * No surgery found *      Hospital Course:   No notes on file     Consults:     * Aspiration into airway    Suctioned in ER no longer rattling. CXR without infiltrate, no fever, no elevated WBC.   Will slow down her cont tube feeds upon d/c to nursing home from 65ml/hr to 50ml/hr and order asp precautions and reflux meds  Cont PPI and zantac        Constipation    Given lactulose and MOM  Will give enema prior to d/c today  Cont colace at nursing home scheduled         Parkinsons    Cont home meds            Final Active Diagnoses:    Diagnosis Date Noted POA    PRINCIPAL PROBLEM:  Aspiration into airway [T17.908A] 05/17/2017 Yes    Constipation [K59.00] 04/18/2018 Yes    Parkinsons [G20] 11/13/2012 Yes      Problems Resolved During this Admission:    Diagnosis Date Noted Date Resolved POA       Discharged Condition: good    Disposition: Home or Self Care    Follow Up:  Follow-up Information     Ayaka ROSE" MD Atul In 1 week.    Specialty:  Family Medicine  Why:  outpatient services  Contact information:  [id number]   [address]ERO [id number] [phone]                 Patient Instructions:   No discharge procedures on file.    Significant Diagnostic Studies:     Recent Labs  Lab 04/17/18 2202   TROPONINI 0.016      BNP     Recent Labs  Lab 04/17/18 2202   *      BMP        Lab Results   Component Value Date      04/17/2018     K 4.0 04/17/2018     CL 98 04/17/2018     CO2 33 (H) 04/17/2018     BUN 22 04/17/2018     CREATININE 0.7 04/17/2018     CALCIUM 10.3 04/17/2018     ANIONGAP 11 04/17/2018     ESTGFRAFRICA >60 04/17/2018     EGFRNONAA >60 04/17/2018      Lab Results   Component Value Date     ALT 16 04/17/2018     AST 26 04/17/2018     ALKPHOS 103 04/17/2018     BILITOT 0.5 04/17/2018            Lab Results   Component Value Date     WBC 6.35 04/17/2018     HGB 14.0 04/17/2018     HCT 43.8 04/17/2018     MCV 96 04/17/2018      04/17/2018      Blood culture in progress     Significant Imaging:      CT abd and pelvis Suspect constipation.  As above.     CXR No active process     EKG Sinus rhythm with 1st degree A-V block  Left axis deviation  Abnormal ECG  When compared with ECG of 17-APR-2018 21:47,  Premature ventricular complexes are no longer Present      Pending Diagnostic Studies:     None         Medications:  Reconciled Home Medications:      Medication List      CHANGE how you take these medications    hydrocodone-acetaminophen 7.5-325mg 7.5-325 mg per tablet  Commonly known as:  NORCO  Take 1 tablet by mouth every 12 (twelve) hours as needed for Pain.  What changed:  Another medication with the same name was removed. Continue taking this medication, and follow the directions you see here.        CONTINUE taking these medications    aspirin 81 MG Chew  1 tablet (81 mg total) by Per G Tube route once daily.     carbidopa-levodopa  mg  mg per tablet  Commonly known as:   SINEMET  1 tablet by Per G Tube route 5 (five) times daily.     docusate sodium 100 MG capsule  Commonly known as:  COLACE  1 capsule (100 mg total) by Per G Tube route once daily.     EXELON 4.6 mg/24 hr Pt24  Generic drug:  rivastigmine  Place 1 patch onto the skin once daily.     JEVITY ORAL  65 mL/hr by PEG Tube route once daily. Via pump on at 6 pm off at 6 am     levothyroxine 75 MCG tablet  Commonly known as:  SYNTHROID  1 tablet (75 mcg total) by Per G Tube route before breakfast.     omeprazole 40 MG capsule  Commonly known as:  PRILOSEC  Open 40 mg capsule into g-tube once daily     potassium chloride 10% 20 mEq/15 mL oral solution  Commonly known as:  KAYCIEL  15 mLs (20 mEq total) by Per G Tube route 2 (two) times daily.     pramipexole 0.25 MG tablet  Commonly known as:  MIRAPEX  1 tablet (0.25 mg total) by Per G Tube route 3 (three) times daily.     ranitidine 150 MG capsule  Commonly known as:  ZANTAC  Take 75 mg by mouth 2 (two) times daily.     senna 8.6 mg tablet  Commonly known as:  SENOKOT  1 tablet by Per G Tube route once daily.     tiZANidine 2 mg Cap  Take 4 mg by mouth nightly as needed.     traMADol 50 mg tablet  Commonly known as:  ULTRAM  1 tablet (50 mg total) by Per G Tube route every 6 (six) hours as needed for Pain.     traZODone 50 MG tablet  Commonly known as:  DESYREL  1 tablet (50 mg total) by Per G Tube route every evening.        STOP taking these medications    dextroamphetamine-amphetamine 15 MG 24 hr capsule  Commonly known as:  ADDERALL XR            Indwelling Lines/Drains at time of discharge:   Lines/Drains/Airways     Drain                 Gastrostomy/Enterostomy Percutaneous endoscopic gastrostomy (PEG) -- days                Time spent on the discharge of patient: 20 minutes  Patient was seen and examined on the date of discharge and determined to be suitable for discharge.         Lorenza Cantor NP  Department of Hospital Medicine  [facility]

## 2018-04-18 NOTE — HPI
"Pt presented to ER with c/o SOB. She is unable to give hx. Daughter at bedside this am and able to give some history. She reports that she has been very congested and "rattling" breath sounds. She report s that in ER they sucteiond a lot of mucus. Cxr without consolidation. No fever. No elevated WBC. (she is on cont peg feeding) daughter reports that patient claims she felt like she had acid reflux. She is not on O2 at nursing home. Tube feeds held and pt placed on floor for observation.     Labs reviewed. CT abd and pelvis shows constipation. Daughter reports that she has not had BM in 1 week. She was given MOM and lactulose with no BM.       "

## 2018-04-23 LAB — BACTERIA BLD CULT: NORMAL

## 2018-04-25 NOTE — PLAN OF CARE
04/25/18 1027   Final Note   Assessment Type Final Discharge Note   Discharge Disposition Home   What phone number can be called within the next 1-3 days to see how you are doing after discharge? [phone]   Hospital Follow Up  Appt(s) scheduled? Yes   Discharge plans and expectations educations in teach back method with documentation complete? Yes   Right Care Referral Info   Post Acute Recommendation Home-care

## 2018-05-25 ENCOUNTER — HOSPITAL ENCOUNTER (EMERGENCY)
Facility: HOSPITAL | Age: 78
Discharge: HOME | End: 2018-05-25
Attending: EMERGENCY MEDICINE
Payer: MEDICARE

## 2018-05-25 VITALS
SYSTOLIC BLOOD PRESSURE: 144 MMHG | RESPIRATION RATE: 16 BRPM | TEMPERATURE: 98 F | WEIGHT: 190 LBS | HEART RATE: 78 BPM | DIASTOLIC BLOOD PRESSURE: 80 MMHG | BODY MASS INDEX: 27.26 KG/M2

## 2018-05-25 DIAGNOSIS — Z93.1 STATUS POST INSERTION OF PERCUTANEOUS ENDOSCOPIC GASTROSTOMY (PEG) TUBE: Primary | ICD-10-CM

## 2018-05-25 DIAGNOSIS — Z93.1 S/P PERCUTANEOUS ENDOSCOPIC GASTROSTOMY (PEG) TUBE PLACEMENT: ICD-10-CM

## 2018-05-25 PROCEDURE — 25500020 PHARM REV CODE 255: Performed by: EMERGENCY MEDICINE

## 2018-05-25 PROCEDURE — 99284 EMERGENCY DEPT VISIT MOD MDM: CPT | Mod: 25

## 2018-05-25 PROCEDURE — 43760 *HC REPLACEMENT GASTROS TUBE: CPT

## 2018-05-25 RX ADMIN — DIATRIZOATE MEGLUMINE AND DIATRIZOATE SODIUM 60 ML: 600; 100 SOLUTION ORAL; RECTAL at 08:05

## 2018-05-25 NOTE — ED PROVIDER NOTES
[facility]                                                  Chief Complaint  78 y.o. female with General Illness (peg tube displaced.)    History of Present Illness  Eddy Cunningham presents to the emergency room with complaints of her peg tube being dislodged.  Patient reports that happened this morning.  She reports no pain.  She reports that she is unsure why it came undone.  She is awake alert and oriented.  She lives in a nursing home.      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)     Parkinson disease     RLS (restless legs syndrome)     Stress incontinence     Venous stasis ulcers      Past Surgical History:   Procedure Laterality Date    CARPAL TUNNEL RELEASE      Right    CHOLECYSTECTOMY      GASTROSTOMY TUBE PLACEMENT      PARTIAL HYSTERECTOMY      ROTATOR CUFF REPAIR      TONSILLECTOMY, ADENOIDECTOMY      tonsiles    TOTAL KNEE ARTHROPLASTY        Review of patient's allergies indicates:   Allergen Reactions    Oxycodone      Hallucination    Sulfa (sulfonamide antibiotics)      Other reaction(s): when on contact        Review of Systems and Physical Exam     Review of Systems  -- Constitution - no fever, denies fatigue, no weakness, no chills  -- Eyes - no tearing or redness, no visual disturbance  -- Ear, Nose - no tinnitus or earache, no nasal congestion or discharge  -- Mouth,Throat - no sore throat, no toothache, normal voice, normal swallowing  -- Respiratory - denies cough and congestion, no shortness of breath, no wheezing  -- Cardiovascular - denies chest pain, no palpitations, denies claudication  -- Gastrointestinal - denies abdominal pain, nausea, vomiting, or  diarrhea reports dislodged PEG tube  -- Genitourinary - no dysuria, no denies flank pain, no hematuria or frequency   -- Musculoskeletal - denies back pain, negative for myalgias and arthralgias   -- Neurological - no headache, denies weakness or seizure; no LOC  -- Skin - denies pallor, rash, or changes in skin. no hives or welts noted    Vital Signs   vitals were not taken for this visit.     Physical Exam  -- Nursing note and vitals reviewed  -- Constitutional: Appears well-developed and well-nourished  -- Head: Atraumatic. Normocephalic. No obvious abnormality  -- Eyes: Pupils are equal and reactive to light. Normal conjunctiva and lids  -- Nose: Nose normal in appearance, nares grossly normal. No discharge  -- Throat: Mucous membranes moist, pharynx normal, normal tonsils. No lesions   -- Ears: External ears and TM normal bilaterally. Normal hearing and no drainage  -- Neck: Normal range of motion. Neck supple. No masses, trachea midline  -- Cardiac: Normal rate, regular rhythm and normal heart sounds  -- Pulmonary: Normal respiratory effort, breath sounds clear to auscultation  -- Abdominal: Soft, no tenderness. Normal bowel sounds. Normal liver edge  PEG site Clean and dry, ostomy possibly closed  -- Musculoskeletal: Normal range of motion, no effusions. Joints stable   -- Neurological: No focal deficits. Showed good interaction with staff  -- Vascular: Posterior tibial, dorsalis pedis and radial pulses 2+ bilaterally    -- Lymphatics: No cervical or peripheral lymphadenopathy. No edema noted  -- Skin: Warm and dry. No evidence of rash or cellulitis  -- Psychiatric: Normal mood and affect. Bedside behavior is appropriate    Emergency Room Course     Treatment and Evaluation  1.  Physical exam as noted above  2.  Nursing replaced PEG tube  3.  KUB with Gastrografin shows PEG tube in place with no weakness  4.  DC home to nursing home          Diagnosis  -- PEG tube replacement    Disposition and Plan  --  Disposition: DC home to nursing home  -- Condition: stable  -- Follow-up: Patient to follow up with Ayaka Pollard MD in 1-2 days.  -- I advised the patient that we have found no life threatening condition today  -- At this time, I believe the patient is clinically stable for discharge.   -- The patient acknowledges that close follow up with a MD is required   -- Patient agrees to comply with all instruction and direction given in the ER  -- Patient counseled on strict return precautions as discussed       Xi Vaca MD  05/25/18 0917       Xi Vaca MD  05/25/18 0917

## 2018-05-26 ENCOUNTER — HOSPITAL ENCOUNTER (EMERGENCY)
Facility: HOSPITAL | Age: 78
Discharge: HOME | End: 2018-05-26
Attending: SURGERY
Payer: MEDICARE

## 2018-05-26 VITALS
BODY MASS INDEX: 27.26 KG/M2 | SYSTOLIC BLOOD PRESSURE: 116 MMHG | TEMPERATURE: 97 F | HEART RATE: 77 BPM | RESPIRATION RATE: 18 BRPM | WEIGHT: 190 LBS | DIASTOLIC BLOOD PRESSURE: 71 MMHG

## 2018-05-26 DIAGNOSIS — Z43.1 PEG (PERCUTANEOUS ENDOSCOPIC GASTROSTOMY) ADJUSTMENT/REPLACEMENT/REMOVAL: Primary | ICD-10-CM

## 2018-05-26 PROCEDURE — 99284 EMERGENCY DEPT VISIT MOD MDM: CPT | Mod: 25

## 2018-05-26 PROCEDURE — 25500020 PHARM REV CODE 255: Performed by: SURGERY

## 2018-05-26 PROCEDURE — 43760 *HC REPLACEMENT GASTROS TUBE: CPT

## 2018-05-26 RX ADMIN — DIATRIZOATE MEGLUMINE AND DIATRIZOATE SODIUM 30 ML: 600; 100 SOLUTION ORAL; RECTAL at 11:05

## 2018-05-26 NOTE — ED NOTES
Discharged to nursing home.    - Condition at discharge: Good  - Mode of Discharge: Wheel chair  - The patient left the ED accompanied by a family member.  - The discharge instructions were discussed with the nurse at [facility].  - She states an understanding of the discharge instructions.  - Walked pt to the discharge station.

## 2018-05-26 NOTE — ED TRIAGE NOTES
78 y.o. female presents to ER ED 06/ED 06   Chief Complaint   Patient presents with    PEG tube problem   Pt of [facility] with PEG tube displaced. Seen here yesterday, it was replaced, it is out again. No acute distress noted.

## 2018-05-26 NOTE — ED PROVIDER NOTES
[facility]                                                 Chief Complaint  78 y.o. female with PEG tube problem    History of Present Illness  Eddy Cunningham presents to the emergency room with PEG tube dislodgment  Pt was seen yesterday in the emergency room after she pulled out her PEG tube  Patient again pulled out her PEG tube today, presents for replacement of this tube  The patient on exam has good granulation tissue with a patent PEG tube skin site  Patient has a soft abdomen, PEG tube replaced without difficulty by the ER [facility]ASSEM.    The history is provided by the NH     Past Medical History   -- COPD (chronic obstructive pulmonary disease)     -- Dementia     -- Depression     -- Elevated C-reactive protein (CRP)     -- Falls frequently     -- Homocystinuria     -- Hypertension     -- Hypopotassemia     -- Hypothyroidism     -- Narcolepsy without cataplexy     -- Parkinson disease     -- RLS (restless legs syndrome)     -- Stress incontinence     -- Venous stasis ulcers        Past Surgical History   -- CARPAL TUNNEL RELEASE       -- CHOLECYSTECTOMY       -- PARTIAL HYSTERECTOMY       -- ROTATOR CUFF REPAIR       -- TONSILLECTOMY, ADENOIDECTOMY       -- TOTAL KNEE ARTHROPLASTY          Review of patient's allergies   -- Oxycodone     -- Sulfa (sulfonamide antibiotics)        Review of Systems and Physical Exam      Review of Systems  -- Constitution - no fever, denies fatigue, no weakness, no chills  -- Eyes - no tearing or redness, no visual disturbance  -- Ear, Nose - no tinnitus or earache, no nasal congestion or discharge  -- Mouth,Throat - no sore throat, no toothache, normal voice, normal swallowing  -- Respiratory - denies cough and congestion, no shortness of breath, no PEREZ  -- Cardiovascular - denies chest pain, no palpitations, denies claudication  -- Gastrointestinal - PEG tube was dislodged by the patient  -- Musculoskeletal - denies back pain, negative for myalgias and  arthralgias   -- Neurological - no headache, denies weakness or seizure; no LOC  -- Skin - denies pallor, rash, or changes in skin. no hives or welts noted     /64  Pulse 76   Temp 96.9 °F (36.1 °C) (Oral)   Resp 20      Physical Exam  -- Nursing note and vitals reviewed  -- Head: Atraumatic. Normocephalic. No obvious abnormality  -- Eyes: Pupils are equal and reactive to light. Normal conjunctiva and lids  -- Cardiac: Normal rate, regular rhythm and normal heart sounds  -- Pulmonary: Normal respiratory effort, breath sounds clear to auscultation  -- Abdominal: Peg tube skin site clean dry and intact  -- Musculoskeletal: Normal range of motion, no effusions. Joints stable   -- Neurological: No focal deficits. Showed good interaction with staff  -- Vascular: Posterior tibial, dorsalis pedis and radial pulses 2+ bilaterally       Emergency Room Course      Treatment and Evaluation  -- PEG tube replaced through the previous skin site  -- X-ray with Gastrografin confirmed placement  -- Site was verified before the start of the procedure  -- No complications were noted from the procedure  -- The patient tolerated the procedure well     Radiology  -- PEG tube appears to be correctly placed on Gastrografin x-ray today    Diagnosis  -- PEG (percutaneous endoscopic gastrostomy) adjustment/replacement/removal.    Disposition and Plan  -- Disposition: home  -- Condition: stable  -- Follow-up: Patient to follow up with Ayaka Pollard MD in 1-2 days.  -- I advised the patient that we have found no life threatening condition today  -- At this time, I believe the patient is clinically stable for discharge.   -- The patient acknowledges that close follow up with a MD is required   -- Patient agrees to comply with all instruction and direction given in the ER    This note is dictated on Dragon Natural Speaking word recognition program.  There are word recognition mistakes that are occasionally missed on review.             Ivan Gutierres MD  05/26/18 9539

## 2018-06-10 ENCOUNTER — HOSPITAL ENCOUNTER (INPATIENT)
Facility: HOSPITAL | Age: 78
LOS: 3 days | Discharge: HOME | DRG: 872 | End: 2018-06-14
Attending: SURGERY | Admitting: INTERNAL MEDICINE
Payer: MEDICARE

## 2018-06-10 DIAGNOSIS — T17.908A ASPIRATION INTO AIRWAY, INITIAL ENCOUNTER: ICD-10-CM

## 2018-06-10 DIAGNOSIS — N30.00 ACUTE CYSTITIS WITHOUT HEMATURIA: ICD-10-CM

## 2018-06-10 DIAGNOSIS — R06.02 SHORTNESS OF BREATH: ICD-10-CM

## 2018-06-10 DIAGNOSIS — A41.9 SEPSIS, DUE TO UNSPECIFIED ORGANISM: Primary | ICD-10-CM

## 2018-06-10 DIAGNOSIS — R06.02 SOB (SHORTNESS OF BREATH): ICD-10-CM

## 2018-06-10 DIAGNOSIS — T17.908A ASPIRATION INTO AIRWAY: ICD-10-CM

## 2018-06-10 LAB
ALBUMIN SERPL BCP-MCNC: 3.8 G/DL
ALLENS TEST: ABNORMAL
ALP SERPL-CCNC: 121 U/L
ALT SERPL W/O P-5'-P-CCNC: 11 U/L
AMORPH CRY URNS QL MICRO: ABNORMAL
ANION GAP SERPL CALC-SCNC: 12 MMOL/L
APTT BLDCRRT: 29.2 SEC
AST SERPL-CCNC: 47 U/L
BACTERIA #/AREA URNS HPF: ABNORMAL /HPF
BASOPHILS # BLD AUTO: 0.04 K/UL
BASOPHILS NFR BLD: 0.6 %
BILIRUB SERPL-MCNC: 0.6 MG/DL
BILIRUB UR QL STRIP: NEGATIVE
BNP SERPL-MCNC: 354 PG/ML
BUN SERPL-MCNC: 19 MG/DL
CALCIUM SERPL-MCNC: 9.7 MG/DL
CHLORIDE SERPL-SCNC: 101 MMOL/L
CK MB SERPL-MCNC: 2.3 NG/ML
CK MB SERPL-RTO: 4.2 %
CK SERPL-CCNC: 55 U/L
CK SERPL-CCNC: 55 U/L
CLARITY UR: ABNORMAL
CO2 SERPL-SCNC: 28 MMOL/L
COLOR UR: YELLOW
CREAT SERPL-MCNC: 0.7 MG/DL
D DIMER PPP IA.FEU-MCNC: 1 MG/L FEU
DELSYS: ABNORMAL
DIFFERENTIAL METHOD: ABNORMAL
EOSINOPHIL # BLD AUTO: 0.2 K/UL
EOSINOPHIL NFR BLD: 2.6 %
ERYTHROCYTE [DISTWIDTH] IN BLOOD BY AUTOMATED COUNT: 13.6 %
EST. GFR  (AFRICAN AMERICAN): >60 ML/MIN/1.73 M^2
EST. GFR  (NON AFRICAN AMERICAN): >60 ML/MIN/1.73 M^2
GLUCOSE SERPL-MCNC: 97 MG/DL
GLUCOSE UR QL STRIP: NEGATIVE
HCO3 UR-SCNC: 31.3 MMOL/L (ref 22–26)
HCT VFR BLD AUTO: 42.2 %
HGB BLD-MCNC: 13.5 G/DL
HGB UR QL STRIP: NEGATIVE
INR PPP: 1.1
KETONES UR QL STRIP: NEGATIVE
LACTATE SERPL-SCNC: 0.8 MMOL/L
LEUKOCYTE ESTERASE UR QL STRIP: ABNORMAL
LYMPHOCYTES # BLD AUTO: 1.2 K/UL
LYMPHOCYTES NFR BLD: 17.4 %
MAGNESIUM SERPL-MCNC: 2.1 MG/DL
MCH RBC QN AUTO: 31 PG
MCHC RBC AUTO-ENTMCNC: 32 G/DL
MCV RBC AUTO: 97 FL
MICROSCOPIC COMMENT: ABNORMAL
MONOCYTES # BLD AUTO: 0.6 K/UL
MONOCYTES NFR BLD: 8.4 %
NEUTROPHILS # BLD AUTO: 5 K/UL
NEUTROPHILS NFR BLD: 71 %
NITRITE UR QL STRIP: POSITIVE
PCO2 BLDA: 45 MMHG (ref 35–45)
PH SMN: 7.45 [PH] (ref 7.35–7.45)
PH UR STRIP: 8 [PH] (ref 5–8)
PHOSPHATE SERPL-MCNC: 3.7 MG/DL
PLATELET # BLD AUTO: 301 K/UL
PMV BLD AUTO: 10 FL
PO2 BLDA: 277 MMHG (ref 75–100)
POC BE: 6.4 MMOL/L (ref -2–2)
POC COHB: 1.8 % (ref 0–3)
POC METHB: 1.2 % (ref 0–1.5)
POC O2HB ARTERIAL: 97.3 % (ref 94–100)
POC SATURATED O2: 100.3 % (ref 90–100)
POC TCO2: 32.7 MMOL/L
POC THB: 13.1 G/DL (ref 12–18)
POTASSIUM SERPL-SCNC: 4.4 MMOL/L
PROT SERPL-MCNC: 7.1 G/DL
PROT UR QL STRIP: ABNORMAL
PROTHROMBIN TIME: 10.9 SEC
RBC # BLD AUTO: 4.35 M/UL
RBC #/AREA URNS HPF: 1 /HPF (ref 0–4)
SITE: ABNORMAL
SODIUM SERPL-SCNC: 141 MMOL/L
SP GR UR STRIP: 1.01 (ref 1–1.03)
SQUAMOUS #/AREA URNS HPF: 2 /HPF
T4 FREE SERPL-MCNC: 1.04 NG/DL
TROPONIN I SERPL DL<=0.01 NG/ML-MCNC: <0.006 NG/ML
TSH SERPL DL<=0.005 MIU/L-ACNC: 4.07 UIU/ML
URN SPEC COLLECT METH UR: ABNORMAL
UROBILINOGEN UR STRIP-ACNC: 1 EU/DL
WBC # BLD AUTO: 7.02 K/UL
WBC #/AREA URNS HPF: 65 /HPF (ref 0–5)

## 2018-06-10 PROCEDURE — 82803 BLOOD GASES ANY COMBINATION: CPT | Performed by: SURGERY

## 2018-06-10 PROCEDURE — 51702 INSERT TEMP BLADDER CATH: CPT | Mod: 59

## 2018-06-10 PROCEDURE — 85379 FIBRIN DEGRADATION QUANT: CPT

## 2018-06-10 PROCEDURE — 99291 CRITICAL CARE FIRST HOUR: CPT | Mod: 25

## 2018-06-10 PROCEDURE — 36415 COLL VENOUS BLD VENIPUNCTURE: CPT

## 2018-06-10 PROCEDURE — 84443 ASSAY THYROID STIM HORMONE: CPT

## 2018-06-10 PROCEDURE — 84439 ASSAY OF FREE THYROXINE: CPT

## 2018-06-10 PROCEDURE — 99900026 HC AIRWAY MAINTENANCE (STAT)

## 2018-06-10 PROCEDURE — 85025 COMPLETE CBC W/AUTO DIFF WBC: CPT

## 2018-06-10 PROCEDURE — 83605 ASSAY OF LACTIC ACID: CPT

## 2018-06-10 PROCEDURE — 94760 N-INVAS EAR/PLS OXIMETRY 1: CPT

## 2018-06-10 PROCEDURE — 82550 ASSAY OF CK (CPK): CPT

## 2018-06-10 PROCEDURE — 87040 BLOOD CULTURE FOR BACTERIA: CPT

## 2018-06-10 PROCEDURE — 96374 THER/PROPH/DIAG INJ IV PUSH: CPT

## 2018-06-10 PROCEDURE — G0378 HOSPITAL OBSERVATION PER HR: HCPCS

## 2018-06-10 PROCEDURE — 93010 ELECTROCARDIOGRAM REPORT: CPT | Mod: ,,, | Performed by: INTERNAL MEDICINE

## 2018-06-10 PROCEDURE — 27000221 HC OXYGEN, UP TO 24 HOURS

## 2018-06-10 PROCEDURE — 83735 ASSAY OF MAGNESIUM: CPT

## 2018-06-10 PROCEDURE — 80053 COMPREHEN METABOLIC PANEL: CPT

## 2018-06-10 PROCEDURE — 84100 ASSAY OF PHOSPHORUS: CPT

## 2018-06-10 PROCEDURE — 87186 SC STD MICRODIL/AGAR DIL: CPT

## 2018-06-10 PROCEDURE — 81000 URINALYSIS NONAUTO W/SCOPE: CPT

## 2018-06-10 PROCEDURE — 63600175 PHARM REV CODE 636 W HCPCS: Performed by: SURGERY

## 2018-06-10 PROCEDURE — 87086 URINE CULTURE/COLONY COUNT: CPT

## 2018-06-10 PROCEDURE — 96361 HYDRATE IV INFUSION ADD-ON: CPT | Mod: 59

## 2018-06-10 PROCEDURE — 83880 ASSAY OF NATRIURETIC PEPTIDE: CPT

## 2018-06-10 PROCEDURE — 84484 ASSAY OF TROPONIN QUANT: CPT

## 2018-06-10 PROCEDURE — 82553 CREATINE MB FRACTION: CPT

## 2018-06-10 PROCEDURE — 31720 CLEARANCE OF AIRWAYS: CPT

## 2018-06-10 PROCEDURE — 87088 URINE BACTERIA CULTURE: CPT

## 2018-06-10 PROCEDURE — 85730 THROMBOPLASTIN TIME PARTIAL: CPT

## 2018-06-10 PROCEDURE — 87077 CULTURE AEROBIC IDENTIFY: CPT

## 2018-06-10 PROCEDURE — 36600 WITHDRAWAL OF ARTERIAL BLOOD: CPT

## 2018-06-10 PROCEDURE — 85610 PROTHROMBIN TIME: CPT

## 2018-06-10 PROCEDURE — 99900035 HC TECH TIME PER 15 MIN (STAT)

## 2018-06-10 PROCEDURE — 25000003 PHARM REV CODE 250: Performed by: SURGERY

## 2018-06-10 PROCEDURE — 93005 ELECTROCARDIOGRAM TRACING: CPT

## 2018-06-10 RX ORDER — ONDANSETRON 2 MG/ML
4 INJECTION INTRAMUSCULAR; INTRAVENOUS EVERY 8 HOURS PRN
Status: DISCONTINUED | OUTPATIENT
Start: 2018-06-10 | End: 2018-06-14 | Stop reason: HOSPADM

## 2018-06-10 RX ORDER — LEVOFLOXACIN 5 MG/ML
500 INJECTION, SOLUTION INTRAVENOUS
Status: DISCONTINUED | OUTPATIENT
Start: 2018-06-10 | End: 2018-06-14 | Stop reason: HOSPADM

## 2018-06-10 RX ORDER — ACETAMINOPHEN 325 MG/1
650 TABLET ORAL EVERY 8 HOURS PRN
Status: DISCONTINUED | OUTPATIENT
Start: 2018-06-10 | End: 2018-06-14 | Stop reason: HOSPADM

## 2018-06-10 RX ORDER — PANTOPRAZOLE SODIUM 40 MG/1
40 TABLET, DELAYED RELEASE ORAL DAILY
Status: DISCONTINUED | OUTPATIENT
Start: 2018-06-11 | End: 2018-06-11

## 2018-06-10 RX ORDER — IPRATROPIUM BROMIDE AND ALBUTEROL SULFATE 2.5; .5 MG/3ML; MG/3ML
3 SOLUTION RESPIRATORY (INHALATION) EVERY 4 HOURS
Status: DISCONTINUED | OUTPATIENT
Start: 2018-06-11 | End: 2018-06-12

## 2018-06-10 RX ADMIN — SODIUM CHLORIDE 1000 ML: 0.9 INJECTION, SOLUTION INTRAVENOUS at 10:06

## 2018-06-10 RX ADMIN — LEVOFLOXACIN 500 MG: 5 INJECTION, SOLUTION INTRAVENOUS at 10:06

## 2018-06-11 PROBLEM — A41.9 SEPSIS: Status: ACTIVE | Noted: 2018-06-11

## 2018-06-11 PROBLEM — E63.9 INADEQUATE DIETARY ENERGY INTAKE: Status: ACTIVE | Noted: 2018-06-11

## 2018-06-11 PROBLEM — E03.4 HYPOTHYROIDISM DUE TO ACQUIRED ATROPHY OF THYROID: Status: ACTIVE | Noted: 2018-06-11

## 2018-06-11 PROBLEM — N30.00 ACUTE CYSTITIS WITHOUT HEMATURIA: Status: ACTIVE | Noted: 2017-05-17

## 2018-06-11 LAB
ALBUMIN SERPL BCP-MCNC: 3.2 G/DL
ALP SERPL-CCNC: 91 U/L
ALT SERPL W/O P-5'-P-CCNC: 24 U/L
ANION GAP SERPL CALC-SCNC: 9 MMOL/L
AST SERPL-CCNC: 31 U/L
BASOPHILS # BLD AUTO: 0.02 K/UL
BASOPHILS NFR BLD: 0.3 %
BILIRUB SERPL-MCNC: 0.7 MG/DL
BUN SERPL-MCNC: 16 MG/DL
CALCIUM SERPL-MCNC: 9 MG/DL
CHLORIDE SERPL-SCNC: 105 MMOL/L
CO2 SERPL-SCNC: 26 MMOL/L
CREAT SERPL-MCNC: 0.6 MG/DL
DIASTOLIC DYSFUNCTION: YES
DIFFERENTIAL METHOD: ABNORMAL
EOSINOPHIL # BLD AUTO: 0.2 K/UL
EOSINOPHIL NFR BLD: 3.1 %
ERYTHROCYTE [DISTWIDTH] IN BLOOD BY AUTOMATED COUNT: 13.3 %
EST. GFR  (AFRICAN AMERICAN): >60 ML/MIN/1.73 M^2
EST. GFR  (NON AFRICAN AMERICAN): >60 ML/MIN/1.73 M^2
ESTIMATED PA SYSTOLIC PRESSURE: 21.9
GLUCOSE SERPL-MCNC: 96 MG/DL
HCT VFR BLD AUTO: 38.1 %
HGB BLD-MCNC: 11.9 G/DL
LACTATE SERPL-SCNC: 1.2 MMOL/L
LYMPHOCYTES # BLD AUTO: 1.3 K/UL
LYMPHOCYTES NFR BLD: 22.7 %
MCH RBC QN AUTO: 30.8 PG
MCHC RBC AUTO-ENTMCNC: 31.2 G/DL
MCV RBC AUTO: 99 FL
MONOCYTES # BLD AUTO: 0.5 K/UL
MONOCYTES NFR BLD: 9.2 %
NEUTROPHILS # BLD AUTO: 3.7 K/UL
NEUTROPHILS NFR BLD: 64.7 %
PLATELET # BLD AUTO: 233 K/UL
PMV BLD AUTO: 9.7 FL
POTASSIUM SERPL-SCNC: 4.2 MMOL/L
PROT SERPL-MCNC: 6 G/DL
RBC # BLD AUTO: 3.86 M/UL
RETIRED EF AND QEF - SEE NOTES: 70 (ref 55–65)
SODIUM SERPL-SCNC: 140 MMOL/L
TRICUSPID VALVE REGURGITATION: ABNORMAL
WBC # BLD AUTO: 5.76 K/UL

## 2018-06-11 PROCEDURE — 99220 PR INITIAL OBSERVATION CARE,LEVL III: CPT | Mod: ,,, | Performed by: INTERNAL MEDICINE

## 2018-06-11 PROCEDURE — C9113 INJ PANTOPRAZOLE SODIUM, VIA: HCPCS | Performed by: INTERNAL MEDICINE

## 2018-06-11 PROCEDURE — 27000221 HC OXYGEN, UP TO 24 HOURS

## 2018-06-11 PROCEDURE — 97802 MEDICAL NUTRITION INDIV IN: CPT

## 2018-06-11 PROCEDURE — 94761 N-INVAS EAR/PLS OXIMETRY MLT: CPT

## 2018-06-11 PROCEDURE — 94640 AIRWAY INHALATION TREATMENT: CPT

## 2018-06-11 PROCEDURE — 85025 COMPLETE CBC W/AUTO DIFF WBC: CPT

## 2018-06-11 PROCEDURE — 36415 COLL VENOUS BLD VENIPUNCTURE: CPT

## 2018-06-11 PROCEDURE — 25500020 PHARM REV CODE 255: Performed by: INTERNAL MEDICINE

## 2018-06-11 PROCEDURE — 83605 ASSAY OF LACTIC ACID: CPT

## 2018-06-11 PROCEDURE — 20000000 HC ICU ROOM

## 2018-06-11 PROCEDURE — 25000003 PHARM REV CODE 250: Performed by: INTERNAL MEDICINE

## 2018-06-11 PROCEDURE — 99900026 HC AIRWAY MAINTENANCE (STAT)

## 2018-06-11 PROCEDURE — 63600175 PHARM REV CODE 636 W HCPCS: Performed by: SURGERY

## 2018-06-11 PROCEDURE — 80053 COMPREHEN METABOLIC PANEL: CPT

## 2018-06-11 PROCEDURE — 93306 TTE W/DOPPLER COMPLETE: CPT

## 2018-06-11 PROCEDURE — 63600175 PHARM REV CODE 636 W HCPCS: Performed by: INTERNAL MEDICINE

## 2018-06-11 PROCEDURE — 25000242 PHARM REV CODE 250 ALT 637 W/ HCPCS: Performed by: SURGERY

## 2018-06-11 RX ORDER — CARBIDOPA AND LEVODOPA 25; 100 MG/1; MG/1
1 TABLET ORAL
Status: DISCONTINUED | OUTPATIENT
Start: 2018-06-11 | End: 2018-06-14 | Stop reason: HOSPADM

## 2018-06-11 RX ORDER — TRAMADOL HYDROCHLORIDE 50 MG/1
50 TABLET ORAL EVERY 6 HOURS PRN
Status: DISCONTINUED | OUTPATIENT
Start: 2018-06-11 | End: 2018-06-14 | Stop reason: HOSPADM

## 2018-06-11 RX ORDER — NAPROXEN SODIUM 220 MG/1
81 TABLET, FILM COATED ORAL DAILY
Status: DISCONTINUED | OUTPATIENT
Start: 2018-06-11 | End: 2018-06-14 | Stop reason: HOSPADM

## 2018-06-11 RX ORDER — PRAMIPEXOLE DIHYDROCHLORIDE 0.12 MG/1
0.25 TABLET ORAL 3 TIMES DAILY
Status: DISCONTINUED | OUTPATIENT
Start: 2018-06-11 | End: 2018-06-14 | Stop reason: HOSPADM

## 2018-06-11 RX ORDER — TRAZODONE HYDROCHLORIDE 50 MG/1
50 TABLET ORAL NIGHTLY
Status: DISCONTINUED | OUTPATIENT
Start: 2018-06-11 | End: 2018-06-14 | Stop reason: HOSPADM

## 2018-06-11 RX ORDER — RIVASTIGMINE 4.6 MG/24H
1 PATCH, EXTENDED RELEASE TRANSDERMAL DAILY
Status: DISCONTINUED | OUTPATIENT
Start: 2018-06-11 | End: 2018-06-14 | Stop reason: HOSPADM

## 2018-06-11 RX ORDER — TIZANIDINE 2 MG/1
4 TABLET ORAL NIGHTLY PRN
Status: DISCONTINUED | OUTPATIENT
Start: 2018-06-11 | End: 2018-06-14 | Stop reason: HOSPADM

## 2018-06-11 RX ORDER — LEVOTHYROXINE SODIUM 75 UG/1
75 TABLET ORAL
Status: DISCONTINUED | OUTPATIENT
Start: 2018-06-11 | End: 2018-06-14 | Stop reason: HOSPADM

## 2018-06-11 RX ORDER — PANTOPRAZOLE SODIUM 40 MG/10ML
40 INJECTION, POWDER, LYOPHILIZED, FOR SOLUTION INTRAVENOUS DAILY
Status: DISCONTINUED | OUTPATIENT
Start: 2018-06-11 | End: 2018-06-14 | Stop reason: HOSPADM

## 2018-06-11 RX ORDER — DOCUSATE SODIUM 100 MG/1
100 CAPSULE, LIQUID FILLED ORAL DAILY
Status: DISCONTINUED | OUTPATIENT
Start: 2018-06-11 | End: 2018-06-14 | Stop reason: HOSPADM

## 2018-06-11 RX ORDER — SENNOSIDES 8.6 MG/1
1 TABLET ORAL DAILY
Status: DISCONTINUED | OUTPATIENT
Start: 2018-06-11 | End: 2018-06-14 | Stop reason: HOSPADM

## 2018-06-11 RX ORDER — ENOXAPARIN SODIUM 100 MG/ML
40 INJECTION SUBCUTANEOUS EVERY 24 HOURS
Status: DISCONTINUED | OUTPATIENT
Start: 2018-06-11 | End: 2018-06-14 | Stop reason: HOSPADM

## 2018-06-11 RX ADMIN — DOCUSATE SODIUM 100 MG: 100 CAPSULE, LIQUID FILLED ORAL at 09:06

## 2018-06-11 RX ADMIN — IPRATROPIUM BROMIDE AND ALBUTEROL SULFATE 3 ML: .5; 3 SOLUTION RESPIRATORY (INHALATION) at 07:06

## 2018-06-11 RX ADMIN — PANTOPRAZOLE SODIUM 40 MG: 40 INJECTION, POWDER, LYOPHILIZED, FOR SOLUTION INTRAVENOUS at 09:06

## 2018-06-11 RX ADMIN — ASPIRIN 81 MG 81 MG: 81 TABLET ORAL at 09:06

## 2018-06-11 RX ADMIN — IPRATROPIUM BROMIDE AND ALBUTEROL SULFATE 3 ML: .5; 3 SOLUTION RESPIRATORY (INHALATION) at 12:06

## 2018-06-11 RX ADMIN — RIVASTIGMINE 1 PATCH: 4.6 PATCH, EXTENDED RELEASE TRANSDERMAL at 09:06

## 2018-06-11 RX ADMIN — TRAZODONE HYDROCHLORIDE 50 MG: 50 TABLET ORAL at 09:06

## 2018-06-11 RX ADMIN — PRAMIPEXOLE DIHYDROCHLORIDE 0.25 MG: 0.12 TABLET ORAL at 03:06

## 2018-06-11 RX ADMIN — TRAMADOL HYDROCHLORIDE 50 MG: 50 TABLET, FILM COATED ORAL at 11:06

## 2018-06-11 RX ADMIN — PRAMIPEXOLE DIHYDROCHLORIDE 0.25 MG: 0.12 TABLET ORAL at 09:06

## 2018-06-11 RX ADMIN — LEVOTHYROXINE SODIUM 75 MCG: 75 TABLET ORAL at 09:06

## 2018-06-11 RX ADMIN — IOHEXOL 75 ML: 350 INJECTION, SOLUTION INTRAVENOUS at 10:06

## 2018-06-11 RX ADMIN — IPRATROPIUM BROMIDE AND ALBUTEROL SULFATE 3 ML: .5; 3 SOLUTION RESPIRATORY (INHALATION) at 04:06

## 2018-06-11 RX ADMIN — TRAMADOL HYDROCHLORIDE 50 MG: 50 TABLET, FILM COATED ORAL at 07:06

## 2018-06-11 RX ADMIN — LEVOFLOXACIN 500 MG: 5 INJECTION, SOLUTION INTRAVENOUS at 09:06

## 2018-06-11 RX ADMIN — CARBIDOPA AND LEVODOPA 1 TABLET: 25; 100 TABLET ORAL at 09:06

## 2018-06-11 RX ADMIN — SENNA 1 TABLET: 8.6 TABLET, COATED ORAL at 09:06

## 2018-06-11 RX ADMIN — CARBIDOPA AND LEVODOPA 1 TABLET: 25; 100 TABLET ORAL at 02:06

## 2018-06-11 RX ADMIN — CARBIDOPA AND LEVODOPA 1 TABLET: 25; 100 TABLET ORAL at 06:06

## 2018-06-11 RX ADMIN — IPRATROPIUM BROMIDE AND ALBUTEROL SULFATE 3 ML: .5; 3 SOLUTION RESPIRATORY (INHALATION) at 03:06

## 2018-06-11 RX ADMIN — ENOXAPARIN SODIUM 40 MG: 100 INJECTION SUBCUTANEOUS at 06:06

## 2018-06-11 NOTE — ED TRIAGE NOTES
78 y.o. female presents to ER   Chief Complaint   Patient presents with    Shortness of Breath   Pt was brought in by [facility] from [facility] with SOB & low O2.

## 2018-06-11 NOTE — PLAN OF CARE
Problem: Patient Care Overview  Goal: Plan of Care Review  Outcome: Ongoing (interventions implemented as appropriate)  Patient admitted to ICU with diagnosis of aspiration into airway. Patient is a resident of [facility]. Vital signs stable. NPO. PEG tube intact and tube feedings resumed slowly, isosource 1.5 at 10 ml/hr. Tolerated well with no residual. Able to cough, oral secretions suctioned as needed. Breath sounds coarse. CT of chest neg for PE, or infiltrate seen. No skin breakdowns noted. Turned q 2 hrs, zee cath to urimeter with clear yellow urine noted. Daughter and patient aware and agree with plan of care.

## 2018-06-11 NOTE — ASSESSMENT & PLAN NOTE
Related to (etiology):   Inability to consume adequate nutrition    Signs and Symptoms (as evidenced by):   Dysphagia, PEG feeds held at this time     Interventions/Recommendations (treatment strategy):  Initiate Isosource 1.5 GR 40ml/hr with 120ml flush Q4  Beneprotein 1 pkt BID  Monitor Residuals and hold >250ml  RD to monitor    Nutrition Diagnosis Status:   New

## 2018-06-11 NOTE — PLAN OF CARE
Problem: Patient Care Overview  Goal: Plan of Care Review  Outcome: Ongoing (interventions implemented as appropriate)  Pt admitted to CCU 1 6/10 with aspiration and UTI. Pt on 3 liters NC sats 99%. Resp tx every 4 hours. Frequent oral suctioning needed. Pt AAO. Afebrile. Skin intact. All distal ext. Elevated on pillows. Arvizu to gravity with cloudy yellow urine noted. Blood cultures drawn in ER. Labs WNL this AM. Turning PT every 2 hours and PRN comfort. Peg tube site cleaned and intact, no redness noted. Peg tube patent and clamped, PIV to Rt upper FA. Saline locked with +blood return. SCDs placed on Pt's BLE. Tolerating care without complaint of discomfort. No falls or injuries this PM shift. Bed low and locked SR up X 2. Pt safety maintained. Will continue to Monitor.

## 2018-06-11 NOTE — ED PROVIDER NOTES
[facility]                                                 Chief Complaint  78 y.o. female with Shortness of Breath    History of Present Illness  Eddy Cunningham presents to the emergency room with shortness of breath today  Patient aspirated of the local nursing home, became hypoxic per EMS report tonight  She was placed on a nonrebreather by the EMS workers, 84% oxygenation noted  Patient has had issues with aspiration before, pt is on constant PEG tube feedings  Patient also has urinary tract infections chronically, stable in ER presentation tonight    The history is provided by the patient   device was not used during this ER visit    Past Medical History   -- COPD (chronic obstructive pulmonary disease)     -- Dementia     -- Depression     -- Elevated C-reactive protein (CRP)     -- Falls frequently     -- Homocystinuria     -- Hypertension     -- Hypopotassemia     -- Hypothyroidism     -- Narcolepsy without cataplexy     -- Parkinson disease     -- RLS (restless legs syndrome)     -- Stress incontinence     -- Venous stasis ulcers        Past Surgical History   -- CARPAL TUNNEL RELEASE       -- CHOLECYSTECTOMY       -- PARTIAL HYSTERECTOMY       -- ROTATOR CUFF REPAIR       -- TONSILLECTOMY, ADENOIDECTOMY       -- TOTAL KNEE ARTHROPLASTY          Review of patient's allergies   -- Oxycodone     -- Sulfa (sulfonamide antibiotics)      Review of Systems and Physical Exam      Review of Systems  -- Unable to obtain, the patient is a poor historian    Vital signs  BP:    Pulse:    Resp:    Temp: 98.5 °F (36.9 °C)     Physical Exam  -- Nursing note and vitals reviewed  -- Eyes: Pupils are equal and reactive to light. Normal conjunctiva and lids  -- Nose: Nose normal in appearance, nares grossly normal. No discharge  -- Throat: Mucous membranes moist, pharynx normal, normal tonsils. No lesions   -- Ears: External ears and TM normal bilaterally. Normal hearing and no  drainage  -- Neck: Normal range of motion. Neck supple. No masses, trachea midline  -- Cardiac: Normal rate, regular rhythm and normal heart sounds  -- Pulmonary: Coarse breath sounds at the bilateral bases with no active wheezing  -- Abdominal: PEG tube appears clean dry and intact  -- Musculoskeletal: Normal range of motion, no effusions. Joints stable   -- Neurological: No focal deficits. Showed good interaction with staff  -- Vascular: Capillary refill less than 2 seconds, good distal pulses    Emergency Room Course      Labs     K 4.4      CO2 28   BUN 19   CREATININE 0.7   GLU 97   ALKPHOS 121   AST 47 (H)   ALT 11   BILITOT 0.6   ALBUMIN 3.8   PROT 7.1   WBC 7.02   HGB 13.5   HCT 42.2      CPK 55   CPK 55   CPKMB 2.3   TROPONINI <0.006   INR 1.1    (H)   DDIMER 1.00 (H)   LACTATE 0.8   MG 2.1   TSH 4.071 (H)     Urinalysis  -- Urinalysis performed during this ER visit showed signs of infection      EKG  -- The EKG findings today were without concerning findings from baseline    Radiology  -- Chest x-ray showed no infiltrate and showed no acute pathology    Additional Work up  -- Blood cultures have also been drawn, results are pending  -- The urine today has been sent for lab culture, results pending    Medications Given  levoFLOXacin 500 mg/100 mL IVPB 500 mg (500 mg Intravenous New Bag 6/10/18 2213)   acetaminophen tablet 650 mg (not administered)   pantoprazole EC tablet 40 mg (not administered)   ondansetron injection 4 mg (not administered)   promethazine (PHENERGAN) 12.5 mg in dextrose 5 % 50 mL IVPB (not administered)   albuterol-ipratropium 2.5 mg-0.5 mg/3 mL nebulizer solution 3 mL (not administered)   sodium chloride 0.9% bolus 1,000 mL (1,000 mLs Intravenous New Bag 6/10/18 2214)     Critical Care ED Physician Time (minutes):  -- Performed by: Ivan Gutierres M.D.  -- Date/Time: 11:32 PM 6/10/2018   -- Direct Patient Care (Face Time): 20  -- Additional History from Records or  Taking Additional History: 10  -- Ordering, Reviewing, and Interpreting Diagnostic Studies: 10  -- Total Time in Documentation: 10  -- Consultation with Other Physicians: 10  -- Consultation with Family Related to Condition: 10  -- Total Critical Care Time: 70    Diagnosis  -- Sepsis  -- SOB   -- Aspiration into airway  -- Acute cystitis without hematuria     Disposition and Plan  -- Disposition: observation  -- Condition: stable  -- Telemetry monitoring  -- Morning labs  -- SCD hoses  -- Home medications  -- Nausea medication when necessary  -- Pain medication when necessary  -- Intravenous fluids  -- Routine monitoring  -- Bed rest until otherwise stated  -- NPO  -- Serial cardiac enzymes  -- Breathing treatments  -- IV antibiotics  -- Blood cultures pending  -- Urine culture pending  -- Serial lactic acids  -- Pulmonary consult    This note is dictated on Dragon Natural Speaking word recognition program.  There are word recognition mistakes that are occasionally missed on review.                   Ivan Gutierres MD  06/10/18 2749

## 2018-06-11 NOTE — ED NOTES
The patient is sleeping, alert and cooperative with a calm affect, patient is aware of environment. Airway is open and patent, respirations are spontaneous, normal respiratory effort and rate noted, skin warm and dry, appearance: no apparent distress noted, resting comfortably.  VSS, no change from previous assessment.  Bed in low, locked position, SR x2, HOB 30 degrees. Will continue to monitor.

## 2018-06-11 NOTE — SUBJECTIVE & OBJECTIVE
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)     Parkinson disease     RLS (restless legs syndrome)     Stress incontinence     Venous stasis ulcers        Past Surgical History:   Procedure Laterality Date    CARPAL TUNNEL RELEASE      Right    CHOLECYSTECTOMY      GASTROSTOMY TUBE PLACEMENT      PARTIAL HYSTERECTOMY      ROTATOR CUFF REPAIR      TONSILLECTOMY, ADENOIDECTOMY      tonsiles    TOTAL KNEE ARTHROPLASTY         Review of patient's allergies indicates:   Allergen Reactions    Oxycodone      Hallucination    Sulfa (sulfonamide antibiotics)      Other reaction(s): when on contact       No current facility-administered medications on file prior to encounter.      Current Outpatient Prescriptions on File Prior to Encounter   Medication Sig    aspirin 81 MG Chew 1 tablet (81 mg total) by Per G Tube route once daily.    carbidopa-levodopa  mg (SINEMET)  mg per tablet 1 tablet by Per G Tube route 5 (five) times daily.    docusate sodium (COLACE) 100 MG capsule 1 capsule (100 mg total) by Per G Tube route once daily.    hydrocodone-acetaminophen 7.5-325mg (NORCO) 7.5-325 mg per tablet Take 1 tablet by mouth every 12 (twelve) hours as needed for Pain.    LACTOSE-REDUCED FOOD (JEVITY ORAL) 65 mL/hr by PEG Tube route once daily. Via pump on at 6 pm off at 6 am    levothyroxine (SYNTHROID) 75 MCG tablet 1 tablet (75 mcg total) by Per G Tube route before breakfast.    potassium chloride 10% (KAYCIEL) 20 mEq/15 mL solution 15 mLs (20 mEq total) by Per G Tube route 2 (two) times daily.    pramipexole (MIRAPEX) 0.25 MG tablet 1 tablet (0.25 mg total) by Per G Tube route 3 (three) times daily.     ranitidine (ZANTAC) 150 MG capsule Take 75 mg by mouth 2 (two) times daily.    rivastigmine (EXELON) 4.6 mg/24 hour PT24 Place 1 patch onto the skin once daily.      omeprazole (PRILOSEC) 40 MG capsule Open 40 mg capsule into g-tube once daily    senna (SENOKOT) 8.6 mg tablet 1 tablet by Per G Tube route once daily.    tiZANidine 2 mg Cap Take 4 mg by mouth nightly as needed.    tramadol (ULTRAM) 50 mg tablet 1 tablet (50 mg total) by Per G Tube route every 6 (six) hours as needed for Pain.    trazodone (DESYREL) 50 MG tablet 1 tablet (50 mg total) by Per G Tube route every evening.     Family History     None        Social History Main Topics    Smoking status: Former Smoker    Smokeless tobacco: Never Used    Alcohol use No    Drug use: No    Sexual activity: Not on file     Review of Systems   Constitutional: Negative for chills and fever.   HENT: Negative for congestion, hearing loss, sinus pressure and sore throat.    Eyes: Negative for photophobia.   Respiratory: Negative for cough, choking, chest tightness and wheezing.    Cardiovascular: Negative for chest pain and palpitations.   Gastrointestinal: Negative for blood in stool, nausea and vomiting.        S/p PEG   Genitourinary: Negative for dysuria and hematuria.        Recurrent UTI   Musculoskeletal: Negative for arthralgias and myalgias.   Skin: Negative for pallor.   Neurological: Negative for dizziness and numbness.        H/o dementia and parkinson disease   Hematological: Does not bruise/bleed easily.   Psychiatric/Behavioral: Negative for confusion and suicidal ideas. The patient is not nervous/anxious.      Objective:     Vital Signs (Most Recent):  Temp: 97.5 °F (36.4 °C) (06/11/18 0408)  Pulse: 77 (06/11/18 0408)  Resp: 18 (06/11/18 0408)  BP: 101/63 (06/11/18 0408)  SpO2: 96 % (06/11/18 0215) Vital Signs (24h Range):  Temp:  [97.5 °F (36.4 °C)-99.1 °F (37.3 °C)] 97.5 °F (36.4 °C)  Pulse:  [] 77  Resp:  [6-27] 18  SpO2:  [96  %-100 %] 96 %  BP: ()/(44-75) 101/63     Weight: 70.3 kg (154 lb 15.7 oz)  Body mass index is 22.24 kg/m².    Physical Exam   Constitutional: She appears well-developed and well-nourished.   HENT:   Head: Normocephalic and atraumatic.   Right Ear: External ear normal.   Left Ear: External ear normal.   Mouth/Throat: Oropharynx is clear and moist.   Eyes: Conjunctivae and EOM are normal. Pupils are equal, round, and reactive to light.   Neck: Normal range of motion. Neck supple. No JVD present. No tracheal deviation present. No thyromegaly present.   Cardiovascular: Normal rate, regular rhythm, normal heart sounds and intact distal pulses.    Pulmonary/Chest: Effort normal and breath sounds normal. No respiratory distress. She has no wheezes. She has no rales. She exhibits no tenderness.   Abdominal: Soft. Bowel sounds are normal. She exhibits no distension and no mass. There is no tenderness. There is no rebound and no guarding.   S/p PEG    Musculoskeletal: Normal range of motion. She exhibits no edema.   Lymphadenopathy:     She has no cervical adenopathy.   Neurological: She is alert. She has normal reflexes. She displays normal reflexes. No cranial nerve deficit. She exhibits normal muscle tone. Gait abnormal. Coordination normal.   CN: Optic discs are flat with normal vasculature, PERRL, Extraoccular movements and visual fields are full. Normal facial sensation and strength,   She knows she is at hospital ; her dr name is Dr Pollard   Skin: Skin is warm and dry.   Psychiatric: She has a normal mood and affect.   Nursing note and vitals reviewed.        CRANIAL NERVES     CN III, IV, VI   Pupils are equal, round, and reactive to light.  Extraocular motions are normal.        Significant Labs:   CBC:   Recent Labs  Lab 06/10/18  1929 06/11/18  0419   WBC 7.02 5.76   HGB 13.5 11.9*   HCT 42.2 38.1    233     CMP:   Recent Labs  Lab 06/10/18  1929 06/11/18  0419    140   K 4.4 4.2    105    CO2 28 26   GLU 97 96   BUN 19 16   CREATININE 0.7 0.6   CALCIUM 9.7 9.0   PROT 7.1 6.0   ALBUMIN 3.8 3.2*   BILITOT 0.6 0.7   ALKPHOS 121 91   AST 47* 31   ALT 11 24   ANIONGAP 12 9   EGFRNONAA >60 >60     Lactic Acid:   Recent Labs  Lab 06/10/18  1928 06/11/18  0419   LACTATE 0.8 1.2       Lab Results   Component Value Date    DDIMER 1.00 (H) 06/10/2018       Urine Studies:   Recent Labs  Lab 06/10/18  2052   COLORU Yellow   APPEARANCEUA Cloudy*   PHUR 8.0   SPECGRAV 1.015   PROTEINUA Trace*   GLUCUA Negative   KETONESU Negative   BILIRUBINUA Negative   OCCULTUA Negative   NITRITE Positive*   UROBILINOGEN 1.0   LEUKOCYTESUR 1+*   RBCUA 1   WBCUA 65*   BACTERIA Many*   SQUAMEPITHEL 2       Significant Imaging: CXR: I have reviewed all pertinent results/findings within the past 24 hours and my personal findings are:  looks ok to me

## 2018-06-11 NOTE — EICU
Electronic rounding completed. Mrs. Cunningham appears to be resting comfortably, VSS W/O S/S of distress nor cardiopulmonary instability. She is noted to be afebrile & no leukocytosis noted. Lab values today are essentially nominal. Will continue to monitor and trend data during her critical care stay.    Results for EDE CUNNINGHAM (MRN [id number]) as of 6/11/2018 13:57   Ref. Range 6/11/2018 04:19   WBC Latest Ref Range: 3.90 - 12.70 K/uL 5.76   RBC Latest Ref Range: 4.00 - 5.40 M/uL 3.86 (L)   Hemoglobin Latest Ref Range: 12.0 - 16.0 g/dL 11.9 (L)   Hematocrit Latest Ref Range: 37.0 - 48.5 % 38.1   MCV Latest Ref Range: 82 - 98 fL 99 (H)   MCH Latest Ref Range: 27.0 - 31.0 pg 30.8   MCHC Latest Ref Range: 32.0 - 36.0 g/dL 31.2 (L)   RDW Latest Ref Range: 11.5 - 14.5 % 13.3   Platelets Latest Ref Range: 150 - 350 K/uL 233   MPV Latest Ref Range: 9.2 - 12.9 fL 9.7   Gran% Latest Ref Range: 38.0 - 73.0 % 64.7   Gran # (ANC) Latest Ref Range: 1.8 - 7.7 K/uL 3.7   Lymph% Latest Ref Range: 18.0 - 48.0 % 22.7   Lymph # Latest Ref Range: 1.0 - 4.8 K/uL 1.3   Mono% Latest Ref Range: 4.0 - 15.0 % 9.2   Mono # Latest Ref Range: 0.3 - 1.0 K/uL 0.5   Eosinophil% Latest Ref Range: 0.0 - 8.0 % 3.1   Eos # Latest Ref Range: 0.0 - 0.5 K/uL 0.2   Basophil% Latest Ref Range: 0.0 - 1.9 % 0.3   Baso # Latest Ref Range: 0.00 - 0.20 K/uL 0.02     Results for EDE CUNNINGHAM (MRN [id number]) as of 6/11/2018 13:57   Ref. Range 6/11/2018 04:19   Sodium Latest Ref Range: 136 - 145 mmol/L 140   Potassium Latest Ref Range: 3.5 - 5.1 mmol/L 4.2   Chloride Latest Ref Range: 95 - 110 mmol/L 105   CO2 Latest Ref Range: 23 - 29 mmol/L 26   Anion Gap Latest Ref Range: 8 - 16 mmol/L 9   BUN, Bld Latest Ref Range: 8 - 23 mg/dL 16   Creatinine Latest Ref Range: 0.5 - 1.4 mg/dL 0.6   eGFR if non African American Latest Ref Range: >60 mL/min/1.73 m^2 >60   eGFR if  Latest Ref Range: >60 mL/min/1.73 m^2 >60   Glucose Latest Ref Range: 70  - 110 mg/dL 96   Calcium Latest Ref Range: 8.7 - 10.5 mg/dL 9.0   Alkaline Phosphatase Latest Ref Range: 55 - 135 U/L 91   Total Protein Latest Ref Range: 6.0 - 8.4 g/dL 6.0   Albumin Latest Ref Range: 3.5 - 5.2 g/dL 3.2 (L)   Total Bilirubin Latest Ref Range: 0.1 - 1.0 mg/dL 0.7   AST Latest Ref Range: 10 - 40 U/L 31   ALT Latest Ref Range: 10 - 44 U/L 24   Lactate, Mann Latest Ref Range: 0.5 - 2.2 mmol/L 1.2     EXAMINATION:  CTA CHEST NON CORONARY    CLINICAL HISTORY:  hypoxia;    TECHNIQUE:  Low dose axial images, sagittal and coronal reformations were obtained from the thoracic inlet to the lung bases following the IV administration of contrast.  Contrast timing was optimized to evaluate the pulmonary arteries.  MIP images were performed.    COMPARISON:  None.    FINDINGS:  CTA examination of the chest with contrast dated June 11, 2018.    The examination is technically limited due to the timing of the bolus of contrast material.  There is no evidence of central pulmonary emboli.  Small distal emboli are difficult to entirely exclude.    No focal abnormality of the aorta.  There are shotty mediastinal lymph nodes.  Coronary artery calcifications.  No pericardial effusion.    Chronic appearing lung changes.  No focal infiltrate, effusion or pneumothorax.    Scoliosis and multilevel degenerative changes of the spine.   Impression       1. No CT evidence of central pulmonary embolus with limitations due to artifact as noted above.  2. No evidence of acute thoracic abnormality.      Electronically signed by: Tyrone Sales MD  Date: 06/11/2018  Time: 10:41       Date of Procedure: 06/11/2018        TEST DESCRIPTION   Technical Quality: This is a technically adequate study.     Aorta: The aortic root is normal in size, measuring 2.9 cm at sinotubular junction.     Left Atrium: The left atrial volume index is normal, measuring 29.18 cc/m2.     Left Ventricle: The left ventricle is normal in size, with an end-diastolic  diameter of 4.3 cm, and an end-systolic diameter of 2.9 cm. LV wall thickness is normal, with the septum measuring 1.1 cm and the posterior wall measuring 0.9 cm across. Relative   wall thickness was normal at 0.42, and the LV mass index was 87.6 g/m2 consistent with normal left ventricular mass. There are no regional wall motion abnormalities. Left ventricular systolic function appears normal. Visually estimated ejection fraction   is 65-70%. The LV Doppler derived stroke volume equals 88.0 ccs.     Diastolic indices: E wave velocity 1.1 m/s, E/A ratio 0.6,  msec., Mean left atrial pressures are increased. There is diastolic dysfunction secondary to relaxation abnormality.     Right Atrium: The right atrium is normal in size, measuring 4.4 cm in length in the apical view.     Right Ventricle: The right ventricle is normal in size measuring 2.5 cm at the base in the apical right ventricle-focused view. Global right ventricular systolic function appears normal. The estimated PA systolic pressure is greater than 22 mmHg.     Aortic Valve:  The aortic valve is mildly sclerotic.     Mitral Valve:  There is mitral annular calcification.     Tricuspid Valve:  There is trivial tricuspid regurgitation.     Intracavitary: There is no evidence of pericardial effusion, intracavity mass, thrombi, or vegetation.             This document has been electronically    SIGNED BY: Chito Leigh MD On: 06/11/2018 12:55       Ref Range & Units 09:06   EF 55 - 65 70    Diastolic Dysfunction  Yes     Est. PA Systolic Pressure  21.9    Tricuspid Valve Regurgitation  TRIVIAL    Resulting Agency  [facility]

## 2018-06-11 NOTE — PLAN OF CARE
Problem: Nutrition, Enteral (Adult)  Intervention: Monitor/Manage Nutrition Support  Nutrition Goals: advancement energy intake within 48 hrs  Nutrition Goal Status: new  Communication of RD Recs: reviewed with RN    Nutrition Discharge Planning: to be determined

## 2018-06-11 NOTE — H&P
"[facility] Medicine  History & Physical    Patient Name: Eddy Cunningham  MRN: [id number]  Admission Date: 6/10/2018  Attending Physician: Hayder Khalil MD   Primary Care Provider: Ayaka Pollard MD         Patient information was obtained from patient, past medical records and ER records.     Subjective:     Principal Problem:Aspiration into airway    Chief Complaint:   Chief Complaint   Patient presents with    Shortness of Breath        HPI: Eddy Cunningham is a 78 y.o. female   Is a nursing home patient with parkinson's disease ,dementia , s/p PEG placement   Who  presents to the emergency room with shortness of breath last night   She was having lots of copious respiratory secretions and was hypoxemic ; 84 % . With lots of  suction her secretions   Improved . She has    urinary tract infections chronically; over night she was placed in ICU for sepsis ; UTI: aspiration concern.  She is at present on 3 litre oxygen and her oxygen sats are stable.    When I asked her why she came ; she says " I dont know "  She does report coughing .      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)     Parkinson disease     RLS (restless legs syndrome)     Stress incontinence     Venous stasis ulcers        Past Surgical History:   Procedure Laterality Date    CARPAL TUNNEL RELEASE      Right    CHOLECYSTECTOMY      GASTROSTOMY TUBE PLACEMENT      PARTIAL HYSTERECTOMY      ROTATOR CUFF REPAIR      TONSILLECTOMY, ADENOIDECTOMY      tonsiles    TOTAL KNEE ARTHROPLASTY         Review of patient's allergies indicates:   Allergen Reactions    Oxycodone      Hallucination    Sulfa " (sulfonamide antibiotics)      Other reaction(s): when on contact       No current facility-administered medications on file prior to encounter.      Current Outpatient Prescriptions on File Prior to Encounter   Medication Sig    aspirin 81 MG Chew 1 tablet (81 mg total) by Per G Tube route once daily.    carbidopa-levodopa  mg (SINEMET)  mg per tablet 1 tablet by Per G Tube route 5 (five) times daily.    docusate sodium (COLACE) 100 MG capsule 1 capsule (100 mg total) by Per G Tube route once daily.    hydrocodone-acetaminophen 7.5-325mg (NORCO) 7.5-325 mg per tablet Take 1 tablet by mouth every 12 (twelve) hours as needed for Pain.    LACTOSE-REDUCED FOOD (JEVITY ORAL) 65 mL/hr by PEG Tube route once daily. Via pump on at 6 pm off at 6 am    levothyroxine (SYNTHROID) 75 MCG tablet 1 tablet (75 mcg total) by Per G Tube route before breakfast.    potassium chloride 10% (KAYCIEL) 20 mEq/15 mL solution 15 mLs (20 mEq total) by Per G Tube route 2 (two) times daily.    pramipexole (MIRAPEX) 0.25 MG tablet 1 tablet (0.25 mg total) by Per G Tube route 3 (three) times daily.    ranitidine (ZANTAC) 150 MG capsule Take 75 mg by mouth 2 (two) times daily.    rivastigmine (EXELON) 4.6 mg/24 hour PT24 Place 1 patch onto the skin once daily.      omeprazole (PRILOSEC) 40 MG capsule Open 40 mg capsule into g-tube once daily    senna (SENOKOT) 8.6 mg tablet 1 tablet by Per G Tube route once daily.    tiZANidine 2 mg Cap Take 4 mg by mouth nightly as needed.    tramadol (ULTRAM) 50 mg tablet 1 tablet (50 mg total) by Per G Tube route every 6 (six) hours as needed for Pain.    trazodone (DESYREL) 50 MG tablet 1 tablet (50 mg total) by Per G Tube route every evening.     Family History     None        Social History Main Topics    Smoking status: Former Smoker    Smokeless tobacco: Never Used    Alcohol use No    Drug use: No    Sexual activity: Not on file     Review of Systems   Constitutional:  Negative for chills and fever.   HENT: Negative for congestion, hearing loss, sinus pressure and sore throat.    Eyes: Negative for photophobia.   Respiratory: Negative for cough, choking, chest tightness and wheezing.    Cardiovascular: Negative for chest pain and palpitations.   Gastrointestinal: Negative for blood in stool, nausea and vomiting.        S/p PEG   Genitourinary: Negative for dysuria and hematuria.        Recurrent UTI   Musculoskeletal: Negative for arthralgias and myalgias.   Skin: Negative for pallor.   Neurological: Negative for dizziness and numbness.        H/o dementia and parkinson disease   Hematological: Does not bruise/bleed easily.   Psychiatric/Behavioral: Negative for confusion and suicidal ideas. The patient is not nervous/anxious.      Objective:     Vital Signs (Most Recent):  Temp: 97.5 °F (36.4 °C) (06/11/18 0408)  Pulse: 77 (06/11/18 0408)  Resp: 18 (06/11/18 0408)  BP: 101/63 (06/11/18 0408)  SpO2: 96 % (06/11/18 0215) Vital Signs (24h Range):  Temp:  [97.5 °F (36.4 °C)-99.1 °F (37.3 °C)] 97.5 °F (36.4 °C)  Pulse:  [] 77  Resp:  [6-27] 18  SpO2:  [96 %-100 %] 96 %  BP: ()/(44-75) 101/63     Weight: 70.3 kg (154 lb 15.7 oz)  Body mass index is 22.24 kg/m².    Physical Exam   Constitutional: She appears well-developed and well-nourished.   HENT:   Head: Normocephalic and atraumatic.   Right Ear: External ear normal.   Left Ear: External ear normal.   Mouth/Throat: Oropharynx is clear and moist.   Eyes: Conjunctivae and EOM are normal. Pupils are equal, round, and reactive to light.   Neck: Normal range of motion. Neck supple. No JVD present. No tracheal deviation present. No thyromegaly present.   Cardiovascular: Normal rate, regular rhythm, normal heart sounds and intact distal pulses.    Pulmonary/Chest: Effort normal and breath sounds normal. No respiratory distress. She has no wheezes. She has no rales. She exhibits no tenderness.   Abdominal: Soft. Bowel sounds are  normal. She exhibits no distension and no mass. There is no tenderness. There is no rebound and no guarding.   S/p PEG    Musculoskeletal: Normal range of motion. She exhibits no edema.   Lymphadenopathy:     She has no cervical adenopathy.   Neurological: She is alert. She has normal reflexes. She displays normal reflexes. No cranial nerve deficit. She exhibits normal muscle tone. Gait abnormal. Coordination normal.   CN: Optic discs are flat with normal vasculature, PERRL, Extraoccular movements and visual fields are full. Normal facial sensation and strength,   She knows she is at hospital ; her dr name is Dr Pollard   Skin: Skin is warm and dry.   Psychiatric: She has a normal mood and affect.   Nursing note and vitals reviewed.        CRANIAL NERVES     CN III, IV, VI   Pupils are equal, round, and reactive to light.  Extraocular motions are normal.        Significant Labs:   CBC:   Recent Labs  Lab 06/10/18  1929 06/11/18  0419   WBC 7.02 5.76   HGB 13.5 11.9*   HCT 42.2 38.1    233     CMP:   Recent Labs  Lab 06/10/18  1929 06/11/18  0419    140   K 4.4 4.2    105   CO2 28 26   GLU 97 96   BUN 19 16   CREATININE 0.7 0.6   CALCIUM 9.7 9.0   PROT 7.1 6.0   ALBUMIN 3.8 3.2*   BILITOT 0.6 0.7   ALKPHOS 121 91   AST 47* 31   ALT 11 24   ANIONGAP 12 9   EGFRNONAA >60 >60     Lactic Acid:   Recent Labs  Lab 06/10/18  1928 06/11/18  0419   LACTATE 0.8 1.2       Lab Results   Component Value Date    DDIMER 1.00 (H) 06/10/2018       Urine Studies:   Recent Labs  Lab 06/10/18  2052   COLORU Yellow   APPEARANCEUA Cloudy*   PHUR 8.0   SPECGRAV 1.015   PROTEINUA Trace*   GLUCUA Negative   KETONESU Negative   BILIRUBINUA Negative   OCCULTUA Negative   NITRITE Positive*   UROBILINOGEN 1.0   LEUKOCYTESUR 1+*   RBCUA 1   WBCUA 65*   BACTERIA Many*   SQUAMEPITHEL 2       Significant Imaging: CXR: I have reviewed all pertinent results/findings within the past 24 hours and my personal findings are:  looks ok to  me    Assessment/Plan:     * Aspiration into airway    Her CXR looks clear   Continue with IV levaquin   Continue with nebs          Hypothyroidism due to acquired atrophy of thyroid    Resume thyroid meds.          SOB (shortness of breath)    Her D dimer is high   Will do CTA chest r/o PE           Acute cystitis without hematuria    Continue levaquin   Follow cultures            Esophageal dysphagia    S/p PEG  Will resume her feedings but at a slower rate          Memory disorder    Resume exelon           Parkinson's disease    Resume her sinemet  And pramipaxole            VTE Risk Mitigation         Ordered     enoxaparin injection 40 mg  Daily      06/11/18 0737     IP VTE HIGH RISK PATIENT  Once      06/10/18 2145     Place sequential compression device  Until discontinued      06/10/18 2145        Critical care time spent on the evaluation and treatment of severe organ dysfunction, review of pertinent labs and imaging studies, discussions with consulting providers and discussions with patient/family: 30 minutes.     Hayder Khalil MD  Department of Hospital Medicine   [facility]

## 2018-06-11 NOTE — CONSULTS
"  [facility]  Adult Nutrition  Consult Note    SUMMARY     Recommendations    Recommendation/Intervention:   1. When feasible, initiate continuous PEG feeds of Isosource 1.5 at 10ml/hr increasing slowly as tolerated to GR 40ml/hr with 120ml water flushes every 4 hrs. Supplement with Beneprotein 1 pkt BID; projected will provide 1490 calories (94%), 77gm protein (100%), and 1467ml fluid.   Keep HOB elevated. Monitor residuals and hold with feeds >250ml.     RD to monitor    Goals: advancement energy intake within 48 hrs  Nutrition Goal Status: new  Communication of RD Recs: reviewed with RN    Nutrition Discharge Planning: to be determined    Reason for Assessment    Reason for Assessment: new tube feeding  Diagnosis:  (aspiration into airway)  Relevant Medical History: Parkinson, HTN, COPD, Hypothyroidism, Depression, Dementia, Gerd  General Information Comments: NH Pt admitted to ICU with aspiration, Drew Bedolla RN, pt pn PEG feedings at NH of Jevity 1.2; pt requesting coffee      Nutrition Risk Screen    Nutrition Risk Screen: tube feeding or parenteral nutrition    Nutrition/Diet History    Patient Reported Diet/Restrictions/Preferences: no oral intake  Do you have any cultural, spiritual, Taoism conflicts, given your current situation?: Denominational  Food Allergies: NKFA  Factors Affecting Nutritional Intake: depression, impaired cognitive status/motor control, NPO, difficulty/impaired swallowing  Nutrition Support Formula Prior to Admit: Jevity 1.2  Nutrition Support Rate Prior to Admit: 70 (ml)  Nutrtion Support Frequency Prior to Admit: ml/hr x24 hrs with 20ml flush Q6hr providing 2016 calories (127%), 93.24gm protein (111%), and 1436ml fluid.    Anthropometrics    Temp: 98.4 °F (36.9 °C)  Height Method: Stated  Height: 5' 10" (177.8 cm)  Height (inches): 70 in  Weight Method: Bed Scale  Weight: 70.3 kg (154 lb 15.7 oz) (rd reviewed)  Weight (lb): 154.98 lb  Ideal Body Weight (IBW), " Female: 150 lb  % Ideal Body Weight, Female (lb): 103.32 lb  BMI (Calculated): 22.3  BMI Grade: 18.5-24.9 - normal       Lab/Procedures/Meds    Pertinent Labs Reviewed: reviewed  Pertinent Medications Reviewed: reviewed      Recent Labs  Lab 06/11/18  0419   CALCIUM 9.0   PROT 6.0      K 4.2   CO2 26      BUN 16   CREATININE 0.6   ALKPHOS 91   ALT 24   AST 31   BILITOT 0.7     Scheduled Meds:   albuterol-ipratropium  3 mL Nebulization Q4H    aspirin  81 mg Per G Tube Daily    carbidopa-levodopa  mg  1 tablet Per G Tube 5x Daily    docusate sodium  100 mg Per G Tube Daily    enoxaparin  40 mg Subcutaneous Daily    levofloxacin IVPB  500 mg Intravenous Q24H    levothyroxine  75 mcg Per G Tube Before breakfast    pantoprazole  40 mg Intravenous Daily    pramipexole  0.25 mg Per G Tube TID    rivastigmine  1 patch Transdermal Daily    senna  1 tablet Per G Tube Daily    traZODone  50 mg Per G Tube QHS     Continuous Infusions:  PRN Meds:.acetaminophen, ondansetron, promethazine (PHENERGAN) IVPB, tiZANidine, traMADol     Physical Findings/Assessment    Overall Physical Appearance: on oxygen therapy, shortness of breath  Tubes: gastrostomy tube  Oral/Mouth Cavity:  (UTO)  Skin: intact (Luca 15)    Estimated/Assessed Needs    Weight Used For Calorie Calculations: 70.3 kg (154 lb 15.7 oz)  Energy Calorie Requirements (kcal): 1585  Energy Need Method: [place]-St Jeor (x1.25)  Protein Requirements: 70-84 (1.0-1.2)  Weight Used For Protein Calculations: 70.3 kg (154 lb 15.7 oz)  Fluid Requirements (mL): 1ml/kcal or per MD     RDA Method (mL): 1585         Nutrition Prescription Ordered    Current Diet Order: NPO    Evaluation of Received Nutrient/Fluid Intake    Energy Calories Required: not meeting needs  Protein Required: not meeting needs  Fluid Required: not meeting needs  % Intake of Estimated Energy Needs: 0 - 25 %  % Meal Intake: NPO    Nutrition Risk    Level of Risk/Frequency of  Follow-up:  (F/U 2x/wk)     Assessment and Plan    Inadequate dietary energy intake    Related to (etiology):   Inability to consume adequate nutrition    Signs and Symptoms (as evidenced by):   Dysphagia, PEG feeds held at this time     Interventions/Recommendations (treatment strategy):  Initiate Isosource 1.5 GR 40ml/hr with 120ml flush Q4  Beneprotein 1 pkt BID  Monitor Residuals and hold >250ml  RD to monitor    Nutrition Diagnosis Status:   New               Monitor and Evaluation    Food and Nutrient Intake: energy intake  Food and Nutrient Adminstration: enteral and parenteral nutrition administration  Physical Activity and Function: nutrition-related ADLs and IADLs  Anthropometric Measurements: weight, weight change  Biochemical Data, Medical Tests and Procedures: electrolyte and renal panel, gastrointestinal profile, inflammatory profile, glucose/endocrine profile, lipid profile  Nutrition-Focused Physical Findings: overall appearance     Nutrition Follow-Up    RD Follow-up?: Yes

## 2018-06-11 NOTE — ED NOTES
Pt admitted to room CCU1. Pt transferred via stretcher by house supervisor on 4L nasal cannula & monitor in no distress. VSS. Bedside report given to ERINN Cao.

## 2018-06-11 NOTE — PLAN OF CARE
06/11/18 1348   Discharge Assessment   Assessment Type Discharge Planning Assessment   Confirmed/corrected address and phone number on facesheet? Yes   Assessment information obtained from? Medical Record   Expected Length of Stay (days) 3   Communicated expected length of stay with patient/caregiver yes   Prior to hospitilization cognitive status: Alert/Oriented   Prior to hospitalization functional status: Assistive Equipment   Current cognitive status: Alert/Oriented   Current Functional Status: Assistive Equipment   Lives With facility resident   Able to Return to Prior Arrangements yes   Is patient able to care for self after discharge? No   Who are your caregiver(s) and their phone number(s)? [facility]   Patient's perception of discharge disposition correction care facility   Readmission Within The Last 30 Days no previous admission in last 30 days   Patient currently being followed by outpatient case management? No   Patient currently receives any other outside agency services? No   Equipment Currently Used at Home none   Do you have any problems affording any of your prescribed medications? No   Is the patient taking medications as prescribed? yes   Does the patient have transportation home? Yes   Transportation Available ambulance   Does the patient receive services at the [facility]? No   Discharge Plan A Return to nursing home   Discharge Plan B Skilled Nursing Facility   Patient/Family In Agreement With Plan yes       Pt is a 78 year old female admitted for SOB.  Pt lives in [facility].  No Social Work needs noted at this time. Will continue to follow and offer support as needed.    Chavo Jenkins LMSW

## 2018-06-11 NOTE — PLAN OF CARE
Problem: Pressure Ulcer Risk (Luca Scale) (Adult,Obstetrics,Pediatric)  Intervention: Turn/Reposition Often  Pt bedrest. Skin intact no breakdown noted on admit to CCU. Scarring  on buttocks and sacrum what appear to be healed wounds. Sacral area blanches as well as bilateral heels intact with blanching present. Pt turned every 2 hours and PRN comfort. HOB up 30 to 45 degrees to PT request. All extremities elevated up on pillows to prevent pressure sores and skin breadown.

## 2018-06-11 NOTE — CONSULTS
[facility]  Cardiology  Consult Note    Patient Name: Eddy Cunningham  MRN: [id number]  Admission Date: 6/10/2018  Hospital Length of Stay: 0 days  Code Status: Full Code   Attending Provider: Hayder Khalil MD   Consulting Provider: LENY Garcia  Primary Care Physician: Ayaka Pollard MD  Principal Problem:Aspiration into airway    Patient information was obtained from patient, nursing home, past medical records and ER records.     Consults  Subjective:     Chief Complaint:  hypoxia      HPI: 78 year old nursing home patient with UTI sepsis with SOB, increased secretions, hypoxia. Unable to give accurate history.     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)     Parkinson disease     RLS (restless legs syndrome)     Stress incontinence     Venous stasis ulcers        Past Surgical History:   Procedure Laterality Date    CARPAL TUNNEL RELEASE      Right    CHOLECYSTECTOMY      GASTROSTOMY TUBE PLACEMENT      PARTIAL HYSTERECTOMY      ROTATOR CUFF REPAIR      TONSILLECTOMY, ADENOIDECTOMY      tonsiles    TOTAL KNEE ARTHROPLASTY         Review of patient's allergies indicates:   Allergen Reactions    Oxycodone      Hallucination    Sulfa (sulfonamide antibiotics)      Other reaction(s): when on contact       No current facility-administered medications on file prior to encounter.      Current Outpatient Prescriptions on File Prior to Encounter   Medication Sig    aspirin 81 MG Chew 1 tablet (81 mg total) by Per G Tube route once daily.    carbidopa-levodopa  mg (SINEMET)  mg per tablet 1 tablet by Per G Tube route 5 (five) times daily.    docusate sodium  (COLACE) 100 MG capsule 1 capsule (100 mg total) by Per G Tube route once daily.    hydrocodone-acetaminophen 7.5-325mg (NORCO) 7.5-325 mg per tablet Take 1 tablet by mouth every 12 (twelve) hours as needed for Pain.    LACTOSE-REDUCED FOOD (JEVITY ORAL) 65 mL/hr by PEG Tube route once daily. Via pump on at 6 pm off at 6 am    levothyroxine (SYNTHROID) 75 MCG tablet 1 tablet (75 mcg total) by Per G Tube route before breakfast.    potassium chloride 10% (KAYCIEL) 20 mEq/15 mL solution 15 mLs (20 mEq total) by Per G Tube route 2 (two) times daily.    pramipexole (MIRAPEX) 0.25 MG tablet 1 tablet (0.25 mg total) by Per G Tube route 3 (three) times daily.    ranitidine (ZANTAC) 150 MG capsule Take 75 mg by mouth 2 (two) times daily.    rivastigmine (EXELON) 4.6 mg/24 hour PT24 Place 1 patch onto the skin once daily.      omeprazole (PRILOSEC) 40 MG capsule Open 40 mg capsule into g-tube once daily    senna (SENOKOT) 8.6 mg tablet 1 tablet by Per G Tube route once daily.    tiZANidine 2 mg Cap Take 4 mg by mouth nightly as needed.    tramadol (ULTRAM) 50 mg tablet 1 tablet (50 mg total) by Per G Tube route every 6 (six) hours as needed for Pain.    trazodone (DESYREL) 50 MG tablet 1 tablet (50 mg total) by Per G Tube route every evening.     Family History     None        Social History Main Topics    Smoking status: Former Smoker    Smokeless tobacco: Never Used    Alcohol use No    Drug use: No    Sexual activity: Not on file     ROS   Constitutional: generalized weakness AMS   Eyes: Negative    Respiratory: cough, SOB     Cardiovascular: Negative   Gastrointestinal: Negative   Genitourinary: Negative   Musculoskeletal: Negative   Skin: Negative .   Neurological: Negative   Objective:     Vital Signs (Most Recent):  Temp: 98.4 °F (36.9 °C) (06/11/18 0746)  Pulse: 77 (06/11/18 0746)  Resp: 16 (06/11/18 0746)  BP: 101/63 (06/11/18 0408)  SpO2: 98 % (06/11/18 0746) Vital Signs (24h Range):  Temp:  [97.5  °F (36.4 °C)-99.1 °F (37.3 °C)] 98.4 °F (36.9 °C)  Pulse:  [] 77  Resp:  [6-27] 16  SpO2:  [96 %-100 %] 98 %  BP: ()/(44-75) 101/63     Weight: 70.3 kg (154 lb 15.7 oz)  Body mass index is 22.24 kg/m².    SpO2: 98 %  O2 Device (Oxygen Therapy): nasal cannula      Intake/Output Summary (Last 24 hours) at 06/11/18 0821  Last data filed at 06/11/18 0600   Gross per 24 hour   Intake             1100 ml   Output              850 ml   Net              250 ml       Lines/Drains/Airways     Drain                 Gastrostomy/Enterostomy 05/26/18 1135 Percutaneous endoscopic gastrostomy (PEG) LUQ 15 days         Urethral Catheter 06/10/18 2050 Non-latex;Double-lumen 16 Fr. less than 1 day          Peripheral Intravenous Line                 Peripheral IV - Single Lumen 06/10/18 2026 Right Forearm less than 1 day                Physical Exam  General appearance: alert, appears stated age and cooperative  Head: Normocephalic, without obvious abnormality, atraumatic  Eyes: conjunctivae/corneas clear. PERRL  Neck: no carotid bruit, no JVD and supple, symmetrical, trachea midline  Lungs: clear to auscultation bilaterally, normal respiratory effort  Chest Wall: no tenderness  Heart: regular rate and rhythm, S1, S2 normal, no murmur, click, rub or gallop  Abdomen: soft, non-tender; bowel sounds normal; no masses,  no organomegaly  Extremities: Extremities normal, atraumatic, no cyanosis, clubbing, or edema  Pulses: Dorsalis Pedis R: 2+ (normal)/L: 2+ (normal)  Skin: Skin color, texture, turgor normal. No rashes or lesions  Neurologic: Normal mood and affect  Alert and oriented X 3  Significant Labs:   Blood Culture:   Recent Labs  Lab 06/10/18  1928 06/10/18  1934   LABBLOO No Growth to date No Growth to date   , BMP:   Recent Labs  Lab 06/10/18  1929 06/11/18  0419   GLU 97 96    140   K 4.4 4.2    105   CO2 28 26   BUN 19 16   CREATININE 0.7 0.6   CALCIUM 9.7 9.0   MG 2.1  --    , CMP   Recent Labs  Lab  06/10/18  1929 06/11/18  0419    140   K 4.4 4.2    105   CO2 28 26   GLU 97 96   BUN 19 16   CREATININE 0.7 0.6   CALCIUM 9.7 9.0   PROT 7.1 6.0   ALBUMIN 3.8 3.2*   BILITOT 0.6 0.7   ALKPHOS 121 91   AST 47* 31   ALT 11 24   ANIONGAP 12 9   ESTGFRAFRICA >60 >60   EGFRNONAA >60 >60   , CBC   Recent Labs  Lab 06/10/18  1929 06/11/18  0419   WBC 7.02 5.76   HGB 13.5 11.9*   HCT 42.2 38.1    233   , INR   Recent Labs  Lab 06/10/18  1929   INR 1.1   , Lipid Panel No results for input(s): CHOL, HDL, LDLCALC, TRIG, CHOLHDL in the last 48 hours.,   Pathology Results  (Last 10 years)    None       and Troponin   Recent Labs  Lab 06/10/18  1929   TROPONINI <0.006       Significant Imaging: Cardiac Cath: , CT scan: CT ABDOMEN PELVIS WITH CONTRAST: No results found for this visit on 06/10/18. and CT ABDOMEN PELVIS WITHOUT CONTRAST: No results found for this visit on 06/10/18., Echocardiogram: 2D echo with color flow doppler: No results found for this or any previous visit., EKG: , Stress Test: and X-Ray: CXR: X-Ray Chest 1 View (CXR):   Results for orders placed or performed during the hospital encounter of 06/10/18   X-Ray Chest 1 View    Narrative    EXAMINATION:  XR CHEST 1 VIEW    CLINICAL HISTORY:  CP;.    TECHNIQUE:  Single view chest dated Carmen 10, 2018.    COMPARISON:  Prior study of April 17, 2018.    FINDINGS:  The cardiac silhouette is within normal limits.  Atherosclerotic changes of the aorta.  Stable chronic lung changes.  No focal infiltrate or effusion.  Degenerative changes of the spine.  Prior right shoulder arthroplasty.      Impression    No active lung disease.      Electronically signed by: Tyrone Sales MD  Date:    06/11/2018  Time:    08:03     Assessment and Plan:     Active Diagnoses:    Diagnosis Date Noted POA    PRINCIPAL PROBLEM:  Aspiration into airway [T17.908A] 05/17/2017 Yes    Hypothyroidism due to acquired atrophy of thyroid [E03.4] 06/11/2018 Unknown    Sepsis  [A41.9] 06/11/2018 Yes    SOB (shortness of breath) [R06.02] 06/10/2018 Yes    Acute cystitis without hematuria [N30.00] 05/17/2017 Yes    Esophageal dysphagia [R13.10] 05/17/2017 Yes    Memory disorder [R41.3] 03/14/2013 Yes    Parkinson's disease [G20] 11/13/2012 Yes      Problems Resolved During this Admission:    Diagnosis Date Noted Date Resolved POA       VTE Risk Mitigation         Ordered     enoxaparin injection 40 mg  Daily      06/11/18 0737     IP VTE HIGH RISK PATIENT  Once      06/10/18 2145     Place sequential compression device  Until discontinued      06/10/18 2145        SOB questionable aspiration, Chest X ray clear.   ABX therapy for sepsis  D dimer elevated CTA of chest to R/O PE pending  Echo pending     PLAN: CTA r/o PE today    LENY Garcia  Cardiology   [facility]  I attest that I have personally seen and examined this patient. I have reviewed and discussed the management in detail as outlined above.

## 2018-06-11 NOTE — HPI
"Eddy Cunningham is a 78 y.o. female   Is a nursing home patient with parkinson's disease ,dementia , s/p PEG placement   Who  presents to the emergency room with shortness of breath last night   She was having lots of copious respiratory secretions and was hypoxemic ; 84 % . With lots of  suction her secretions   Improved . She has    urinary tract infections chronically; over night she was placed in ICU for sepsis ; UTI: aspiration concern.  She is at present on 3 litre oxygen and her oxygen sats are stable.    When I asked her why she came ; she says " I dont know "  She does report coughing .    "

## 2018-06-12 PROBLEM — A41.9 SEPSIS: Status: RESOLVED | Noted: 2018-06-11 | Resolved: 2018-06-12

## 2018-06-12 PROBLEM — A41.9 SEPSIS: Status: ACTIVE | Noted: 2018-06-12

## 2018-06-12 PROCEDURE — 63600175 PHARM REV CODE 636 W HCPCS: Performed by: INTERNAL MEDICINE

## 2018-06-12 PROCEDURE — 25000242 PHARM REV CODE 250 ALT 637 W/ HCPCS: Performed by: INTERNAL MEDICINE

## 2018-06-12 PROCEDURE — 99233 SBSQ HOSP IP/OBS HIGH 50: CPT | Mod: ,,, | Performed by: INTERNAL MEDICINE

## 2018-06-12 PROCEDURE — 27000221 HC OXYGEN, UP TO 24 HOURS

## 2018-06-12 PROCEDURE — 25000242 PHARM REV CODE 250 ALT 637 W/ HCPCS: Performed by: SURGERY

## 2018-06-12 PROCEDURE — 63600175 PHARM REV CODE 636 W HCPCS: Performed by: SURGERY

## 2018-06-12 PROCEDURE — 25000003 PHARM REV CODE 250: Performed by: INTERNAL MEDICINE

## 2018-06-12 PROCEDURE — 94761 N-INVAS EAR/PLS OXIMETRY MLT: CPT

## 2018-06-12 PROCEDURE — 94640 AIRWAY INHALATION TREATMENT: CPT

## 2018-06-12 PROCEDURE — C9113 INJ PANTOPRAZOLE SODIUM, VIA: HCPCS | Performed by: INTERNAL MEDICINE

## 2018-06-12 PROCEDURE — 11000001 HC ACUTE MED/SURG PRIVATE ROOM

## 2018-06-12 RX ORDER — IPRATROPIUM BROMIDE AND ALBUTEROL SULFATE 2.5; .5 MG/3ML; MG/3ML
3 SOLUTION RESPIRATORY (INHALATION)
Status: DISCONTINUED | OUTPATIENT
Start: 2018-06-12 | End: 2018-06-14 | Stop reason: HOSPADM

## 2018-06-12 RX ORDER — ASPIRIN 81 MG/1
81 TABLET ORAL DAILY
Status: DISCONTINUED | OUTPATIENT
Start: 2018-06-12 | End: 2018-06-12

## 2018-06-12 RX ADMIN — IPRATROPIUM BROMIDE AND ALBUTEROL SULFATE 3 ML: .5; 3 SOLUTION RESPIRATORY (INHALATION) at 07:06

## 2018-06-12 RX ADMIN — CARBIDOPA AND LEVODOPA 1 TABLET: 25; 100 TABLET ORAL at 05:06

## 2018-06-12 RX ADMIN — RIVASTIGMINE 1 PATCH: 4.6 PATCH, EXTENDED RELEASE TRANSDERMAL at 09:06

## 2018-06-12 RX ADMIN — LEVOFLOXACIN 500 MG: 5 INJECTION, SOLUTION INTRAVENOUS at 09:06

## 2018-06-12 RX ADMIN — CARBIDOPA AND LEVODOPA 1 TABLET: 25; 100 TABLET ORAL at 01:06

## 2018-06-12 RX ADMIN — PANTOPRAZOLE SODIUM 40 MG: 40 INJECTION, POWDER, LYOPHILIZED, FOR SOLUTION INTRAVENOUS at 09:06

## 2018-06-12 RX ADMIN — CARBIDOPA AND LEVODOPA 1 TABLET: 25; 100 TABLET ORAL at 09:06

## 2018-06-12 RX ADMIN — IPRATROPIUM BROMIDE AND ALBUTEROL SULFATE 3 ML: .5; 3 SOLUTION RESPIRATORY (INHALATION) at 03:06

## 2018-06-12 RX ADMIN — SENNA 1 TABLET: 8.6 TABLET, COATED ORAL at 09:06

## 2018-06-12 RX ADMIN — PRAMIPEXOLE DIHYDROCHLORIDE 0.25 MG: 0.12 TABLET ORAL at 03:06

## 2018-06-12 RX ADMIN — PRAMIPEXOLE DIHYDROCHLORIDE 0.25 MG: 0.12 TABLET ORAL at 09:06

## 2018-06-12 RX ADMIN — IPRATROPIUM BROMIDE AND ALBUTEROL SULFATE 3 ML: .5; 3 SOLUTION RESPIRATORY (INHALATION) at 12:06

## 2018-06-12 RX ADMIN — ASPIRIN 81 MG 81 MG: 81 TABLET ORAL at 09:06

## 2018-06-12 RX ADMIN — LEVOTHYROXINE SODIUM 75 MCG: 75 TABLET ORAL at 05:06

## 2018-06-12 RX ADMIN — ENOXAPARIN SODIUM 40 MG: 100 INJECTION SUBCUTANEOUS at 05:06

## 2018-06-12 RX ADMIN — TRAMADOL HYDROCHLORIDE 50 MG: 50 TABLET, FILM COATED ORAL at 01:06

## 2018-06-12 RX ADMIN — DOCUSATE SODIUM 100 MG: 100 CAPSULE, LIQUID FILLED ORAL at 09:06

## 2018-06-12 RX ADMIN — IPRATROPIUM BROMIDE AND ALBUTEROL SULFATE 3 ML: .5; 3 SOLUTION RESPIRATORY (INHALATION) at 01:06

## 2018-06-12 RX ADMIN — TRAZODONE HYDROCHLORIDE 50 MG: 50 TABLET ORAL at 09:06

## 2018-06-12 RX ADMIN — TRAMADOL HYDROCHLORIDE 50 MG: 50 TABLET, FILM COATED ORAL at 03:06

## 2018-06-12 NOTE — PLAN OF CARE
06/12/18 1209   Medicare Message   Important Message from Medicare regarding Discharge Appeal Rights Given to patient/caregiver;Explained to patient/caregiver;Signed/date by patient/caregiver   Date IMM was signed 06/10/18   Time IMM was signed 2122

## 2018-06-12 NOTE — NURSING
Patient complained of leg pain, brought tylenol but patient refused and states the tramadol works much better, will obtain medication per patient request, daughter at bedside.

## 2018-06-12 NOTE — PROGRESS NOTES
[facility]  Cardiology  Progress Note    Patient Name: Eddy Cunningham  MRN: [id number]  Admission Date: 6/10/2018  Hospital Length of Stay: 1 days  Code Status: Full Code   Attending Physician: Hayder Khalil MD   Primary Care Physician: Ayaka Pollard MD  Expected Discharge Date:   Principal Problem:Aspiration into airway    Subjective:     Hospital Course: aspiration into airway, CT negative for PE, hyperdynamic EF on echo.     Interval History: 78 year old nursing home patient with chronic UTI, sepsis with SOB, increased secretions, hypoxia. Unable to give accurate history.         ROS   Constitutional: generalized weakness AMS   Eyes: Negative    Respiratory: cough, SOB     Cardiovascular: Negative   Gastrointestinal: Negative   Genitourinary: Negative   Musculoskeletal: Negative   Skin: Negative .   Neurological: Negative   Objective:     Vital Signs (Most Recent):  Temp: 97.3 °F (36.3 °C) (06/12/18 0400)  Pulse: 79 (06/12/18 0748)  Resp: 16 (06/12/18 0748)  BP: (!) 114/59 (06/12/18 0600)  SpO2: 99 % (06/12/18 0748) Vital Signs (24h Range):  Temp:  [97.1 °F (36.2 °C)-99.1 °F (37.3 °C)] 97.3 °F (36.3 °C)  Pulse:  [64-88] 79  Resp:  [11-25] 16  SpO2:  [96 %-100 %] 99 %  BP: ()/(35-72) 114/59     Weight: 76.7 kg (169 lb 1.5 oz)  Body mass index is 24.26 kg/m².    SpO2: 99 %  O2 Device (Oxygen Therapy): nasal cannula      Intake/Output Summary (Last 24 hours) at 06/12/18 0830  Last data filed at 06/12/18 0600   Gross per 24 hour   Intake             1040 ml   Output              795 ml   Net              245 ml       Lines/Drains/Airways     Drain                 Gastrostomy/Enterostomy 05/26/18 1135 Percutaneous endoscopic gastrostomy (PEG) LUQ 16 days         Urethral Catheter 06/10/18 2050 Non-latex;Double-lumen 16 Fr. 1 day          Peripheral Intravenous Line                 Peripheral IV - Single Lumen 06/10/18 2026 Right Forearm 1 day                Physical Exam  General  appearance: alert, appears stated age and cooperative  Head: Normocephalic, without obvious abnormality, atraumatic  Eyes: conjunctivae/corneas clear. PERRL  Neck: no carotid bruit, no JVD and supple, symmetrical, trachea midline  Lungs: clear to auscultation bilaterally, normal respiratory effort  Chest Wall: no tenderness  Heart: regular rate and rhythm, S1, S2 normal, no murmur, click, rub or gallop  Abdomen: soft, non-tender; bowel sounds normal; no masses,  no organomegaly  Extremities: Extremities normal, atraumatic, no cyanosis, clubbing, or edema  Pulses: Dorsalis Pedis R: 2+ (normal)/L: 2+ (normal)  Skin: Skin color, texture, turgor normal. No rashes or lesions  Neurologic: Normal mood and affect    Significant Labs:   ABG:   Recent Labs  Lab 06/10/18  1943   PH 7.45   PCO2 45   HCO3 31.30*   POCSATURATED 100.3*   BE 6.40*   , Blood Culture:   Recent Labs  Lab 06/10/18  1928 06/10/18  1934   LABBLOO No Growth to date  No Growth to date No Growth to date  No Growth to date   , BMP:   Recent Labs  Lab 06/10/18  1929 06/11/18  0419   GLU 97 96    140   K 4.4 4.2    105   CO2 28 26   BUN 19 16   CREATININE 0.7 0.6   CALCIUM 9.7 9.0   MG 2.1  --    , CMP   Recent Labs  Lab 06/10/18  1929 06/11/18  0419    140   K 4.4 4.2    105   CO2 28 26   GLU 97 96   BUN 19 16   CREATININE 0.7 0.6   CALCIUM 9.7 9.0   PROT 7.1 6.0   ALBUMIN 3.8 3.2*   BILITOT 0.6 0.7   ALKPHOS 121 91   AST 47* 31   ALT 11 24   ANIONGAP 12 9   ESTGFRAFRICA >60 >60   EGFRNONAA >60 >60   , CBC   Recent Labs  Lab 06/10/18  1929 06/11/18  0419   WBC 7.02 5.76   HGB 13.5 11.9*   HCT 42.2 38.1    233   , INR   Recent Labs  Lab 06/10/18  1929   INR 1.1   , Lipid Panel No results for input(s): CHOL, HDL, LDLCALC, TRIG, CHOLHDL in the last 48 hours.,   Pathology Results  (Last 10 years)    None      , Troponin   Recent Labs  Lab 06/10/18  1929   TROPONINI <0.006    and All pertinent lab results from the last 24 hours  have been reviewed.    Significant Imaging: Cardiac Cath: , CT scan: CT ABDOMEN PELVIS WITH CONTRAST: No results found for this visit on 06/10/18. and CT ABDOMEN PELVIS WITHOUT CONTRAST: No results found for this visit on 06/10/18., Echocardiogram:   2D echo with color flow doppler:   Results for orders placed or performed during the hospital encounter of 06/10/18   2D echo with color flow doppler   Result Value Ref Range    EF 70 55 - 65    Diastolic Dysfunction Yes (A)     Est. PA Systolic Pressure 21.9     Tricuspid Valve Regurgitation TRIVIAL    , EKG: , Stress Test:  and X-Ray: CXR: X-Ray Chest 1 View (CXR):   Results for orders placed or performed during the hospital encounter of 06/10/18   X-Ray Chest 1 View    Narrative    EXAMINATION:  XR CHEST 1 VIEW    CLINICAL HISTORY:  CP;.    TECHNIQUE:  Single view chest dated Carmen 10, 2018.    COMPARISON:  Prior study of April 17, 2018.    FINDINGS:  The cardiac silhouette is within normal limits.  Atherosclerotic changes of the aorta.  Stable chronic lung changes.  No focal infiltrate or effusion.  Degenerative changes of the spine.  Prior right shoulder arthroplasty.      Impression    No active lung disease.      Electronically signed by: Tyrone Sales MD  Date:    06/11/2018  Time:    08:03     Assessment and Plan:       Active Diagnoses:    Diagnosis Date Noted POA    PRINCIPAL PROBLEM:  Aspiration into airway [T17.908A] 05/17/2017 Yes    Sepsis [A41.9] 06/12/2018 Yes    Hypothyroidism due to acquired atrophy of thyroid [E03.4] 06/11/2018 Yes    Inadequate dietary energy intake [E63.9] 06/11/2018 Yes    SOB (shortness of breath) [R06.02] 06/10/2018 Yes    Acute cystitis without hematuria [N30.00] 05/17/2017 Yes    Esophageal dysphagia [R13.10] 05/17/2017 Yes    Memory disorder [R41.3] 03/14/2013 Yes    Parkinson's disease [G20] 11/13/2012 Yes      Problems Resolved During this Admission:    Diagnosis Date Noted Date Resolved POA    Sepsis [A41.9]  06/11/2018 06/12/2018 Yes       VTE Risk Mitigation         Ordered     enoxaparin injection 40 mg  Daily      06/11/18 0737     IP VTE HIGH RISK PATIENT  Once      06/10/18 2145     Place sequential compression device  Until discontinued      06/10/18 2145      SOB questionable aspiration, Chest X ray clear.   ABX therapy for sepsis  D dimer elevated CTA of chest to R/O PE pending  Echo Normal EF  Very stable from CV standpoint'; NO CHF or arrhythmia seen    CTA negative for PE.   No significant pathology on echo     PLAN: continue same  Not much to add  May go to floor from CV standpoint  Signing off    LENY Garcia  Cardiology  [facility]  I attest that I have personally seen and examined this patient. I have reviewed and discussed the management in detail as outlined above.

## 2018-06-12 NOTE — HOSPITAL COURSE
6/12/18  She looks better .  Her lungs are clear ; tube feelings ar going well ; no residuals.  She is on Levaquin for day 2 for uti and aspiration . Her echo EF 70 %    CTA chest : 1. No CT evidence of central pulmonary embolus with limitations due to artifact as noted above.  2. No evidence of acute thoracic abnormality.    Will advance her feedings.    6/13/  Levaquin day 3 for UTI/aspiration. Afebrile. No elevated WBC  urine culture with e coli sensitivity pending  Blood cultures no growth to date  Cardiology signed off EF 70% with diastolic dysfunction     Tolerating PEG feeds at 20ml/hr without residual, goal is 40  Also still has zee    6/14  levaquin day 4 for UTI/aspioration. Afebrile, no elevated WBC. Urine shows sensitivity to flouroquinolones. Blood cultures NGTD. VSS/afebrile    Pulled zee and pt noted to be voiding in diaper    Also has tolerated her feeds at 30ml/hr without residual, going up to 40ml./hr today which is her goal

## 2018-06-12 NOTE — ASSESSMENT & PLAN NOTE
Her CXR looks clear   Continue with IV levaquin   Continue with nebs    CTA shows no pneumonia

## 2018-06-12 NOTE — NURSING
Patient resting on right side with HOB at 45 degrees. Responds to commands and appears to be in good spirits. O2 per NC at 2L/min. O2 saturation 99%. Assessment done. VSS. No distress noted.

## 2018-06-12 NOTE — PROGRESS NOTES
"[facility] Medicine  Progress Note    Patient Name: Eddy Cunningham  MRN: [id number]  Patient Class: IP- Inpatient   Admission Date: 6/10/2018  Length of Stay: 1 days  Attending Physician: Hayder Khalil MD  Primary Care Provider: Ayaka Pollard MD        Subjective:     Principal Problem:Aspiration into airway    HPI:  Eddy Cunningham is a 78 y.o. female   Is a nursing home patient with parkinson's disease ,dementia , s/p PEG placement   Who  presents to the emergency room with shortness of breath last night   She was having lots of copious respiratory secretions and was hypoxemic ; 84 % . With lots of  suction her secretions   Improved . She has    urinary tract infections chronically; over night she was placed in ICU for sepsis ; UTI: aspiration concern.  She is at present on 3 litre oxygen and her oxygen sats are stable.    When I asked her why she came ; she says " I dont know "  She does report coughing .      Hospital Course:  6/12/18  She looks better .  Her lungs are clear ; tube feelings ar going well ; no residuals.  She is on Levaquin for day 2 for uti and aspiration . Her echo EF 70 %    CTA chest : 1. No CT evidence of central pulmonary embolus with limitations due to artifact as noted above.  2. No evidence of acute thoracic abnormality.    Will advance her feedings.          Interval History: see above    Review of Systems   Constitutional: Negative for chills and fever.   HENT: Negative for congestion, hearing loss, sinus pressure and sore throat.    Eyes: Negative for photophobia.   Respiratory: Negative for cough, choking, chest tightness and wheezing.    Cardiovascular: Negative for chest pain and palpitations.   Gastrointestinal: Negative for blood in stool, nausea and vomiting.        S/p PEG   Genitourinary: Negative for dysuria and hematuria.        Recurrent UTI   Musculoskeletal: Negative for arthralgias and myalgias.   Skin: Negative for pallor. "   Neurological: Negative for dizziness and numbness.        H/o dementia and parkinson disease   Hematological: Does not bruise/bleed easily.   Psychiatric/Behavioral: Negative for confusion and suicidal ideas. The patient is not nervous/anxious.      Objective:     Vital Signs (Most Recent):  Temp: 97.3 °F (36.3 °C) (06/12/18 0400)  Pulse: 86 (06/12/18 0600)  Resp: 15 (06/12/18 0600)  BP: (!) 114/59 (06/12/18 0600)  SpO2: 100 % (06/12/18 0600) Vital Signs (24h Range):  Temp:  [97.1 °F (36.2 °C)-99.1 °F (37.3 °C)] 97.3 °F (36.3 °C)  Pulse:  [64-88] 86  Resp:  [11-25] 15  SpO2:  [96 %-100 %] 100 %  BP: ()/(35-72) 114/59     Weight: 76.7 kg (169 lb 1.5 oz)  Body mass index is 24.26 kg/m².    Intake/Output Summary (Last 24 hours) at 06/12/18 0733  Last data filed at 06/12/18 0600   Gross per 24 hour   Intake             1040 ml   Output              795 ml   Net              245 ml      Physical Exam   Constitutional: She appears well-developed and well-nourished.   HENT:   Head: Normocephalic and atraumatic.   Right Ear: External ear normal.   Left Ear: External ear normal.   Mouth/Throat: Oropharynx is clear and moist.   Eyes: Conjunctivae and EOM are normal. Pupils are equal, round, and reactive to light.   Neck: Normal range of motion. Neck supple. No JVD present. No tracheal deviation present. No thyromegaly present.   Cardiovascular: Normal rate, regular rhythm, normal heart sounds and intact distal pulses.    Pulmonary/Chest: Effort normal and breath sounds normal. No respiratory distress. She has no wheezes. She has no rales. She exhibits no tenderness.   Abdominal: Soft. Bowel sounds are normal. She exhibits no distension and no mass. There is no tenderness. There is no rebound and no guarding.   S/p PEG    Musculoskeletal: Normal range of motion. She exhibits no edema.   Lymphadenopathy:     She has no cervical adenopathy.   Neurological: She is alert. She has normal reflexes. She displays normal  reflexes. No cranial nerve deficit. She exhibits normal muscle tone. Gait abnormal. Coordination normal.   CN: Optic discs are flat with normal vasculature, PERRL, Extraoccular movements and visual fields are full. Normal facial sensation and strength, she is very alert and oriented   She knows she is at [facility]  In [place] ; her dr name is Dr Pollard   Skin: Skin is warm and dry.   Psychiatric: She has a normal mood and affect.   Nursing note and vitals reviewed.      Significant Labs:   CBC:   Recent Labs  Lab 06/10/18  1929 06/11/18  0419   WBC 7.02 5.76   HGB 13.5 11.9*   HCT 42.2 38.1    233     CMP:   Recent Labs  Lab 06/10/18  1929 06/11/18  0419    140   K 4.4 4.2    105   CO2 28 26   GLU 97 96   BUN 19 16   CREATININE 0.7 0.6   CALCIUM 9.7 9.0   PROT 7.1 6.0   ALBUMIN 3.8 3.2*   BILITOT 0.6 0.7   ALKPHOS 121 91   AST 47* 31   ALT 11 24   ANIONGAP 12 9   EGFRNONAA >60 >60     TSH:   Recent Labs  Lab 06/10/18  1929   TSH 4.071*       Significant Imaging: CT: I have reviewed all pertinent results/findings within the past 24 hours and my personal findings are:  1. No CT evidence of central pulmonary embolus with limitations due to artifact as noted above.    Assessment/Plan:      * Aspiration into airway    Her CXR looks clear   Continue with IV levaquin   Continue with nebs    CTA shows no pneumonia            Inadequate dietary energy intake    Will slowly advance her feedings   Started on tube feedings   Will advance from 10 cc/hr to 20 cc  ;the goal 40 cc /hr           Hypothyroidism due to acquired atrophy of thyroid    Resumed her  thyroid meds.          SOB (shortness of breath)    Her D dimer is high    CTA chest  1. No CT evidence of central pulmonary embolus with limitations due to artifact as noted above.  2. No evidence of acute thoracic abnormality.        Acute cystitis without hematuria    Continue levaquin   Follow cultures  So far no growth             Esophageal  dysphagia    S/p PEG  Will resume her feedings but at a slower rate          Memory disorder    Resume exelon           Parkinson's disease    Continue  her sinemet  And pramipaxole            VTE Risk Mitigation         Ordered     enoxaparin injection 40 mg  Daily      06/11/18 0737     IP VTE HIGH RISK PATIENT  Once      06/10/18 2145     Place sequential compression device  Until discontinued      06/10/18 2145          Critical care time spent on the evaluation and treatment of severe organ dysfunction, review of pertinent labs and imaging studies, discussions with consulting providers and discussions with patient/family: 20 minutes.    Hayder Khalil MD  Department of Hospital Medicine   [facility]

## 2018-06-12 NOTE — ASSESSMENT & PLAN NOTE
Will slowly advance her feedings   Started on tube feedings   Will advance from 10 cc/hr to 20 cc  ;the goal 40 cc /hr

## 2018-06-12 NOTE — NURSING
Patient transported to room 308. Assessment done prior to transport. VSS. Patient transported with telemetry and O2 per oxygen tank at 2L/NC. Report given to Ирина PLASENCIA. No distress noted and patient tolerated the transport well.

## 2018-06-12 NOTE — SUBJECTIVE & OBJECTIVE
Interval History: see above    Review of Systems   Constitutional: Negative for chills and fever.   HENT: Negative for congestion, hearing loss, sinus pressure and sore throat.    Eyes: Negative for photophobia.   Respiratory: Negative for cough, choking, chest tightness and wheezing.    Cardiovascular: Negative for chest pain and palpitations.   Gastrointestinal: Negative for blood in stool, nausea and vomiting.        S/p PEG   Genitourinary: Negative for dysuria and hematuria.        Recurrent UTI   Musculoskeletal: Negative for arthralgias and myalgias.   Skin: Negative for pallor.   Neurological: Negative for dizziness and numbness.        H/o dementia and parkinson disease   Hematological: Does not bruise/bleed easily.   Psychiatric/Behavioral: Negative for confusion and suicidal ideas. The patient is not nervous/anxious.      Objective:     Vital Signs (Most Recent):  Temp: 97.3 °F (36.3 °C) (06/12/18 0400)  Pulse: 86 (06/12/18 0600)  Resp: 15 (06/12/18 0600)  BP: (!) 114/59 (06/12/18 0600)  SpO2: 100 % (06/12/18 0600) Vital Signs (24h Range):  Temp:  [97.1 °F (36.2 °C)-99.1 °F (37.3 °C)] 97.3 °F (36.3 °C)  Pulse:  [64-88] 86  Resp:  [11-25] 15  SpO2:  [96 %-100 %] 100 %  BP: ()/(35-72) 114/59     Weight: 76.7 kg (169 lb 1.5 oz)  Body mass index is 24.26 kg/m².    Intake/Output Summary (Last 24 hours) at 06/12/18 0733  Last data filed at 06/12/18 0600   Gross per 24 hour   Intake             1040 ml   Output              795 ml   Net              245 ml      Physical Exam   Constitutional: She appears well-developed and well-nourished.   HENT:   Head: Normocephalic and atraumatic.   Right Ear: External ear normal.   Left Ear: External ear normal.   Mouth/Throat: Oropharynx is clear and moist.   Eyes: Conjunctivae and EOM are normal. Pupils are equal, round, and reactive to light.   Neck: Normal range of motion. Neck supple. No JVD present. No tracheal deviation present. No thyromegaly present.    Cardiovascular: Normal rate, regular rhythm, normal heart sounds and intact distal pulses.    Pulmonary/Chest: Effort normal and breath sounds normal. No respiratory distress. She has no wheezes. She has no rales. She exhibits no tenderness.   Abdominal: Soft. Bowel sounds are normal. She exhibits no distension and no mass. There is no tenderness. There is no rebound and no guarding.   S/p PEG    Musculoskeletal: Normal range of motion. She exhibits no edema.   Lymphadenopathy:     She has no cervical adenopathy.   Neurological: She is alert. She has normal reflexes. She displays normal reflexes. No cranial nerve deficit. She exhibits normal muscle tone. Gait abnormal. Coordination normal.   CN: Optic discs are flat with normal vasculature, PERRL, Extraoccular movements and visual fields are full. Normal facial sensation and strength, she is very alert and oriented   She knows she is at [facility]  In [place] ; her dr name is Dr Pollard   Skin: Skin is warm and dry.   Psychiatric: She has a normal mood and affect.   Nursing note and vitals reviewed.      Significant Labs:   CBC:   Recent Labs  Lab 06/10/18  1929 06/11/18  0419   WBC 7.02 5.76   HGB 13.5 11.9*   HCT 42.2 38.1    233     CMP:   Recent Labs  Lab 06/10/18  1929 06/11/18  0419    140   K 4.4 4.2    105   CO2 28 26   GLU 97 96   BUN 19 16   CREATININE 0.7 0.6   CALCIUM 9.7 9.0   PROT 7.1 6.0   ALBUMIN 3.8 3.2*   BILITOT 0.6 0.7   ALKPHOS 121 91   AST 47* 31   ALT 11 24   ANIONGAP 12 9   EGFRNONAA >60 >60     TSH:   Recent Labs  Lab 06/10/18  1929   TSH 4.071*       Significant Imaging: CT: I have reviewed all pertinent results/findings within the past 24 hours and my personal findings are:  1. No CT evidence of central pulmonary embolus with limitations due to artifact as noted above.

## 2018-06-12 NOTE — PLAN OF CARE
Problem: Patient Care Overview  Goal: Plan of Care Review  Outcome: Ongoing (interventions implemented as appropriate)  Pt admitted to [facility] on 06/10/2018 for airway aspiration. Resident of [facility] with history of Parkinson's, dementia, frequent UTI  and COPD. Peg tube to left upper quadrant patent with Isosource tube feeding infusing now at goal of 40ml/hr with no residual noted. Arvizu patent and draining odorous urine with sediment. Pt has documented UTI and receiving Levaquin. Urine cultures still pending. Blood cultures negative to date. Vital signs remain WNL. Sinus rhythm with PVCs noted per cardiac monitor.  Pt is oriented x 4. No shortness of breath noted. O2 at 2L per NC in use. Breath sounds still coarse. Safety maintained. Siderails up x 2. Call bell in reach. Will continue to monitor.

## 2018-06-12 NOTE — PLAN OF CARE
"   06/12/18 7467   Discharge Assessment   Assessment Type Discharge Planning Reassessment     Patient and daughter updated on care plan. Both are in agreement with patient discharging back to [facility] when well. When asked if there were any issues or concerns I can help them with they replied "no".  "

## 2018-06-12 NOTE — ASSESSMENT & PLAN NOTE
Her D dimer is high    CTA chest  1. No CT evidence of central pulmonary embolus with limitations due to artifact as noted above.  2. No evidence of acute thoracic abnormality.

## 2018-06-13 LAB — BACTERIA UR CULT: NORMAL

## 2018-06-13 PROCEDURE — C9113 INJ PANTOPRAZOLE SODIUM, VIA: HCPCS | Performed by: INTERNAL MEDICINE

## 2018-06-13 PROCEDURE — 27000221 HC OXYGEN, UP TO 24 HOURS

## 2018-06-13 PROCEDURE — 94761 N-INVAS EAR/PLS OXIMETRY MLT: CPT

## 2018-06-13 PROCEDURE — 63600175 PHARM REV CODE 636 W HCPCS: Performed by: INTERNAL MEDICINE

## 2018-06-13 PROCEDURE — 25000242 PHARM REV CODE 250 ALT 637 W/ HCPCS: Performed by: INTERNAL MEDICINE

## 2018-06-13 PROCEDURE — G8981 BODY POS CURRENT STATUS: HCPCS | Mod: CM

## 2018-06-13 PROCEDURE — 99232 SBSQ HOSP IP/OBS MODERATE 35: CPT | Mod: ,,, | Performed by: FAMILY MEDICINE

## 2018-06-13 PROCEDURE — 25000003 PHARM REV CODE 250: Performed by: NURSE PRACTITIONER

## 2018-06-13 PROCEDURE — 97161 PT EVAL LOW COMPLEX 20 MIN: CPT

## 2018-06-13 PROCEDURE — G8982 BODY POS GOAL STATUS: HCPCS | Mod: CL

## 2018-06-13 PROCEDURE — 63600175 PHARM REV CODE 636 W HCPCS: Performed by: SURGERY

## 2018-06-13 PROCEDURE — 97110 THERAPEUTIC EXERCISES: CPT

## 2018-06-13 PROCEDURE — 94640 AIRWAY INHALATION TREATMENT: CPT

## 2018-06-13 PROCEDURE — 25000003 PHARM REV CODE 250: Performed by: INTERNAL MEDICINE

## 2018-06-13 PROCEDURE — 11000001 HC ACUTE MED/SURG PRIVATE ROOM

## 2018-06-13 RX ORDER — LACTULOSE 10 G/15ML
20 SOLUTION ORAL 2 TIMES DAILY PRN
Status: DISCONTINUED | OUTPATIENT
Start: 2018-06-13 | End: 2018-06-14 | Stop reason: HOSPADM

## 2018-06-13 RX ADMIN — ASPIRIN 81 MG 81 MG: 81 TABLET ORAL at 08:06

## 2018-06-13 RX ADMIN — RIVASTIGMINE 1 PATCH: 4.6 PATCH, EXTENDED RELEASE TRANSDERMAL at 08:06

## 2018-06-13 RX ADMIN — IPRATROPIUM BROMIDE AND ALBUTEROL SULFATE 3 ML: .5; 3 SOLUTION RESPIRATORY (INHALATION) at 07:06

## 2018-06-13 RX ADMIN — ENOXAPARIN SODIUM 40 MG: 100 INJECTION SUBCUTANEOUS at 05:06

## 2018-06-13 RX ADMIN — CARBIDOPA AND LEVODOPA 1 TABLET: 25; 100 TABLET ORAL at 05:06

## 2018-06-13 RX ADMIN — CARBIDOPA AND LEVODOPA 1 TABLET: 25; 100 TABLET ORAL at 09:06

## 2018-06-13 RX ADMIN — TRAMADOL HYDROCHLORIDE 50 MG: 50 TABLET, FILM COATED ORAL at 06:06

## 2018-06-13 RX ADMIN — IPRATROPIUM BROMIDE AND ALBUTEROL SULFATE 3 ML: .5; 3 SOLUTION RESPIRATORY (INHALATION) at 01:06

## 2018-06-13 RX ADMIN — LEVOTHYROXINE SODIUM 75 MCG: 75 TABLET ORAL at 05:06

## 2018-06-13 RX ADMIN — PRAMIPEXOLE DIHYDROCHLORIDE 0.25 MG: 0.12 TABLET ORAL at 09:06

## 2018-06-13 RX ADMIN — CARBIDOPA AND LEVODOPA 1 TABLET: 25; 100 TABLET ORAL at 02:06

## 2018-06-13 RX ADMIN — PRAMIPEXOLE DIHYDROCHLORIDE 0.25 MG: 0.12 TABLET ORAL at 08:06

## 2018-06-13 RX ADMIN — LACTULOSE 20 G: 20 SOLUTION ORAL at 10:06

## 2018-06-13 RX ADMIN — PANTOPRAZOLE SODIUM 40 MG: 40 INJECTION, POWDER, LYOPHILIZED, FOR SOLUTION INTRAVENOUS at 08:06

## 2018-06-13 RX ADMIN — LEVOFLOXACIN 500 MG: 5 INJECTION, SOLUTION INTRAVENOUS at 09:06

## 2018-06-13 RX ADMIN — TRAZODONE HYDROCHLORIDE 50 MG: 50 TABLET ORAL at 09:06

## 2018-06-13 RX ADMIN — SENNA 1 TABLET: 8.6 TABLET, COATED ORAL at 08:06

## 2018-06-13 RX ADMIN — PRAMIPEXOLE DIHYDROCHLORIDE 0.25 MG: 0.12 TABLET ORAL at 02:06

## 2018-06-13 RX ADMIN — DOCUSATE SODIUM 100 MG: 100 CAPSULE, LIQUID FILLED ORAL at 08:06

## 2018-06-13 NOTE — PT/OT/SLP EVAL
"Physical Therapy Evaluation    Patient Name:  Eddy Cunningham   MRN:  [id number]    Recommendations:     Discharge Recommendations:  nursing facility, basic   Discharge Equipment Recommendations: none   Barriers to discharge: None    Assessment:     Eddy Cunningham is a 78 y.o. female admitted with a medical diagnosis of Aspiration into airway.  She presents with the following impairments/functional limitations:  weakness, impaired endurance, impaired self care skills, impaired functional mobilty, impaired balance, decreased upper extremity function, decreased lower extremity function, decreased safety awareness . Pt presents bed bound PLOF. Pt with shoulder contractures limited to 90 degrees flexion/abduction. Pt with also with limited ROM to BLE. Pt with some dementia and difficulty following commands.    Rehab Prognosis:  Fair; patient would benefit from acute skilled PT services to address these deficits and reach maximum level of function.      Recent Surgery: * No surgery found *      Plan:     During this hospitalization, patient to be seen  (1-2x/day Monday-Friday/PRN Weekends/Holidays) to address the above listed problems via therapeutic exercises  · Plan of Care Expires:   (upon D/C from facility)   Plan of Care Reviewed with: patient    Subjective     Communicated with patient prior to session.  Patient found supine in bed upon PT entry to room, agreeable to evaluation.      Chief Complaint: Pt with no c/o  Patient comments/goals: "Go home"  Pain/Comfort:  · Pain Rating 1: 0/10    Patients cultural, spiritual, Oriental orthodox conflicts given the current situation:      Living Environment:  Pt lives in a basic nursing facility  Prior to admission, patients level of function was dependent.  Patient has the following equipment: none.  DME owned (not currently used): none.  Upon discharge, patient will have assistance from facility staff.    Objective:     Patient found with: peripheral IV (heel protector " booties)     General Precautions: Standard, fall   Orthopedic Precautions:N/A   Braces: N/A     Exams:  · Cognitive Exam:  Patient is oriented to Person, Place, Time and Situation and follows 20% of verbal/tactile commands   · RUE ROM: WFL except shoulder flexion/abduction limited to 90 degrees  · LUE ROM: WFL except shoulder flexion/abduction limited to 90 degrees  · RLE ROM: WFL except knee flexion/hip flexion limited secondary to extension contractures  · RLE Strength: Deficits: grossly 2/5  · LLE ROM: WFL except knee flexion/hip flexion limited secondary to extension contractures  · LLE Strength: Deficits: grossly 2/5    Functional Mobility:  · Bed Mobility:     · Rolling Left:  dependence  · Rolling Right: dependence    AM-PAC 6 CLICK MOBILITY  Total Score:7       Therapeutic Activities and Exercises:   Pt tolerated PROM to BUE/BLE at all joints in available planes of motion with rest breaks as needed to prevent contractures and skin breakdown.     Patient left supine with all lines intact, call button in reach and NSG notified.    GOALS:    Physical Therapy Goals        Problem: Physical Therapy Goal    Goal Priority Disciplines Outcome Goal Variances Interventions   Physical Therapy Goal     PT/OT, PT      Description:  Goals to be met by: upon D/C from facility     Patient will increase functional independence with mobility by performin. Pt will tolerate PROM to BUE/BLE at all joints in available planes of motion with rest breaks as needed to prevent contractures and skin breakdown.                       History:     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)     Parkinson disease     RLS (restless legs  syndrome)     Stress incontinence     Venous stasis ulcers        Past Surgical History:   Procedure Laterality Date    CARPAL TUNNEL RELEASE      Right    CHOLECYSTECTOMY      GASTROSTOMY TUBE PLACEMENT      PARTIAL HYSTERECTOMY      ROTATOR CUFF REPAIR      TONSILLECTOMY, ADENOIDECTOMY      tonsiles    TOTAL KNEE ARTHROPLASTY         Clinical Decision Making:     History  Co-morbidities and personal factors that may impact the plan of care Examination  Body Structures and Functions, activity limitations and participation restrictions that may impact the plan of care Clinical Presentation   Decision Making/ Complexity Score   Co-morbidities:   [x] Time since onset of injury / illness / exacerbation  [] Status of current condition  [x]Patient's cognitive status and safety concerns    [] Multiple Medical Problems (see med hx)  Personal Factors:   [] Patient's age  [] Prior Level of function   [] Patient's home situation (environment and family support)  [x] Patient's level of motivation  [x] Expected progression of patient      HISTORY:(criteria)    [] 45413 - no personal factors/history    [] 99642 - has 1-2 personal factor/comorbidity     [x] 81446 - has >3 personal factor/comorbidity     Body Regions:  [] Objective examination findings  [] Head     []  Neck  [] Trunk   [x] Upper Extremity  [x] Lower Extremity    Body Systems:  [] For communication ability, affect, cognition, language, and learning style: the assessment of the ability to make needs known, consciousness, orientation (person, place, and time), expected emotional /behavioral responses, and learning preferences (eg, learning barriers, education  needs)  [] For the neuromuscular system: a general assessment of gross coordinated movement (eg, balance, gait, locomotion, transfers, and transitions) and motor function  (motor control and motor learning)  [x] For the musculoskeletal system: the assessment of gross symmetry, gross range of motion,  gross strength, height, and weight  [] For the integumentary system: the assessment of pliability(texture), presence of scar formation, skin color, and skin integrity  [] For cardiovascular/pulmonary system: the assessment of heart rate, respiratory rate, blood pressure, and edema     Activity limitations:    [x] Patient's cognitive status and saf ety concerns          [x] Status of current condition      [] Weight bearing restriction  [] Cardiopulmunary Restriction    Participation Restrictions:   [] Goals and goal agreement with the patient     [x] Rehab potential (prognosis) and probable outcome      Examination of Body System: (criteria)    [x] 00910 - addressing 1-2 elements    [] 70513 - addressing a total of 3 or more elements     [] 08572 -  Addressing a total of 4 or more elements         Clinical Presentation: (criteria)  Stable - 10925     On examination of body system using standardized tests and measures patient presents with 1-2 elements from any of the following: body structures and functions, activity limitations, and/or participation restrictions.  Leading to a clinical presentation that is considered stable and/or uncomplicated                              Clinical Decision Making  (Eval Complexity):  Low- 74491     Time Tracking:     PT Received On: 06/13/18  PT Start Time: 1029     PT Stop Time: 1052  PT Total Time (min): 23 min     Billable Minutes: Evaluation 13 minutes and Therapeutic Exercise 10 minutes      Nellie Benoit, PT  06/13/2018

## 2018-06-13 NOTE — PROGRESS NOTES
"[facility] Medicine  Progress Note    Patient Name: Eddy Cunningham  MRN: [id number]  Patient Class: IP- Inpatient   Admission Date: 6/10/2018  Length of Stay: 2 days  Attending Physician: Hayder Khalil MD  Primary Care Provider: Ayaka Pollard MD        Subjective:     Principal Problem:Aspiration into airway    HPI:  Eddy Cunningham is a 78 y.o. female   Is a nursing home patient with parkinson's disease ,dementia , s/p PEG placement   Who  presents to the emergency room with shortness of breath last night   She was having lots of copious respiratory secretions and was hypoxemic ; 84 % . With lots of  suction her secretions   Improved . She has    urinary tract infections chronically; over night she was placed in ICU for sepsis ; UTI: aspiration concern.  She is at present on 3 litre oxygen and her oxygen sats are stable.    When I asked her why she came ; she says " I dont know "  She does report coughing .      Hospital Course:  6/12/18  She looks better .  Her lungs are clear ; tube feelings ar going well ; no residuals.  She is on Levaquin for day 2 for uti and aspiration . Her echo EF 70 %    CTA chest : 1. No CT evidence of central pulmonary embolus with limitations due to artifact as noted above.  2. No evidence of acute thoracic abnormality.    Will advance her feedings.    6/13/  Levaquin day 3 for UTI/aspiration. Afebrile. No elevated WBC  urine culture with e coli sensitivity pending  Blood cultures no growth to date  Cardiology signed off EF 70% with diastolic dysfunction     Tolerating PEG feeds at 20ml/hr without residual, goal is 40  Also still has zee    Interval History: see above    Review of Systems   Constitutional: Negative for chills and fever.   HENT: Negative for congestion, hearing loss, sinus pressure and sore throat.    Eyes: Negative for photophobia.   Respiratory: Negative for cough, choking, chest tightness and wheezing.    Cardiovascular: " Negative for chest pain and palpitations.   Gastrointestinal: Negative for blood in stool, nausea and vomiting.        S/p PEG   Genitourinary: Negative for dysuria and hematuria.        Recurrent UTI   Musculoskeletal: Negative for arthralgias and myalgias.   Skin: Negative for pallor.   Neurological: Negative for dizziness and numbness.        H/o dementia and parkinson disease   Hematological: Does not bruise/bleed easily.   Psychiatric/Behavioral: Negative for confusion and suicidal ideas. The patient is not nervous/anxious.      Objective:     Vital Signs (Most Recent):  Temp: 97.3 °F (36.3 °C) (06/13/18 0708)  Pulse: 73 (06/13/18 0723)  Resp: 16 (06/13/18 0723)  BP: (!) 108/55 (06/13/18 0708)  SpO2: 99 % (06/13/18 0723) Vital Signs (24h Range):  Temp:  [97.3 °F (36.3 °C)-98 °F (36.7 °C)] 97.3 °F (36.3 °C)  Pulse:  [67-90] 73  Resp:  [14-24] 16  SpO2:  [96 %-100 %] 99 %  BP: ()/(55-61) 108/55     Weight: 76.7 kg (169 lb 1.5 oz)  Body mass index is 24.26 kg/m².    Intake/Output Summary (Last 24 hours) at 06/13/18 0835  Last data filed at 06/13/18 0622   Gross per 24 hour   Intake              812 ml   Output             1300 ml   Net             -488 ml      Physical Exam   Constitutional: She appears well-developed and well-nourished.   HENT:   Head: Normocephalic and atraumatic.   Right Ear: External ear normal.   Left Ear: External ear normal.   Mouth/Throat: Oropharynx is clear and moist.   Eyes: Conjunctivae and EOM are normal. Pupils are equal, round, and reactive to light.   Neck: Normal range of motion. Neck supple. No JVD present. No tracheal deviation present. No thyromegaly present.   Cardiovascular: Normal rate, regular rhythm, normal heart sounds and intact distal pulses.    Pulmonary/Chest: Effort normal and breath sounds normal. No respiratory distress. She has no wheezes. She has no rales. She exhibits no tenderness.   Abdominal: Soft. Bowel sounds are normal. She exhibits no distension and  "no mass. There is no tenderness. There is no rebound and no guarding.   S/p PEG    Musculoskeletal: Normal range of motion. She exhibits no edema.   Lymphadenopathy:     She has no cervical adenopathy.   Neurological: She is alert. She has normal reflexes. She displays normal reflexes. No cranial nerve deficit. She exhibits normal muscle tone. Gait abnormal. Coordination normal.    PERRL, Extraoccular movements and visual fields are full. Normal facial sensation and strength, she is very alert and oriented   She knows she is at [facility]  In [place] ; her dr name is Dr Pollard   Skin: Skin is warm and dry.   Psychiatric: She has a normal mood and affect.   Nursing note and vitals reviewed.      Significant Labs:   Lab Results   Component Value Date    WBC 5.76 06/11/2018    HGB 11.9 (L) 06/11/2018    HCT 38.1 06/11/2018    MCV 99 (H) 06/11/2018     06/11/2018     BMP  Lab Results   Component Value Date     06/11/2018    K 4.2 06/11/2018     06/11/2018    CO2 26 06/11/2018    BUN 16 06/11/2018    CREATININE 0.6 06/11/2018    CALCIUM 9.0 06/11/2018    ANIONGAP 9 06/11/2018    ESTGFRAFRICA >60 06/11/2018    EGFRNONAA >60 06/11/2018     Lab Results   Component Value Date    ALT 24 06/11/2018    AST 31 06/11/2018    ALKPHOS 91 06/11/2018    BILITOT 0.7 06/11/2018   mag 2.1  Phos 3.7    Lactic acid 1.2    Lab Results   Component Value Date    DDIMER 1.00 (H) 06/10/2018     Lab Results   Component Value Date    INR 1.1 06/10/2018    INR 1.1 03/14/2018       Recent Labs  Lab 06/10/18  1929   CPK 55  55   CPKMB 2.3   TROPONINI <0.006   MB 4.2       BNP    Recent Labs  Lab 06/10/18  1929   *       Urinalysis cloudy, +nitrite, 1+ leuko 65 wbc and many bacteria  .   Escherichia coli     CULTURE, URINE     Amox/K Clav'ate 16/8  Intermediate     Amp/Sulbactam >16/8  Resistant     Ampicillin >16  Resistant     Cefazolin 16  Intermediate     Cefepime <=8 "><=8  Sensitive     Ceftriaxone <=8 "><=8  " "Sensitive     Ciprofloxacin <=1 "><=1  Sensitive     Ertapenem <=2 "><=2  Sensitive     Gentamicin <=4 "><=4  Sensitive     Meropenem <=4 "><=4  Sensitive     Nitrofurantoin <=32 "><=32  Sensitive     Piperacillin/Tazo <=16 "><=16  Sensitive     Tetracycline >8  Resistant     Tobramycin <=4 "><=4  Sensitive     Trimeth/Sulfa >2/38  Resistant          Blood cultures NGTD x 2    TSH:     Recent Labs  Lab 06/10/18  1929   TSH 4.071*   free t4 1.04    Significant Imaging:     CXR The cardiac silhouette is within normal limits.  Atherosclerotic changes of the aorta.  Stable chronic lung changes.  No focal infiltrate or effusion.  Degenerative changes of the spine.  Prior right shoulder arthroplasty    CTA   1. No CT evidence of central pulmonary embolus with limitations due to artifact as noted above.    Echo   EF 55 - 65 70    Diastolic Dysfunction  Yes     Est. PA Systolic Pressure  21.9    Tricuspid Valve Regurgitation  TRIVIAL    Intracavitary: There is no evidence of pericardial effusion, intracavity mass, thrombi, or vegetation    EKG  Sinus rhythm with occasional Premature ventricular complexes  Otherwise normal ECG  When compared with ECG of 18-APR-2018 02:51,  Premature ventricular complexes are now Present  The axis Shifted right  Confirmed by ANAN PRASAD MD ([id number]) on 6/11/2018 8:41:56 AM    2. No evidence of acute thoracic abnormality.        Assessment/Plan:      * Aspiration into airway    Her CXR looks clear   Continue with IV levaquin   Continue with nebs    CTA shows no pneumonia            Sepsis    Resolved now          Inadequate dietary energy intake    Will slowly advance her feedings   Started on tube feedings   Will advance from 10 cc/hr to 20 cc  ;the goal 40 cc /hr - at goal          Hypothyroidism due to acquired atrophy of thyroid    Resumed her  thyroid meds.          SOB (shortness of breath)    Her D dimer is high    CTA chest  1. No CT evidence of central pulmonary embolus with " limitations due to artifact as noted above.  2. No evidence of acute thoracic abnormality.        Acute cystitis without hematuria    Continue levaquin   Follow cultures  ecoli with sensitivity to flouroquinolones   D/c zee today  Hopefully on in am SKILL at [facility]            Esophageal dysphagia    S/p PEG  Will resume her feedings but at a slower rate - increase to goal of tolerating 40ml/hr  Now on 20ml/hr          Memory disorder    Resume exelon           Parkinson's disease    Continue  her sinemet, exelon  And pramipaxole            VTE Risk Mitigation         Ordered     enoxaparin injection 40 mg  Daily      06/11/18 0737     IP VTE HIGH RISK PATIENT  Once      06/10/18 2145     Place sequential compression device  Until discontinued      06/10/18 2145              Kunal Sandoval MD  Department of Hospital Medicine   [facility]

## 2018-06-13 NOTE — ASSESSMENT & PLAN NOTE
Continue levaquin   Follow cultures  ecoli with sensitivity to flouroquinolones   D/c saadia today  Hopefully on in am SKILL at [facility]

## 2018-06-13 NOTE — ASSESSMENT & PLAN NOTE
Will slowly advance her feedings   Started on tube feedings   Will advance from 10 cc/hr to 20 cc  ;the goal 40 cc /hr - at goal

## 2018-06-13 NOTE — PLAN OF CARE
Problem: Patient Care Overview  Goal: Plan of Care Review  Outcome: Ongoing (interventions implemented as appropriate)  Pt admitted with UTI/sepsis. Afebrile. WBC negative. Culture and sensitivity back. Remains on levaquin IV. Discontinued zee catheter this morning; monitoring for pt to void. Continuous PEG feedings increased to 30cc/hr today; no residual noted. Tolerating well. Vitals stable. Oxygen sats 100% on 2L via NC. Turning pt every 2 hours; heel booties on and elevated on pillow. Call bell within reach. Bed in low, locked position. Reviewed plan of care with pt and daughter Sola; state agreement.

## 2018-06-13 NOTE — SUBJECTIVE & OBJECTIVE
Interval History: see above    Review of Systems   Constitutional: Negative for chills and fever.   HENT: Negative for congestion, hearing loss, sinus pressure and sore throat.    Eyes: Negative for photophobia.   Respiratory: Negative for cough, choking, chest tightness and wheezing.    Cardiovascular: Negative for chest pain and palpitations.   Gastrointestinal: Negative for blood in stool, nausea and vomiting.        S/p PEG   Genitourinary: Negative for dysuria and hematuria.        Recurrent UTI   Musculoskeletal: Negative for arthralgias and myalgias.   Skin: Negative for pallor.   Neurological: Negative for dizziness and numbness.        H/o dementia and parkinson disease   Hematological: Does not bruise/bleed easily.   Psychiatric/Behavioral: Negative for confusion and suicidal ideas. The patient is not nervous/anxious.      Objective:     Vital Signs (Most Recent):  Temp: 97.3 °F (36.3 °C) (06/13/18 0708)  Pulse: 73 (06/13/18 0723)  Resp: 16 (06/13/18 0723)  BP: (!) 108/55 (06/13/18 0708)  SpO2: 99 % (06/13/18 0723) Vital Signs (24h Range):  Temp:  [97.3 °F (36.3 °C)-98 °F (36.7 °C)] 97.3 °F (36.3 °C)  Pulse:  [67-90] 73  Resp:  [14-24] 16  SpO2:  [96 %-100 %] 99 %  BP: ()/(55-61) 108/55     Weight: 76.7 kg (169 lb 1.5 oz)  Body mass index is 24.26 kg/m².    Intake/Output Summary (Last 24 hours) at 06/13/18 0835  Last data filed at 06/13/18 0622   Gross per 24 hour   Intake              812 ml   Output             1300 ml   Net             -488 ml      Physical Exam   Constitutional: She appears well-developed and well-nourished.   HENT:   Head: Normocephalic and atraumatic.   Right Ear: External ear normal.   Left Ear: External ear normal.   Mouth/Throat: Oropharynx is clear and moist.   Eyes: Conjunctivae and EOM are normal. Pupils are equal, round, and reactive to light.   Neck: Normal range of motion. Neck supple. No JVD present. No tracheal deviation present. No thyromegaly present.    Cardiovascular: Normal rate, regular rhythm, normal heart sounds and intact distal pulses.    Pulmonary/Chest: Effort normal and breath sounds normal. No respiratory distress. She has no wheezes. She has no rales. She exhibits no tenderness.   Abdominal: Soft. Bowel sounds are normal. She exhibits no distension and no mass. There is no tenderness. There is no rebound and no guarding.   S/p PEG    Musculoskeletal: Normal range of motion. She exhibits no edema.   Lymphadenopathy:     She has no cervical adenopathy.   Neurological: She is alert. She has normal reflexes. She displays normal reflexes. No cranial nerve deficit. She exhibits normal muscle tone. Gait abnormal. Coordination normal.    PERRL, Extraoccular movements and visual fields are full. Normal facial sensation and strength, she is very alert and oriented   She knows she is at [facility]  In [place] ; her dr name is Dr Pollard   Skin: Skin is warm and dry.   Psychiatric: She has a normal mood and affect.   Nursing note and vitals reviewed.      Significant Labs:   Lab Results   Component Value Date    WBC 5.76 06/11/2018    HGB 11.9 (L) 06/11/2018    HCT 38.1 06/11/2018    MCV 99 (H) 06/11/2018     06/11/2018     BMP  Lab Results   Component Value Date     06/11/2018    K 4.2 06/11/2018     06/11/2018    CO2 26 06/11/2018    BUN 16 06/11/2018    CREATININE 0.6 06/11/2018    CALCIUM 9.0 06/11/2018    ANIONGAP 9 06/11/2018    ESTGFRAFRICA >60 06/11/2018    EGFRNONAA >60 06/11/2018     Lab Results   Component Value Date    ALT 24 06/11/2018    AST 31 06/11/2018    ALKPHOS 91 06/11/2018    BILITOT 0.7 06/11/2018   mag 2.1  Phos 3.7    Lactic acid 1.2    Lab Results   Component Value Date    DDIMER 1.00 (H) 06/10/2018     Lab Results   Component Value Date    INR 1.1 06/10/2018    INR 1.1 03/14/2018       Recent Labs  Lab 06/10/18  1929   CPK 55  55   CPKMB 2.3   TROPONINI <0.006   MB 4.2       BNP    Recent Labs  Lab 06/10/18  1929  "  *       Urinalysis cloudy, +nitrite, 1+ leuko 65 wbc and many bacteria  .   Escherichia coli     CULTURE, URINE     Amox/K Clav'ate 16/8  Intermediate     Amp/Sulbactam >16/8  Resistant     Ampicillin >16  Resistant     Cefazolin 16  Intermediate     Cefepime <=8 "><=8  Sensitive     Ceftriaxone <=8 "><=8  Sensitive     Ciprofloxacin <=1 "><=1  Sensitive     Ertapenem <=2 "><=2  Sensitive     Gentamicin <=4 "><=4  Sensitive     Meropenem <=4 "><=4  Sensitive     Nitrofurantoin <=32 "><=32  Sensitive     Piperacillin/Tazo <=16 "><=16  Sensitive     Tetracycline >8  Resistant     Tobramycin <=4 "><=4  Sensitive     Trimeth/Sulfa >2/38  Resistant          Blood cultures NGTD x 2    TSH:     Recent Labs  Lab 06/10/18  1929   TSH 4.071*   free t4 1.04    Significant Imaging:     CXR The cardiac silhouette is within normal limits.  Atherosclerotic changes of the aorta.  Stable chronic lung changes.  No focal infiltrate or effusion.  Degenerative changes of the spine.  Prior right shoulder arthroplasty    CTA   1. No CT evidence of central pulmonary embolus with limitations due to artifact as noted above.    Echo   EF 55 - 65 70    Diastolic Dysfunction  Yes     Est. PA Systolic Pressure  21.9    Tricuspid Valve Regurgitation  TRIVIAL    Intracavitary: There is no evidence of pericardial effusion, intracavity mass, thrombi, or vegetation    EKG  Sinus rhythm with occasional Premature ventricular complexes  Otherwise normal ECG  When compared with ECG of 18-APR-2018 02:51,  Premature ventricular complexes are now Present  The axis Shifted right  Confirmed by SHANICE ERVIN, ANNA ([id number]) on 6/11/2018 8:41:56 AM    2. No evidence of acute thoracic abnormality.      "

## 2018-06-13 NOTE — PLAN OF CARE
Problem: Patient Care Overview  Goal: Plan of Care Review  Outcome: Ongoing (interventions implemented as appropriate)  Vitals stable; on oxygen. No complaints of pain. Arvizu; no BM. Fall precautions maintained; bedridden, bed alarm set. Tolerating tube feedings; feedings paused for synthroid. Repositioned q2h. Telemetry: NSR with PVCs. Plan of care reviewed with patient; voiced understanding. Will continue to monitor.

## 2018-06-13 NOTE — PT/OT/SLP PROGRESS
"Physical Therapy Treatment    Patient Name:  Eddy Cunningham   MRN:  [id number]    Recommendations:     Discharge Recommendations:  nursing facility, basic   Discharge Equipment Recommendations: none   Barriers to discharge: None    Assessment:     Eddy Cunningham is a 78 y.o. female admitted with a medical diagnosis of Aspiration into airway.  She presents with the following impairments/functional limitations:  weakness, impaired endurance, impaired self care skills, impaired functional mobilty, impaired balance, decreased upper extremity function, decreased lower extremity function, decreased safety awareness . Pt progressing well with POC goals with good tolerance of ROM.    Rehab Prognosis:  Good; patient would benefit from acute skilled PT services to address these deficits and reach maximum level of function.      Recent Surgery: * No surgery found *      Plan:     During this hospitalization, patient to be seen  (1-2x/day Monday-Friday; PRN Weekends/Holidays) to address the above listed problems via therapeutic exercises  · Plan of Care Expires:   (upon D/C from facility)   Plan of Care Reviewed with: patient    Subjective     Communicated with patient prior to session.  Patient found supine in bed upon PT entry to room, agreeable to treatment.      Chief Complaint: "Pt with no c/o"  Patient comments/goals: "Go home"  Pain/Comfort:  · Pain Rating 1: 0/10    Patients cultural, spiritual, Catholic conflicts given the current situation:      Objective:     Patient found with: peripheral IV     General Precautions: Standard, fall   Orthopedic Precautions:N/A   Braces: N/A       AM-PAC 6 CLICK MOBILITY  Turning over in bed (including adjusting bedclothes, sheets and blankets)?: 2  Sitting down on and standing up from a chair with arms (e.g., wheelchair, bedside commode, etc.): 1  Moving from lying on back to sitting on the side of the bed?: 1  Moving to and from a bed to a chair (including a wheelchair)?: " 1  Need to walk in hospital room?: 1  Climbing 3-5 steps with a railing?: 1  Total Score: 7       Therapeutic Activities and Exercises:   PROM to BUE/BLE at all joints in available planes of motion with rest breaks as needed to prevent contractures and skin breakdown.    Patient left supine with all lines intact, call button in reach and NSG notified..    GOALS:    Physical Therapy Goals        Problem: Physical Therapy Goal    Goal Priority Disciplines Outcome Goal Variances Interventions   Physical Therapy Goal     PT/OT, PT      Description:  Goals to be met by: upon D/C from facility     Patient will increase functional independence with mobility by performin. Pt will tolerate PROM to BUE/BLE at all joints in available planes of motion with rest breaks as needed to prevent contractures and skin breakdown.                       Time Tracking:     PT Received On: 18  PT Start Time: 1358     PT Stop Time: 1413  PT Total Time (min): 15 min     Billable Minutes: Therapeutic Exercise 15 minutes    Treatment Type: Treatment  PT/PTA: PT           Nellie Benoit, PT  2018

## 2018-06-13 NOTE — ASSESSMENT & PLAN NOTE
S/p PEG  Will resume her feedings but at a slower rate - increase to goal of tolerating 40ml/hr  Now on 20ml/hr

## 2018-06-14 VITALS
WEIGHT: 169.06 LBS | SYSTOLIC BLOOD PRESSURE: 114 MMHG | TEMPERATURE: 98 F | BODY MASS INDEX: 24.2 KG/M2 | DIASTOLIC BLOOD PRESSURE: 57 MMHG | RESPIRATION RATE: 18 BRPM | HEIGHT: 70 IN | OXYGEN SATURATION: 94 % | HEART RATE: 83 BPM

## 2018-06-14 PROCEDURE — 94640 AIRWAY INHALATION TREATMENT: CPT

## 2018-06-14 PROCEDURE — 94761 N-INVAS EAR/PLS OXIMETRY MLT: CPT

## 2018-06-14 PROCEDURE — 27000221 HC OXYGEN, UP TO 24 HOURS

## 2018-06-14 PROCEDURE — G8983 BODY POS D/C STATUS: HCPCS | Mod: CM

## 2018-06-14 PROCEDURE — 99238 HOSP IP/OBS DSCHRG MGMT 30/<: CPT | Mod: ,,, | Performed by: FAMILY MEDICINE

## 2018-06-14 PROCEDURE — 97110 THERAPEUTIC EXERCISES: CPT

## 2018-06-14 PROCEDURE — 63600175 PHARM REV CODE 636 W HCPCS: Performed by: INTERNAL MEDICINE

## 2018-06-14 PROCEDURE — 25000003 PHARM REV CODE 250: Performed by: INTERNAL MEDICINE

## 2018-06-14 PROCEDURE — G8982 BODY POS GOAL STATUS: HCPCS | Mod: CL

## 2018-06-14 PROCEDURE — C9113 INJ PANTOPRAZOLE SODIUM, VIA: HCPCS | Performed by: INTERNAL MEDICINE

## 2018-06-14 PROCEDURE — 25000242 PHARM REV CODE 250 ALT 637 W/ HCPCS: Performed by: INTERNAL MEDICINE

## 2018-06-14 RX ORDER — LEVOFLOXACIN 500 MG/1
500 TABLET, FILM COATED ORAL DAILY
Qty: 7 TABLET | Refills: 0 | Status: ON HOLD | OUTPATIENT
Start: 2018-06-14 | End: 2018-07-27 | Stop reason: HOSPADM

## 2018-06-14 RX ADMIN — IPRATROPIUM BROMIDE AND ALBUTEROL SULFATE 3 ML: .5; 3 SOLUTION RESPIRATORY (INHALATION) at 07:06

## 2018-06-14 RX ADMIN — LEVOTHYROXINE SODIUM 75 MCG: 75 TABLET ORAL at 05:06

## 2018-06-14 RX ADMIN — SENNA 1 TABLET: 8.6 TABLET, COATED ORAL at 10:06

## 2018-06-14 RX ADMIN — PANTOPRAZOLE SODIUM 40 MG: 40 INJECTION, POWDER, LYOPHILIZED, FOR SOLUTION INTRAVENOUS at 10:06

## 2018-06-14 RX ADMIN — CARBIDOPA AND LEVODOPA 1 TABLET: 25; 100 TABLET ORAL at 10:06

## 2018-06-14 RX ADMIN — DOCUSATE SODIUM 100 MG: 100 CAPSULE, LIQUID FILLED ORAL at 10:06

## 2018-06-14 RX ADMIN — RIVASTIGMINE 1 PATCH: 4.6 PATCH, EXTENDED RELEASE TRANSDERMAL at 10:06

## 2018-06-14 RX ADMIN — CARBIDOPA AND LEVODOPA 1 TABLET: 25; 100 TABLET ORAL at 05:06

## 2018-06-14 RX ADMIN — ASPIRIN 81 MG 81 MG: 81 TABLET ORAL at 10:06

## 2018-06-14 RX ADMIN — PRAMIPEXOLE DIHYDROCHLORIDE 0.25 MG: 0.12 TABLET ORAL at 10:06

## 2018-06-14 NOTE — ASSESSMENT & PLAN NOTE
Continue levaquin d/c on levaquin x 6 more days (total 10 days)  Follow cultures  ecoli with sensitivity to flouroquinolones   D/c zee yesterday and voiding in diaper  Hopefully on in am SKILL at [facility]

## 2018-06-14 NOTE — ASSESSMENT & PLAN NOTE
Her CXR looks clear   Continue with IV levaquin - d/c on levaquin x 6 more days  Continue with nebs    CTA shows no pneumonia

## 2018-06-14 NOTE — SUBJECTIVE & OBJECTIVE
Interval History: see above    Review of Systems   Constitutional: Negative for chills and fever.   HENT: Negative for congestion, hearing loss, sinus pressure and sore throat.    Eyes: Negative for photophobia.   Respiratory: Negative for cough, choking, chest tightness and wheezing.    Cardiovascular: Negative for chest pain and palpitations.   Gastrointestinal: Negative for blood in stool, nausea and vomiting.        S/p PEG   Genitourinary: Negative for dysuria and hematuria.        Recurrent UTI   Musculoskeletal: Negative for arthralgias and myalgias.   Skin: Negative for pallor.   Neurological: Negative for dizziness and numbness.        H/o dementia and parkinson disease   Hematological: Does not bruise/bleed easily.   Psychiatric/Behavioral: Negative for confusion and suicidal ideas. The patient is not nervous/anxious.      Objective:     Vital Signs (Most Recent):  Temp: 96.5 °F (35.8 °C) (06/14/18 0749)  Pulse: 79 (06/14/18 0800)  Resp: 16 (06/14/18 0754)  BP: (!) 119/53 (06/14/18 0749)  SpO2: 99 % (06/14/18 0754) Vital Signs (24h Range):  Temp:  [96.5 °F (35.8 °C)-97.5 °F (36.4 °C)] 96.5 °F (35.8 °C)  Pulse:  [69-86] 79  Resp:  [16-19] 16  SpO2:  [94 %-99 %] 99 %  BP: (111-124)/(53-62) 119/53     Weight: 76.7 kg (169 lb 1.5 oz)  Body mass index is 24.26 kg/m².    Intake/Output Summary (Last 24 hours) at 06/14/18 0951  Last data filed at 06/14/18 0627   Gross per 24 hour   Intake              780 ml   Output              250 ml   Net              530 ml      Physical Exam   Constitutional: She appears well-developed and well-nourished.   HENT:   Head: Normocephalic and atraumatic.   Right Ear: External ear normal.   Left Ear: External ear normal.   Mouth/Throat: Oropharynx is clear and moist.   Eyes: Conjunctivae and EOM are normal. Pupils are equal, round, and reactive to light.   Neck: Normal range of motion. Neck supple. No JVD present. No tracheal deviation present. No thyromegaly present.    Cardiovascular: Normal rate, regular rhythm, normal heart sounds and intact distal pulses.    Pulmonary/Chest: Effort normal and breath sounds normal. No respiratory distress. She has no wheezes. She has no rales. She exhibits no tenderness.   Abdominal: Soft. Bowel sounds are normal. She exhibits no distension and no mass. There is no tenderness. There is no rebound and no guarding.   S/p PEG    Musculoskeletal: Normal range of motion. She exhibits no edema.   Lymphadenopathy:     She has no cervical adenopathy.   Neurological: She is alert. She has normal reflexes. She displays normal reflexes. No cranial nerve deficit. She exhibits normal muscle tone. Gait abnormal. Coordination normal.    PERRL, Extraoccular movements and visual fields are full. Normal facial sensation and strength, she is very alert and oriented   She knows she is at [facility]  In [place] ; her dr name is Dr Pollard   Skin: Skin is warm and dry.   Psychiatric: She has a normal mood and affect.   Nursing note and vitals reviewed.      Significant Labs:   Lab Results   Component Value Date    WBC 5.76 06/11/2018    HGB 11.9 (L) 06/11/2018    HCT 38.1 06/11/2018    MCV 99 (H) 06/11/2018     06/11/2018     BMP  Lab Results   Component Value Date     06/11/2018    K 4.2 06/11/2018     06/11/2018    CO2 26 06/11/2018    BUN 16 06/11/2018    CREATININE 0.6 06/11/2018    CALCIUM 9.0 06/11/2018    ANIONGAP 9 06/11/2018    ESTGFRAFRICA >60 06/11/2018    EGFRNONAA >60 06/11/2018     Lab Results   Component Value Date    ALT 24 06/11/2018    AST 31 06/11/2018    ALKPHOS 91 06/11/2018    BILITOT 0.7 06/11/2018   mag 2.1  Phos 3.7    Lactic acid 1.2    Lab Results   Component Value Date    DDIMER 1.00 (H) 06/10/2018     Lab Results   Component Value Date    INR 1.1 06/10/2018    INR 1.1 03/14/2018       Recent Labs  Lab 06/10/18  1929   CPK 55  55   CPKMB 2.3   TROPONINI <0.006   MB 4.2       BNP    Recent Labs  Lab 06/10/18  1929  "  *       Urinalysis cloudy, +nitrite, 1+ leuko 65 wbc and many bacteria  .   Escherichia coli     CULTURE, URINE     Amox/K Clav'ate 16/8  Intermediate     Amp/Sulbactam >16/8  Resistant     Ampicillin >16  Resistant     Cefazolin 16  Intermediate     Cefepime <=8 "><=8  Sensitive     Ceftriaxone <=8 "><=8  Sensitive     Ciprofloxacin <=1 "><=1  Sensitive     Ertapenem <=2 "><=2  Sensitive     Gentamicin <=4 "><=4  Sensitive     Meropenem <=4 "><=4  Sensitive     Nitrofurantoin <=32 "><=32  Sensitive     Piperacillin/Tazo <=16 "><=16  Sensitive     Tetracycline >8  Resistant     Tobramycin <=4 "><=4  Sensitive     Trimeth/Sulfa >2/38  Resistant          Blood cultures NGTD x 2    TSH:     Recent Labs  Lab 06/10/18  1929   TSH 4.071*   free t4 1.04    Significant Imaging:     CXR The cardiac silhouette is within normal limits.  Atherosclerotic changes of the aorta.  Stable chronic lung changes.  No focal infiltrate or effusion.  Degenerative changes of the spine.  Prior right shoulder arthroplasty    CTA   1. No CT evidence of central pulmonary embolus with limitations due to artifact as noted above.    Echo   EF 55 - 65 70    Diastolic Dysfunction  Yes     Est. PA Systolic Pressure  21.9    Tricuspid Valve Regurgitation  TRIVIAL    Intracavitary: There is no evidence of pericardial effusion, intracavity mass, thrombi, or vegetation    EKG  Sinus rhythm with occasional Premature ventricular complexes  Otherwise normal ECG  When compared with ECG of 18-APR-2018 02:51,  Premature ventricular complexes are now Present  The axis Shifted right  Confirmed by SHANICE ERVIN, ANNA ([id number]) on 6/11/2018 8:41:56 AM    2. No evidence of acute thoracic abnormality.      "

## 2018-06-14 NOTE — PLAN OF CARE
Problem: Patient Care Overview  Goal: Plan of Care Review  Outcome: Ongoing (interventions implemented as appropriate)  Patient aware of plan of care. Patient had a good night. VS stable, afebrile. Tube feeding continued at 30 mL/hr with 120 mL water flush every 6 hours, tolerating well. Incontinent B/B. 2L O2 per NC in use, oxygen saturation upper 90's. Heel booties in place and BLE elevated on pillow. Frequent repositioning. Telemetry. Free from falls/injuries. No questions or concerns at this time. Agrees with plan of care.

## 2018-06-14 NOTE — ASSESSMENT & PLAN NOTE
S/p PEG  Will resume her feedings but at a slower rate - increase to goal of tolerating 40ml/hr  Now on 30ml/hr>> increase to 40ml/hr today her goal

## 2018-06-14 NOTE — SUBJECTIVE & OBJECTIVE
Interval History: see above    Review of Systems   Constitutional: Negative for chills and fever.   HENT: Negative for congestion, hearing loss, sinus pressure and sore throat.    Eyes: Negative for photophobia.   Respiratory: Negative for cough, choking, chest tightness and wheezing.    Cardiovascular: Negative for chest pain and palpitations.   Gastrointestinal: Negative for blood in stool, nausea and vomiting.        S/p PEG   Genitourinary: Negative for dysuria and hematuria.        Recurrent UTI   Musculoskeletal: Negative for arthralgias and myalgias.   Skin: Negative for pallor.   Neurological: Negative for dizziness and numbness.        H/o dementia and parkinson disease   Hematological: Does not bruise/bleed easily.   Psychiatric/Behavioral: Negative for confusion and suicidal ideas. The patient is not nervous/anxious.      Objective:     Vital Signs (Most Recent):  Temp: 96.5 °F (35.8 °C) (06/14/18 0749)  Pulse: 79 (06/14/18 0800)  Resp: 16 (06/14/18 0754)  BP: (!) 119/53 (06/14/18 0749)  SpO2: 99 % (06/14/18 0754) Vital Signs (24h Range):  Temp:  [96.5 °F (35.8 °C)-97.5 °F (36.4 °C)] 96.5 °F (35.8 °C)  Pulse:  [69-86] 79  Resp:  [16-19] 16  SpO2:  [94 %-99 %] 99 %  BP: (111-124)/(53-62) 119/53     Weight: 76.7 kg (169 lb 1.5 oz)  Body mass index is 24.26 kg/m².    Intake/Output Summary (Last 24 hours) at 06/14/18 0840  Last data filed at 06/14/18 0627   Gross per 24 hour   Intake              780 ml   Output              250 ml   Net              530 ml      Physical Exam   Constitutional: She appears well-developed and well-nourished.   HENT:   Head: Normocephalic and atraumatic.   Right Ear: External ear normal.   Left Ear: External ear normal.   Mouth/Throat: Oropharynx is clear and moist.   Eyes: Conjunctivae and EOM are normal. Pupils are equal, round, and reactive to light.   Neck: Normal range of motion. Neck supple. No JVD present. No tracheal deviation present. No thyromegaly present.    Cardiovascular: Normal rate, regular rhythm, normal heart sounds and intact distal pulses.    Pulmonary/Chest: Effort normal and breath sounds normal. No respiratory distress. She has no wheezes. She has no rales. She exhibits no tenderness.   Abdominal: Soft. Bowel sounds are normal. She exhibits no distension and no mass. There is no tenderness. There is no rebound and no guarding.   S/p PEG    Musculoskeletal: Normal range of motion. She exhibits no edema.   Lymphadenopathy:     She has no cervical adenopathy.   Neurological: She is alert. She has normal reflexes. She displays normal reflexes. No cranial nerve deficit. She exhibits normal muscle tone. Gait abnormal. Coordination normal.    PERRL, Extraoccular movements and visual fields are full. Normal facial sensation and strength, she is very alert and oriented   She knows she is at [facility]  In [place] ; her dr name is Dr Pollard   Skin: Skin is warm and dry.   Psychiatric: She has a normal mood and affect.   Nursing note and vitals reviewed.      Significant Labs:   Lab Results   Component Value Date    WBC 5.76 06/11/2018    HGB 11.9 (L) 06/11/2018    HCT 38.1 06/11/2018    MCV 99 (H) 06/11/2018     06/11/2018     BMP  Lab Results   Component Value Date     06/11/2018    K 4.2 06/11/2018     06/11/2018    CO2 26 06/11/2018    BUN 16 06/11/2018    CREATININE 0.6 06/11/2018    CALCIUM 9.0 06/11/2018    ANIONGAP 9 06/11/2018    ESTGFRAFRICA >60 06/11/2018    EGFRNONAA >60 06/11/2018     Lab Results   Component Value Date    ALT 24 06/11/2018    AST 31 06/11/2018    ALKPHOS 91 06/11/2018    BILITOT 0.7 06/11/2018   mag 2.1  Phos 3.7    Lactic acid 1.2    Lab Results   Component Value Date    DDIMER 1.00 (H) 06/10/2018     Lab Results   Component Value Date    INR 1.1 06/10/2018    INR 1.1 03/14/2018       Recent Labs  Lab 06/10/18  1929   CPK 55  55   CPKMB 2.3   TROPONINI <0.006   MB 4.2       BNP    Recent Labs  Lab 06/10/18  1929  "  *       Urinalysis cloudy, +nitrite, 1+ leuko 65 wbc and many bacteria  .   Escherichia coli     CULTURE, URINE     Amox/K Clav'ate 16/8  Intermediate     Amp/Sulbactam >16/8  Resistant     Ampicillin >16  Resistant     Cefazolin 16  Intermediate     Cefepime <=8 "><=8  Sensitive     Ceftriaxone <=8 "><=8  Sensitive     Ciprofloxacin <=1 "><=1  Sensitive     Ertapenem <=2 "><=2  Sensitive     Gentamicin <=4 "><=4  Sensitive     Meropenem <=4 "><=4  Sensitive     Nitrofurantoin <=32 "><=32  Sensitive     Piperacillin/Tazo <=16 "><=16  Sensitive     Tetracycline >8  Resistant     Tobramycin <=4 "><=4  Sensitive     Trimeth/Sulfa >2/38  Resistant          Blood cultures NGTD x 2    TSH:     Recent Labs  Lab 06/10/18  1929   TSH 4.071*   free t4 1.04    Significant Imaging:     CXR The cardiac silhouette is within normal limits.  Atherosclerotic changes of the aorta.  Stable chronic lung changes.  No focal infiltrate or effusion.  Degenerative changes of the spine.  Prior right shoulder arthroplasty    CTA   1. No CT evidence of central pulmonary embolus with limitations due to artifact as noted above.    Echo   EF 55 - 65 70    Diastolic Dysfunction  Yes     Est. PA Systolic Pressure  21.9    Tricuspid Valve Regurgitation  TRIVIAL    Intracavitary: There is no evidence of pericardial effusion, intracavity mass, thrombi, or vegetation    EKG  Sinus rhythm with occasional Premature ventricular complexes  Otherwise normal ECG  When compared with ECG of 18-APR-2018 02:51,  Premature ventricular complexes are now Present  The axis Shifted right  Confirmed by SHANICE ERVIN, ANNA ([id number]) on 6/11/2018 8:41:56 AM    2. No evidence of acute thoracic abnormality.      "

## 2018-06-14 NOTE — PLAN OF CARE
06/14/18 0945   Medicare Message   Important Message from Medicare regarding Discharge Appeal Rights Given to patient/caregiver;Explained to patient/caregiver;Signed/date by patient/caregiver   Date IMM was signed 06/13/18   Time IMM was signed 1500

## 2018-06-14 NOTE — PROGRESS NOTES
"[facility] Hospital Medicine  Progress Note    Patient Name: Eddy Cunningham  MRN: [id number]  Patient Class: IP- Inpatient   Admission Date: 6/10/2018  Length of Stay: 3 days  Attending Physician: Hayder Khalil MD  Primary Care Provider: Ayaka Pollard MD        Subjective:     Principal Problem:Acute cystitis without hematuria    HPI:  Eddy Cunningham is a 78 y.o. female   Is a nursing home patient with parkinson's disease ,dementia , s/p PEG placement   Who  presents to the emergency room with shortness of breath last night   She was having lots of copious respiratory secretions and was hypoxemic ; 84 % . With lots of  suction her secretions   Improved . She has    urinary tract infections chronically; over night she was placed in ICU for sepsis ; UTI: aspiration concern.  She is at present on 3 litre oxygen and her oxygen sats are stable.    When I asked her why she came ; she says " I dont know "  She does report coughing .      Hospital Course:  6/12/18  She looks better .  Her lungs are clear ; tube feelings ar going well ; no residuals.  She is on Levaquin for day 2 for uti and aspiration . Her echo EF 70 %    CTA chest : 1. No CT evidence of central pulmonary embolus with limitations due to artifact as noted above.  2. No evidence of acute thoracic abnormality.    Will advance her feedings.    6/13/  Levaquin day 3 for UTI/aspiration. Afebrile. No elevated WBC  urine culture with e coli sensitivity pending  Blood cultures no growth to date  Cardiology signed off EF 70% with diastolic dysfunction     Tolerating PEG feeds at 20ml/hr without residual, goal is 40  Also still has zee    6/14  levaquin day 4 for UTI/aspioration. Afebrile, no elevated WBC. Urine shows sensitivity to flouroquinolones. Blood cultures NGTD. VSS/afebrile    Pulled zee and pt noted to be voiding in diaper    Also has tolerated her feeds at 30ml/hr without residual, going up to 40ml./hr today which is " her goal      Interval History: see above    Review of Systems   Constitutional: Negative for chills and fever.   HENT: Negative for congestion, hearing loss, sinus pressure and sore throat.    Eyes: Negative for photophobia.   Respiratory: Negative for cough, choking, chest tightness and wheezing.    Cardiovascular: Negative for chest pain and palpitations.   Gastrointestinal: Negative for blood in stool, nausea and vomiting.        S/p PEG   Genitourinary: Negative for dysuria and hematuria.        Recurrent UTI   Musculoskeletal: Negative for arthralgias and myalgias.   Skin: Negative for pallor.   Neurological: Negative for dizziness and numbness.        H/o dementia and parkinson disease   Hematological: Does not bruise/bleed easily.   Psychiatric/Behavioral: Negative for confusion and suicidal ideas. The patient is not nervous/anxious.      Objective:     Vital Signs (Most Recent):  Temp: 96.5 °F (35.8 °C) (06/14/18 0749)  Pulse: 79 (06/14/18 0800)  Resp: 16 (06/14/18 0754)  BP: (!) 119/53 (06/14/18 0749)  SpO2: 99 % (06/14/18 0754) Vital Signs (24h Range):  Temp:  [96.5 °F (35.8 °C)-97.5 °F (36.4 °C)] 96.5 °F (35.8 °C)  Pulse:  [69-86] 79  Resp:  [16-19] 16  SpO2:  [94 %-99 %] 99 %  BP: (111-124)/(53-62) 119/53     Weight: 76.7 kg (169 lb 1.5 oz)  Body mass index is 24.26 kg/m².    Intake/Output Summary (Last 24 hours) at 06/14/18 0986  Last data filed at 06/14/18 0609   Gross per 24 hour   Intake              780 ml   Output              250 ml   Net              530 ml      Physical Exam   Constitutional: She appears well-developed and well-nourished.   HENT:   Head: Normocephalic and atraumatic.   Right Ear: External ear normal.   Left Ear: External ear normal.   Mouth/Throat: Oropharynx is clear and moist.   Eyes: Conjunctivae and EOM are normal. Pupils are equal, round, and reactive to light.   Neck: Normal range of motion. Neck supple. No JVD present. No tracheal deviation present. No thyromegaly  present.   Cardiovascular: Normal rate, regular rhythm, normal heart sounds and intact distal pulses.    Pulmonary/Chest: Effort normal and breath sounds normal. No respiratory distress. She has no wheezes. She has no rales. She exhibits no tenderness.   Abdominal: Soft. Bowel sounds are normal. She exhibits no distension and no mass. There is no tenderness. There is no rebound and no guarding.   S/p PEG    Musculoskeletal: Normal range of motion. She exhibits no edema.   Lymphadenopathy:     She has no cervical adenopathy.   Neurological: She is alert. She has normal reflexes. She displays normal reflexes. No cranial nerve deficit. She exhibits normal muscle tone. Gait abnormal. Coordination normal.    PERRL, Extraoccular movements and visual fields are full. Normal facial sensation and strength, she is very alert and oriented   She knows she is at [facility]  In [place] ; her dr name is Dr Pollard   Skin: Skin is warm and dry.   Psychiatric: She has a normal mood and affect.   Nursing note and vitals reviewed.      Significant Labs:   Lab Results   Component Value Date    WBC 5.76 06/11/2018    HGB 11.9 (L) 06/11/2018    HCT 38.1 06/11/2018    MCV 99 (H) 06/11/2018     06/11/2018     BMP  Lab Results   Component Value Date     06/11/2018    K 4.2 06/11/2018     06/11/2018    CO2 26 06/11/2018    BUN 16 06/11/2018    CREATININE 0.6 06/11/2018    CALCIUM 9.0 06/11/2018    ANIONGAP 9 06/11/2018    ESTGFRAFRICA >60 06/11/2018    EGFRNONAA >60 06/11/2018     Lab Results   Component Value Date    ALT 24 06/11/2018    AST 31 06/11/2018    ALKPHOS 91 06/11/2018    BILITOT 0.7 06/11/2018   mag 2.1  Phos 3.7    Lactic acid 1.2    Lab Results   Component Value Date    DDIMER 1.00 (H) 06/10/2018     Lab Results   Component Value Date    INR 1.1 06/10/2018    INR 1.1 03/14/2018       Recent Labs  Lab 06/10/18  1929   CPK 55  55   CPKMB 2.3   TROPONINI <0.006   MB 4.2       BNP    Recent Labs  Lab  "06/10/18  1929   *       Urinalysis cloudy, +nitrite, 1+ leuko 65 wbc and many bacteria  .   Escherichia coli     CULTURE, URINE     Amox/K Clav'ate 16/8  Intermediate     Amp/Sulbactam >16/8  Resistant     Ampicillin >16  Resistant     Cefazolin 16  Intermediate     Cefepime <=8 "><=8  Sensitive     Ceftriaxone <=8 "><=8  Sensitive     Ciprofloxacin <=1 "><=1  Sensitive     Ertapenem <=2 "><=2  Sensitive     Gentamicin <=4 "><=4  Sensitive     Meropenem <=4 "><=4  Sensitive     Nitrofurantoin <=32 "><=32  Sensitive     Piperacillin/Tazo <=16 "><=16  Sensitive     Tetracycline >8  Resistant     Tobramycin <=4 "><=4  Sensitive     Trimeth/Sulfa >2/38  Resistant          Blood cultures NGTD x 2    TSH:     Recent Labs  Lab 06/10/18  1929   TSH 4.071*   free t4 1.04    Significant Imaging:     CXR The cardiac silhouette is within normal limits.  Atherosclerotic changes of the aorta.  Stable chronic lung changes.  No focal infiltrate or effusion.  Degenerative changes of the spine.  Prior right shoulder arthroplasty    CTA   1. No CT evidence of central pulmonary embolus with limitations due to artifact as noted above.    Echo   EF 55 - 65 70    Diastolic Dysfunction  Yes     Est. PA Systolic Pressure  21.9    Tricuspid Valve Regurgitation  TRIVIAL    Intracavitary: There is no evidence of pericardial effusion, intracavity mass, thrombi, or vegetation    EKG  Sinus rhythm with occasional Premature ventricular complexes  Otherwise normal ECG  When compared with ECG of 18-APR-2018 02:51,  Premature ventricular complexes are now Present  The axis Shifted right  Confirmed by SHANICE ERVIN, ANNA ([id number]) on 6/11/2018 8:41:56 AM    2. No evidence of acute thoracic abnormality.        Assessment/Plan:      * Acute cystitis without hematuria    Continue levaquin d/c on levaquin x 6 more days (total 10 days)  Follow cultures  ecoli with sensitivity to flouroquinolones   D/c zee yesterday and voiding in diaper  Hopefully on " in am SKILL at [facility]            Sepsis    Resolved now          Inadequate dietary energy intake    Will slowly advance her feedings   Started on tube feedings   Will advance from 10 cc/hr to 20 cc to 30ml/hr and now moving to 40ml.hr  ;the goal 40 cc /hr - at goal          Hypothyroidism due to acquired atrophy of thyroid    Resumed her  thyroid meds.          SOB (shortness of breath)    Her D dimer is high    CTA chest  1. No CT evidence of central pulmonary embolus with limitations due to artifact as noted above.  2. No evidence of acute thoracic abnormality.        Aspiration into airway    Her CXR looks clear   Continue with IV levaquin - d/c on levaquin x 6 more days  Continue with nebs    CTA shows no pneumonia            Esophageal dysphagia    S/p PEG  Will resume her feedings but at a slower rate - increase to goal of tolerating 40ml/hr  Now on 30ml/hr>> increase to 40ml/hr today her goal          Memory disorder    Resume exelon           Parkinson's disease    Continue  her sinemet, exelon  And pramipaxole            VTE Risk Mitigation         Ordered     enoxaparin injection 40 mg  Daily      06/11/18 0737     IP VTE HIGH RISK PATIENT  Once      06/10/18 2145     Place sequential compression device  Until discontinued      06/10/18 2145              Jeremiah Ayala MD  Department of Hospital Medicine   [facility]

## 2018-06-14 NOTE — PROGRESS NOTES
aleta rodriguez here to  pt[facility] Belongings and paperwork given to CNA

## 2018-06-14 NOTE — ASSESSMENT & PLAN NOTE
Continue levaquin d/c on levaquin x 6 more days (total 10 days)  Follow cultures  ecoli with sensitivity to flouroquinolones   D/c zee yesterday and voiding in diaper  Hopefully on in am SKILL at [facility]

## 2018-06-14 NOTE — PT/OT/SLP DISCHARGE
Physical Therapy Discharge Summary    Name: Eddy Cunningham  MRN: [id number]   Principal Problem: Acute cystitis without hematuria     Patient Discharged from acute Physical Therapy on 2018 following a.m. PT tx. session.  Please refer to prior PT noted date on 2018 for functional status.     Assessment:     Patient appropriate for care in another setting.    Objective:     GOALS:    Physical Therapy Goals     Not on file          Multidisciplinary Problems (Resolved)        Problem: Physical Therapy Goal    Goal Priority Disciplines Outcome Goal Variances Interventions   Physical Therapy Goal   (Resolved)     PT/OT, PT Outcome(s) achieved     Description:  Goals to be met by: upon D/C from facility     Patient will increase functional independence with mobility by performin. Pt will tolerate PROM to BUE/BLE at all joints in available planes of motion with rest breaks as needed to prevent contractures and skin breakdown.                       Reasons for Discontinuation of Therapy Services  Transfer to alternate level of care.      Plan:     Patient Discharged to: Nursing Facility, [facility].    Karlo Gonsalez, PT  2018

## 2018-06-14 NOTE — DISCHARGE SUMMARY
"[facility] Medicine  Discharge Summary      Patient Name: Eddy Cunningham  MRN: [id number]  Admission Date: 6/10/2018  Hospital Length of Stay: 3 days  Discharge Date and Time:  06/14/2018 9:50 AM  Attending Physician: Hayder Khalil MD   Discharging Provider: Lorenza Cantor NP  Primary Care Provider: Ayaka Pollard MD      HPI:   Eddy Cunningham is a 78 y.o. female   Is a nursing home patient with parkinson's disease ,dementia , s/p PEG placement   Who  presents to the emergency room with shortness of breath last night   She was having lots of copious respiratory secretions and was hypoxemic ; 84 % . With lots of  suction her secretions   Improved . She has    urinary tract infections chronically; over night she was placed in ICU for sepsis ; UTI: aspiration concern.  She is at present on 3 litre oxygen and her oxygen sats are stable.    When I asked her why she came ; she says " I dont know "  She does report coughing .      * No surgery found *      Hospital Course:   6/12/18  She looks better .  Her lungs are clear ; tube feelings ar going well ; no residuals.  She is on Levaquin for day 2 for uti and aspiration . Her echo EF 70 %    CTA chest : 1. No CT evidence of central pulmonary embolus with limitations due to artifact as noted above.  2. No evidence of acute thoracic abnormality.    Will advance her feedings.    6/13/  Levaquin day 3 for UTI/aspiration. Afebrile. No elevated WBC  urine culture with e coli sensitivity pending  Blood cultures no growth to date  Cardiology signed off EF 70% with diastolic dysfunction     Tolerating PEG feeds at 20ml/hr without residual, goal is 40  Also still has zee    6/14  levaquin day 4 for UTI/aspioration. Afebrile, no elevated WBC. Urine shows sensitivity to flouroquinolones. Blood cultures NGTD. VSS/afebrile    Pulled zee and pt noted to be voiding in diaper    Also has tolerated her feeds at 30ml/hr without residual, going up " to 40ml./hr today which is her goal       Consults:   Consults         Status Ordering Provider     Inpatient consult to Pulmonology  Once     Provider:  Chito Leigh MD    Acknowledged SARAI MORRIS     Inpatient consult to Social Work/Case Management  Once     Provider:  (Not yet assigned)    Completed KIMI THIBODEAUX          * Aspiration into airway    Her CXR looks clear   Continue with IV levaquin - d/c on levaquin x 6 more days  Continue with nebs    CTA shows no pneumonia            Sepsis    Resolved now          Inadequate dietary energy intake    Will slowly advance her feedings   Started on tube feedings   Will advance from 10 cc/hr to 20 cc to 30ml/hr and now moving to 40ml.hr  ;the goal 40 cc /hr - at goal          Hypothyroidism due to acquired atrophy of thyroid    Resumed her  thyroid meds.          SOB (shortness of breath)    Her D dimer is high    CTA chest  1. No CT evidence of central pulmonary embolus with limitations due to artifact as noted above.  2. No evidence of acute thoracic abnormality.        Acute cystitis without hematuria    Continue levaquin d/c on levaquin x 6 more days (total 10 days)  Follow cultures  ecoli with sensitivity to flouroquinolones   D/c zee yesterday and voiding in diaper  Hopefully on in am SKILL at [facility]            Esophageal dysphagia    S/p PEG  Will resume her feedings but at a slower rate - increase to goal of tolerating 40ml/hr  Now on 30ml/hr>> increase to 40ml/hr today her goal          Memory disorder    Resume exelon           Parkinson's disease    Continue  her sinemet, exelon  And pramipaxole            Final Active Diagnoses:    Diagnosis Date Noted POA    PRINCIPAL PROBLEM:  Aspiration into airway [T17.908A] 05/17/2017 Yes    Sepsis [A41.9] 06/12/2018 Yes    Hypothyroidism due to acquired atrophy of thyroid [E03.4] 06/11/2018 Yes    Inadequate dietary energy intake [E63.9] 06/11/2018 Yes    SOB (shortness of breath)  "[R06.02] 06/10/2018 Yes    Acute cystitis without hematuria [N30.00] 05/17/2017 Yes    Esophageal dysphagia [R13.10] 05/17/2017 Yes    Memory disorder [R41.3] 03/14/2013 Yes    Parkinson's disease [G20] 11/13/2012 Yes      Problems Resolved During this Admission:    Diagnosis Date Noted Date Resolved POA    Sepsis [A41.9] 06/11/2018 06/12/2018 Yes       Discharged Condition: stable    Disposition:     Follow Up:  Follow-up Information     Ayaka Pollard MD.    Specialty:  Family Medicine  Why:  Outpatient Services  Contact information:  [id number]  [place] [address] [phone]                 Patient Instructions:   No discharge procedures on file.    Significant Diagnostic Studies:       Significant Labs:   Lab Results   Component Value Date    WBC 5.76 06/11/2018    HGB 11.9 (L) 06/11/2018    HCT 38.1 06/11/2018    MCV 99 (H) 06/11/2018     06/11/2018     BMP  Lab Results   Component Value Date     06/11/2018    K 4.2 06/11/2018     06/11/2018    CO2 26 06/11/2018    BUN 16 06/11/2018    CREATININE 0.6 06/11/2018    CALCIUM 9.0 06/11/2018    ANIONGAP 9 06/11/2018    ESTGFRAFRICA >60 06/11/2018    EGFRNONAA >60 06/11/2018     Lab Results   Component Value Date    ALT 24 06/11/2018    AST 31 06/11/2018    ALKPHOS 91 06/11/2018    BILITOT 0.7 06/11/2018   mag 2.1  Phos 3.7    Lactic acid 1.2    Lab Results   Component Value Date    DDIMER 1.00 (H) 06/10/2018     Lab Results   Component Value Date    INR 1.1 06/10/2018    INR 1.1 03/14/2018       Recent Labs  Lab 06/10/18  1929   CPK 55  55   CPKMB 2.3   TROPONINI <0.006   MB 4.2       BNP    Recent Labs  Lab 06/10/18  1929   *       Urinalysis cloudy, +nitrite, 1+ leuko 65 wbc and many bacteria  .   Escherichia coli     CULTURE, URINE     Amox/K Clav'ate 16/8  Intermediate     Amp/Sulbactam >16/8  Resistant     Ampicillin >16  Resistant     Cefazolin 16  Intermediate     Cefepime <=8 "><=8  Sensitive     Ceftriaxone <=8 "><=8  " "Sensitive     Ciprofloxacin <=1 "><=1  Sensitive     Ertapenem <=2 "><=2  Sensitive     Gentamicin <=4 "><=4  Sensitive     Meropenem <=4 "><=4  Sensitive     Nitrofurantoin <=32 "><=32  Sensitive     Piperacillin/Tazo <=16 "><=16  Sensitive     Tetracycline >8  Resistant     Tobramycin <=4 "><=4  Sensitive     Trimeth/Sulfa >2/38  Resistant          Blood cultures NGTD x 2    TSH:     Recent Labs  Lab 06/10/18  1929   TSH 4.071*   free t4 1.04    Significant Imaging:     CXR The cardiac silhouette is within normal limits.  Atherosclerotic changes of the aorta.  Stable chronic lung changes.  No focal infiltrate or effusion.  Degenerative changes of the spine.  Prior right shoulder arthroplasty    CTA   1. No CT evidence of central pulmonary embolus with limitations due to artifact as noted above.    Echo   EF 55 - 65 70    Diastolic Dysfunction  Yes     Est. PA Systolic Pressure  21.9    Tricuspid Valve Regurgitation  TRIVIAL    Intracavitary: There is no evidence of pericardial effusion, intracavity mass, thrombi, or vegetation    EKG  Sinus rhythm with occasional Premature ventricular complexes  Otherwise normal ECG  When compared with ECG of 18-APR-2018 02:51,  Premature ventricular complexes are now Present  The axis Shifted right  Confirmed by ANNA PRASAD MD ([id number]) on 6/11/2018 8:41:56 AM    2. No evidence of acute thoracic abnormality.        Pending Diagnostic Studies:     None         Medications:  Reconciled Home Medications:      Medication List      START taking these medications    levoFLOXacin 500 MG tablet  Commonly known as:  LEVAQUIN  Take 1 tablet (500 mg total) by mouth once daily.        CONTINUE taking these medications    aspirin 81 MG Chew  1 tablet (81 mg total) by Per G Tube route once daily.     carbidopa-levodopa  mg  mg per tablet  Commonly known as:  SINEMET  1 tablet by Per G Tube route 5 (five) times daily.     docusate sodium 100 MG capsule  Commonly known as:  COLACE  1 " capsule (100 mg total) by Per G Tube route once daily.     EXELON 4.6 mg/24 hr Pt24  Generic drug:  rivastigmine  Place 1 patch onto the skin once daily.     HYDROcodone-acetaminophen 7.5-325 mg per tablet  Commonly known as:  NORCO  Take 1 tablet by mouth every 12 (twelve) hours as needed for Pain.     JEVITY ORAL  65 mL/hr by PEG Tube route once daily. Via pump on at 6 pm off at 6 am     levothyroxine 75 MCG tablet  Commonly known as:  SYNTHROID  1 tablet (75 mcg total) by Per G Tube route before breakfast.     omeprazole 40 MG capsule  Commonly known as:  PRILOSEC  Open 40 mg capsule into g-tube once daily     potassium chloride 10% 20 mEq/15 mL oral solution  Commonly known as:  KAYCIEL  15 mLs (20 mEq total) by Per G Tube route 2 (two) times daily.     pramipexole 0.25 MG tablet  Commonly known as:  MIRAPEX  1 tablet (0.25 mg total) by Per G Tube route 3 (three) times daily.     ranitidine 150 MG capsule  Commonly known as:  ZANTAC  Take 75 mg by mouth 2 (two) times daily.     senna 8.6 mg tablet  Commonly known as:  SENOKOT  1 tablet by Per G Tube route once daily.     tiZANidine 2 mg Cap  Take 4 mg by mouth nightly as needed.     traMADol 50 mg tablet  Commonly known as:  ULTRAM  1 tablet (50 mg total) by Per G Tube route every 6 (six) hours as needed for Pain.     traZODone 50 MG tablet  Commonly known as:  DESYREL  1 tablet (50 mg total) by Per G Tube route every evening.            Indwelling Lines/Drains at time of discharge:   Lines/Drains/Airways     Drain                 Gastrostomy/Enterostomy 05/26/18 1135 Percutaneous endoscopic gastrostomy (PEG) LUQ 18 days                Time spent on the discharge of patient: 30 minutes  Patient was seen and examined on the date of discharge and determined to be suitable for discharge.         Lorenza Cantor NP  Department of Hospital Medicine  [facility]

## 2018-06-14 NOTE — PROGRESS NOTES
Report called to aleta rodriguez.  IV removed from pt, IV wrapped. PEG tube feeding stopped and PEG tube flushed.

## 2018-06-14 NOTE — ASSESSMENT & PLAN NOTE
Will slowly advance her feedings   Started on tube feedings   Will advance from 10 cc/hr to 20 cc to 30ml/hr and now moving to 40ml.hr  ;the goal 40 cc /hr - at goal

## 2018-06-14 NOTE — PROGRESS NOTES
Staff Handoff    Bedside report received from ERINN Pagan. patient sitting up in bed. VS stable. Denies pain. Assessment complete per flowsheet. Call bell in reach.  Resident Handoff

## 2018-06-15 LAB
BACTERIA BLD CULT: NORMAL
BACTERIA BLD CULT: NORMAL

## 2018-06-15 NOTE — PHYSICIAN QUERY
PT Name: Eddy Cunningham  MR #: [id number]    Physician Query Form - Cause and Effect Relationship Clarification      CDS/: Reyna Doan               Contact information:[email or web address]    This form is a permanent document in the medical record.     Query Date: Carmen 15, 2018    By submitting this query, we are merely seeking further clarification of documentation. Please utilize your independent clinical judgment when addressing the question(s) below.    The Medical record contains the following:  Supporting Clinical Findings   Location in record                                                                      placed in ICU for sepsis ; UTI: aspiration concern                                                                                                      urine culture with e coli                    DS 6/14                                                                     Continue levaquin d/c on levaquin x 6 more days (total 10 days)                                                                                                                             DS 6/14         Provider, please clarify if there is any correlation between ___sepsis___ and ____e coli______.           Are the conditions:     [x  ] Due to or associated with each other     [  ] Unrelated to each other     [  ] Other (Please Specify): _________________________     [  ] Clinically Undetermined

## 2018-06-15 NOTE — PLAN OF CARE
06/15/18 0903   Final Note   Assessment Type Final Discharge Note   Discharge Disposition Int Care Fac   What phone number can be called within the next 1-3 days to see how you are doing after discharge? [phone]   Hospital Follow Up  Appt(s) scheduled? Yes   Discharge plans and expectations educations in teach back method with documentation complete? Yes   Right Care Referral Info   Post Acute Recommendation IRF  (Return to NH)

## 2018-07-21 ENCOUNTER — HOSPITAL ENCOUNTER (INPATIENT)
Facility: HOSPITAL | Age: 78
LOS: 9 days | Discharge: HOME | DRG: 853 | End: 2018-07-31
Attending: SURGERY | Admitting: FAMILY MEDICINE
Payer: MEDICARE

## 2018-07-21 DIAGNOSIS — L89.150 DECUBITUS ULCER OF SACRAL REGION, UNSTAGEABLE: ICD-10-CM

## 2018-07-21 DIAGNOSIS — R50.9 FEVER: ICD-10-CM

## 2018-07-21 DIAGNOSIS — A41.9 SEPSIS, DUE TO UNSPECIFIED ORGANISM: Primary | ICD-10-CM

## 2018-07-21 DIAGNOSIS — E86.0 DEHYDRATION: ICD-10-CM

## 2018-07-21 DIAGNOSIS — E87.0 HYPERNATREMIA: ICD-10-CM

## 2018-07-21 DIAGNOSIS — A41.02 SEPSIS DUE TO METHICILLIN RESISTANT STAPHYLOCOCCUS AUREUS (MRSA): ICD-10-CM

## 2018-07-21 LAB
ALBUMIN SERPL BCP-MCNC: 3.4 G/DL
ALP SERPL-CCNC: 99 U/L
ALT SERPL W/O P-5'-P-CCNC: 23 U/L
AMORPH CRY URNS QL MICRO: ABNORMAL
ANION GAP SERPL CALC-SCNC: 12 MMOL/L
APTT BLDCRRT: 26.3 SEC
AST SERPL-CCNC: 56 U/L
BACTERIA #/AREA URNS HPF: ABNORMAL /HPF
BASOPHILS # BLD AUTO: 0.06 K/UL
BASOPHILS NFR BLD: 0.6 %
BILIRUB SERPL-MCNC: 0.7 MG/DL
BILIRUB UR QL STRIP: NEGATIVE
BNP SERPL-MCNC: 348 PG/ML
BUN SERPL-MCNC: 64 MG/DL
CALCIUM SERPL-MCNC: 9.2 MG/DL
CAOX CRY URNS QL MICRO: ABNORMAL
CHLORIDE SERPL-SCNC: 126 MMOL/L
CK MB SERPL-MCNC: 0.5 NG/ML
CK MB SERPL-RTO: 0.8 %
CK SERPL-CCNC: 66 U/L
CK SERPL-CCNC: 66 U/L
CLARITY UR: ABNORMAL
CO2 SERPL-SCNC: 26 MMOL/L
COLOR UR: YELLOW
CREAT SERPL-MCNC: 1 MG/DL
DIFFERENTIAL METHOD: ABNORMAL
EOSINOPHIL # BLD AUTO: 0.3 K/UL
EOSINOPHIL NFR BLD: 2.8 %
ERYTHROCYTE [DISTWIDTH] IN BLOOD BY AUTOMATED COUNT: 14.4 %
EST. GFR  (AFRICAN AMERICAN): >60 ML/MIN/1.73 M^2
EST. GFR  (NON AFRICAN AMERICAN): 54 ML/MIN/1.73 M^2
FLUAV AG SPEC QL IA: NEGATIVE
FLUBV AG SPEC QL IA: NEGATIVE
GLUCOSE SERPL-MCNC: 152 MG/DL
GLUCOSE UR QL STRIP: NEGATIVE
HCT VFR BLD AUTO: 51.4 %
HGB BLD-MCNC: 15.2 G/DL
HGB UR QL STRIP: ABNORMAL
HYALINE CASTS #/AREA URNS LPF: 1 /LPF
INR PPP: 1.3
KETONES UR QL STRIP: ABNORMAL
LACTATE SERPL-SCNC: 0.9 MMOL/L
LACTATE SERPL-SCNC: 1.7 MMOL/L
LEUKOCYTE ESTERASE UR QL STRIP: NEGATIVE
LYMPHOCYTES # BLD AUTO: 1.8 K/UL
LYMPHOCYTES NFR BLD: 17 %
MAGNESIUM SERPL-MCNC: 2.7 MG/DL
MCH RBC QN AUTO: 30.6 PG
MCHC RBC AUTO-ENTMCNC: 29.6 G/DL
MCV RBC AUTO: 103 FL
MICROSCOPIC COMMENT: ABNORMAL
MONOCYTES # BLD AUTO: 1 K/UL
MONOCYTES NFR BLD: 10.1 %
NEUTROPHILS # BLD AUTO: 7.2 K/UL
NEUTROPHILS NFR BLD: 69.5 %
NITRITE UR QL STRIP: NEGATIVE
PH UR STRIP: 6 [PH] (ref 5–8)
PHOSPHATE SERPL-MCNC: 2.7 MG/DL
PLATELET # BLD AUTO: 158 K/UL
PMV BLD AUTO: 12.7 FL
POTASSIUM SERPL-SCNC: 4.3 MMOL/L
PROT SERPL-MCNC: 7.4 G/DL
PROT UR QL STRIP: ABNORMAL
PROTHROMBIN TIME: 12.9 SEC
RBC # BLD AUTO: 4.97 M/UL
RBC #/AREA URNS HPF: 4 /HPF (ref 0–4)
SODIUM SERPL-SCNC: 164 MMOL/L
SP GR UR STRIP: 1.02 (ref 1–1.03)
SPECIMEN SOURCE: NORMAL
SQUAMOUS #/AREA URNS HPF: 3 /HPF
TROPONIN I SERPL DL<=0.01 NG/ML-MCNC: 0.08 NG/ML
URN SPEC COLLECT METH UR: ABNORMAL
UROBILINOGEN UR STRIP-ACNC: NEGATIVE EU/DL
WBC # BLD AUTO: 10.34 K/UL
WBC #/AREA URNS HPF: 10 /HPF (ref 0–5)

## 2018-07-21 PROCEDURE — 93010 ELECTROCARDIOGRAM REPORT: CPT | Mod: ,,, | Performed by: INTERNAL MEDICINE

## 2018-07-21 PROCEDURE — 87040 BLOOD CULTURE FOR BACTERIA: CPT

## 2018-07-21 PROCEDURE — 85730 THROMBOPLASTIN TIME PARTIAL: CPT

## 2018-07-21 PROCEDURE — 96374 THER/PROPH/DIAG INJ IV PUSH: CPT

## 2018-07-21 PROCEDURE — 85610 PROTHROMBIN TIME: CPT

## 2018-07-21 PROCEDURE — 36415 COLL VENOUS BLD VENIPUNCTURE: CPT

## 2018-07-21 PROCEDURE — 96361 HYDRATE IV INFUSION ADD-ON: CPT

## 2018-07-21 PROCEDURE — 81000 URINALYSIS NONAUTO W/SCOPE: CPT

## 2018-07-21 PROCEDURE — 51702 INSERT TEMP BLADDER CATH: CPT

## 2018-07-21 PROCEDURE — 27000221 HC OXYGEN, UP TO 24 HOURS

## 2018-07-21 PROCEDURE — 84100 ASSAY OF PHOSPHORUS: CPT

## 2018-07-21 PROCEDURE — 96375 TX/PRO/DX INJ NEW DRUG ADDON: CPT

## 2018-07-21 PROCEDURE — G0378 HOSPITAL OBSERVATION PER HR: HCPCS

## 2018-07-21 PROCEDURE — 63600175 PHARM REV CODE 636 W HCPCS: Performed by: SURGERY

## 2018-07-21 PROCEDURE — 85025 COMPLETE CBC W/AUTO DIFF WBC: CPT

## 2018-07-21 PROCEDURE — 93005 ELECTROCARDIOGRAM TRACING: CPT | Mod: 59

## 2018-07-21 PROCEDURE — 80053 COMPREHEN METABOLIC PANEL: CPT

## 2018-07-21 PROCEDURE — 94760 N-INVAS EAR/PLS OXIMETRY 1: CPT

## 2018-07-21 PROCEDURE — 84484 ASSAY OF TROPONIN QUANT: CPT

## 2018-07-21 PROCEDURE — 83880 ASSAY OF NATRIURETIC PEPTIDE: CPT

## 2018-07-21 PROCEDURE — 96365 THER/PROPH/DIAG IV INF INIT: CPT

## 2018-07-21 PROCEDURE — 25000003 PHARM REV CODE 250: Performed by: SURGERY

## 2018-07-21 PROCEDURE — 82553 CREATINE MB FRACTION: CPT

## 2018-07-21 PROCEDURE — 87400 INFLUENZA A/B EACH AG IA: CPT | Mod: 59

## 2018-07-21 PROCEDURE — 83605 ASSAY OF LACTIC ACID: CPT

## 2018-07-21 PROCEDURE — 83735 ASSAY OF MAGNESIUM: CPT

## 2018-07-21 PROCEDURE — 94640 AIRWAY INHALATION TREATMENT: CPT

## 2018-07-21 PROCEDURE — 82550 ASSAY OF CK (CPK): CPT

## 2018-07-21 PROCEDURE — 99285 EMERGENCY DEPT VISIT HI MDM: CPT | Mod: 25

## 2018-07-21 RX ORDER — ONDANSETRON 2 MG/ML
4 INJECTION INTRAMUSCULAR; INTRAVENOUS EVERY 8 HOURS PRN
Status: DISCONTINUED | OUTPATIENT
Start: 2018-07-21 | End: 2018-07-31 | Stop reason: HOSPADM

## 2018-07-21 RX ORDER — IPRATROPIUM BROMIDE AND ALBUTEROL SULFATE 2.5; .5 MG/3ML; MG/3ML
3 SOLUTION RESPIRATORY (INHALATION) EVERY 4 HOURS
Status: DISCONTINUED | OUTPATIENT
Start: 2018-07-22 | End: 2018-07-25

## 2018-07-21 RX ORDER — CEFEPIME HYDROCHLORIDE 1 G/50ML
INJECTION, SOLUTION INTRAVENOUS
Status: DISPENSED
Start: 2018-07-21 | End: 2018-07-22

## 2018-07-21 RX ORDER — ACETAMINOPHEN 10 MG/ML
1000 INJECTION, SOLUTION INTRAVENOUS EVERY 8 HOURS
Status: DISCONTINUED | OUTPATIENT
Start: 2018-07-21 | End: 2018-07-21

## 2018-07-21 RX ORDER — VANCOMYCIN HYDROCHLORIDE 1 G/20ML
INJECTION, POWDER, LYOPHILIZED, FOR SOLUTION INTRAVENOUS
Status: DISPENSED
Start: 2018-07-21 | End: 2018-07-22

## 2018-07-21 RX ORDER — ACETAMINOPHEN 325 MG/1
650 TABLET ORAL EVERY 8 HOURS PRN
Status: DISCONTINUED | OUTPATIENT
Start: 2018-07-21 | End: 2018-07-31 | Stop reason: HOSPADM

## 2018-07-21 RX ORDER — DEXTROSE MONOHYDRATE 50 MG/ML
1000 INJECTION, SOLUTION INTRAVENOUS CONTINUOUS
Status: DISCONTINUED | OUTPATIENT
Start: 2018-07-21 | End: 2018-07-22

## 2018-07-21 RX ORDER — PANTOPRAZOLE SODIUM 40 MG/1
40 TABLET, DELAYED RELEASE ORAL DAILY
Status: DISCONTINUED | OUTPATIENT
Start: 2018-07-22 | End: 2018-07-22

## 2018-07-21 RX ORDER — CEFEPIME HYDROCHLORIDE 1 G/50ML
2 INJECTION, SOLUTION INTRAVENOUS
Status: DISCONTINUED | OUTPATIENT
Start: 2018-07-21 | End: 2018-07-23

## 2018-07-21 RX ORDER — ACETAMINOPHEN 10 MG/ML
1000 INJECTION, SOLUTION INTRAVENOUS
Status: COMPLETED | OUTPATIENT
Start: 2018-07-21 | End: 2018-07-21

## 2018-07-21 RX ADMIN — ACETAMINOPHEN 1000 MG: 10 INJECTION, SOLUTION INTRAVENOUS at 06:07

## 2018-07-21 RX ADMIN — CEFEPIME HYDROCHLORIDE 2 G: 2 INJECTION, SOLUTION INTRAVENOUS at 11:07

## 2018-07-21 RX ADMIN — DEXTROSE 1000 ML: 5 SOLUTION INTRAVENOUS at 11:07

## 2018-07-21 RX ADMIN — SODIUM CHLORIDE 500 ML: 0.9 INJECTION, SOLUTION INTRAVENOUS at 06:07

## 2018-07-21 RX ADMIN — AZITHROMYCIN MONOHYDRATE 500 MG: 500 INJECTION, POWDER, LYOPHILIZED, FOR SOLUTION INTRAVENOUS at 11:07

## 2018-07-21 RX ADMIN — SODIUM CHLORIDE 500 ML: 0.9 INJECTION, SOLUTION INTRAVENOUS at 09:07

## 2018-07-21 RX ADMIN — VANCOMYCIN HYDROCHLORIDE 1250 MG: 1 INJECTION, POWDER, LYOPHILIZED, FOR SOLUTION INTRAVENOUS at 11:07

## 2018-07-21 RX ADMIN — IPRATROPIUM BROMIDE AND ALBUTEROL SULFATE 3 ML: .5; 3 SOLUTION RESPIRATORY (INHALATION) at 11:07

## 2018-07-22 PROBLEM — E86.0 DEHYDRATION: Status: ACTIVE | Noted: 2018-07-22

## 2018-07-22 PROBLEM — E87.0 HYPERNATREMIA: Status: ACTIVE | Noted: 2018-07-22

## 2018-07-22 PROBLEM — L89.150 DECUBITUS ULCER OF SACRAL REGION, UNSTAGEABLE: Status: ACTIVE | Noted: 2018-07-22

## 2018-07-22 PROBLEM — A41.9 SEPSIS: Chronic | Status: ACTIVE | Noted: 2018-06-12

## 2018-07-22 PROBLEM — Z87.898 HISTORY OF AIRWAY ASPIRATION: Status: ACTIVE | Noted: 2018-07-22

## 2018-07-22 LAB
ALBUMIN SERPL BCP-MCNC: 2.8 G/DL
ALP SERPL-CCNC: 73 U/L
ALT SERPL W/O P-5'-P-CCNC: 46 U/L
ANION GAP SERPL CALC-SCNC: 9 MMOL/L
AST SERPL-CCNC: 32 U/L
BASOPHILS # BLD AUTO: 0.05 K/UL
BASOPHILS NFR BLD: 0.5 %
BILIRUB SERPL-MCNC: 1 MG/DL
BUN SERPL-MCNC: 54 MG/DL
CALCIUM SERPL-MCNC: 8.4 MG/DL
CHLORIDE SERPL-SCNC: 122 MMOL/L
CO2 SERPL-SCNC: 26 MMOL/L
CREAT SERPL-MCNC: 0.7 MG/DL
DIFFERENTIAL METHOD: ABNORMAL
EOSINOPHIL # BLD AUTO: 0.1 K/UL
EOSINOPHIL NFR BLD: 1.2 %
ERYTHROCYTE [DISTWIDTH] IN BLOOD BY AUTOMATED COUNT: 14.2 %
EST. GFR  (AFRICAN AMERICAN): >60 ML/MIN/1.73 M^2
EST. GFR  (NON AFRICAN AMERICAN): >60 ML/MIN/1.73 M^2
GLUCOSE SERPL-MCNC: 188 MG/DL
HCT VFR BLD AUTO: 43.9 %
HGB BLD-MCNC: 12.9 G/DL
LACTATE SERPL-SCNC: 1.2 MMOL/L
LYMPHOCYTES # BLD AUTO: 2.2 K/UL
LYMPHOCYTES NFR BLD: 20.3 %
MCH RBC QN AUTO: 30.8 PG
MCHC RBC AUTO-ENTMCNC: 29.4 G/DL
MCV RBC AUTO: 105 FL
MONOCYTES # BLD AUTO: 0.8 K/UL
MONOCYTES NFR BLD: 7.6 %
NEUTROPHILS # BLD AUTO: 7.5 K/UL
NEUTROPHILS NFR BLD: 70.4 %
PLATELET # BLD AUTO: 208 K/UL
PMV BLD AUTO: 11.3 FL
POTASSIUM SERPL-SCNC: 3.3 MMOL/L
PROT SERPL-MCNC: 6.2 G/DL
RBC # BLD AUTO: 4.19 M/UL
SODIUM SERPL-SCNC: 157 MMOL/L
TROPONIN I SERPL DL<=0.01 NG/ML-MCNC: 0.06 NG/ML
WBC # BLD AUTO: 10.59 K/UL

## 2018-07-22 PROCEDURE — 25000242 PHARM REV CODE 250 ALT 637 W/ HCPCS: Performed by: SURGERY

## 2018-07-22 PROCEDURE — 25000003 PHARM REV CODE 250: Performed by: FAMILY MEDICINE

## 2018-07-22 PROCEDURE — 96376 TX/PRO/DX INJ SAME DRUG ADON: CPT

## 2018-07-22 PROCEDURE — 36415 COLL VENOUS BLD VENIPUNCTURE: CPT

## 2018-07-22 PROCEDURE — 96361 HYDRATE IV INFUSION ADD-ON: CPT

## 2018-07-22 PROCEDURE — 94761 N-INVAS EAR/PLS OXIMETRY MLT: CPT

## 2018-07-22 PROCEDURE — 96375 TX/PRO/DX INJ NEW DRUG ADDON: CPT

## 2018-07-22 PROCEDURE — 85025 COMPLETE CBC W/AUTO DIFF WBC: CPT

## 2018-07-22 PROCEDURE — 63600175 PHARM REV CODE 636 W HCPCS: Performed by: FAMILY MEDICINE

## 2018-07-22 PROCEDURE — 96374 THER/PROPH/DIAG INJ IV PUSH: CPT

## 2018-07-22 PROCEDURE — 94640 AIRWAY INHALATION TREATMENT: CPT

## 2018-07-22 PROCEDURE — 80053 COMPREHEN METABOLIC PANEL: CPT

## 2018-07-22 PROCEDURE — 25000003 PHARM REV CODE 250: Performed by: SURGERY

## 2018-07-22 PROCEDURE — 83605 ASSAY OF LACTIC ACID: CPT

## 2018-07-22 PROCEDURE — 27000221 HC OXYGEN, UP TO 24 HOURS

## 2018-07-22 PROCEDURE — C9113 INJ PANTOPRAZOLE SODIUM, VIA: HCPCS | Performed by: FAMILY MEDICINE

## 2018-07-22 PROCEDURE — 84484 ASSAY OF TROPONIN QUANT: CPT

## 2018-07-22 PROCEDURE — 63600175 PHARM REV CODE 636 W HCPCS: Performed by: SURGERY

## 2018-07-22 PROCEDURE — S5010 5% DEXTROSE AND 0.45% SALINE: HCPCS | Performed by: FAMILY MEDICINE

## 2018-07-22 PROCEDURE — 11000001 HC ACUTE MED/SURG PRIVATE ROOM

## 2018-07-22 PROCEDURE — 99223 1ST HOSP IP/OBS HIGH 75: CPT | Mod: AI,,, | Performed by: FAMILY MEDICINE

## 2018-07-22 RX ORDER — RIVASTIGMINE 4.6 MG/24H
1 PATCH, EXTENDED RELEASE TRANSDERMAL DAILY
Status: DISCONTINUED | OUTPATIENT
Start: 2018-07-22 | End: 2018-07-31 | Stop reason: HOSPADM

## 2018-07-22 RX ORDER — CARBIDOPA AND LEVODOPA 25; 100 MG/1; MG/1
1 TABLET ORAL
Status: DISCONTINUED | OUTPATIENT
Start: 2018-07-22 | End: 2018-07-31 | Stop reason: HOSPADM

## 2018-07-22 RX ORDER — NAPROXEN SODIUM 220 MG/1
81 TABLET, FILM COATED ORAL DAILY
Status: DISCONTINUED | OUTPATIENT
Start: 2018-07-22 | End: 2018-07-31 | Stop reason: HOSPADM

## 2018-07-22 RX ORDER — PRAMIPEXOLE DIHYDROCHLORIDE 0.12 MG/1
0.25 TABLET ORAL 3 TIMES DAILY
Status: DISCONTINUED | OUTPATIENT
Start: 2018-07-22 | End: 2018-07-31 | Stop reason: HOSPADM

## 2018-07-22 RX ORDER — LEVOTHYROXINE SODIUM 75 UG/1
75 TABLET ORAL
Status: DISCONTINUED | OUTPATIENT
Start: 2018-07-23 | End: 2018-07-31 | Stop reason: HOSPADM

## 2018-07-22 RX ORDER — TRAMADOL HYDROCHLORIDE 50 MG/1
50 TABLET ORAL EVERY 6 HOURS PRN
Status: DISCONTINUED | OUTPATIENT
Start: 2018-07-22 | End: 2018-07-31 | Stop reason: HOSPADM

## 2018-07-22 RX ORDER — HYDROCODONE BITARTRATE AND ACETAMINOPHEN 7.5; 325 MG/15ML; MG/15ML
15 SOLUTION ORAL EVERY 4 HOURS PRN
Status: DISCONTINUED | OUTPATIENT
Start: 2018-07-22 | End: 2018-07-31 | Stop reason: HOSPADM

## 2018-07-22 RX ORDER — HYDROCODONE BITARTRATE AND ACETAMINOPHEN 7.5; 325 MG/1; MG/1
1 TABLET ORAL EVERY 6 HOURS PRN
Status: DISCONTINUED | OUTPATIENT
Start: 2018-07-22 | End: 2018-07-22

## 2018-07-22 RX ORDER — DEXTROSE MONOHYDRATE AND SODIUM CHLORIDE 5; .45 G/100ML; G/100ML
INJECTION, SOLUTION INTRAVENOUS CONTINUOUS
Status: DISCONTINUED | OUTPATIENT
Start: 2018-07-22 | End: 2018-07-23

## 2018-07-22 RX ORDER — TRAZODONE HYDROCHLORIDE 50 MG/1
50 TABLET ORAL NIGHTLY
Status: DISCONTINUED | OUTPATIENT
Start: 2018-07-22 | End: 2018-07-31 | Stop reason: HOSPADM

## 2018-07-22 RX ORDER — POTASSIUM CHLORIDE 20 MEQ/15ML
20 SOLUTION ORAL 2 TIMES DAILY
Status: DISCONTINUED | OUTPATIENT
Start: 2018-07-22 | End: 2018-07-24

## 2018-07-22 RX ORDER — PANTOPRAZOLE SODIUM 40 MG/10ML
40 INJECTION, POWDER, LYOPHILIZED, FOR SOLUTION INTRAVENOUS DAILY
Status: DISCONTINUED | OUTPATIENT
Start: 2018-07-22 | End: 2018-07-31

## 2018-07-22 RX ADMIN — IPRATROPIUM BROMIDE AND ALBUTEROL SULFATE 3 ML: .5; 3 SOLUTION RESPIRATORY (INHALATION) at 08:07

## 2018-07-22 RX ADMIN — IPRATROPIUM BROMIDE AND ALBUTEROL SULFATE 3 ML: .5; 3 SOLUTION RESPIRATORY (INHALATION) at 07:07

## 2018-07-22 RX ADMIN — HYDROCODONE BITARTRATE AND ACETAMINOPHEN 15 ML: 7.5; 325 SOLUTION ORAL at 12:07

## 2018-07-22 RX ADMIN — POTASSIUM CHLORIDE 20 MEQ: 20 SOLUTION ORAL at 12:07

## 2018-07-22 RX ADMIN — DEXTROSE MONOHYDRATE AND SODIUM CHLORIDE: 5; .45 INJECTION, SOLUTION INTRAVENOUS at 12:07

## 2018-07-22 RX ADMIN — HYDROCODONE BITARTRATE AND ACETAMINOPHEN 15 ML: 7.5; 325 SOLUTION ORAL at 09:07

## 2018-07-22 RX ADMIN — RIVASTIGMINE 1 PATCH: 4.6 PATCH, EXTENDED RELEASE TRANSDERMAL at 12:07

## 2018-07-22 RX ADMIN — IPRATROPIUM BROMIDE AND ALBUTEROL SULFATE 3 ML: .5; 3 SOLUTION RESPIRATORY (INHALATION) at 04:07

## 2018-07-22 RX ADMIN — HYDROCODONE BITARTRATE AND ACETAMINOPHEN 15 ML: 7.5; 325 SOLUTION ORAL at 05:07

## 2018-07-22 RX ADMIN — DEXTROSE MONOHYDRATE AND SODIUM CHLORIDE: 5; .45 INJECTION, SOLUTION INTRAVENOUS at 11:07

## 2018-07-22 RX ADMIN — CARBIDOPA AND LEVODOPA 1 TABLET: 25; 100 TABLET ORAL at 09:07

## 2018-07-22 RX ADMIN — CARBIDOPA AND LEVODOPA 1 TABLET: 25; 100 TABLET ORAL at 05:07

## 2018-07-22 RX ADMIN — TRAZODONE HYDROCHLORIDE 50 MG: 50 TABLET ORAL at 09:07

## 2018-07-22 RX ADMIN — DEXTROSE 1000 ML: 5 SOLUTION INTRAVENOUS at 06:07

## 2018-07-22 RX ADMIN — IPRATROPIUM BROMIDE AND ALBUTEROL SULFATE 3 ML: .5; 3 SOLUTION RESPIRATORY (INHALATION) at 11:07

## 2018-07-22 RX ADMIN — PRAMIPEXOLE DIHYDROCHLORIDE 0.25 MG: 0.12 TABLET ORAL at 09:07

## 2018-07-22 RX ADMIN — PANTOPRAZOLE SODIUM 40 MG: 40 INJECTION, POWDER, LYOPHILIZED, FOR SOLUTION INTRAVENOUS at 12:07

## 2018-07-22 RX ADMIN — CARBIDOPA AND LEVODOPA 1 TABLET: 25; 100 TABLET ORAL at 03:07

## 2018-07-22 RX ADMIN — CEFEPIME HYDROCHLORIDE 2 G: 2 INJECTION, SOLUTION INTRAVENOUS at 05:07

## 2018-07-22 RX ADMIN — POTASSIUM CHLORIDE 20 MEQ: 20 SOLUTION ORAL at 09:07

## 2018-07-22 RX ADMIN — VANCOMYCIN HYDROCHLORIDE 1750 MG: 1 INJECTION, POWDER, LYOPHILIZED, FOR SOLUTION INTRAVENOUS at 11:07

## 2018-07-22 RX ADMIN — IPRATROPIUM BROMIDE AND ALBUTEROL SULFATE 3 ML: .5; 3 SOLUTION RESPIRATORY (INHALATION) at 12:07

## 2018-07-22 RX ADMIN — PRAMIPEXOLE DIHYDROCHLORIDE 0.25 MG: 0.12 TABLET ORAL at 03:07

## 2018-07-22 RX ADMIN — ASPIRIN 81 MG 81 MG: 81 TABLET ORAL at 12:07

## 2018-07-22 RX ADMIN — CEFEPIME HYDROCHLORIDE 2 G: 2 INJECTION, SOLUTION INTRAVENOUS at 10:07

## 2018-07-22 NOTE — ASSESSMENT & PLAN NOTE
I'm not sure how important this is anymore, but will continue her exelon patch as ordered at home

## 2018-07-22 NOTE — SUBJECTIVE & OBJECTIVE
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)     Parkinson disease     RLS (restless legs syndrome)     Stress incontinence     Venous stasis ulcers        Past Surgical History:   Procedure Laterality Date    CARPAL TUNNEL RELEASE      Right    CHOLECYSTECTOMY      GASTROSTOMY TUBE PLACEMENT      PARTIAL HYSTERECTOMY      ROTATOR CUFF REPAIR      TONSILLECTOMY, ADENOIDECTOMY      tonsiles    TOTAL KNEE ARTHROPLASTY         Review of patient's allergies indicates:   Allergen Reactions    Oxycodone      Hallucination    Sulfa (sulfonamide antibiotics)      Other reaction(s): when on contact       No current facility-administered medications on file prior to encounter.      Current Outpatient Prescriptions on File Prior to Encounter   Medication Sig    aspirin 81 MG Chew 1 tablet (81 mg total) by Per G Tube route once daily.    carbidopa-levodopa  mg (SINEMET)  mg per tablet 1 tablet by Per G Tube route 5 (five) times daily.    docusate sodium (COLACE) 100 MG capsule 1 capsule (100 mg total) by Per G Tube route once daily.    hydrocodone-acetaminophen 7.5-325mg (NORCO) 7.5-325 mg per tablet Take 1 tablet by mouth every 12 (twelve) hours as needed for Pain.    LACTOSE-REDUCED FOOD (JEVITY ORAL) 65 mL/hr by PEG Tube route once daily. Via pump on at 6 pm off at 6 am    levoFLOXacin (LEVAQUIN) 500 MG tablet Take 1 tablet (500 mg total) by mouth once daily.    levothyroxine (SYNTHROID) 75 MCG tablet 1 tablet (75 mcg total) by Per G Tube route before breakfast.    omeprazole (PRILOSEC) 40 MG capsule Open 40 mg capsule into g-tube once daily    potassium chloride 10% (KAYCIEL) 20 mEq/15 mL solution  15 mLs (20 mEq total) by Per G Tube route 2 (two) times daily.    pramipexole (MIRAPEX) 0.25 MG tablet 1 tablet (0.25 mg total) by Per G Tube route 3 (three) times daily.    ranitidine (ZANTAC) 150 MG capsule Take 75 mg by mouth 2 (two) times daily.    rivastigmine (EXELON) 4.6 mg/24 hour PT24 Place 1 patch onto the skin once daily.      senna (SENOKOT) 8.6 mg tablet 1 tablet by Per G Tube route once daily.    tiZANidine 2 mg Cap Take 4 mg by mouth nightly as needed.    tramadol (ULTRAM) 50 mg tablet 1 tablet (50 mg total) by Per G Tube route every 6 (six) hours as needed for Pain.    trazodone (DESYREL) 50 MG tablet 1 tablet (50 mg total) by Per G Tube route every evening.     Family History     None        Social History Main Topics    Smoking status: Former Smoker    Smokeless tobacco: Never Used    Alcohol use No    Drug use: No    Sexual activity: Not on file     Review of Systems   Unable to perform ROS: Patient nonverbal     Objective:     Vital Signs (Most Recent):  Temp: 99.9 °F (37.7 °C) (07/22/18 1020)  Pulse: 90 (07/22/18 0827)  Resp: 20 (07/22/18 0827)  BP: 107/62 (07/22/18 0400)  SpO2: 96 % (07/22/18 0827) Vital Signs (24h Range):  Temp:  [97.1 °F (36.2 °C)-102.7 °F (39.3 °C)] 99.9 °F (37.7 °C)  Pulse:  [] 90  Resp:  [18-29] 20  SpO2:  [91 %-98 %] 96 %  BP: ()/(59-74) 107/62     Weight: 63.6 kg (140 lb 3.4 oz)  Body mass index is 21.32 kg/m².    Physical Exam   Constitutional: She appears well-developed.   Demented nonverbal female staring at me as I talk to her.    HENT:   Head: Normocephalic and atraumatic.   Right Ear: External ear normal.   Left Ear: External ear normal.   Nose: Nose normal.   Mouth/Throat: Oropharynx is clear and moist.   Dry cracking black hairy tongue    Eyes: EOM are normal. Pupils are equal, round, and reactive to light.   Neck: Normal range of motion. Neck supple. No JVD present. No tracheal deviation present. No thyromegaly present.   Cardiovascular:  Normal rate, normal heart sounds and intact distal pulses.    No murmur heard.  Pulmonary/Chest: Effort normal and breath sounds normal. No respiratory distress. She has no wheezes. She has no rales. She exhibits no tenderness.   Abdominal: Soft. Bowel sounds are normal. She exhibits no distension and no mass. There is no tenderness. There is no rebound and no guarding.   Musculoskeletal: Normal range of motion. She exhibits no edema or tenderness.   Lymphadenopathy:     She has no cervical adenopathy.   Neurological: She is alert. No cranial nerve deficit. She exhibits abnormal muscle tone.   Flexure contractures of both hands    Skin: Skin is warm and dry. No rash noted. No erythema. No pallor.   Stage 3-4 decubitus of sacrum. Foul smelling. Brown to black discharge          CRANIAL NERVES     CN III, IV, VI   Pupils are equal, round, and reactive to light.  Extraocular motions are normal.        Significant Labs:   Blood Culture:   Recent Labs  Lab 07/21/18 1848 07/21/18 1849   LABBLOO No Growth to date No Growth to date     CBC:   Recent Labs  Lab 07/21/18 1848 07/22/18  0428   WBC 10.34 10.59   HGB 15.2 12.9   HCT 51.4* 43.9    208     CMP:   Recent Labs  Lab 07/21/18 1848 07/22/18 0428   * 157*   K 4.3 3.3*   * 122*   CO2 26 26   * 188*   BUN 64* 54*   CREATININE 1.0 0.7   CALCIUM 9.2 8.4*   PROT 7.4 6.2   ALBUMIN 3.4* 2.8*   BILITOT 0.7 1.0   ALKPHOS 99 73   AST 56* 32   ALT 23 46*   ANIONGAP 12 9   EGFRNONAA 54* >60     Troponin:   Recent Labs  Lab 07/21/18 1848 07/22/18 0428   TROPONINI 0.082* 0.063*     Urine Studies:   Recent Labs  Lab 07/21/18 2024   COLORU Yellow   APPEARANCEUA Hazy*   PHUR 6.0   SPECGRAV 1.020   PROTEINUA 2+*   GLUCUA Negative   KETONESU Trace*   BILIRUBINUA Negative   OCCULTUA Trace*   NITRITE Negative   UROBILINOGEN Negative   LEUKOCYTESUR Negative   RBCUA 4   WBCUA 10*   BACTERIA Few*   SQUAMEPITHEL 3   HYALINECASTS 1       Significant Imaging: I  have reviewed and interpreted all pertinent imaging results/findings within the past 24 hours.

## 2018-07-22 NOTE — EICU
Remote video assessment- Patient sleeping in bed. Safety measures in place. No acute distress noted.

## 2018-07-22 NOTE — ASSESSMENT & PLAN NOTE
This is very unusual for her to be so dehydrated if she is receiving Jevity at 70cc/ hour with flushes as [facility] orders state.  That is not making sense to me.  Hmmm...    Continue IVF D51/2 NS and restart her Jevity feedings at 70/hour. 200ml  Flushes q 6     BMP daily     Will also order oral care as her mouth is very dry with a black hairy tongue

## 2018-07-22 NOTE — H&P
[facility] Medicine  History & Physical    Patient Name: Eddy Cunningham  MRN: [id number]  Admission Date: 7/21/2018  Attending Physician: Kunal Sandoval MD   Primary Care Provider: Ayaka Pollard MD         Patient information was obtained from patient and ER records.     Subjective:     Principal Problem:Sepsis    Chief Complaint:   Chief Complaint   Patient presents with    Fever        HPI: 78 year old demented female with PEG presents from [facility] with fever of 103. Work up in ED is essentially negative, including influenza, and no WBC. Urine clean. She is prerenal and with hypernatremia. She does meet sepsis criteria with HR greater than 90 and fever. BP is on the low side this am, 87/40. Upon further exam up here in ICU she is found to have an unstagable decubitus of sacrum(but at least stage 3 in my opinion).  She is admitted to ICU and placed on Vanc, Cefepime, IVF at 125/hr.    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)     Parkinson disease     RLS (restless legs syndrome)     Stress incontinence     Venous stasis ulcers        Past Surgical History:   Procedure Laterality Date    CARPAL TUNNEL RELEASE      Right    CHOLECYSTECTOMY      GASTROSTOMY TUBE PLACEMENT      PARTIAL HYSTERECTOMY      ROTATOR CUFF REPAIR      TONSILLECTOMY, ADENOIDECTOMY      tonsiles    TOTAL KNEE ARTHROPLASTY         Review of patient's allergies indicates:   Allergen Reactions    Oxycodone      Hallucination    Sulfa (sulfonamide antibiotics)      Other reaction(s): when on contact       No current facility-administered medications on file prior to encounter.      Current  Outpatient Prescriptions on File Prior to Encounter   Medication Sig    aspirin 81 MG Chew 1 tablet (81 mg total) by Per G Tube route once daily.    carbidopa-levodopa  mg (SINEMET)  mg per tablet 1 tablet by Per G Tube route 5 (five) times daily.    docusate sodium (COLACE) 100 MG capsule 1 capsule (100 mg total) by Per G Tube route once daily.    hydrocodone-acetaminophen 7.5-325mg (NORCO) 7.5-325 mg per tablet Take 1 tablet by mouth every 12 (twelve) hours as needed for Pain.    LACTOSE-REDUCED FOOD (JEVITY ORAL) 65 mL/hr by PEG Tube route once daily. Via pump on at 6 pm off at 6 am    levoFLOXacin (LEVAQUIN) 500 MG tablet Take 1 tablet (500 mg total) by mouth once daily.    levothyroxine (SYNTHROID) 75 MCG tablet 1 tablet (75 mcg total) by Per G Tube route before breakfast.    omeprazole (PRILOSEC) 40 MG capsule Open 40 mg capsule into g-tube once daily    potassium chloride 10% (KAYCIEL) 20 mEq/15 mL solution 15 mLs (20 mEq total) by Per G Tube route 2 (two) times daily.    pramipexole (MIRAPEX) 0.25 MG tablet 1 tablet (0.25 mg total) by Per G Tube route 3 (three) times daily.    ranitidine (ZANTAC) 150 MG capsule Take 75 mg by mouth 2 (two) times daily.    rivastigmine (EXELON) 4.6 mg/24 hour PT24 Place 1 patch onto the skin once daily.      senna (SENOKOT) 8.6 mg tablet 1 tablet by Per G Tube route once daily.    tiZANidine 2 mg Cap Take 4 mg by mouth nightly as needed.    tramadol (ULTRAM) 50 mg tablet 1 tablet (50 mg total) by Per G Tube route every 6 (six) hours as needed for Pain.    trazodone (DESYREL) 50 MG tablet 1 tablet (50 mg total) by Per G Tube route every evening.     Family History     None        Social History Main Topics    Smoking status: Former Smoker    Smokeless tobacco: Never Used    Alcohol use No    Drug use: No    Sexual activity: Not on file     Review of Systems   Unable to perform ROS: Patient nonverbal     Objective:     Vital Signs (Most  Recent):  Temp: 99.9 °F (37.7 °C) (07/22/18 1020)  Pulse: 90 (07/22/18 0827)  Resp: 20 (07/22/18 0827)  BP: 107/62 (07/22/18 0400)  SpO2: 96 % (07/22/18 0827) Vital Signs (24h Range):  Temp:  [97.1 °F (36.2 °C)-102.7 °F (39.3 °C)] 99.9 °F (37.7 °C)  Pulse:  [] 90  Resp:  [18-29] 20  SpO2:  [91 %-98 %] 96 %  BP: ()/(59-74) 107/62     Weight: 63.6 kg (140 lb 3.4 oz)  Body mass index is 21.32 kg/m².    Physical Exam   Constitutional: She appears well-developed.   Demented nonverbal female staring at me as I talk to her.    HENT:   Head: Normocephalic and atraumatic.   Right Ear: External ear normal.   Left Ear: External ear normal.   Nose: Nose normal.   Mouth/Throat: Oropharynx is clear and moist.   Dry cracking black hairy tongue    Eyes: EOM are normal. Pupils are equal, round, and reactive to light.   Neck: Normal range of motion. Neck supple. No JVD present. No tracheal deviation present. No thyromegaly present.   Cardiovascular: Normal rate, normal heart sounds and intact distal pulses.    No murmur heard.  Pulmonary/Chest: Effort normal and breath sounds normal. No respiratory distress. She has no wheezes. She has no rales. She exhibits no tenderness.   Abdominal: Soft. Bowel sounds are normal. She exhibits no distension and no mass. There is no tenderness. There is no rebound and no guarding.   Musculoskeletal: Normal range of motion. She exhibits no edema or tenderness.   Lymphadenopathy:     She has no cervical adenopathy.   Neurological: She is alert. No cranial nerve deficit. She exhibits abnormal muscle tone.   Flexure contractures of both hands    Skin: Skin is warm and dry. No rash noted. No erythema. No pallor.   Stage 3-4 decubitus of sacrum. Foul smelling. Brown to black discharge          CRANIAL NERVES     CN III, IV, VI   Pupils are equal, round, and reactive to light.  Extraocular motions are normal.        Significant Labs:   Blood Culture:   Recent Labs  Lab 07/21/18  9432  07/21/18  1849   LABBLOO No Growth to date No Growth to date     CBC:   Recent Labs  Lab 07/21/18 1848 07/22/18  0428   WBC 10.34 10.59   HGB 15.2 12.9   HCT 51.4* 43.9    208     CMP:   Recent Labs  Lab 07/21/18 1848 07/22/18  0428   * 157*   K 4.3 3.3*   * 122*   CO2 26 26   * 188*   BUN 64* 54*   CREATININE 1.0 0.7   CALCIUM 9.2 8.4*   PROT 7.4 6.2   ALBUMIN 3.4* 2.8*   BILITOT 0.7 1.0   ALKPHOS 99 73   AST 56* 32   ALT 23 46*   ANIONGAP 12 9   EGFRNONAA 54* >60     Troponin:   Recent Labs  Lab 07/21/18 1848 07/22/18 0428   TROPONINI 0.082* 0.063*     Urine Studies:   Recent Labs  Lab 07/21/18 2024   COLORU Yellow   APPEARANCEUA Hazy*   PHUR 6.0   SPECGRAV 1.020   PROTEINUA 2+*   GLUCUA Negative   KETONESU Trace*   BILIRUBINUA Negative   OCCULTUA Trace*   NITRITE Negative   UROBILINOGEN Negative   LEUKOCYTESUR Negative   RBCUA 4   WBCUA 10*   BACTERIA Few*   SQUAMEPITHEL 3   HYALINECASTS 1       Significant Imaging: I have reviewed and interpreted all pertinent imaging results/findings within the past 24 hours.    Assessment/Plan:     * Sepsis    Likely due to her decubitus   Covering broad spectrum with vanc and cefepime   Follow blood cultures          Decubitus ulcer of sacral region, unstageable    Order woundcare           Dehydration    This is very unusual for her to be so dehydrated if she is receiving Jevity at 70cc/ hour with flushes as [facility] orders state.  That is not making sense to me.  Hmmm...    Continue IVF D51/2 NS and restart her Jevity feedings at 70/hour. 200ml  Flushes q 6     BMP daily     Will also order oral care as her mouth is very dry with a black hairy tongue        Hypernatremia    Improving   Continue D51/2 NS   BMP daily           History of airway aspiration    PEG tube   NPO          Hypothyroidism due to acquired atrophy of thyroid    Lab Results   Component Value Date    TSH 4.071 (H) 06/10/2018     Continue synthroid 75mcg daily            Functional quadriplegia    Air mattress   She is a DNR           Hypokalemia    Continue Kcl 20 meq daily   BMP daily           Memory disorder    I'm not sure how important this is anymore, but will continue her exelon patch as ordered at home           Parkinson's disease    Continue sinemet             VTE Risk Mitigation         Ordered     IP VTE HIGH RISK PATIENT  Once      07/21/18 2210     Place sequential compression device  Until discontinued      07/21/18 2210        Critical care time spent on the evaluation and treatment of severe organ dysfunction, review of pertinent labs and imaging studies, discussions with consulting providers and discussions with patient/family: 60 minutes.     Kunal Sandoval MD  Department of Hospital Medicine   [facility]

## 2018-07-22 NOTE — NURSING
Pt to ICU via stretcher with cardiac monitor in use. Pt transferred to ICU bed. Placed on 2 liters O2. HOB up for ease of respirations. BLE elevated off bed. Heels + for blanching no breakdown noted. Sacral dressing CDI. Will reassess and obtain pictures with AM bath. Pt alert and able to follow commands. Noted severe impairment to BUE and BLE. Hands contracted. Orders noted. Will continue to monitor. Bed low and locked.

## 2018-07-22 NOTE — PLAN OF CARE
Problem: Patient Care Overview  Goal: Plan of Care Review  Outcome: Ongoing (interventions implemented as appropriate)  Pt arrived to ER from [facility] for elevated temp 103 and dehydration. Temp treated in ER. Once in ICU temp 97.7 tympanic. NA+ 164 D5W at 100ml/hr initiated in ICU as ordered. Arvizu with small amt of sheila urine noted to urometer. Breath sounds Rhonci to Upper respiratory. Pt with weak congested cough. Placed on 2 liters O2. Peg tube to LT upper quad. Flushed without difficulty. No residuals present. Peg clamped at this time. Pt NPO at this time. Pt turned and repositioned every 2 hours and Prn for comfort. No falls or injuries during this evening shift. Bed low locked and Rails up X 2. Will continue to monitor.

## 2018-07-22 NOTE — NURSING
Dressing removed from sacral area. Decubitus noted to midline sacral area. Red areas and slight bleeding noted. Large open wound with brown tissue observe. Very strong malodorous noted. Area cleaned and photos taken and documented. Wound cleaned with moist 4X4, areas patted dry and new mepilex dressing applied. Pt complained with some discomfort to area while tending to wound.

## 2018-07-22 NOTE — ED PROVIDER NOTES
[facility]                                                 Chief Complaint  78 y.o. female with Fever    History of Present Illness  Eddy Cunningham presents to the emergency room with fever at the NH  Patient had fever at the nursing home, patient has severe Parkinson's Dz  Patient is unable to give any history, 102 degree Fahrenheit temperature   Patient has had a history of sepsis, chronic respiratory and UTIs noted  Patient has a good blood pressure but does look dehydrated this p.m.  Has a sodium of 164, pre renal azotemia and dehydration on ER evaluation    The history is provided by the patient   device was not used during this ER visit    Past Medical History   -- COPD (chronic obstructive pulmonary disease)     -- Dementia     -- Depression     -- Elevated C-reactive protein (CRP)     -- Falls frequently     -- Homocystinuria     -- Hypertension     -- Hypopotassemia     -- Hypothyroidism     -- Narcolepsy without cataplexy     -- Parkinson disease     -- RLS (restless legs syndrome)     -- Stress incontinence     -- Venous stasis ulcers        Past Surgical History   -- CARPAL TUNNEL RELEASE       -- CHOLECYSTECTOMY       -- PARTIAL HYSTERECTOMY       -- ROTATOR CUFF REPAIR       -- TONSILLECTOMY, ADENOIDECTOMY       -- TOTAL KNEE ARTHROPLASTY          Review of patient's allergies   -- Oxycodone     -- Sulfa (sulfonamide antibiotics)      Review of Systems and Physical Exam      Review of Systems  -- Unable to obtain due to the patient's dementia state    /68   Pulse 99   Temp 100.3 °F (37.9 °C) (Axillary)   Resp (!) 22     Physical Exam  -- Nursing note and vitals reviewed  -- Constitutional:  Frail dehydrated white female  -- Head: Atraumatic. Normocephalic. No obvious abnormality  -- Eyes: Pupils are equal and reactive to light. Normal conjunctiva and lids  -- Nose: Nose normal in appearance, nares grossly normal. No discharge  -- Cardiac: Normal  rate, regular rhythm and normal heart sounds  -- Pulmonary:  Coarse lung sounds of the bilateral bases with no active wheezing  -- Abdominal: Soft, no tenderness. Normal bowel sounds. Normal liver edge  -- Musculoskeletal: Normal range of motion, no effusions. Joints stable   -- Neurological: No focal deficits. Showed good interaction with staff  -- Vascular: Posterior tibial, dorsalis pedis and radial pulses 2+ bilaterally  3  -- Skin: significant sacral decubitus with necrosed adjacent skin    Emergency Room Course      Lab Results   (HH)   K 4.3    (H)   CO2 26   BUN 64 (H)   CREATININE 1.0    (H)   ALKPHOS 99   AST 56 (H)   ALT 23   BILITOT 0.7   ALBUMIN 3.4 (L)   PROT 7.4   WBC 10.34   HGB 15.2   HCT 51.4 (H)      CPK 66   CPK 66   CPKMB 0.5   TROPONINI 0.082 (H)   INR 1.3 (H)    (H)   DDIMER 1.00 (H)   LACTATE 1.7   MG 2.7 (H)   TSH 4.071 (H)     Urinalysis  -- Urinalysis performed during this ER visit showed no signs of infection     EKG  -- The EKG findings today were without concerning findings from baseline    Radiology  -- Chest x-ray showed no infiltrate and showed no acute pathology    Additional Work up  -- Blood cultures have also been drawn, results are pending    Medications Given  cefepime in dextrose 5 % IVPB 2 g (not administered)   vancomycin (VANCOCIN) 1,250 mg in dextrose 5 % 250 mL IVPB (not administered)   azithromycin 500 mg in dextrose 5 % 250 mL IVPB (ready to mix system) (not administered)   sodium chloride 0.9% bolus 500 mL (0 mLs Intravenous Stopped 7/21/18 1912)   acetaminophen (10 mg/mL) injection 1,000 mg (0 mg Intravenous Stopped 7/21/18 1911)   sodium chloride 0.9% bolus 500 mL (0 mLs Intravenous Stopped 7/21/18 2152)     Critical Care ED Physician Time (minutes):  -- Performed by: Ivan Gutierres M.D.  -- Date/Time: 10:10 PM 7/21/2018   -- Direct Patient Care (Face Time): 10  -- Additional History from Records or Taking Additional History: 10  --  Ordering, Reviewing, and Interpreting Diagnostic Studies: 10  -- Total Time in Documentation: 10  -- Consultation with Other Physicians: 10  -- Consultation with Family Related to Condition: 10  -- Total Critical Care Time:60    Diagnosis  -- Sepsis  -- Fever  -- Dehydration  -- Hypernatremia     Disposition and Plan  -- Disposition: observation  -- Condition: stable  -- Telemetry monitoring  -- Morning labs  -- SCD hoses  -- Home medications  -- Nausea medication when necessary  -- Pain medication when necessary  -- D5W IV fluids  -- Routine monitoring  -- Bed rest until otherwise stateddiet  -- Protonix for GERD prophylaxis  -- Breathing treatments  -- IV antibiotics  -- Blood cultures pending     This note is dictated on Dragon Natural Speaking word recognition program.  There are word recognition mistakes that are occasionally missed on review.                Ivan Gutierres MD  07/22/18 0919

## 2018-07-22 NOTE — ASSESSMENT & PLAN NOTE
Lab Results   Component Value Date    TSH 4.071 (H) 06/10/2018     Continue synthroid 75mcg daily

## 2018-07-22 NOTE — HPI
78 year old demented female with PEG presents from [facility] with fever of 103. Work up in ED is essentially negative, including influenza, and no WBC. Urine clean. She is prerenal and with hypernatremia. She does meet sepsis criteria with HR greater than 90 and fever. BP is on the low side this am, 87/40. Upon further exam up here in ICU she is found to have an unstagable decubitus of sacrum(but at least stage 3 in my opinion).  She is admitted to ICU and placed on Vanc, Cefepime, IVF at 125/hr.

## 2018-07-22 NOTE — ASSESSMENT & PLAN NOTE
Likely due to her decubitus   Covering broad spectrum with vanc and cefepime   Follow blood cultures

## 2018-07-23 PROBLEM — E43 SEVERE PROTEIN-CALORIE MALNUTRITION: Status: ACTIVE | Noted: 2018-07-23

## 2018-07-23 PROBLEM — A41.02 SEPSIS DUE TO METHICILLIN RESISTANT STAPHYLOCOCCUS AUREUS (MRSA): Status: ACTIVE | Noted: 2018-06-12

## 2018-07-23 LAB
ALBUMIN SERPL BCP-MCNC: 2.7 G/DL
ALP SERPL-CCNC: 125 U/L
ALT SERPL W/O P-5'-P-CCNC: 30 U/L
ANION GAP SERPL CALC-SCNC: 8 MMOL/L
AST SERPL-CCNC: 42 U/L
BASOPHILS # BLD AUTO: 0.03 K/UL
BASOPHILS NFR BLD: 0.3 %
BILIRUB SERPL-MCNC: 0.5 MG/DL
BUN SERPL-MCNC: 38 MG/DL
CALCIUM SERPL-MCNC: 8.4 MG/DL
CHLORIDE SERPL-SCNC: 116 MMOL/L
CO2 SERPL-SCNC: 24 MMOL/L
CREAT SERPL-MCNC: 0.7 MG/DL
DIFFERENTIAL METHOD: ABNORMAL
EOSINOPHIL # BLD AUTO: 0.6 K/UL
EOSINOPHIL NFR BLD: 5.5 %
ERYTHROCYTE [DISTWIDTH] IN BLOOD BY AUTOMATED COUNT: 13.7 %
EST. GFR  (AFRICAN AMERICAN): >60 ML/MIN/1.73 M^2
EST. GFR  (NON AFRICAN AMERICAN): >60 ML/MIN/1.73 M^2
GLUCOSE SERPL-MCNC: 176 MG/DL
HCT VFR BLD AUTO: 38.8 %
HGB BLD-MCNC: 11.4 G/DL
LYMPHOCYTES # BLD AUTO: 1.1 K/UL
LYMPHOCYTES NFR BLD: 9.8 %
MAGNESIUM SERPL-MCNC: 2.1 MG/DL
MCH RBC QN AUTO: 30.4 PG
MCHC RBC AUTO-ENTMCNC: 29.4 G/DL
MCV RBC AUTO: 104 FL
MONOCYTES # BLD AUTO: 0.7 K/UL
MONOCYTES NFR BLD: 6.6 %
NEUTROPHILS # BLD AUTO: 8.3 K/UL
NEUTROPHILS NFR BLD: 77.8 %
PHOSPHATE SERPL-MCNC: 2.8 MG/DL
PLATELET # BLD AUTO: 176 K/UL
PMV BLD AUTO: 11.2 FL
POCT GLUCOSE: 98 MG/DL (ref 70–110)
POTASSIUM SERPL-SCNC: 4.1 MMOL/L
PROT SERPL-MCNC: 5.8 G/DL
RBC # BLD AUTO: 3.75 M/UL
SODIUM SERPL-SCNC: 148 MMOL/L
WBC # BLD AUTO: 10.66 K/UL

## 2018-07-23 PROCEDURE — 25000242 PHARM REV CODE 250 ALT 637 W/ HCPCS: Performed by: SURGERY

## 2018-07-23 PROCEDURE — 84100 ASSAY OF PHOSPHORUS: CPT

## 2018-07-23 PROCEDURE — 96376 TX/PRO/DX INJ SAME DRUG ADON: CPT

## 2018-07-23 PROCEDURE — 25000003 PHARM REV CODE 250: Performed by: FAMILY MEDICINE

## 2018-07-23 PROCEDURE — 85025 COMPLETE CBC W/AUTO DIFF WBC: CPT

## 2018-07-23 PROCEDURE — 25000003 PHARM REV CODE 250: Performed by: SURGERY

## 2018-07-23 PROCEDURE — 99233 SBSQ HOSP IP/OBS HIGH 50: CPT | Mod: ,,, | Performed by: INTERNAL MEDICINE

## 2018-07-23 PROCEDURE — C9113 INJ PANTOPRAZOLE SODIUM, VIA: HCPCS | Performed by: FAMILY MEDICINE

## 2018-07-23 PROCEDURE — 11000001 HC ACUTE MED/SURG PRIVATE ROOM

## 2018-07-23 PROCEDURE — 97802 MEDICAL NUTRITION INDIV IN: CPT

## 2018-07-23 PROCEDURE — 96361 HYDRATE IV INFUSION ADD-ON: CPT

## 2018-07-23 PROCEDURE — 63600175 PHARM REV CODE 636 W HCPCS: Performed by: FAMILY MEDICINE

## 2018-07-23 PROCEDURE — S5010 5% DEXTROSE AND 0.45% SALINE: HCPCS | Performed by: FAMILY MEDICINE

## 2018-07-23 PROCEDURE — 94761 N-INVAS EAR/PLS OXIMETRY MLT: CPT

## 2018-07-23 PROCEDURE — 27000221 HC OXYGEN, UP TO 24 HOURS

## 2018-07-23 PROCEDURE — 25000003 PHARM REV CODE 250: Performed by: INTERNAL MEDICINE

## 2018-07-23 PROCEDURE — 63600175 PHARM REV CODE 636 W HCPCS: Performed by: SURGERY

## 2018-07-23 PROCEDURE — 83735 ASSAY OF MAGNESIUM: CPT

## 2018-07-23 PROCEDURE — 80053 COMPREHEN METABOLIC PANEL: CPT

## 2018-07-23 PROCEDURE — 94640 AIRWAY INHALATION TREATMENT: CPT

## 2018-07-23 PROCEDURE — 36415 COLL VENOUS BLD VENIPUNCTURE: CPT

## 2018-07-23 RX ORDER — CLINDAMYCIN HYDROCHLORIDE 150 MG/1
450 CAPSULE ORAL EVERY 6 HOURS
Status: DISCONTINUED | OUTPATIENT
Start: 2018-07-23 | End: 2018-07-31 | Stop reason: HOSPADM

## 2018-07-23 RX ADMIN — ASPIRIN 81 MG 81 MG: 81 TABLET ORAL at 10:07

## 2018-07-23 RX ADMIN — DEXTROSE MONOHYDRATE AND SODIUM CHLORIDE: 5; .45 INJECTION, SOLUTION INTRAVENOUS at 05:07

## 2018-07-23 RX ADMIN — PRAMIPEXOLE DIHYDROCHLORIDE 0.25 MG: 0.12 TABLET ORAL at 10:07

## 2018-07-23 RX ADMIN — IPRATROPIUM BROMIDE AND ALBUTEROL SULFATE 3 ML: .5; 3 SOLUTION RESPIRATORY (INHALATION) at 07:07

## 2018-07-23 RX ADMIN — CARBIDOPA AND LEVODOPA 1 TABLET: 25; 100 TABLET ORAL at 09:07

## 2018-07-23 RX ADMIN — CLINDAMYCIN HYDROCHLORIDE 450 MG: 150 CAPSULE ORAL at 06:07

## 2018-07-23 RX ADMIN — HYDROCODONE BITARTRATE AND ACETAMINOPHEN 15 ML: 7.5; 325 SOLUTION ORAL at 03:07

## 2018-07-23 RX ADMIN — ACETAMINOPHEN 650 MG: 325 TABLET ORAL at 09:07

## 2018-07-23 RX ADMIN — HYDROCODONE BITARTRATE AND ACETAMINOPHEN 15 ML: 7.5; 325 SOLUTION ORAL at 05:07

## 2018-07-23 RX ADMIN — PRAMIPEXOLE DIHYDROCHLORIDE 0.25 MG: 0.12 TABLET ORAL at 03:07

## 2018-07-23 RX ADMIN — PRAMIPEXOLE DIHYDROCHLORIDE 0.25 MG: 0.12 TABLET ORAL at 09:07

## 2018-07-23 RX ADMIN — LEVOFLOXACIN 750 MG: 500 TABLET, FILM COATED ORAL at 11:07

## 2018-07-23 RX ADMIN — TRAZODONE HYDROCHLORIDE 50 MG: 50 TABLET ORAL at 09:07

## 2018-07-23 RX ADMIN — CLINDAMYCIN HYDROCHLORIDE 450 MG: 150 CAPSULE ORAL at 11:07

## 2018-07-23 RX ADMIN — LEVOTHYROXINE SODIUM 75 MCG: 75 TABLET ORAL at 06:07

## 2018-07-23 RX ADMIN — POTASSIUM CHLORIDE 20 MEQ: 20 SOLUTION ORAL at 10:07

## 2018-07-23 RX ADMIN — RIVASTIGMINE 1 PATCH: 4.6 PATCH, EXTENDED RELEASE TRANSDERMAL at 09:07

## 2018-07-23 RX ADMIN — IPRATROPIUM BROMIDE AND ALBUTEROL SULFATE 3 ML: .5; 3 SOLUTION RESPIRATORY (INHALATION) at 04:07

## 2018-07-23 RX ADMIN — IPRATROPIUM BROMIDE AND ALBUTEROL SULFATE 3 ML: .5; 3 SOLUTION RESPIRATORY (INHALATION) at 01:07

## 2018-07-23 RX ADMIN — POTASSIUM CHLORIDE 20 MEQ: 20 SOLUTION ORAL at 09:07

## 2018-07-23 RX ADMIN — CARBIDOPA AND LEVODOPA 1 TABLET: 25; 100 TABLET ORAL at 03:07

## 2018-07-23 RX ADMIN — CARBIDOPA AND LEVODOPA 1 TABLET: 25; 100 TABLET ORAL at 10:07

## 2018-07-23 RX ADMIN — IPRATROPIUM BROMIDE AND ALBUTEROL SULFATE 3 ML: .5; 3 SOLUTION RESPIRATORY (INHALATION) at 11:07

## 2018-07-23 RX ADMIN — CARBIDOPA AND LEVODOPA 1 TABLET: 25; 100 TABLET ORAL at 06:07

## 2018-07-23 RX ADMIN — SODIUM CHLORIDE 500 ML: 0.9 INJECTION, SOLUTION INTRAVENOUS at 10:07

## 2018-07-23 RX ADMIN — HYDROCODONE BITARTRATE AND ACETAMINOPHEN 15 ML: 7.5; 325 SOLUTION ORAL at 11:07

## 2018-07-23 RX ADMIN — PANTOPRAZOLE SODIUM 40 MG: 40 INJECTION, POWDER, LYOPHILIZED, FOR SOLUTION INTRAVENOUS at 10:07

## 2018-07-23 RX ADMIN — CEFEPIME HYDROCHLORIDE 2 G: 2 INJECTION, SOLUTION INTRAVENOUS at 01:07

## 2018-07-23 RX ADMIN — IPRATROPIUM BROMIDE AND ALBUTEROL SULFATE 3 ML: .5; 3 SOLUTION RESPIRATORY (INHALATION) at 03:07

## 2018-07-23 NOTE — HOSPITAL COURSE
7/23/18  Patient is much improved today. She is answering yes and no questions. No fevers. Still no leukocytosis. Vitals are stable. Back on tube feeds.     7/24/18  She has a large decubitus  Which looks like the source of infection .  She c/o back pain  Black eschar by pictures looks like it would need debridement    Her tachycardia is better   Urine output is better ; sodium is getting down ;She is awake and responsive ; weak wet cough .  Her vanc  And cefepime was stopped ;she is on Levaquin and clindamycin via peg   Her BUN /creatinine is still high .  She is running no fevers .  Her BP is still on low side    7/25/18  BP better   Surgery debrided wound at bedside but would like to take her to OR [facility].  Using santyl, but surgery not confident this will be enough   No BM yet       7/26/18 had partial debridment 2 days ago at bedside but painful. Planned for surgery this am; Sx debridement of decubitus. PEG tube feedings held overnight  Afebrile overnight. WBC 6.58. Levofloxacin/ clinda  per peg tube day 4. Day 2 metronidazole day 2  Blood cultures negative x 5 days no grwth x 2  Afebrile, BP& HR better 92/54, HR 81.  NA+ 165 max;> 142,   7/27/18  S/P removal of necrotic skin, fat, muscle and fascia per Dr. Serna 7/26  Pt required hydrocodone x 2 overnight. Afebrile. WBC 6.08; clinda day 5 & metronidazole day 2 per PEG   PEG feedings resumed.   Planning for dc to [place]--she is medically stable    7/27  Remains stable  Labs reviewed, no changes. No need for continued daily labs  She is at her baseline functional status and remains on antibx  Tolerating tube feeds  Ready for discharge when bed available at NH    7/30  Awaiting bed at nursing home. VSS/afebrile. No labs. Remains on flagyl and clinda per peg. Tolerating feeds. Still no BM    7/31/18 Bed available today nursing home placement- will D/C today Remains on flagyl day 7/10 & clinda day 9/10 per PEG tube.

## 2018-07-23 NOTE — CONSULTS
Recommendation for Stage IV sacral decubitus wound care as follows:  Cleanse with saline and gauze, apply Santyl to inside of wound bed, cover sacral area with Aquacel AG, then foam adhesive dressing daily.      Offload sacrum and turn patient at least every two hours.  Optimize nutrition and fluids for wound healing.  Surgical consult for debridement recommended due to large amount of necrotic tissue to wound bed.

## 2018-07-23 NOTE — NURSING
" Pt with moderate amount of green "bile" color gastric contents oozing from peg tube site. Peg tube dressings changed with splint gauze and base of peg secured to abdomen. Pt HOB up about 20 degrees with bath and turning for linen change. Pt with small amount of emesis. Green gastric contents similar to the contents oozing around Peg site. Tube feedings have been off an hour prior to medication to be given. No residuals at that time present. Oral suctioned and oral care done. Pt with HOB up 60 degrees. VSS O2 sats 100% on 2 liter NC    "

## 2018-07-23 NOTE — NURSING
Pt with congested cough. Attempting to cough to clear secretions. Able to use yonker and deep suction back of throat. Suctioned copious amounts of lite yellow thick mucus, Pt tolerated with slight discomfort. Pt able to maintain sats upper 90's

## 2018-07-23 NOTE — PROGRESS NOTES
[facility] Medicine  Progress Note    Patient Name: Eddy Cunningham  MRN: [id number]  Patient Class: IP- Inpatient   Admission Date: 7/21/2018  Length of Stay: 1 days  Attending Physician: Kunal Sandoval MD  Primary Care Provider: Ayaka Pollard MD        Subjective:     Principal Problem:Sepsis due to methicillin resistant Staphylococcus aureus (MRSA)    HPI:  78 year old demented female with PEG presents from [facility] with fever of 103. Work up in ED is essentially negative, including influenza, and no WBC. Urine clean. She is prerenal and with hypernatremia. She does meet sepsis criteria with HR greater than 90 and fever. BP is on the low side this am, 87/40. Upon further exam up here in ICU she is found to have an unstagable decubitus of sacrum(but at least stage 3 in my opinion).  She is admitted to ICU and placed on Vanc, Cefepime, IVF at 125/hr.    Hospital Course:  7/23/18  Patient is much improved today. She is answering yes and no questions. No fevers. Still no leukocytosis. Vitals are stable. Back on tube feeds.     Interval History: improving, more alert    Review of Systems   Unable to perform ROS: Dementia     Objective:     Vital Signs (Most Recent):  Temp: 97.8 °F (36.6 °C) (07/23/18 0729)  Pulse: 93 (07/23/18 0900)  Resp: 16 (07/23/18 0900)  BP: (!) 98/55 (07/23/18 0900)  SpO2: 100 % (07/23/18 0900) Vital Signs (24h Range):  Temp:  [97.8 °F (36.6 °C)-100 °F (37.8 °C)] 97.8 °F (36.6 °C)  Pulse:  [] 93  Resp:  [12-27] 16  SpO2:  [92 %-100 %] 100 %  BP: ()/(50-71) 98/55     Weight: 63.6 kg (140 lb 3.4 oz) (RD reviewed)  Body mass index is 21.32 kg/m².    Intake/Output Summary (Last 24 hours) at 07/23/18 0943  Last data filed at 07/23/18 0912   Gross per 24 hour   Intake             5119 ml   Output              880 ml   Net             4239 ml      Physical Exam   Constitutional: She appears well-developed. No distress.   thin   HENT:   Head:  Normocephalic and atraumatic.   Right Ear: External ear normal.   Left Ear: External ear normal.   Eyes: Conjunctivae and EOM are normal. Pupils are equal, round, and reactive to light. Right eye exhibits no discharge. Left eye exhibits no discharge.   Neck: No tracheal deviation present.   Cardiovascular: Normal rate and regular rhythm.    No murmur heard.  Pulmonary/Chest: Effort normal. No respiratory distress.   Coarse breath sounds b/l, upper airway   Abdominal: Soft. Bowel sounds are normal. She exhibits no distension. There is no tenderness.   PEG in place   Musculoskeletal: She exhibits deformity (diffuse contractures from parkinsons).   Neurological: She is alert.   Not oriented to person, place or time   Skin: Skin is warm and dry.   unstagable decubitus ulcer   Nursing note and vitals reviewed.      Significant Labs:   CBC:   Recent Labs  Lab 07/21/18 1848 07/22/18  0428 07/23/18  0447   WBC 10.34 10.59 10.66   HGB 15.2 12.9 11.4*   HCT 51.4* 43.9 38.8    208 176     CMP:   Recent Labs  Lab 07/21/18 1848 07/22/18  0428 07/23/18  0447   * 157* 148*   K 4.3 3.3* 4.1   * 122* 116*   CO2 26 26 24   * 188* 176*   BUN 64* 54* 38*   CREATININE 1.0 0.7 0.7   CALCIUM 9.2 8.4* 8.4*   PROT 7.4 6.2 5.8*   ALBUMIN 3.4* 2.8* 2.7*   BILITOT 0.7 1.0 0.5   ALKPHOS 99 73 125   AST 56* 32 42*   ALT 23 46* 30   ANIONGAP 12 9 8   EGFRNONAA 54* >60 >60     All pertinent labs within the past 24 hours have been reviewed.    Significant Imaging: I have reviewed all pertinent imaging results/findings within the past 24 hours.    Assessment/Plan:      * Sepsis due to methicillin resistant Staphylococcus aureus (MRSA)    Likely due to her decubitus   Covering broad spectrum with vanc and cefepime --7/23/18 will switch to clindamycin and levaquin which will still give MRSA and pseudomonas coverage in anticipation of discharge  Follow blood cultures--negative  UA clear  CXR clear          Decubitus ulcer of  sacral region, unstageable    Order wound care           Dehydration    This is very unusual for her to be so dehydrated if she is receiving Jevity at 70cc/ hour with flushes as [facility] orders state.    onadmit: IVF D51/2 NS and restart her Jevity feedings at 70/hour. 200ml  Flushes q 6. 7/23: now that back on feeds will hold IVF and ensure stability of labs    BMP daily     Will also order oral care as her mouth is very dry with a black hairy tongue--improved now        Hypernatremia    Improving   D51/2 NS on admit--7/23/18 will hold IVF as back on feeds and ensure labs remain stable  BMP daily           History of airway aspiration    PEG tube --restarted feeds and tolerating. Nutrition consult  NPO          Hypothyroidism due to acquired atrophy of thyroid    Lab Results   Component Value Date    TSH 4.071 (H) 06/10/2018     Continue synthroid 75mcg daily           Functional quadriplegia    Air mattress   She is a DNR           Hypokalemia    Continue Kcl 20 meq daily   BMP daily   K improved          Memory disorder     will continue her exelon patch as ordered at home           Parkinson's disease    Continue sinemet   Multiple contractures            VTE Risk Mitigation         Ordered     IP VTE HIGH RISK PATIENT  Once      07/21/18 2210     Place sequential compression device  Until discontinued      07/21/18 2210          Critical care time spent on the evaluation and treatment of severe organ dysfunction, review of pertinent labs and imaging studies, discussions with consulting providers and discussions with patient/family: 40 minutes.    Kristin Pinto MD  Department of Hospital Medicine   [facility]

## 2018-07-23 NOTE — ASSESSMENT & PLAN NOTE
Malnutrition in the context of Social/Environmental Circumstances    Related to (etiology):  Inability to consume adequate energy intake with questionable accuracy/intake of PTA TF order    Signs and Symptoms (as evidenced by):  Energy Intake: 100% per TF order of estimated energy requirement (question accuracy of order/intake)  Body Fat Depletion: moderate depletion of orbitals and triceps   Muscle Mass Depletion: severe depletion of temples and clavicle region, Acromion very prominent   Weight Loss: 26% x 3 month (significant)   Dehydration, new sacral decubitus    Interventions  Modify composition of enteral nutrition  Modify rate of enteral nutrition  Feeding tube flush    Recommendations (treatment strategy):  Change TF to:  Impact Peptide 1.5 GR 55ml/hr  160ml FWF Q4    Nutrition Diagnosis Status:  New

## 2018-07-23 NOTE — PLAN OF CARE
07/23/18 1214   Discharge Reassessment   Assessment Type Discharge Planning Reassessment       Patient is non verbal at this time. The plan is for patient to return to [facility] up on discharge. CM will continue to follow and assist as needed.

## 2018-07-23 NOTE — ASSESSMENT & PLAN NOTE
Improving   D51/2 NS on admit--7/23/18 will hold IVF as back on feeds and ensure labs remain stable  BMP daily

## 2018-07-23 NOTE — CONSULTS
[facility]  Adult Nutrition  Consult Note    SUMMARY     Recommendations    Recommendation/Intervention:   1. Change PEG Feeds to Impact Peptide 1.5 with GR 55ml/hr with 160ml/hr every 4hrs. projected will provide 1980 calories (100% EEN), 124gm protein (100% EPN), and 1976ml fluid.     Keep HOB elevated. Monitor residuals and hold feeds with >250mls. RD to monitor.    Goals: advancement/tolerance TF to goal rate within 48 hrs  Nutrition Goal Status: new  Communication of RD Recs: reviewed with RN    Nutrition Discharge Planning: PEG feeds to be determined    Reason for Assessment    Reason for Assessment: consult  Diagnosis: infection/sepsis  Relevant Medical History: Parkinson, HTN, COPD, Hypothyroid, Depression, Dementia, GERD  General Information Comments: Known pt admitted from NH with PEG. Pt is non verbal. Per RN, pt came in with dry cracking black hairy tongue and new sacral decubitus. TF started of Isosource 1.5 @ 40ml to increase to GR 70ml/hr. Per chart review, pt with aristides (bile) colored drainage oozing from PEG with +small emesis, no residuals found. TF had been held 1 hr prior to drainage for medication. NFPE completed.    Malnutrition Assessment:  Energy Intake: 100% of estimated energy requirement per NH PTA TF order (question accuracy/actual intake of TF)  Body Fat Depletion: moderate depletion of orbitals and triceps   Muscle Mass Depletion: severe depletion of temples and clavicle region acromion very prominent  Weight Loss: 26% x 3 month   Dehydration, new sacral decubitus ulcer      Nutrition Risk Screen    Nutrition Risk Screen: tube feeding or parenteral nutrition    Nutrition/Diet History    Patient Reported Diet/Restrictions/Preferences: no oral intake  Do you have any cultural, spiritual, Anabaptism conflicts, given your current situation?: Adventism  Food Allergies: NKFA  Factors Affecting Nutritional Intake: NPO, impaired cognitive status/motor control  Nutrition  "Support Formula Prior to Admit: Jevity 1.2  Nutrition Support Rate Prior to Admit: 70 (ml) (question accuracy of order to actual intake PTA due to current status.)  Nutrtion Support Frequency Prior to Admit: [id number]    Anthropometrics    Temp: 97.8 °F (36.6 °C)  Height Method: Stated  Height: 5' 8" (172.7 cm)  Height (inches): 68 in  Weight Method: Bed Scale  Weight: 63.6 kg (140 lb 3.4 oz) (RD reviewed)  Weight (lb): 140.21 lb  Ideal Body Weight (IBW), Female: 140 lb  % Ideal Body Weight, Female (lb): 100.15 lb  BMI (Calculated): 21.4  BMI Grade: 18.5-24.9 - normal  Weight Loss: unintentional  Usual Body Weight (UBW), k.6 kg (18 admit)  Weight Change Amount: 50 lb 11.3 oz (d9qotmi, significant)  % Usual Body Weight: 73.59  % Weight Change From Usual Weight: -26.56 %       Lab/Procedures/Meds    Pertinent Labs Reviewed: reviewed  Pertinent Medications Reviewed: reviewed      Recent Labs  Lab 18  0447   CALCIUM 8.4*   PROT 5.8*   *   K 4.1   CO2 24   *   BUN 38*   CREATININE 0.7   ALKPHOS 125   ALT 30   AST 42*   BILITOT 0.5       Scheduled Meds:   albuterol-ipratropium  3 mL Nebulization Q4H    aspirin  81 mg Per G Tube Daily    carbidopa-levodopa  mg  1 tablet Per G Tube 5x Daily    clindamycin  450 mg Per G Tube Q6H    [START ON 2018] levoFLOXacin  750 mg Per G Tube Daily    levothyroxine  75 mcg Per G Tube Before breakfast    pantoprazole  40 mg Intravenous Daily    potassium chloride 10%  20 mEq Per G Tube BID    pramipexole  0.25 mg Per G Tube TID    rivastigmine  1 patch Transdermal Daily    traZODone  50 mg Per G Tube QHS     Continuous Infusions:  PRN Meds:.acetaminophen, hydrocodone-apap 7.5-325 MG/15 ML, ondansetron, promethazine (PHENERGAN) IVPB, traMADol     Physical Findings/Assessment    Overall Physical Appearance: on oxygen therapy, loss of muscle mass, loss of subcutaneous fat (functional quadriplegia)  Tubes: gastrostomy tube  Oral/Mouth Cavity: "  (dry, cracked, black hairy tongue)  Skin: pressure ulcer(s) (Luca 8)    Estimated/Assessed Needs    Weight Used For Calorie Calculations: 63.6 kg (140 lb 3.4 oz)  Energy Calorie Requirements (kcal): 8241-0370  Energy Need Method: Kcal/kg (30-35)  Protein Requirements:  (1.5-2.0)  Weight Used For Protein Calculations: 63.6 kg (140 lb 3.4 oz)  Fluid Requirements (mL): 1ml/kcal or per MD     RDA Method (mL): 1908         Nutrition Prescription Ordered    Current Diet Order: NPO  Current Nutrition Support Formula Ordered: Isosource 1.5  Current Nutrition Support Rate Ordered: 70 (ml)  Current Nutrition Support Frequency Ordered: ml/hr x24 hrs with 200ml flushes Q6    Evaluation of Received Nutrient/Fluid Intake    Enteral Calories (kcal): 2520  Enteral Protein (gm): 114  Enteral (Free Water) Fluid (mL): 1307  Free Water Flush Fluid (mL): 800  % Kcal Needs: 2520  % Protein Needs: 100  IV Fluid (mL): 3000  Total Fluid Intake (mL): 5107  Energy Calories Required: exceed needs  Protein Required: meeting needs  Fluid Required: exceed needs  % Intake of Estimated Energy Needs: Other: 113 (current Isosource Goal Rate)  % Meal Intake: NPO    Nutrition Risk    Level of Risk/Frequency of Follow-up:  (F/U 2x/wk)     Assessment and Plan    Severe protein-calorie malnutrition    Malnutrition in the context of Social/Environmental Circumstances    Related to (etiology):  Inability to consume adequate energy intake with questionable accuracy/intake of PTA TF order    Signs and Symptoms (as evidenced by):  Energy Intake: 100% per TF order of estimated energy requirement (question accuracy of order/intake)  Body Fat Depletion: moderate depletion of orbitals and triceps   Muscle Mass Depletion: severe depletion of temples and clavicle region, Acromion very prominent   Weight Loss: 26% x 3 month (significant)   Dehydration, new sacral decubitus    Interventions  Modify composition of enteral nutrition  Modify rate of enteral  nutrition  Feeding tube flush    Recommendations (treatment strategy):  Change TF to:  Impact Peptide 1.5 GR 55ml/hr  160ml FWF Q4    Nutrition Diagnosis Status:  New               Monitor and Evaluation    Food and Nutrient Intake: enteral nutrition intake  Food and Nutrient Adminstration: enteral and parenteral nutrition administration  Physical Activity and Function: nutrition-related ADLs and IADLs  Anthropometric Measurements: weight, weight change  Biochemical Data, Medical Tests and Procedures: electrolyte and renal panel, gastrointestinal profile, glucose/endocrine profile, inflammatory profile, lipid profile  Nutrition-Focused Physical Findings: overall appearance     Nutrition Follow-Up    RD Follow-up?: Yes

## 2018-07-23 NOTE — ASSESSMENT & PLAN NOTE
This is very unusual for her to be so dehydrated if she is receiving Jevity at 70cc/ hour with flushes as [facility] orders state.    onadmit: IVF D51/2 NS and restart her Jevity feedings at 70/hour. 200ml  Flushes q 6. 7/23: now that back on feeds will hold IVF and ensure stability of labs    BMP daily     Will also order oral care as her mouth is very dry with a black hairy tongue--improved now

## 2018-07-23 NOTE — PLAN OF CARE
Problem: Patient Care Overview  Goal: Plan of Care Review  Outcome: Ongoing (interventions implemented as appropriate)  Pt admitted with hypernatremia and febrile. Arrived ICU from ER 7/21/18. Sepsis protocol initiated. IVF D5W at 125ml/hr and multiple IV  antibiotics started on admit to ICU. PT presents to ICU with unstagable  Decubitus to sacrum and buttocks area. Photos taken and documents. MD aware and orders noted for wound consult. Pt with clamped Peg on arrival. Orders to start Tube feedings Isosource 1.5 via pump with water flushes 200ml every 6 hours. Pt turned every 2 hours and Prn for comfort. VSS. Arvizu in place with dark sheila moderate amount of UOP noted since admit. Pt receiving respiratory Tx every 4 hours. No falls or injuries this evening shift. Will continue to monitor update PT with changes to POC.

## 2018-07-23 NOTE — SUBJECTIVE & OBJECTIVE
Interval History: improving, more alert    Review of Systems   Unable to perform ROS: Dementia     Objective:     Vital Signs (Most Recent):  Temp: 97.8 °F (36.6 °C) (07/23/18 0729)  Pulse: 93 (07/23/18 0900)  Resp: 16 (07/23/18 0900)  BP: (!) 98/55 (07/23/18 0900)  SpO2: 100 % (07/23/18 0900) Vital Signs (24h Range):  Temp:  [97.8 °F (36.6 °C)-100 °F (37.8 °C)] 97.8 °F (36.6 °C)  Pulse:  [] 93  Resp:  [12-27] 16  SpO2:  [92 %-100 %] 100 %  BP: ()/(50-71) 98/55     Weight: 63.6 kg (140 lb 3.4 oz) (RD reviewed)  Body mass index is 21.32 kg/m².    Intake/Output Summary (Last 24 hours) at 07/23/18 0943  Last data filed at 07/23/18 0912   Gross per 24 hour   Intake             5119 ml   Output              880 ml   Net             4239 ml      Physical Exam   Constitutional: She appears well-developed. No distress.   thin   HENT:   Head: Normocephalic and atraumatic.   Right Ear: External ear normal.   Left Ear: External ear normal.   Eyes: Conjunctivae and EOM are normal. Pupils are equal, round, and reactive to light. Right eye exhibits no discharge. Left eye exhibits no discharge.   Neck: No tracheal deviation present.   Cardiovascular: Normal rate and regular rhythm.    No murmur heard.  Pulmonary/Chest: Effort normal. No respiratory distress.   Coarse breath sounds b/l, upper airway   Abdominal: Soft. Bowel sounds are normal. She exhibits no distension. There is no tenderness.   PEG in place   Musculoskeletal: She exhibits deformity (diffuse contractures from parkinsons).   Neurological: She is alert.   Not oriented to person, place or time   Skin: Skin is warm and dry.   unstagable decubitus ulcer   Nursing note and vitals reviewed.      Significant Labs:   CBC:   Recent Labs  Lab 07/21/18  1848 07/22/18  0428 07/23/18  0447   WBC 10.34 10.59 10.66   HGB 15.2 12.9 11.4*   HCT 51.4* 43.9 38.8    208 176     CMP:   Recent Labs  Lab 07/21/18  1848 07/22/18  0428 07/23/18  0447   * 157* 148*    K 4.3 3.3* 4.1   * 122* 116*   CO2 26 26 24   * 188* 176*   BUN 64* 54* 38*   CREATININE 1.0 0.7 0.7   CALCIUM 9.2 8.4* 8.4*   PROT 7.4 6.2 5.8*   ALBUMIN 3.4* 2.8* 2.7*   BILITOT 0.7 1.0 0.5   ALKPHOS 99 73 125   AST 56* 32 42*   ALT 23 46* 30   ANIONGAP 12 9 8   EGFRNONAA 54* >60 >60     All pertinent labs within the past 24 hours have been reviewed.    Significant Imaging: I have reviewed all pertinent imaging results/findings within the past 24 hours.

## 2018-07-23 NOTE — PLAN OF CARE
Problem: Nutrition, Enteral (Adult)  Intervention: Monitor/Manage Nutrition Support  Goals: advancement/tolerance TF to goal rate within 48 hrs  Nutrition Goal Status: new  Communication of RD Recs: reviewed with RN    Nutrition Discharge Planning: PEG feeds to be determined

## 2018-07-23 NOTE — ASSESSMENT & PLAN NOTE
Likely due to her decubitus   Covering broad spectrum with vanc and cefepime --7/23/18 will switch to clindamycin and levaquin which will still give MRSA and pseudomonas coverage in anticipation of discharge  Follow blood cultures--negative  UA clear  CXR clear

## 2018-07-23 NOTE — NURSING
Tube feedings stopped. No residuals aspirated. Flushed Peg with 60 ml of water. Peg tube clamped for 1 hour prior to synthroid meds to be given and 1 hour post med administration.

## 2018-07-24 PROBLEM — L89.159 PRESSURE ULCER OF SACRUM: Status: ACTIVE | Noted: 2018-07-24

## 2018-07-24 LAB
ALBUMIN SERPL BCP-MCNC: 2.4 G/DL
ALP SERPL-CCNC: 129 U/L
ALT SERPL W/O P-5'-P-CCNC: 41 U/L
ANION GAP SERPL CALC-SCNC: 5 MMOL/L
AST SERPL-CCNC: 70 U/L
BASOPHILS # BLD AUTO: 0.02 K/UL
BASOPHILS NFR BLD: 0.3 %
BILIRUB SERPL-MCNC: 0.4 MG/DL
BUN SERPL-MCNC: 34 MG/DL
CALCIUM SERPL-MCNC: 8.4 MG/DL
CHLORIDE SERPL-SCNC: 113 MMOL/L
CO2 SERPL-SCNC: 25 MMOL/L
CREAT SERPL-MCNC: 0.6 MG/DL
DIFFERENTIAL METHOD: ABNORMAL
EOSINOPHIL # BLD AUTO: 0.5 K/UL
EOSINOPHIL NFR BLD: 7.1 %
ERYTHROCYTE [DISTWIDTH] IN BLOOD BY AUTOMATED COUNT: 13.2 %
EST. GFR  (AFRICAN AMERICAN): >60 ML/MIN/1.73 M^2
EST. GFR  (NON AFRICAN AMERICAN): >60 ML/MIN/1.73 M^2
GLUCOSE SERPL-MCNC: 136 MG/DL
HCT VFR BLD AUTO: 33.2 %
HGB BLD-MCNC: 9.9 G/DL
LYMPHOCYTES # BLD AUTO: 0.9 K/UL
LYMPHOCYTES NFR BLD: 14.1 %
MCH RBC QN AUTO: 30.5 PG
MCHC RBC AUTO-ENTMCNC: 29.8 G/DL
MCV RBC AUTO: 102 FL
MONOCYTES # BLD AUTO: 0.4 K/UL
MONOCYTES NFR BLD: 6.5 %
NEUTROPHILS # BLD AUTO: 4.6 K/UL
NEUTROPHILS NFR BLD: 72 %
PLATELET # BLD AUTO: 140 K/UL
PMV BLD AUTO: 11.6 FL
POTASSIUM SERPL-SCNC: 4.9 MMOL/L
PROT SERPL-MCNC: 5.3 G/DL
RBC # BLD AUTO: 3.25 M/UL
SODIUM SERPL-SCNC: 143 MMOL/L
WBC # BLD AUTO: 6.45 K/UL

## 2018-07-24 PROCEDURE — 25000242 PHARM REV CODE 250 ALT 637 W/ HCPCS: Performed by: SURGERY

## 2018-07-24 PROCEDURE — C9113 INJ PANTOPRAZOLE SODIUM, VIA: HCPCS | Performed by: FAMILY MEDICINE

## 2018-07-24 PROCEDURE — 0JB70ZZ EXCISION OF BACK SUBCUTANEOUS TISSUE AND FASCIA, OPEN APPROACH: ICD-10-PCS | Performed by: SURGERY

## 2018-07-24 PROCEDURE — 96376 TX/PRO/DX INJ SAME DRUG ADON: CPT

## 2018-07-24 PROCEDURE — 25000003 PHARM REV CODE 250: Performed by: FAMILY MEDICINE

## 2018-07-24 PROCEDURE — 25000003 PHARM REV CODE 250: Performed by: INTERNAL MEDICINE

## 2018-07-24 PROCEDURE — 80053 COMPREHEN METABOLIC PANEL: CPT

## 2018-07-24 PROCEDURE — 11000001 HC ACUTE MED/SURG PRIVATE ROOM

## 2018-07-24 PROCEDURE — 63600175 PHARM REV CODE 636 W HCPCS: Performed by: INTERNAL MEDICINE

## 2018-07-24 PROCEDURE — 99233 SBSQ HOSP IP/OBS HIGH 50: CPT | Mod: ,,, | Performed by: INTERNAL MEDICINE

## 2018-07-24 PROCEDURE — 96372 THER/PROPH/DIAG INJ SC/IM: CPT | Mod: 59

## 2018-07-24 PROCEDURE — 85025 COMPLETE CBC W/AUTO DIFF WBC: CPT

## 2018-07-24 PROCEDURE — 94640 AIRWAY INHALATION TREATMENT: CPT

## 2018-07-24 PROCEDURE — 82962 GLUCOSE BLOOD TEST: CPT

## 2018-07-24 PROCEDURE — 36415 COLL VENOUS BLD VENIPUNCTURE: CPT

## 2018-07-24 PROCEDURE — 63600175 PHARM REV CODE 636 W HCPCS: Performed by: FAMILY MEDICINE

## 2018-07-24 PROCEDURE — 99222 1ST HOSP IP/OBS MODERATE 55: CPT | Mod: 25,,, | Performed by: SURGERY

## 2018-07-24 PROCEDURE — 94761 N-INVAS EAR/PLS OXIMETRY MLT: CPT

## 2018-07-24 PROCEDURE — 11042 DBRDMT SUBQ TIS 1ST 20SQCM/<: CPT | Mod: ,,, | Performed by: SURGERY

## 2018-07-24 PROCEDURE — 25000003 PHARM REV CODE 250: Performed by: SURGERY

## 2018-07-24 RX ORDER — ENOXAPARIN SODIUM 100 MG/ML
30 INJECTION SUBCUTANEOUS EVERY 24 HOURS
Status: DISCONTINUED | OUTPATIENT
Start: 2018-07-24 | End: 2018-07-31 | Stop reason: HOSPADM

## 2018-07-24 RX ADMIN — CARBIDOPA AND LEVODOPA 1 TABLET: 25; 100 TABLET ORAL at 06:07

## 2018-07-24 RX ADMIN — CARBIDOPA AND LEVODOPA 1 TABLET: 25; 100 TABLET ORAL at 09:07

## 2018-07-24 RX ADMIN — LEVOTHYROXINE SODIUM 75 MCG: 75 TABLET ORAL at 06:07

## 2018-07-24 RX ADMIN — TRAZODONE HYDROCHLORIDE 50 MG: 50 TABLET ORAL at 09:07

## 2018-07-24 RX ADMIN — IPRATROPIUM BROMIDE AND ALBUTEROL SULFATE 3 ML: .5; 3 SOLUTION RESPIRATORY (INHALATION) at 07:07

## 2018-07-24 RX ADMIN — CLINDAMYCIN HYDROCHLORIDE 450 MG: 150 CAPSULE ORAL at 11:07

## 2018-07-24 RX ADMIN — HYDROCODONE BITARTRATE AND ACETAMINOPHEN 15 ML: 7.5; 325 SOLUTION ORAL at 02:07

## 2018-07-24 RX ADMIN — RIVASTIGMINE 1 PATCH: 4.6 PATCH, EXTENDED RELEASE TRANSDERMAL at 08:07

## 2018-07-24 RX ADMIN — ACETAMINOPHEN 650 MG: 325 TABLET ORAL at 07:07

## 2018-07-24 RX ADMIN — COLLAGENASE SANTYL: 250 OINTMENT TOPICAL at 09:07

## 2018-07-24 RX ADMIN — CLINDAMYCIN HYDROCHLORIDE 450 MG: 150 CAPSULE ORAL at 05:07

## 2018-07-24 RX ADMIN — PRAMIPEXOLE DIHYDROCHLORIDE 0.25 MG: 0.12 TABLET ORAL at 08:07

## 2018-07-24 RX ADMIN — CARBIDOPA AND LEVODOPA 1 TABLET: 25; 100 TABLET ORAL at 05:07

## 2018-07-24 RX ADMIN — PANTOPRAZOLE SODIUM 40 MG: 40 INJECTION, POWDER, LYOPHILIZED, FOR SOLUTION INTRAVENOUS at 08:07

## 2018-07-24 RX ADMIN — IPRATROPIUM BROMIDE AND ALBUTEROL SULFATE 3 ML: .5; 3 SOLUTION RESPIRATORY (INHALATION) at 01:07

## 2018-07-24 RX ADMIN — PRAMIPEXOLE DIHYDROCHLORIDE 0.25 MG: 0.12 TABLET ORAL at 09:07

## 2018-07-24 RX ADMIN — IPRATROPIUM BROMIDE AND ALBUTEROL SULFATE 3 ML: .5; 3 SOLUTION RESPIRATORY (INHALATION) at 08:07

## 2018-07-24 RX ADMIN — LEVOFLOXACIN 750 MG: 500 TABLET, FILM COATED ORAL at 08:07

## 2018-07-24 RX ADMIN — IPRATROPIUM BROMIDE AND ALBUTEROL SULFATE 3 ML: .5; 3 SOLUTION RESPIRATORY (INHALATION) at 04:07

## 2018-07-24 RX ADMIN — ASPIRIN 81 MG 81 MG: 81 TABLET ORAL at 08:07

## 2018-07-24 RX ADMIN — TRAMADOL HYDROCHLORIDE 50 MG: 50 TABLET, FILM COATED ORAL at 11:07

## 2018-07-24 RX ADMIN — ACETAMINOPHEN 650 MG: 325 TABLET ORAL at 09:07

## 2018-07-24 RX ADMIN — IPRATROPIUM BROMIDE AND ALBUTEROL SULFATE 3 ML: .5; 3 SOLUTION RESPIRATORY (INHALATION) at 03:07

## 2018-07-24 RX ADMIN — ENOXAPARIN SODIUM 30 MG: 100 INJECTION SUBCUTANEOUS at 04:07

## 2018-07-24 RX ADMIN — CARBIDOPA AND LEVODOPA 1 TABLET: 25; 100 TABLET ORAL at 02:07

## 2018-07-24 RX ADMIN — PRAMIPEXOLE DIHYDROCHLORIDE 0.25 MG: 0.12 TABLET ORAL at 02:07

## 2018-07-24 RX ADMIN — CLINDAMYCIN HYDROCHLORIDE 450 MG: 150 CAPSULE ORAL at 06:07
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
## 2018-07-24 NOTE — NURSING
Notified Dr. Pinto of decreased systolic blood pressures in 80s with MAPs in upper 50s over past hour. Urine output approximately 30ml/hr. Mentation at baseline and unchanged. Glucose at 98 per capillary blood via glucometer. New orders noted. Will continue to monitor.

## 2018-07-24 NOTE — CONSULTS
[facility]  General Surgery  Consult Note    Consults  Subjective:     Chief Complaint/Reason for Admission:    History of Present Illness: 79 yo female nursing home resident with dementia has sacral pressure ulcer.  She was seen yesterday by wound care nurse and Rashi was ordered.  I was consulted today for surgical evaluation.      No current facility-administered medications on file prior to encounter.      Current Outpatient Prescriptions on File Prior to Encounter   Medication Sig    aspirin 81 MG Chew 1 tablet (81 mg total) by Per G Tube route once daily.    carbidopa-levodopa  mg (SINEMET)  mg per tablet 1 tablet by Per G Tube route 5 (five) times daily.    docusate sodium (COLACE) 100 MG capsule 1 capsule (100 mg total) by Per G Tube route once daily.    hydrocodone-acetaminophen 7.5-325mg (NORCO) 7.5-325 mg per tablet Take 1 tablet by mouth every 12 (twelve) hours as needed for Pain.    LACTOSE-REDUCED FOOD (JEVITY ORAL) 65 mL/hr by PEG Tube route once daily. Via pump on at 6 pm off at 6 am    levoFLOXacin (LEVAQUIN) 500 MG tablet Take 1 tablet (500 mg total) by mouth once daily.    levothyroxine (SYNTHROID) 75 MCG tablet 1 tablet (75 mcg total) by Per G Tube route before breakfast.    omeprazole (PRILOSEC) 40 MG capsule Open 40 mg capsule into g-tube once daily    potassium chloride 10% (KAYCIEL) 20 mEq/15 mL solution 15 mLs (20 mEq total) by Per G Tube route 2 (two) times daily.    pramipexole (MIRAPEX) 0.25 MG tablet 1 tablet (0.25 mg total) by Per G Tube route 3 (three) times daily.    ranitidine (ZANTAC) 150 MG capsule Take 75 mg by mouth 2 (two) times daily.    rivastigmine (EXELON) 4.6 mg/24 hour PT24 Place 1 patch onto the skin once daily.      senna (SENOKOT) 8.6 mg tablet 1 tablet by Per G Tube route once daily.    tiZANidine 2 mg Cap Take 4 mg by mouth nightly as needed.    tramadol (ULTRAM) 50 mg tablet 1 tablet (50 mg total) by Per G Tube route  every 6 (six) hours as needed for Pain.    trazodone (DESYREL) 50 MG tablet 1 tablet (50 mg total) by Per G Tube route every evening.       Review of patient's allergies indicates:   Allergen Reactions    Oxycodone      Hallucination    Sulfa (sulfonamide antibiotics)      Other reaction(s): when on contact       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)     Parkinson disease     RLS (restless legs syndrome)     Stress incontinence     Venous stasis ulcers      Past Surgical History:   Procedure Laterality Date    CARPAL TUNNEL RELEASE      Right    CHOLECYSTECTOMY      GASTROSTOMY TUBE PLACEMENT      PARTIAL HYSTERECTOMY      ROTATOR CUFF REPAIR      TONSILLECTOMY, ADENOIDECTOMY      tonsiles    TOTAL KNEE ARTHROPLASTY       Family History     None        Social History Main Topics    Smoking status: Former Smoker    Smokeless tobacco: Never Used    Alcohol use No    Drug use: No    Sexual activity: Not on file     Review of Systems   Skin: Positive for wound.        buttock   Neurological:        +Dementia   All other systems reviewed and are negative.    Objective:     Vital Signs (Most Recent):  Temp: 97.7 °F (36.5 °C) (07/24/18 1101)  Pulse: 90 (07/24/18 1345)  Resp: (!) 25 (07/24/18 1345)  BP: (!) 90/44 (07/24/18 1330)  SpO2: (!) 92 % (07/24/18 1345) Vital Signs (24h Range):  Temp:  [96.9 °F (36.1 °C)-100.4 °F (38 °C)] 97.7 °F (36.5 °C)  Pulse:  [] 90  Resp:  [12-28] 25  SpO2:  [92 %-100 %] 92 %  BP: ()/(41-57) 90/44     Weight: 74.5 kg (164 lb 3.9 oz)  Body mass index is 24.97 kg/m².      Intake/Output Summary (Last 24 hours) at 07/24/18 1422  Last data filed at 07/24/18 1100   Gross  per 24 hour   Intake             1855 ml   Output              875 ml   Net              980 ml       Physical Exam   Constitutional: She appears well-developed.   Neck: Normal range of motion.   Pulmonary/Chest: Effort normal.   Abdominal:   Pt with PEG tube in place   Skin:   Pt with full thickness unstageable sacral pressure ulcer.  Measures [id number] with black foul smelling necrotic skin edges and base of wound.  Some sharp debridement will need to be done today.         Significant Labs:  BMP:   Recent Labs  Lab 07/23/18  0447 07/24/18  0307   * 136*   * 143   K 4.1 4.9   * 113*   CO2 24 25   BUN 38* 34*   CREATININE 0.7 0.6   CALCIUM 8.4* 8.4*   MG 2.1  --      CBC:   Recent Labs  Lab 07/24/18  0307   WBC 6.45   RBC 3.25*   HGB 9.9*   HCT 33.2*   *   *   MCH 30.5   MCHC 29.8*       Significant Diagnostics:  None    Assessment/Plan:     Active Diagnoses:    Diagnosis Date Noted POA    PRINCIPAL PROBLEM:  Sepsis due to methicillin resistant Staphylococcus aureus (MRSA) [A41.02] 06/12/2018 Yes    Pressure ulcer of sacrum [L89.159] 07/24/2018 Yes    Severe protein-calorie malnutrition [E43] 07/23/2018 Unknown    History of airway aspiration [Z87.898] 07/22/2018 Yes    Hypernatremia [E87.0] 07/22/2018 Yes    Dehydration [E86.0] 07/22/2018 Yes    Decubitus ulcer of sacral region, unstageable [L89.150] 07/22/2018 Yes    Hypothyroidism due to acquired atrophy of thyroid [E03.4] 06/11/2018 Yes    Functional quadriplegia [R53.2] 05/19/2017 Yes    Hypokalemia [E87.6] 05/18/2017 Yes    Memory disorder [R41.3] 03/14/2013 Yes    Parkinson's disease [G20] 11/13/2012 Yes      Problems Resolved During this Admission:    Diagnosis Date Noted Date Resolved POA     Plan for bedside debridement today  Continue Santyl daily  Continue offloading  Dr Serna to evaluate again on Thursday morning    Thank you for your consult.     Robbin Guy Jr, MD  General Surgery  [facility]

## 2018-07-24 NOTE — PLAN OF CARE
Problem: Patient Care Overview  Goal: Plan of Care Review  Outcome: Ongoing (interventions implemented as appropriate)  Pt admitted to [facility] on 07/21/18 for sepsis/dehydration. Required 500ml fluid bolus x 1 to maintain adequate blood pressure during shift, then saline locked once again. Blood pressures still labile at times .Had low grade 100.4 temp at beginning of shift. Afebrile for remainder of shift.Could possible be masked by admin of Tyleno for pain management. Urine output totaling 420ml at this time with at least 30ml/hr. Tolerated goal tube feedings of Impact @ 55ml/hr with 160ml H2O flushes every 4 hours. Tongue somewhat dry, but now pink. Oral care provided every 4 hours.  Pt has coarse congested lung sounds with poor cough reflex, therefore difficult to clear secretions at rear of throat. Remains 100% on room air throughout shift with respirations even, unlabored and WNL. Site care performed to PEG and verification of placement at 2cm marking on tube .Air bolus auscultated and gastric contents detected when assessing residual.Foam dressing to sacrum remains dry and intact. General surgery consult today for possible debridement. Mentation at baseline and unchanged. Pain management accomplished with administration of Tylenol x 1 and Norco x 1. Weight shift assistance provided q 2 hours. Safety maintained. Call bell in reach. Will continue to monitor.

## 2018-07-24 NOTE — PLAN OF CARE
Dr Guy spoke with pt and pts daughter concerning pt wound status and possible debridement, drsg removed per Dr Guy foul smell noted from wound, black loose skin noted to center of wound and to edges and base of wound, He informed pt and pts daughter of need for debridement, small amount of nonviable tissue removed, santyl on gauze applied to site with Aquacel also applied. Pt tolerated well.

## 2018-07-24 NOTE — PROCEDURES
Pt with full thickness unstageable pressure ulcer to sacrum.  The wound is 3x3cm wih depth of 2cm.  The necrotic tissue seems to involve muscle but unsure if bone is involved.  Wet, black foul smelling skin edges and subcutaneous tissue were removed sharply with scissors.  This area removed was less than 20 cm squared.  The wound was packed with 4x4 and Santyl and covered with Aquacel Ag.  She may need further debridement in OR in next few days.

## 2018-07-24 NOTE — PLAN OF CARE
Pt presents awake alert opens eyes spontaneously, follow simple commands. slow speech at present, oriented to person and place reoriented to time and situation. dry tongue noted, oral care done. HOB elevated 30 degrees. VS stable at present. BBS coarse with rhonchi noted bilaterally. Weak cough noted, encouraged cough and deep breathing unable to cough strongly. resp tx ordered routinely. PIV to left arm intact secured and flushed.PEG to left to mid abd intact secured to abd with continual TF infusing no residual noted.  Arvizu to BSD adequate UOP at present. Discussed plan of care with pt reinforcement needed. continuing to monitor.

## 2018-07-24 NOTE — ASSESSMENT & PLAN NOTE
Continue Kcl 20 meq daily   BMP daily   K improved  BMP  Lab Results   Component Value Date     07/24/2018    K 4.9 07/24/2018     (H) 07/24/2018    CO2 25 07/24/2018    BUN 34 (H) 07/24/2018    CREATININE 0.6 07/24/2018    CALCIUM 8.4 (L) 07/24/2018    ANIONGAP 5 (L) 07/24/2018    ESTGFRAFRICA >60 07/24/2018    EGFRNONAA >60 07/24/2018     DC KCL

## 2018-07-24 NOTE — PROGRESS NOTES
[facility] Hospital Medicine  Progress Note    Patient Name: Eddy Cunningham  MRN: [id number]  Patient Class: IP- Inpatient   Admission Date: 7/21/2018  Length of Stay: 2 days  Attending Physician: Kunal Sandoval MD  Primary Care Provider: Ayaka Pollard MD        Subjective:     Principal Problem:Sepsis due to methicillin resistant Staphylococcus aureus (MRSA)    HPI:  78 year old demented female with PEG presents from [facility] with fever of 103. Work up in ED is essentially negative, including influenza, and no WBC. Urine clean. She is prerenal and with hypernatremia. She does meet sepsis criteria with HR greater than 90 and fever. BP is on the low side this am, 87/40. Upon further exam up here in ICU she is found to have an unstagable decubitus of sacrum(but at least stage 3 in my opinion).  She is admitted to ICU and placed on Vanc, Cefepime, IVF at 125/hr.    Hospital Course:  7/23/18  Patient is much improved today. She is answering yes and no questions. No fevers. Still no leukocytosis. Vitals are stable. Back on tube feeds.     7/24/18  She has a large decubitus  Which looks like the source of infection .  She c/o back pain  Black eschar by pictures looks like it would need debridement    Her tachycardia is better   Urine output is better ; sodium is getting down ;She is awake and responsive ; weak wet cough .  Her vanc  And cefepime was stopped ;she is on Levaquin and clindamycin via peg   Her BUN /creatinine is still high .  She is running no fevers .  Her BP is still on low side      Interval History: see above    Review of Systems   Constitutional: Negative for fever.   Respiratory: Positive for cough.    Musculoskeletal: Positive for back pain.   Neurological:        Moans and groans      Objective:     Vital Signs (Most Recent):  Temp: 96.9 °F (36.1 °C) (07/24/18 0715)  Pulse: 80 (07/24/18 0730)  Resp: 20 (07/24/18 0730)  BP: (!) 86/51 (07/24/18 0700)  SpO2: 99 %  (07/24/18 0730) Vital Signs (24h Range):  Temp:  [96.9 °F (36.1 °C)-100.4 °F (38 °C)] 96.9 °F (36.1 °C)  Pulse:  [] 80  Resp:  [12-24] 20  SpO2:  [93 %-100 %] 99 %  BP: ()/(41-58) 86/51     Weight: 74.5 kg (164 lb 3.9 oz)  Body mass index is 24.97 kg/m².    Intake/Output Summary (Last 24 hours) at 07/24/18 0700  Last data filed at 07/24/18 0615   Gross per 24 hour   Intake          2338.33 ml   Output              835 ml   Net          1503.33 ml      Physical Exam   Constitutional: She appears well-developed. No distress.   thin   HENT:   Head: Normocephalic and atraumatic.   Right Ear: External ear normal.   Left Ear: External ear normal.   Eyes: Conjunctivae and EOM are normal. Pupils are equal, round, and reactive to light. Right eye exhibits no discharge. Left eye exhibits no discharge.   Neck: No tracheal deviation present.   Cardiovascular: Normal rate and regular rhythm.    No murmur heard.  Pulmonary/Chest: Effort normal. No respiratory distress.   Coarse breath sounds b/l, upper airway   Abdominal: Soft. Bowel sounds are normal. She exhibits no distension. There is no tenderness.   PEG in place   Musculoskeletal: She exhibits deformity (diffuse contractures from parkinsons).   Neurological: She is alert.   oriented to person, place or time     Skin: Skin is warm and dry.   Black eschar covering a large unstagable decubitus ulcer on her sacrum    Nursing note and vitals reviewed.      Significant Labs:   CBC:   Recent Labs  Lab 07/23/18 0447 07/24/18  0307   WBC 10.66 6.45   HGB 11.4* 9.9*   HCT 38.8 33.2*    140*     CMP:   Recent Labs  Lab 07/23/18 0447 07/24/18  0307   * 143   K 4.1 4.9   * 113*   CO2 24 25   * 136*   BUN 38* 34*   CREATININE 0.7 0.6   CALCIUM 8.4* 8.4*   PROT 5.8* 5.3*   ALBUMIN 2.7* 2.4*   BILITOT 0.5 0.4   ALKPHOS 125 129   AST 42* 70*   ALT 30 41   ANIONGAP 8 5*   EGFRNONAA >60 >60     Blood  Culture: no growth      Significant Imaging: CXR: I  have reviewed all pertinent results/findings within the past 24 hours and my personal findings are:  The cardiomediastinal silhouette is within normal limits.  The lungs are well expanded without consolidation or pleural effusion.  A right shoulder arthroplasty is noted.  Cholecystectomy clips are present.  There is a chronic fracture of a right upper rib.    Assessment/Plan:      * Sepsis due to methicillin resistant Staphylococcus aureus (MRSA)    Likely due to her decubitus   Covering broad spectrum with vanc and cefepime --7/23/18 will switch to clindamycin and levaquin which will still give MRSA and pseudomonas coverage in anticipation of discharge  Follow blood cultures--negative  UA clear  CXR clear          Severe protein-calorie malnutrition      Continue jevity         Decubitus ulcer of sacral region, unstageable    Wound care consult   May need debridement         Dehydration    Improved   BMP daily           Hypernatremia    Improved   Off of IVF  On water flushes via PEG          History of airway aspiration    PEG tube --restarted feeds and tolerating. Nutrition consult  NPO          Hypothyroidism due to acquired atrophy of thyroid    Lab Results   Component Value Date    TSH 4.071 (H) 06/10/2018     Continue synthroid 75mcg daily           Functional quadriplegia    Air mattress   She is a DNR           Hypokalemia    Continue Kcl 20 meq daily   BMP daily   K improved  BMP  Lab Results   Component Value Date     07/24/2018    K 4.9 07/24/2018     (H) 07/24/2018    CO2 25 07/24/2018    BUN 34 (H) 07/24/2018    CREATININE 0.6 07/24/2018    CALCIUM 8.4 (L) 07/24/2018    ANIONGAP 5 (L) 07/24/2018    ESTGFRAFRICA >60 07/24/2018    EGFRNONAA >60 07/24/2018     DC KCL          Memory disorder     will continue her exelon patch as ordered at home           Parkinson's disease    Continue sinemet   Multiple contractures            VTE Risk Mitigation         Ordered     IP VTE HIGH RISK PATIENT   Once      07/21/18 2210     Place sequential compression device  Until discontinued      07/21/18 2210          Critical care time spent on the evaluation and treatment of severe organ dysfunction, review of pertinent labs and imaging studies, discussions with consulting providers and discussions with patient/family: 30 minutes.    Hayder Khalil MD  Department of Hospital Medicine   [facility]

## 2018-07-24 NOTE — SUBJECTIVE & OBJECTIVE
Interval History: see above    Review of Systems   Constitutional: Negative for fever.   Respiratory: Positive for cough.    Musculoskeletal: Positive for back pain.   Neurological:        Moans and groans      Objective:     Vital Signs (Most Recent):  Temp: 96.9 °F (36.1 °C) (07/24/18 0715)  Pulse: 80 (07/24/18 0730)  Resp: 20 (07/24/18 0730)  BP: (!) 86/51 (07/24/18 0700)  SpO2: 99 % (07/24/18 0730) Vital Signs (24h Range):  Temp:  [96.9 °F (36.1 °C)-100.4 °F (38 °C)] 96.9 °F (36.1 °C)  Pulse:  [] 80  Resp:  [12-24] 20  SpO2:  [93 %-100 %] 99 %  BP: ()/(41-58) 86/51     Weight: 74.5 kg (164 lb 3.9 oz)  Body mass index is 24.97 kg/m².    Intake/Output Summary (Last 24 hours) at 07/24/18 0737  Last data filed at 07/24/18 0615   Gross per 24 hour   Intake          2338.33 ml   Output              835 ml   Net          1503.33 ml      Physical Exam   Constitutional: She appears well-developed. No distress.   thin   HENT:   Head: Normocephalic and atraumatic.   Right Ear: External ear normal.   Left Ear: External ear normal.   Eyes: Conjunctivae and EOM are normal. Pupils are equal, round, and reactive to light. Right eye exhibits no discharge. Left eye exhibits no discharge.   Neck: No tracheal deviation present.   Cardiovascular: Normal rate and regular rhythm.    No murmur heard.  Pulmonary/Chest: Effort normal. No respiratory distress.   Coarse breath sounds b/l, upper airway   Abdominal: Soft. Bowel sounds are normal. She exhibits no distension. There is no tenderness.   PEG in place   Musculoskeletal: She exhibits deformity (diffuse contractures from parkinsons).   Neurological: She is alert.   oriented to person, place or time     Skin: Skin is warm and dry.   Black eschar covering a large unstagable decubitus ulcer on her sacrum    Nursing note and vitals reviewed.      Significant Labs:   CBC:   Recent Labs  Lab 07/23/18  0447 07/24/18  0307   WBC 10.66 6.45   HGB 11.4* 9.9*   HCT 38.8 33.2*   PLT  176 140*     CMP:   Recent Labs  Lab 07/23/18  0447 07/24/18  0307   * 143   K 4.1 4.9   * 113*   CO2 24 25   * 136*   BUN 38* 34*   CREATININE 0.7 0.6   CALCIUM 8.4* 8.4*   PROT 5.8* 5.3*   ALBUMIN 2.7* 2.4*   BILITOT 0.5 0.4   ALKPHOS 125 129   AST 42* 70*   ALT 30 41   ANIONGAP 8 5*   EGFRNONAA >60 >60     Blood  Culture: no growth      Significant Imaging: CXR: I have reviewed all pertinent results/findings within the past 24 hours and my personal findings are:  The cardiomediastinal silhouette is within normal limits.  The lungs are well expanded without consolidation or pleural effusion.  A right shoulder arthroplasty is noted.  Cholecystectomy clips are present.  There is a chronic fracture of a right upper rib.

## 2018-07-24 NOTE — PLAN OF CARE
Discussed pt status and reviewed chart with Dr Khalil he assessed pt and new orders noted. Dr Khalil aware of BP variability, SBP  87-90, continuing to monitor

## 2018-07-25 PROBLEM — L89.159 PRESSURE ULCER OF SACRUM: Status: RESOLVED | Noted: 2018-07-24 | Resolved: 2018-07-25

## 2018-07-25 LAB
ALBUMIN SERPL BCP-MCNC: 2.4 G/DL
ALP SERPL-CCNC: 121 U/L
ALT SERPL W/O P-5'-P-CCNC: 39 U/L
ANION GAP SERPL CALC-SCNC: 7 MMOL/L
AST SERPL-CCNC: 30 U/L
BASOPHILS # BLD AUTO: 0.01 K/UL
BASOPHILS NFR BLD: 0.2 %
BILIRUB SERPL-MCNC: 0.3 MG/DL
BUN SERPL-MCNC: 32 MG/DL
CALCIUM SERPL-MCNC: 8.5 MG/DL
CHLORIDE SERPL-SCNC: 109 MMOL/L
CO2 SERPL-SCNC: 25 MMOL/L
CREAT SERPL-MCNC: 0.7 MG/DL
DIFFERENTIAL METHOD: ABNORMAL
EOSINOPHIL # BLD AUTO: 0.3 K/UL
EOSINOPHIL NFR BLD: 4.1 %
ERYTHROCYTE [DISTWIDTH] IN BLOOD BY AUTOMATED COUNT: 13.5 %
EST. GFR  (AFRICAN AMERICAN): >60 ML/MIN/1.73 M^2
EST. GFR  (NON AFRICAN AMERICAN): >60 ML/MIN/1.73 M^2
GLUCOSE SERPL-MCNC: 148 MG/DL
HCT VFR BLD AUTO: 32.1 %
HGB BLD-MCNC: 9.8 G/DL
LYMPHOCYTES # BLD AUTO: 1.1 K/UL
LYMPHOCYTES NFR BLD: 16.1 %
MCH RBC QN AUTO: 30.7 PG
MCHC RBC AUTO-ENTMCNC: 30.5 G/DL
MCV RBC AUTO: 101 FL
MONOCYTES # BLD AUTO: 0.4 K/UL
MONOCYTES NFR BLD: 6 %
NEUTROPHILS # BLD AUTO: 4.9 K/UL
NEUTROPHILS NFR BLD: 73.6 %
PLATELET # BLD AUTO: 156 K/UL
PMV BLD AUTO: 12.1 FL
POTASSIUM SERPL-SCNC: 4.3 MMOL/L
PROT SERPL-MCNC: 5.4 G/DL
RBC # BLD AUTO: 3.19 M/UL
SODIUM SERPL-SCNC: 141 MMOL/L
WBC # BLD AUTO: 6.63 K/UL

## 2018-07-25 PROCEDURE — 25000003 PHARM REV CODE 250: Performed by: INTERNAL MEDICINE

## 2018-07-25 PROCEDURE — 63600175 PHARM REV CODE 636 W HCPCS: Performed by: INTERNAL MEDICINE

## 2018-07-25 PROCEDURE — C9113 INJ PANTOPRAZOLE SODIUM, VIA: HCPCS | Performed by: FAMILY MEDICINE

## 2018-07-25 PROCEDURE — 25000003 PHARM REV CODE 250: Performed by: SURGERY

## 2018-07-25 PROCEDURE — 25000242 PHARM REV CODE 250 ALT 637 W/ HCPCS: Performed by: FAMILY MEDICINE

## 2018-07-25 PROCEDURE — 25000003 PHARM REV CODE 250: Performed by: FAMILY MEDICINE

## 2018-07-25 PROCEDURE — 96372 THER/PROPH/DIAG INJ SC/IM: CPT | Mod: 59

## 2018-07-25 PROCEDURE — 99232 SBSQ HOSP IP/OBS MODERATE 35: CPT | Mod: ,,, | Performed by: FAMILY MEDICINE

## 2018-07-25 PROCEDURE — 80053 COMPREHEN METABOLIC PANEL: CPT

## 2018-07-25 PROCEDURE — 96376 TX/PRO/DX INJ SAME DRUG ADON: CPT

## 2018-07-25 PROCEDURE — 36415 COLL VENOUS BLD VENIPUNCTURE: CPT

## 2018-07-25 PROCEDURE — 11000001 HC ACUTE MED/SURG PRIVATE ROOM

## 2018-07-25 PROCEDURE — 25000242 PHARM REV CODE 250 ALT 637 W/ HCPCS: Performed by: SURGERY

## 2018-07-25 PROCEDURE — 85025 COMPLETE CBC W/AUTO DIFF WBC: CPT

## 2018-07-25 PROCEDURE — 63600175 PHARM REV CODE 636 W HCPCS: Performed by: FAMILY MEDICINE

## 2018-07-25 PROCEDURE — 94761 N-INVAS EAR/PLS OXIMETRY MLT: CPT

## 2018-07-25 PROCEDURE — 94640 AIRWAY INHALATION TREATMENT: CPT

## 2018-07-25 RX ORDER — METRONIDAZOLE 500 MG/1
500 TABLET ORAL EVERY 8 HOURS
Status: DISCONTINUED | OUTPATIENT
Start: 2018-07-25 | End: 2018-07-31 | Stop reason: HOSPADM

## 2018-07-25 RX ORDER — IPRATROPIUM BROMIDE AND ALBUTEROL SULFATE 2.5; .5 MG/3ML; MG/3ML
3 SOLUTION RESPIRATORY (INHALATION)
Status: DISCONTINUED | OUTPATIENT
Start: 2018-07-25 | End: 2018-07-31 | Stop reason: HOSPADM

## 2018-07-25 RX ADMIN — LEVOFLOXACIN 750 MG: 500 TABLET, FILM COATED ORAL at 08:07

## 2018-07-25 RX ADMIN — CLINDAMYCIN HYDROCHLORIDE 450 MG: 150 CAPSULE ORAL at 11:07

## 2018-07-25 RX ADMIN — LEVOTHYROXINE SODIUM 75 MCG: 75 TABLET ORAL at 05:07

## 2018-07-25 RX ADMIN — IPRATROPIUM BROMIDE AND ALBUTEROL SULFATE 3 ML: .5; 3 SOLUTION RESPIRATORY (INHALATION) at 07:07

## 2018-07-25 RX ADMIN — CARBIDOPA AND LEVODOPA 1 TABLET: 25; 100 TABLET ORAL at 05:07

## 2018-07-25 RX ADMIN — IPRATROPIUM BROMIDE AND ALBUTEROL SULFATE 3 ML: .5; 3 SOLUTION RESPIRATORY (INHALATION) at 03:07

## 2018-07-25 RX ADMIN — HYDROCODONE BITARTRATE AND ACETAMINOPHEN 15 ML: 7.5; 325 SOLUTION ORAL at 12:07

## 2018-07-25 RX ADMIN — ASPIRIN 81 MG 81 MG: 81 TABLET ORAL at 08:07

## 2018-07-25 RX ADMIN — CARBIDOPA AND LEVODOPA 1 TABLET: 25; 100 TABLET ORAL at 02:07

## 2018-07-25 RX ADMIN — TRAMADOL HYDROCHLORIDE 50 MG: 50 TABLET, FILM COATED ORAL at 11:07

## 2018-07-25 RX ADMIN — CARBIDOPA AND LEVODOPA 1 TABLET: 25; 100 TABLET ORAL at 10:07

## 2018-07-25 RX ADMIN — TRAZODONE HYDROCHLORIDE 50 MG: 50 TABLET ORAL at 08:07

## 2018-07-25 RX ADMIN — CLINDAMYCIN HYDROCHLORIDE 450 MG: 150 CAPSULE ORAL at 05:07

## 2018-07-25 RX ADMIN — ACETAMINOPHEN 650 MG: 325 TABLET ORAL at 03:07

## 2018-07-25 RX ADMIN — METRONIDAZOLE 500 MG: 500 TABLET ORAL at 02:07

## 2018-07-25 RX ADMIN — HYDROCODONE BITARTRATE AND ACETAMINOPHEN 15 ML: 7.5; 325 SOLUTION ORAL at 08:07

## 2018-07-25 RX ADMIN — PRAMIPEXOLE DIHYDROCHLORIDE 0.25 MG: 0.12 TABLET ORAL at 03:07

## 2018-07-25 RX ADMIN — CARBIDOPA AND LEVODOPA 1 TABLET: 25; 100 TABLET ORAL at 11:07

## 2018-07-25 RX ADMIN — SODIUM CHLORIDE 500 ML: 0.9 INJECTION, SOLUTION INTRAVENOUS at 03:07

## 2018-07-25 RX ADMIN — IPRATROPIUM BROMIDE AND ALBUTEROL SULFATE 3 ML: .5; 3 SOLUTION RESPIRATORY (INHALATION) at 11:07

## 2018-07-25 RX ADMIN — CLINDAMYCIN HYDROCHLORIDE 450 MG: 150 CAPSULE ORAL at 12:07

## 2018-07-25 RX ADMIN — HYDROCODONE BITARTRATE AND ACETAMINOPHEN 15 ML: 7.5; 325 SOLUTION ORAL at 05:07

## 2018-07-25 RX ADMIN — COLLAGENASE SANTYL: 250 OINTMENT TOPICAL at 10:07

## 2018-07-25 RX ADMIN — PRAMIPEXOLE DIHYDROCHLORIDE 0.25 MG: 0.12 TABLET ORAL at 08:07

## 2018-07-25 RX ADMIN — ENOXAPARIN SODIUM 30 MG: 100 INJECTION SUBCUTANEOUS at 05:07

## 2018-07-25 RX ADMIN — RIVASTIGMINE 1 PATCH: 4.6 PATCH, EXTENDED RELEASE TRANSDERMAL at 09:07

## 2018-07-25 RX ADMIN — PANTOPRAZOLE SODIUM 40 MG: 40 INJECTION, POWDER, LYOPHILIZED, FOR SOLUTION INTRAVENOUS at 08:07

## 2018-07-25 RX ADMIN — METRONIDAZOLE 500 MG: 500 TABLET ORAL at 11:07

## 2018-07-25 RX ADMIN — IPRATROPIUM BROMIDE AND ALBUTEROL SULFATE 3 ML: .5; 3 SOLUTION RESPIRATORY (INHALATION) at 12:07

## 2018-07-25 NOTE — NURSING
Pt transferred from ICU via bed. Report received from [facility]ENC[facility].  IV fluids infusing to peripheral line. Continuous feedings to PEG. Vitals stable. Temperature 99.8 axillary. Positioned in bed using pillows; HOB elevated. Placed on contact isolation.

## 2018-07-25 NOTE — SUBJECTIVE & OBJECTIVE
Interval History: see      Review of Systems   Constitutional: Negative for fever.   Skin: Positive for wound.   Neurological: Positive for weakness.     Objective:     Vital Signs (Most Recent):  Temp: 98.8 °F (37.1 °C) (07/25/18 0701)  Pulse: 84 (07/25/18 0754)  Resp: 20 (07/25/18 0754)  BP: (!) 109/50 (07/25/18 0700)  SpO2: 100 % (07/25/18 0754) Vital Signs (24h Range):  Temp:  [97.7 °F (36.5 °C)-100.4 °F (38 °C)] 98.8 °F (37.1 °C)  Pulse:  [] 84  Resp:  [13-38] 20  SpO2:  [92 %-100 %] 100 %  BP: ()/(34-57) 109/50     Weight: 74.5 kg (164 lb 3.9 oz)  Body mass index is 24.97 kg/m².    Intake/Output Summary (Last 24 hours) at 07/25/18 0806  Last data filed at 07/25/18 0600   Gross per 24 hour   Intake             2400 ml   Output              785 ml   Net             1615 ml      Physical Exam   Constitutional: She appears well-developed.   Pleasant female singing in bed     HENT:   Head: Normocephalic and atraumatic.   Right Ear: External ear normal.   Left Ear: External ear normal.   Nose: Nose normal.   Mouth/Throat: Oropharynx is clear and moist.   Dry cracking black hairy tongue resolved. Now pink   Eyes: EOM are normal. Pupils are equal, round, and reactive to light.   Neck: Normal range of motion. Neck supple. No JVD present. No tracheal deviation present. No thyromegaly present.   Cardiovascular: Normal rate, normal heart sounds and intact distal pulses.    No murmur heard.  Pulmonary/Chest: Effort normal and breath sounds normal. No respiratory distress. She has no wheezes. She has no rales. She exhibits no tenderness.   Abdominal: Soft. Bowel sounds are normal. She exhibits no distension and no mass. There is no tenderness. There is no rebound and no guarding.   Musculoskeletal: Normal range of motion. She exhibits no edema or tenderness.   Lymphadenopathy:     She has no cervical adenopathy.   Neurological: She is alert. No cranial nerve deficit. She exhibits abnormal muscle tone.    Flexure contractures of both hands    Skin: Skin is warm and dry. No rash noted. No erythema. No pallor.   Stage 3-4 decubitus of sacrum. Foul smelling. Brown to black discharge        Significant Labs:   Blood Culture: No results for input(s): LABBLOO in the last 48 hours.  CBC:   Recent Labs  Lab 07/24/18  0307 07/25/18  0401   WBC 6.45 6.63   HGB 9.9* 9.8*   HCT 33.2* 32.1*   * 156     CMP:   Recent Labs  Lab 07/24/18  0307 07/25/18  0401    141   K 4.9 4.3   * 109   CO2 25 25   * 148*   BUN 34* 32*   CREATININE 0.6 0.7   CALCIUM 8.4* 8.5*   PROT 5.3* 5.4*   ALBUMIN 2.4* 2.4*   BILITOT 0.4 0.3   ALKPHOS 129 121   AST 70* 30   ALT 41 39   ANIONGAP 5* 7*   EGFRNONAA >60 >60       Significant Imaging: I have reviewed and interpreted all pertinent imaging results/findings within the past 24 hours.

## 2018-07-25 NOTE — PLAN OF CARE
Dr Sandoval updated on pt status BP decreased , pt remains coherent and UOP adequate, new order noted NS bolus started as ordered

## 2018-07-25 NOTE — PLAN OF CARE
Portable CM initiated, transported to 3rd floor in bed with assistance per Juanis' PCT. Stable upon transfer

## 2018-07-25 NOTE — PROGRESS NOTES
[facility] Medicine  Progress Note    Patient Name: Eddy Cunningham  MRN: [id number]  Patient Class: IP- Inpatient   Admission Date: 7/21/2018  Length of Stay: 3 days  Attending Physician: Kunal Sandoval MD  Primary Care Provider: Ayaka Pollard MD        Subjective:     Principal Problem:Sepsis due to methicillin resistant Staphylococcus aureus (MRSA)    HPI:  78 year old demented female with PEG presents from [facility][note 3] with fever of 103. Work up in ED is essentially negative, including influenza, and no WBC. Urine clean. She is prerenal and with hypernatremia. She does meet sepsis criteria with HR greater than 90 and fever. BP is on the low side this am, 87/40. Upon further exam up here in ICU she is found to have an unstagable decubitus of sacrum(but at least stage 3 in my opinion).  She is admitted to ICU and placed on Vanc, Cefepime, IVF at 125/hr.    Hospital Course:  7/23/18  Patient is much improved today. She is answering yes and no questions. No fevers. Still no leukocytosis. Vitals are stable. Back on tube feeds.     7/24/18  She has a large decubitus  Which looks like the source of infection .  She c/o back pain  Black eschar by pictures looks like it would need debridement    Her tachycardia is better   Urine output is better ; sodium is getting down ;She is awake and responsive ; weak wet cough .  Her vanc  And cefepime was stopped ;she is on Levaquin and clindamycin via peg   Her BUN /creatinine is still high .  She is running no fevers .  Her BP is still on low side    7/25/18  BP better   Surgery [facility] wound at [facility]  Using santyl, but surgery not confident this will be enough   No BM yet           Interval History: see HC     Review of Systems   Constitutional: Negative for fever.   Skin: Positive for wound.   Neurological: Positive for weakness.     Objective:     Vital Signs (Most Recent):  Temp: 98.8 °F (37.1  °C) (07/25/18 0701)  Pulse: 84 (07/25/18 0754)  Resp: 20 (07/25/18 0754)  BP: (!) 109/50 (07/25/18 0700)  SpO2: 100 % (07/25/18 0754) Vital Signs (24h Range):  Temp:  [97.7 °F (36.5 °C)-100.4 °F (38 °C)] 98.8 °F (37.1 °C)  Pulse:  [] 84  Resp:  [13-38] 20  SpO2:  [92 %-100 %] 100 %  BP: ()/(34-57) 109/50     Weight: 74.5 kg (164 lb 3.9 oz)  Body mass index is 24.97 kg/m².    Intake/Output Summary (Last 24 hours) at 07/25/18 0806  Last data filed at 07/25/18 0600   Gross per 24 hour   Intake             2400 ml   Output              785 ml   Net             1615 ml      Physical Exam   Constitutional: She appears well-developed.   Pleasant female singing in bed     HENT:   Head: Normocephalic and atraumatic.   Right Ear: External ear normal.   Left Ear: External ear normal.   Nose: Nose normal.   Mouth/Throat: Oropharynx is clear and moist.   Dry cracking black hairy tongue resolved. Now pink   Eyes: EOM are normal. Pupils are equal, round, and reactive to light.   Neck: Normal range of motion. Neck supple. No JVD present. No tracheal deviation present. No thyromegaly present.   Cardiovascular: Normal rate, normal heart sounds and intact distal pulses.    No murmur heard.  Pulmonary/Chest: Effort normal and breath sounds normal. No respiratory distress. She has no wheezes. She has no rales. She exhibits no tenderness.   Abdominal: Soft. Bowel sounds are normal. She exhibits no distension and no mass. There is no tenderness. There is no rebound and no guarding.   Musculoskeletal: Normal range of motion. She exhibits no edema or tenderness.   Lymphadenopathy:     She has no cervical adenopathy.   Neurological: She is alert. No cranial nerve deficit. She exhibits abnormal muscle tone.   Flexure contractures of both hands    Skin: Skin is warm and dry. No rash noted. No erythema. No pallor.   Stage 3-4 decubitus of sacrum. Foul smelling. Brown to black discharge        Significant Labs:   Blood Culture: No  results for input(s): LABBLOO in the last 48 hours.  CBC:   Recent Labs  Lab 07/24/18  0307 07/25/18  0401   WBC 6.45 6.63   HGB 9.9* 9.8*   HCT 33.2* 32.1*   * 156     CMP:   Recent Labs  Lab 07/24/18  0307 07/25/18  0401    141   K 4.9 4.3   * 109   CO2 25 25   * 148*   BUN 34* 32*   CREATININE 0.6 0.7   CALCIUM 8.4* 8.5*   PROT 5.3* 5.4*   ALBUMIN 2.4* 2.4*   BILITOT 0.4 0.3   ALKPHOS 129 121   AST 70* 30   ALT 41 39   ANIONGAP 5* 7*   EGFRNONAA >60 >60       Significant Imaging: I have reviewed and interpreted all pertinent imaging results/findings within the past 24 hours.    Assessment/Plan:      * Sepsis due to methicillin resistant Staphylococcus aureus (MRSA)    Likely due to her decubitus   Covering broad spectrum with vanc and cefepime --7/23/18 will switch to clindamycin and levaquin which will still give MRSA and pseudomonas coverage in anticipation of discharge  Will add flagyl 7/25 for better anaerobic coverage given proximity of wound to her rectum    Follow blood cultures--negative  UA clear  CXR clear          Severe protein-calorie malnutrition      Continue isosource        Decubitus ulcer of sacral region, unstageable    Wound care consult   May need debridement in OR tomoro per surgery   She needs to resume constipation meds but will wait til after surgery to avoid potential of uncontrollable loose stools on wound bed during surgery        Dehydration    Improved   BMP daily           Hypernatremia    Improved   Off of IVF  On water flushes via PEG          History of airway aspiration    PEG tube --restarted feeds and tolerating. Nutrition consult  NPO          Hypothyroidism due to acquired atrophy of thyroid    Lab Results   Component Value Date    TSH 4.071 (H) 06/10/2018     Continue synthroid 75mcg daily           Acute blood loss anemia              Functional quadriplegia    Air mattress   She is a DNR           Hypokalemia    Continue Kcl 20 meq daily   BMP  daily   K improved  BMP  Lab Results   Component Value Date     07/25/2018    K 4.3 07/25/2018     07/25/2018    CO2 25 07/25/2018    BUN 32 (H) 07/25/2018    CREATININE 0.7 07/25/2018    CALCIUM 8.5 (L) 07/25/2018    ANIONGAP 7 (L) 07/25/2018    ESTGFRAFRICA >60 07/25/2018    EGFRNONAA >60 07/25/2018     DC KCL          Memory disorder     will continue her exelon patch as ordered at home           Parkinson's disease    Continue sinemet   Multiple contractures            VTE Risk Mitigation         Ordered     enoxaparin injection 30 mg  Daily      07/24/18 0749     IP VTE HIGH RISK PATIENT  Once      07/21/18 2210     Place sequential compression device  Until discontinued      07/21/18 2210          Critical care time spent on the evaluation and treatment of severe organ dysfunction, review of pertinent labs and imaging studies, discussions with consulting providers and discussions with patient/family: 35 minutes.    Kunal Sandoval MD  Department of Hospital Medicine   [facility]

## 2018-07-25 NOTE — ASSESSMENT & PLAN NOTE
Continue Kcl 20 meq daily   BMP daily   K improved  BMP  Lab Results   Component Value Date     07/25/2018    K 4.3 07/25/2018     07/25/2018    CO2 25 07/25/2018    BUN 32 (H) 07/25/2018    CREATININE 0.7 07/25/2018    CALCIUM 8.5 (L) 07/25/2018    ANIONGAP 7 (L) 07/25/2018    ESTGFRAFRICA >60 07/25/2018    EGFRNONAA >60 07/25/2018     DC KCL

## 2018-07-25 NOTE — PLAN OF CARE
Problem: Patient Care Overview  Goal: Plan of Care Review  Outcome: Ongoing (interventions implemented as appropriate)  Patient admit 7/21 with fever from nursing home; to CCU for sepsis w/u and dehydration. Patient now receiving TF and free water bolus via PEG tube; tolerating well with adequate U/O per zee. Patient oriented to self at this time with report of back pain relieved by Hydrocodone elixir. Sacral decubitus with surgery/wound care following; dsg changed this shift, purulent drainage noted. T-max 100.4. Instructed patient on POC with follow up needed.

## 2018-07-25 NOTE — ASSESSMENT & PLAN NOTE
Likely due to her decubitus   Covering broad spectrum with vanc and cefepime --7/23/18 will switch to clindamycin and levaquin which will still give MRSA and pseudomonas coverage in anticipation of discharge  Will add flagyl 7/25 for better anaerobic coverage given proximity of wound to her rectum    Follow blood cultures--negative  UA clear  CXR clear

## 2018-07-25 NOTE — ASSESSMENT & PLAN NOTE
Wound care consult   May need debridement in OR tomoro per surgery   She needs to resume constipation meds but will wait til after surgery to avoid potential of uncontrollable loose stools on wound bed during surgery

## 2018-07-25 NOTE — PLAN OF CARE
Discussed pt status, reviewed chart and plan of care with Dr Sandoval, he then assessed pt. New orders noted.

## 2018-07-25 NOTE — PLAN OF CARE
Pt presents with eyes close easily arousable humming , answers questions slowly appropriatley. Reoriented to time and situation, CM in progress alarms set and on. NAD. BB with coarseness to upper lobes and diminished to bases. VS stable. PIV to right arm intact secured and flushed. PEG to mid to left  abd intact secured with Isosource TF infusing well to site. Arvizu to BSD. Continuing to monitor.

## 2018-07-26 ENCOUNTER — ANESTHESIA EVENT (OUTPATIENT)
Dept: SURGERY | Facility: HOSPITAL | Age: 78
DRG: 853 | End: 2018-07-26
Payer: MEDICARE

## 2018-07-26 ENCOUNTER — ANESTHESIA (OUTPATIENT)
Dept: SURGERY | Facility: HOSPITAL | Age: 78
DRG: 853 | End: 2018-07-26
Payer: MEDICARE

## 2018-07-26 LAB
ALBUMIN SERPL BCP-MCNC: 2.4 G/DL
ALP SERPL-CCNC: 109 U/L
ALT SERPL W/O P-5'-P-CCNC: 23 U/L
ANION GAP SERPL CALC-SCNC: 6 MMOL/L
AST SERPL-CCNC: 41 U/L
BASOPHILS # BLD AUTO: 0.02 K/UL
BASOPHILS NFR BLD: 0.3 %
BILIRUB SERPL-MCNC: 0.3 MG/DL
BUN SERPL-MCNC: 29 MG/DL
CALCIUM SERPL-MCNC: 8.6 MG/DL
CHLORIDE SERPL-SCNC: 109 MMOL/L
CO2 SERPL-SCNC: 27 MMOL/L
CREAT SERPL-MCNC: 0.6 MG/DL
DIFFERENTIAL METHOD: ABNORMAL
EOSINOPHIL # BLD AUTO: 0.3 K/UL
EOSINOPHIL NFR BLD: 4.9 %
ERYTHROCYTE [DISTWIDTH] IN BLOOD BY AUTOMATED COUNT: 14 %
EST. GFR  (AFRICAN AMERICAN): >60 ML/MIN/1.73 M^2
EST. GFR  (NON AFRICAN AMERICAN): >60 ML/MIN/1.73 M^2
GLUCOSE SERPL-MCNC: 118 MG/DL
HCT VFR BLD AUTO: 32.3 %
HGB BLD-MCNC: 9.8 G/DL
LYMPHOCYTES # BLD AUTO: 1 K/UL
LYMPHOCYTES NFR BLD: 15.5 %
MCH RBC QN AUTO: 30.4 PG
MCHC RBC AUTO-ENTMCNC: 30.3 G/DL
MCV RBC AUTO: 100 FL
MONOCYTES # BLD AUTO: 0.6 K/UL
MONOCYTES NFR BLD: 8.7 %
NEUTROPHILS # BLD AUTO: 4.7 K/UL
NEUTROPHILS NFR BLD: 70.6 %
PLATELET # BLD AUTO: 195 K/UL
PMV BLD AUTO: 12 FL
POTASSIUM SERPL-SCNC: 4.1 MMOL/L
PROT SERPL-MCNC: 5.3 G/DL
RBC # BLD AUTO: 3.22 M/UL
SODIUM SERPL-SCNC: 142 MMOL/L
WBC # BLD AUTO: 6.58 K/UL

## 2018-07-26 PROCEDURE — 36000707: Performed by: SURGERY

## 2018-07-26 PROCEDURE — 0JB70ZZ EXCISION OF BACK SUBCUTANEOUS TISSUE AND FASCIA, OPEN APPROACH: ICD-10-PCS | Performed by: SURGERY

## 2018-07-26 PROCEDURE — 36415 COLL VENOUS BLD VENIPUNCTURE: CPT

## 2018-07-26 PROCEDURE — 99232 SBSQ HOSP IP/OBS MODERATE 35: CPT | Mod: ,,, | Performed by: FAMILY MEDICINE

## 2018-07-26 PROCEDURE — 96372 THER/PROPH/DIAG INJ SC/IM: CPT | Mod: 59

## 2018-07-26 PROCEDURE — 85025 COMPLETE CBC W/AUTO DIFF WBC: CPT

## 2018-07-26 PROCEDURE — 96376 TX/PRO/DX INJ SAME DRUG ADON: CPT

## 2018-07-26 PROCEDURE — 71000033 HC RECOVERY, INTIAL HOUR: Performed by: SURGERY

## 2018-07-26 PROCEDURE — 36000706: Performed by: SURGERY

## 2018-07-26 PROCEDURE — 25000003 PHARM REV CODE 250: Performed by: NURSE ANESTHETIST, CERTIFIED REGISTERED

## 2018-07-26 PROCEDURE — 63600175 PHARM REV CODE 636 W HCPCS: Performed by: FAMILY MEDICINE

## 2018-07-26 PROCEDURE — 25000003 PHARM REV CODE 250: Performed by: INTERNAL MEDICINE

## 2018-07-26 PROCEDURE — 94640 AIRWAY INHALATION TREATMENT: CPT

## 2018-07-26 PROCEDURE — 80053 COMPREHEN METABOLIC PANEL: CPT

## 2018-07-26 PROCEDURE — 63600175 PHARM REV CODE 636 W HCPCS: Performed by: NURSE ANESTHETIST, CERTIFIED REGISTERED

## 2018-07-26 PROCEDURE — 63600175 PHARM REV CODE 636 W HCPCS: Performed by: INTERNAL MEDICINE

## 2018-07-26 PROCEDURE — 94761 N-INVAS EAR/PLS OXIMETRY MLT: CPT

## 2018-07-26 PROCEDURE — 37000008 HC ANESTHESIA 1ST 15 MINUTES: Performed by: SURGERY

## 2018-07-26 PROCEDURE — 25000242 PHARM REV CODE 250 ALT 637 W/ HCPCS: Performed by: FAMILY MEDICINE

## 2018-07-26 PROCEDURE — 37000009 HC ANESTHESIA EA ADD 15 MINS: Performed by: SURGERY

## 2018-07-26 PROCEDURE — 11000001 HC ACUTE MED/SURG PRIVATE ROOM

## 2018-07-26 PROCEDURE — 25000003 PHARM REV CODE 250: Performed by: FAMILY MEDICINE

## 2018-07-26 PROCEDURE — C9113 INJ PANTOPRAZOLE SODIUM, VIA: HCPCS | Performed by: FAMILY MEDICINE

## 2018-07-26 PROCEDURE — 00300 ANES ALL PX INTEG H/N/PTRUNK: CPT | Mod: QZ,P2 | Performed by: NURSE ANESTHETIST, CERTIFIED REGISTERED

## 2018-07-26 RX ORDER — SODIUM CHLORIDE, SODIUM LACTATE, POTASSIUM CHLORIDE, CALCIUM CHLORIDE 600; 310; 30; 20 MG/100ML; MG/100ML; MG/100ML; MG/100ML
INJECTION, SOLUTION INTRAVENOUS CONTINUOUS PRN
Status: DISCONTINUED | OUTPATIENT
Start: 2018-07-26 | End: 2018-07-26

## 2018-07-26 RX ORDER — FENTANYL CITRATE 50 UG/ML
INJECTION, SOLUTION INTRAMUSCULAR; INTRAVENOUS
Status: DISCONTINUED | OUTPATIENT
Start: 2018-07-26 | End: 2018-07-26

## 2018-07-26 RX ORDER — PROPOFOL 10 MG/ML
VIAL (ML) INTRAVENOUS
Status: DISCONTINUED | OUTPATIENT
Start: 2018-07-26 | End: 2018-07-26

## 2018-07-26 RX ORDER — SENNOSIDES 8.6 MG/1
1 TABLET ORAL DAILY
Status: DISCONTINUED | OUTPATIENT
Start: 2018-07-27 | End: 2018-07-31 | Stop reason: HOSPADM

## 2018-07-26 RX ORDER — DOCUSATE SODIUM 50 MG/5ML
100 LIQUID ORAL DAILY
Status: DISCONTINUED | OUTPATIENT
Start: 2018-07-27 | End: 2018-07-31 | Stop reason: HOSPADM

## 2018-07-26 RX ADMIN — FENTANYL CITRATE 25 MCG: 50 INJECTION, SOLUTION INTRAMUSCULAR; INTRAVENOUS at 11:07

## 2018-07-26 RX ADMIN — IPRATROPIUM BROMIDE AND ALBUTEROL SULFATE 3 ML: .5; 3 SOLUTION RESPIRATORY (INHALATION) at 07:07

## 2018-07-26 RX ADMIN — HYDROCODONE BITARTRATE AND ACETAMINOPHEN 15 ML: 7.5; 325 SOLUTION ORAL at 03:07

## 2018-07-26 RX ADMIN — RIVASTIGMINE 1 PATCH: 4.6 PATCH, EXTENDED RELEASE TRANSDERMAL at 09:07

## 2018-07-26 RX ADMIN — PANTOPRAZOLE SODIUM 40 MG: 40 INJECTION, POWDER, LYOPHILIZED, FOR SOLUTION INTRAVENOUS at 09:07

## 2018-07-26 RX ADMIN — CLINDAMYCIN HYDROCHLORIDE 450 MG: 150 CAPSULE ORAL at 05:07

## 2018-07-26 RX ADMIN — CLINDAMYCIN HYDROCHLORIDE 450 MG: 150 CAPSULE ORAL at 12:07

## 2018-07-26 RX ADMIN — METRONIDAZOLE 500 MG: 500 TABLET ORAL at 11:07

## 2018-07-26 RX ADMIN — HYDROCODONE BITARTRATE AND ACETAMINOPHEN 15 ML: 7.5; 325 SOLUTION ORAL at 07:07

## 2018-07-26 RX ADMIN — PRAMIPEXOLE DIHYDROCHLORIDE 0.25 MG: 0.12 TABLET ORAL at 02:07

## 2018-07-26 RX ADMIN — PRAMIPEXOLE DIHYDROCHLORIDE 0.25 MG: 0.12 TABLET ORAL at 09:07

## 2018-07-26 RX ADMIN — IPRATROPIUM BROMIDE AND ALBUTEROL SULFATE 3 ML: .5; 3 SOLUTION RESPIRATORY (INHALATION) at 01:07

## 2018-07-26 RX ADMIN — PROPOFOL 10 MG: 10 INJECTION, EMULSION INTRAVENOUS at 11:07

## 2018-07-26 RX ADMIN — HYDROCODONE BITARTRATE AND ACETAMINOPHEN 15 ML: 7.5; 325 SOLUTION ORAL at 08:07

## 2018-07-26 RX ADMIN — LEVOFLOXACIN 750 MG: 500 TABLET, FILM COATED ORAL at 09:07

## 2018-07-26 RX ADMIN — PRAMIPEXOLE DIHYDROCHLORIDE 0.25 MG: 0.12 TABLET ORAL at 08:07

## 2018-07-26 RX ADMIN — CLINDAMYCIN HYDROCHLORIDE 450 MG: 150 CAPSULE ORAL at 11:07

## 2018-07-26 RX ADMIN — CARBIDOPA AND LEVODOPA 1 TABLET: 25; 100 TABLET ORAL at 05:07

## 2018-07-26 RX ADMIN — METRONIDAZOLE 500 MG: 500 TABLET ORAL at 02:07

## 2018-07-26 RX ADMIN — CARBIDOPA AND LEVODOPA 1 TABLET: 25; 100 TABLET ORAL at 02:07

## 2018-07-26 RX ADMIN — TRAZODONE HYDROCHLORIDE 50 MG: 50 TABLET ORAL at 08:07

## 2018-07-26 RX ADMIN — LEVOTHYROXINE SODIUM 75 MCG: 75 TABLET ORAL at 05:07

## 2018-07-26 RX ADMIN — SODIUM CHLORIDE, SODIUM LACTATE, POTASSIUM CHLORIDE, AND CALCIUM CHLORIDE: .6; .31; .03; .02 INJECTION, SOLUTION INTRAVENOUS at 11:07

## 2018-07-26 RX ADMIN — ENOXAPARIN SODIUM 30 MG: 100 INJECTION SUBCUTANEOUS at 04:07

## 2018-07-26 RX ADMIN — METRONIDAZOLE 500 MG: 500 TABLET ORAL at 05:07

## 2018-07-26 RX ADMIN — CARBIDOPA AND LEVODOPA 1 TABLET: 25; 100 TABLET ORAL at 11:07

## 2018-07-26 RX ADMIN — ASPIRIN 81 MG 81 MG: 81 TABLET ORAL at 09:07

## 2018-07-26 RX ADMIN — TRAMADOL HYDROCHLORIDE 50 MG: 50 TABLET, FILM COATED ORAL at 05:07

## 2018-07-26 NOTE — ANESTHESIA PREPROCEDURE EVALUATION
07/26/2018  Eddy Cunningham is a 78 y.o., female.    Pre-op Assessment    I have reviewed the Patient Summary Reports.     I have reviewed the Nursing Notes.   I have reviewed the Medications.     Review of Systems  Anesthesia Hx:  No problems with previous Anesthesia    Social:  Non-Smoker, No Alcohol Use    Hematology/Oncology:  Hematology Normal   Oncology Normal     EENT/Dental:EENT/Dental Normal   Cardiovascular:   Exercise tolerance: good Hypertension, well controlled    Pulmonary:   COPD, mild Shortness of breath    Renal/:  Renal/ Normal     Hepatic/GI:   GERD, well controlled    Musculoskeletal:  Musculoskeletal Normal    Neurological:  Neurology Normal    Endocrine:   Hypothyroidism    Psych:   Psychiatric History          Physical Exam  General:  Well nourished, Obesity    Airway/Jaw/Neck:  Airway Findings: Mouth Opening: Normal Tongue: Normal  General Airway Assessment: Adult  Mallampati: II  TM Distance: Normal, at least 6 cm  Jaw/Neck Findings:  Neck ROM: Normal ROM      Dental:  Dental Findings: In tact        Mental Status:  Mental Status Findings:  Cooperative         Anesthesia Plan  Type of Anesthesia, risks & benefits discussed:  Anesthesia Type:  general, MAC  Patient's Preference:   Intra-op Monitoring Plan: standard ASA monitors  Intra-op Monitoring Plan Comments:   Post Op Pain Control Plan: multimodal analgesia  Post Op Pain Control Plan Comments:   Induction:   IV  Beta Blocker:  Patient is not currently on a Beta-Blocker (No further documentation required).       Informed Consent: Patient understands risks and agrees with Anesthesia plan.  Questions answered. Anesthesia consent signed with patient.  ASA Score: 2     Day of Surgery Review of History & Physical: I have interviewed and examined the patient. I have reviewed the patient's H&P dated: 7/26/18. There are no  significant changes.  H&P update referred to the surgeon.

## 2018-07-26 NOTE — NURSING
Dr. Serna to see pt. Will take down to surgery this morning for debridement. Consents signed per daughter.

## 2018-07-26 NOTE — ASSESSMENT & PLAN NOTE
Continue Kcl 20 meq daily   BMP daily   K improved  BMP  Lab Results   Component Value Date     07/26/2018    K 4.1 07/26/2018     07/26/2018    CO2 27 07/26/2018    BUN 29 (H) 07/26/2018    CREATININE 0.6 07/26/2018    CALCIUM 8.6 (L) 07/26/2018    ANIONGAP 6 (L) 07/26/2018    ESTGFRAFRICA >60 07/26/2018    EGFRNONAA >60 07/26/2018     DC KCL  7/26 K+stable

## 2018-07-26 NOTE — TRANSFER OF CARE
"Anesthesia Transfer of Care Note    Patient: Eddy Cunningham    Procedure(s) Performed: Procedure(s) (LRB):  DEBRIDEMENT, ULCER, SACRAL AREA (N/A)    Patient location: PACU    Anesthesia Type: MAC    Transport from OR: Transported from OR on 6-10 L/min O2 by face mask with adequate spontaneous ventilation    Post pain: adequate analgesia    Post assessment: no apparent anesthetic complications and tolerated procedure well    Post vital signs: stable    Level of consciousness: confused and awake    Nausea/Vomiting: no nausea/vomiting    Complications: none    Transfer of care protocol was followed      Last vitals:   Visit Vitals  /64 (BP Location: Left arm, Patient Position: Lying)   Pulse 82   Temp 35.9 °C (96.7 °F) (Tympanic)   Resp 20   Ht 5' 8" (1.727 m)   Wt 74.5 kg (164 lb 3.9 oz)   SpO2 99%   Breastfeeding? No   BMI 24.97 kg/m²     "

## 2018-07-26 NOTE — NURSING
Report received from Marcelina PLASENCIA. Pt lying in bed with daughter at bedside. PEG feedings held since midnight. Vitals stable. Turning pt every 2 hours. Arvizu intact. Contact isolation maintained.

## 2018-07-26 NOTE — PROGRESS NOTES
[facility] Medicine  Progress Note    Patient Name: Eddy Cunningham  MRN: [id number]  Patient Class: IP- Inpatient   Admission Date: 7/21/2018  Length of Stay: 4 days  Attending Physician: Kunal Sandoval MD  Primary Care Provider: Ayaka Pollard MD        Subjective:     Principal Problem:Sepsis due to methicillin resistant Staphylococcus aureus (MRSA)    HPI:  78 year old demented female with PEG presents from [facility] with fever of 103. Work up in ED is essentially negative, including influenza, and no WBC. Urine clean. She is prerenal and with hypernatremia. She does meet sepsis criteria with HR greater than 90 and fever. BP is on the low side this am, 87/40. Upon further exam up here in ICU she is found to have an unstagable decubitus of sacrum(but at least stage 3 in my opinion).  She is admitted to ICU and placed on Vanc, Cefepime, IVF at 125/hr.    Hospital Course:  7/23/18  Patient is much improved today. She is answering yes and no questions. No fevers. Still no leukocytosis. Vitals are stable. Back on tube feeds.     7/24/18  She has a large decubitus  Which looks like the source of infection .  She c/o back pain  Black eschar by pictures looks like it would need debridement    Her tachycardia is better   Urine output is better ; sodium is getting down ;She is awake and responsive ; weak wet cough .  Her vanc  And cefepime was stopped ;she is on Levaquin and clindamycin via peg   Her BUN /creatinine is still high .  She is running no fevers .  Her BP is still on low side    7/25/18  BP better   Surgery debrided wound at bedside but would like to take her to OR [facility].  Using santyl, but surgery not confident this will be enough   No BM yet       7/26/18 had partial debridment 2 days ago at bedside but painful. Planned for surgery this am; Sx debridement of decubitus. PEG tube feedings held overnight  Afebrile overnight. WBC 6.58. Levofloxacin/ clinda  per peg tube  day 4. Day 2 metronidazole day 2  Blood cultures negative x 5 days no grwth x 2  Afebrile, BP& HR better 92/54, HR 81.  NA+ 165 max;> 142,     Interval History: see HC     Review of Systems   Constitutional: Positive for activity change. Negative for fever.   Skin: Positive for wound.   Neurological: Positive for weakness.     Objective:     Vital Signs (Most Recent):  Temp: 96.3 °F (35.7 °C) (07/26/18 0705)  Pulse: 83 (07/26/18 0800)  Resp: 20 (07/26/18 0747)  BP: (!) 108/56 (07/26/18 0705)  SpO2: 99 % (07/26/18 0747) Vital Signs (24h Range):  Temp:  [96.3 °F (35.7 °C)-99.8 °F (37.7 °C)] 96.3 °F (35.7 °C)  Pulse:  [74-96] 83  Resp:  [13-34] 20  SpO2:  [96 %-100 %] 99 %  BP: ()/(45-56) 108/56     Weight: 74.5 kg (164 lb 3.9 oz)  Body mass index is 24.97 kg/m².    Intake/Output Summary (Last 24 hours) at 07/26/18 0945  Last data filed at 07/26/18 0533   Gross per 24 hour   Intake             2305 ml   Output              590 ml   Net             1715 ml      Physical Exam   Constitutional: She is oriented to person, place, and time. She appears well-developed. No distress.   HENT:   Head: Normocephalic and atraumatic.   Eyes: Conjunctivae are normal. Pupils are equal, round, and reactive to light.   Neck: Normal range of motion. Neck supple.   Cardiovascular: Normal rate, regular rhythm, normal heart sounds and intact distal pulses.    Pulmonary/Chest: Effort normal and breath sounds normal. No respiratory distress. She has no wheezes.   Coarse breath sounds   Abdominal: Soft. Bowel sounds are normal. There is no tenderness.   Peg tube to abdomen   Genitourinary:   Genitourinary Comments: Arvizu in place   Musculoskeletal: She exhibits no edema.   scds in place   Lymphadenopathy:     She has no cervical adenopathy.   Neurological: She is alert and oriented to person, place, and time.   Skin: Skin is warm and dry. No rash noted.   decubitis   Nursing note and vitals reviewed.      Significant Labs:   Blood  Culture: No results for input(s): LABBLOO in the last 48 hours.  CBC:     Recent Labs  Lab 07/25/18  0401 07/26/18  0517   WBC 6.63 6.58   HGB 9.8* 9.8*   HCT 32.1* 32.3*    195     CMP:     Recent Labs  Lab 07/25/18  0401 07/26/18  0517    142   K 4.3 4.1    109   CO2 25 27   * 118*   BUN 32* 29*   CREATININE 0.7 0.6   CALCIUM 8.5* 8.6*   PROT 5.4* 5.3*   ALBUMIN 2.4* 2.4*   BILITOT 0.3 0.3   ALKPHOS 121 109   AST 30 41*   ALT 39 23   ANIONGAP 7* 6*   EGFRNONAA >60 >60       Significant Imaging: I have reviewed and interpreted all pertinent imaging results/findings within the past 24 hours.    Assessment/Plan:      * Sepsis due to methicillin resistant Staphylococcus aureus (MRSA)    Likely due to her decubitus   Covering broad spectrum with vanc and cefepime --7/23/18 will switch to clindamycin and levaquin  Will add flagyl 7/25 for better anaerobic coverage given proximity of wound to her rectum    Follow blood cultures--negative  UA clear  CXR clear  7/26 blood culture negative x 5d- infection seems to be deubitus; cover skin; continue clinda and flagyl. Add in cipro if necessary          Severe protein-calorie malnutrition      Continue isosource        Decubitus ulcer of sacral region, unstageable    Wound care consult   May need debridement in OR tomoro per surgery   She needs to resume constipation meds but will wait til after surgery to avoid potential of uncontrollable loose stools on wound bed during surgery  7/26 sx today        Dehydration    Improved   BMP daily           Hypernatremia    Improved   Off of IVF  On water flushes via PEG  7/26 Na 142 this am          History of airway aspiration    PEG tube --restarted feeds and tolerating. Nutrition consult  NPO          Hypothyroidism due to acquired atrophy of thyroid    Lab Results   Component Value Date    TSH 4.071 (H) 06/10/2018     Continue synthroid 75mcg daily           Acute blood loss anemia      Initially  hemoconcentrated - with dilution H&H stable        Functional quadriplegia    Air mattress /pressure reduction  She is a DNR   Turn q 2          Hypokalemia    Continue Kcl 20 meq daily   BMP daily   K improved  BMP  Lab Results   Component Value Date     07/26/2018    K 4.1 07/26/2018     07/26/2018    CO2 27 07/26/2018    BUN 29 (H) 07/26/2018    CREATININE 0.6 07/26/2018    CALCIUM 8.6 (L) 07/26/2018    ANIONGAP 6 (L) 07/26/2018    ESTGFRAFRICA >60 07/26/2018    EGFRNONAA >60 07/26/2018     DC KCL  7/26 K+stable        Memory disorder     will continue her exelon patch as ordered at home           Parkinson's disease    Continue sinemet   Multiple contractures            VTE Risk Mitigation         Ordered     enoxaparin injection 30 mg  Daily      07/24/18 0749     IP VTE HIGH RISK PATIENT  Once      07/21/18 2210     Place sequential compression device  Until discontinued      07/21/18 2210              Tessy Marvin MD  Department of Hospital Medicine   [facility]

## 2018-07-26 NOTE — SUBJECTIVE & OBJECTIVE
Interval History: see      Review of Systems   Constitutional: Positive for activity change. Negative for fever.   Skin: Positive for wound.   Neurological: Positive for weakness.     Objective:     Vital Signs (Most Recent):  Temp: 96.3 °F (35.7 °C) (07/26/18 0705)  Pulse: 83 (07/26/18 0800)  Resp: 20 (07/26/18 0747)  BP: (!) 108/56 (07/26/18 0705)  SpO2: 99 % (07/26/18 0747) Vital Signs (24h Range):  Temp:  [96.3 °F (35.7 °C)-99.8 °F (37.7 °C)] 96.3 °F (35.7 °C)  Pulse:  [74-96] 83  Resp:  [13-34] 20  SpO2:  [96 %-100 %] 99 %  BP: ()/(45-56) 108/56     Weight: 74.5 kg (164 lb 3.9 oz)  Body mass index is 24.97 kg/m².    Intake/Output Summary (Last 24 hours) at 07/26/18 0945  Last data filed at 07/26/18 0533   Gross per 24 hour   Intake             2305 ml   Output              590 ml   Net             1715 ml      Physical Exam   Constitutional: She is oriented to person, place, and time. She appears well-developed. No distress.   HENT:   Head: Normocephalic and atraumatic.   Eyes: Conjunctivae are normal. Pupils are equal, round, and reactive to light.   Neck: Normal range of motion. Neck supple.   Cardiovascular: Normal rate, regular rhythm, normal heart sounds and intact distal pulses.    Pulmonary/Chest: Effort normal and breath sounds normal. No respiratory distress. She has no wheezes.   Coarse breath sounds   Abdominal: Soft. Bowel sounds are normal. There is no tenderness.   Peg tube to abdomen   Genitourinary:   Genitourinary Comments: Arvizu in place   Musculoskeletal: She exhibits no edema.   scds in place   Lymphadenopathy:     She has no cervical adenopathy.   Neurological: She is alert and oriented to person, place, and time.   Skin: Skin is warm and dry. No rash noted.   decubitis   Nursing note and vitals reviewed.      Significant Labs:   Blood Culture: No results for input(s): LABBLOO in the last 48 hours.  CBC:     Recent Labs  Lab 07/25/18  0401 07/26/18  0517   WBC 6.63 6.58   HGB 9.8*  9.8*   HCT 32.1* 32.3*    195     CMP:     Recent Labs  Lab 07/25/18  0401 07/26/18  0517    142   K 4.3 4.1    109   CO2 25 27   * 118*   BUN 32* 29*   CREATININE 0.7 0.6   CALCIUM 8.5* 8.6*   PROT 5.4* 5.3*   ALBUMIN 2.4* 2.4*   BILITOT 0.3 0.3   ALKPHOS 121 109   AST 30 41*   ALT 39 23   ANIONGAP 7* 6*   EGFRNONAA >60 >60       Significant Imaging: I have reviewed and interpreted all pertinent imaging results/findings within the past 24 hours.

## 2018-07-26 NOTE — PLAN OF CARE
Spoke to patient and her daughter about skilled level of care for wound care and stability of PEG feeds. Both expressed desire to remain here for treatment. However, Ms Cunningham has no skilled days left for this year. I explained to her daughter that she would need to remain out of the hospital for 60 days to reclaim her skilled days. She is still acutely ill today and we will reassess discharge disposition as she progresses toward discharge.

## 2018-07-26 NOTE — INTERVAL H&P NOTE
The patient has been examined and the H&P has been reviewed:        I concur with the findings and no changes have occurred since H&P was written.        Patient cleared for Anesthesia: MAC        Anesthesia/Surgery risks, benefits and alternative options discussed and understood by patient/family.      Active Hospital Problems    Diagnosis  POA    *Sepsis due to methicillin resistant Staphylococcus aureus (MRSA) [A41.02]  Yes    Severe protein-calorie malnutrition [E43]  Yes    History of airway aspiration [Z87.898]  Yes    Hypernatremia [E87.0]  Yes    Dehydration [E86.0]  Yes    Decubitus ulcer of sacral region, unstageable [L89.150]  Yes    Hypothyroidism due to acquired atrophy of thyroid [E03.4]  Yes    Acute blood loss anemia [D62]  Yes    Functional quadriplegia [R53.2]  Yes    Hypokalemia [E87.6]  Yes    Memory disorder [R41.3]  Yes    Parkinson's disease [G20]  Yes      Resolved Hospital Problems    Diagnosis Date Resolved POA    Pressure ulcer of sacrum [L89.159] 07/25/2018 Yes

## 2018-07-26 NOTE — ANESTHESIA POSTPROCEDURE EVALUATION
"Anesthesia Post Evaluation    Patient: Eddy Cunningham    Procedure(s) Performed: Procedure(s) (LRB):  DEBRIDEMENT, ULCER, SACRAL AREA (N/A)    Final Anesthesia Type: MAC  Patient location during evaluation: PACU  Patient participation: Yes- Able to Participate  Level of consciousness: awake and alert, oriented and awake  Post-procedure vital signs: reviewed and stable  Pain management: adequate  Airway patency: patent  PONV status at discharge: No PONV  Anesthetic complications: no      Cardiovascular status: blood pressure returned to baseline, hemodynamically stable and stable  Respiratory status: unassisted, spontaneous ventilation and room air  Hydration status: euvolemic  Follow-up not needed.        Visit Vitals  /64 (BP Location: Left arm, Patient Position: Lying)   Pulse 82   Temp 35.9 °C (96.7 °F) (Tympanic)   Resp 20   Ht 5' 8" (1.727 m)   Wt 74.5 kg (164 lb 3.9 oz)   SpO2 99%   Breastfeeding? No   BMI 24.97 kg/m²       Pain/Jessica Score: Pain Assessment Performed: Yes (7/26/2018  7:05 AM)  Presence of Pain: complains of pain/discomfort (7/26/2018  7:05 AM)  Pain Rating Prior to Med Admin: 7 (7/26/2018  7:42 AM)  Pain Rating Post Med Admin: 0 (7/26/2018  4:27 AM)      "

## 2018-07-26 NOTE — NURSING
Pt transported back from surgery. Report received from Charlette PLASENCIA. Pt awake and alert; denies pain. Daughter at bedside. Dressing to sacral wound D&I. Vitals stable. Placed back on tube feedings. Turned pt on side and used pillow for support. Arvizu catheter intact. scds on BLE. Placed on dinamap for surgery vitals; denies needs.

## 2018-07-26 NOTE — PLAN OF CARE
Problem: Patient Care Overview  Goal: Plan of Care Review  Outcome: Ongoing (interventions implemented as appropriate)  Patient seemed to have a lot of pain overnight. Treated 3 times for pain so far. PCT's are repositioning patient as needed. Peg feeding off for surgery this am. No neuro changes or telemetry changes. POC reviewed and instructed to call for needs/asst. Daughter at bedside.

## 2018-07-26 NOTE — BRIEF OP NOTE
[facility]  Brief Operative Note    SUMMARY     Surgery Date: 7/26/2018     Surgeon(s) and Role:     * Fabio Serna MD - Primary    Assisting Surgeon: None    Pre-op Diagnosis:  Decubitus ulcer of sacral area [L89.159]    Post-op Diagnosis:  Post-Op Diagnosis Codes:     * Decubitus ulcer of sacral area [L89.159]    Procedure(s) (LRB):  DEBRIDEMENT, ULCER, SACRAL AREA (N/A)    Anesthesia: General    Description of Procedure: removal of necrotic skin, fat, muscle and fascia.     Description of the findings of the procedure: Necrotic tissue of layers from skin to and including fascia    Estimated Blood Loss: * No values recorded between 7/26/2018 11:31 AM and 7/26/2018 11:45 AM *         Specimens:   Specimen (12h ago through future)    None

## 2018-07-26 NOTE — ASSESSMENT & PLAN NOTE
Wound care consult   May need debridement in OR tomoro per surgery   She needs to resume constipation meds but will wait til after surgery to avoid potential of uncontrollable loose stools on wound bed during surgery  7/26 sx today

## 2018-07-26 NOTE — PROGRESS NOTES
Staff Handoff  Report received from Latasha RN and patient assessed per flowsheet. Daughter at bedside. Patient moaning, reports being in pain. DNR. Large sacral decub. Contact isolation maintained. NPO. Peg feeding per Kangaroo pump, Impact peptide 1.5 at 55 ml/hr. Arvizu to gravity with clear yellow urine. SR with PVC's on telemetry. Turn q2 hr schedule. Will apply heel protectors to feet. Instructed daughter to call for needs/asst.    Resident Handoff

## 2018-07-26 NOTE — PLAN OF CARE
Problem: Patient Care Overview  Goal: Plan of Care Review  Outcome: Ongoing (interventions implemented as appropriate)  Pt admitted with sacral decubitus. Debrided today in OR with Dr. Serna. Dressing dry and intact. Turning pt every 2 hours and using pillows for support. Remains on clindamycin and flagyl. On continuous Peg feedings; Impact @ 55ccs/hr with 200cc water flushes every 6 hours. Tolerating well; keeping HOB elevated; no residual noted. Afebrile. Vitals stable. WBC negative. Arvizu intact; draining clear yellow urine. SCDs to BLE. Reviewed plan of care with pt and daughter; state agreement.

## 2018-07-26 NOTE — ASSESSMENT & PLAN NOTE
Likely due to her decubitus   Covering broad spectrum with vanc and cefepime --7/23/18 will switch to clindamycin and levaquin  Will add flagyl 7/25 for better anaerobic coverage given proximity of wound to her rectum    Follow blood cultures--negative  UA clear  CXR clear  7/26 blood culture negative x 5d- infection seems to be deubitus; cover skin; continue clinda and flagyl. Add in cipro if necessary

## 2018-07-26 NOTE — H&P (VIEW-ONLY)
[facility]  General Surgery  Consult Note    Consults  Subjective:     Chief Complaint/Reason for Admission:    History of Present Illness: 79 yo female nursing home resident with dementia has sacral pressure ulcer.  She was seen yesterday by wound care nurse and Rashi was ordered.  I was consulted today for surgical evaluation.      No current facility-administered medications on file prior to encounter.      Current Outpatient Prescriptions on File Prior to Encounter   Medication Sig    aspirin 81 MG Chew 1 tablet (81 mg total) by Per G Tube route once daily.    carbidopa-levodopa  mg (SINEMET)  mg per tablet 1 tablet by Per G Tube route 5 (five) times daily.    docusate sodium (COLACE) 100 MG capsule 1 capsule (100 mg total) by Per G Tube route once daily.    hydrocodone-acetaminophen 7.5-325mg (NORCO) 7.5-325 mg per tablet Take 1 tablet by mouth every 12 (twelve) hours as needed for Pain.    LACTOSE-REDUCED FOOD (JEVITY ORAL) 65 mL/hr by PEG Tube route once daily. Via pump on at 6 pm off at 6 am    levoFLOXacin (LEVAQUIN) 500 MG tablet Take 1 tablet (500 mg total) by mouth once daily.    levothyroxine (SYNTHROID) 75 MCG tablet 1 tablet (75 mcg total) by Per G Tube route before breakfast.    omeprazole (PRILOSEC) 40 MG capsule Open 40 mg capsule into g-tube once daily    potassium chloride 10% (KAYCIEL) 20 mEq/15 mL solution 15 mLs (20 mEq total) by Per G Tube route 2 (two) times daily.    pramipexole (MIRAPEX) 0.25 MG tablet 1 tablet (0.25 mg total) by Per G Tube route 3 (three) times daily.    ranitidine (ZANTAC) 150 MG capsule Take 75 mg by mouth 2 (two) times daily.    rivastigmine (EXELON) 4.6 mg/24 hour PT24 Place 1 patch onto the skin once daily.      senna (SENOKOT) 8.6 mg tablet 1 tablet by Per G Tube route once daily.    tiZANidine 2 mg Cap Take 4 mg by mouth nightly as needed.    tramadol (ULTRAM) 50 mg tablet 1 tablet (50 mg total) by Per G Tube route  every 6 (six) hours as needed for Pain.    trazodone (DESYREL) 50 MG tablet 1 tablet (50 mg total) by Per G Tube route every evening.       Review of patient's allergies indicates:   Allergen Reactions    Oxycodone      Hallucination    Sulfa (sulfonamide antibiotics)      Other reaction(s): when on contact       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)     Parkinson disease     RLS (restless legs syndrome)     Stress incontinence     Venous stasis ulcers      Past Surgical History:   Procedure Laterality Date    CARPAL TUNNEL RELEASE      Right    CHOLECYSTECTOMY      GASTROSTOMY TUBE PLACEMENT      PARTIAL HYSTERECTOMY      ROTATOR CUFF REPAIR      TONSILLECTOMY, ADENOIDECTOMY      tonsiles    TOTAL KNEE ARTHROPLASTY       Family History     None        Social History Main Topics    Smoking status: Former Smoker    Smokeless tobacco: Never Used    Alcohol use No    Drug use: No    Sexual activity: Not on file     Review of Systems   Skin: Positive for wound.        buttock   Neurological:        +Dementia   All other systems reviewed and are negative.    Objective:     Vital Signs (Most Recent):  Temp: 97.7 °F (36.5 °C) (07/24/18 1101)  Pulse: 90 (07/24/18 1345)  Resp: (!) 25 (07/24/18 1345)  BP: (!) 90/44 (07/24/18 1330)  SpO2: (!) 92 % (07/24/18 1345) Vital Signs (24h Range):  Temp:  [96.9 °F (36.1 °C)-100.4 °F (38 °C)] 97.7 °F (36.5 °C)  Pulse:  [] 90  Resp:  [12-28] 25  SpO2:  [92 %-100 %] 92 %  BP: ()/(41-57) 90/44     Weight: 74.5 kg (164 lb 3.9 oz)  Body mass index is 24.97 kg/m².      Intake/Output Summary (Last 24 hours) at 07/24/18 1422  Last data filed at 07/24/18 1100   Gross  per 24 hour   Intake             1855 ml   Output              875 ml   Net              980 ml       Physical Exam   Constitutional: She appears well-developed.   Neck: Normal range of motion.   Pulmonary/Chest: Effort normal.   Abdominal:   Pt with PEG tube in place   Skin:   Pt with full thickness unstageable sacral pressure ulcer.  Measures 0b2x2hu with black foul smelling necrotic skin edges and base of wound.  Some sharp debridement will need to be done today.         Significant Labs:  BMP:   Recent Labs  Lab 07/23/18  0447 07/24/18  0307   * 136*   * 143   K 4.1 4.9   * 113*   CO2 24 25   BUN 38* 34*   CREATININE 0.7 0.6   CALCIUM 8.4* 8.4*   MG 2.1  --      CBC:   Recent Labs  Lab 07/24/18  0307   WBC 6.45   RBC 3.25*   HGB 9.9*   HCT 33.2*   *   *   MCH 30.5   MCHC 29.8*       Significant Diagnostics:  None    Assessment/Plan:     Active Diagnoses:    Diagnosis Date Noted POA    PRINCIPAL PROBLEM:  Sepsis due to methicillin resistant Staphylococcus aureus (MRSA) [A41.02] 06/12/2018 Yes    Pressure ulcer of sacrum [L89.159] 07/24/2018 Yes    Severe protein-calorie malnutrition [E43] 07/23/2018 Unknown    History of airway aspiration [Z87.898] 07/22/2018 Yes    Hypernatremia [E87.0] 07/22/2018 Yes    Dehydration [E86.0] 07/22/2018 Yes    Decubitus ulcer of sacral region, unstageable [L89.150] 07/22/2018 Yes    Hypothyroidism due to acquired atrophy of thyroid [E03.4] 06/11/2018 Yes    Functional quadriplegia [R53.2] 05/19/2017 Yes    Hypokalemia [E87.6] 05/18/2017 Yes    Memory disorder [R41.3] 03/14/2013 Yes    Parkinson's disease [G20] 11/13/2012 Yes      Problems Resolved During this Admission:    Diagnosis Date Noted Date Resolved POA     Plan for bedside debridement today  Continue Santyl daily  Continue offloading  Dr Serna to evaluate again on Thursday morning    Thank you for your consult.     Robbin Guy Jr, MD  General Surgery  [facility]

## 2018-07-27 PROBLEM — D53.9 MACROCYTIC ANEMIA: Status: ACTIVE | Noted: 2017-05-20

## 2018-07-27 LAB
ALBUMIN SERPL BCP-MCNC: 2.3 G/DL
ALP SERPL-CCNC: 102 U/L
ALT SERPL W/O P-5'-P-CCNC: 22 U/L
ANION GAP SERPL CALC-SCNC: 6 MMOL/L
AST SERPL-CCNC: 32 U/L
BACTERIA BLD CULT: NORMAL
BACTERIA BLD CULT: NORMAL
BASOPHILS # BLD AUTO: 0.02 K/UL
BASOPHILS NFR BLD: 0.3 %
BILIRUB SERPL-MCNC: 0.3 MG/DL
BUN SERPL-MCNC: 30 MG/DL
CALCIUM SERPL-MCNC: 8.8 MG/DL
CHLORIDE SERPL-SCNC: 109 MMOL/L
CO2 SERPL-SCNC: 27 MMOL/L
CREAT SERPL-MCNC: 0.6 MG/DL
DIFFERENTIAL METHOD: ABNORMAL
EOSINOPHIL # BLD AUTO: 0.2 K/UL
EOSINOPHIL NFR BLD: 3.9 %
ERYTHROCYTE [DISTWIDTH] IN BLOOD BY AUTOMATED COUNT: 14.4 %
EST. GFR  (AFRICAN AMERICAN): >60 ML/MIN/1.73 M^2
EST. GFR  (NON AFRICAN AMERICAN): >60 ML/MIN/1.73 M^2
GLUCOSE SERPL-MCNC: 133 MG/DL
HCT VFR BLD AUTO: 32.7 %
HGB BLD-MCNC: 9.9 G/DL
LYMPHOCYTES # BLD AUTO: 1.1 K/UL
LYMPHOCYTES NFR BLD: 17.8 %
MCH RBC QN AUTO: 30.2 PG
MCHC RBC AUTO-ENTMCNC: 30.3 G/DL
MCV RBC AUTO: 100 FL
MONOCYTES # BLD AUTO: 0.5 K/UL
MONOCYTES NFR BLD: 8.9 %
NEUTROPHILS # BLD AUTO: 4.2 K/UL
NEUTROPHILS NFR BLD: 69.1 %
PLATELET # BLD AUTO: 202 K/UL
PMV BLD AUTO: 11.7 FL
POTASSIUM SERPL-SCNC: 4.3 MMOL/L
PROT SERPL-MCNC: 5.4 G/DL
RBC # BLD AUTO: 3.28 M/UL
SODIUM SERPL-SCNC: 142 MMOL/L
WBC # BLD AUTO: 6.08 K/UL

## 2018-07-27 PROCEDURE — 25000003 PHARM REV CODE 250: Performed by: FAMILY MEDICINE

## 2018-07-27 PROCEDURE — 11000001 HC ACUTE MED/SURG PRIVATE ROOM

## 2018-07-27 PROCEDURE — 99233 SBSQ HOSP IP/OBS HIGH 50: CPT | Mod: ,,, | Performed by: INTERNAL MEDICINE

## 2018-07-27 PROCEDURE — C9113 INJ PANTOPRAZOLE SODIUM, VIA: HCPCS | Performed by: FAMILY MEDICINE

## 2018-07-27 PROCEDURE — 63600175 PHARM REV CODE 636 W HCPCS: Performed by: INTERNAL MEDICINE

## 2018-07-27 PROCEDURE — 94761 N-INVAS EAR/PLS OXIMETRY MLT: CPT

## 2018-07-27 PROCEDURE — 96372 THER/PROPH/DIAG INJ SC/IM: CPT | Mod: 59

## 2018-07-27 PROCEDURE — 25000242 PHARM REV CODE 250 ALT 637 W/ HCPCS: Performed by: FAMILY MEDICINE

## 2018-07-27 PROCEDURE — 97802 MEDICAL NUTRITION INDIV IN: CPT

## 2018-07-27 PROCEDURE — 80053 COMPREHEN METABOLIC PANEL: CPT

## 2018-07-27 PROCEDURE — 85025 COMPLETE CBC W/AUTO DIFF WBC: CPT

## 2018-07-27 PROCEDURE — 94640 AIRWAY INHALATION TREATMENT: CPT

## 2018-07-27 PROCEDURE — 96376 TX/PRO/DX INJ SAME DRUG ADON: CPT

## 2018-07-27 PROCEDURE — 25000003 PHARM REV CODE 250: Performed by: INTERNAL MEDICINE

## 2018-07-27 PROCEDURE — 36415 COLL VENOUS BLD VENIPUNCTURE: CPT

## 2018-07-27 PROCEDURE — 94760 N-INVAS EAR/PLS OXIMETRY 1: CPT

## 2018-07-27 PROCEDURE — 27200966 HC CLOSED SUCTION SYSTEM

## 2018-07-27 PROCEDURE — 63600175 PHARM REV CODE 636 W HCPCS: Performed by: FAMILY MEDICINE

## 2018-07-27 RX ORDER — CLINDAMYCIN HYDROCHLORIDE 150 MG/1
450 CAPSULE ORAL EVERY 6 HOURS
Status: CANCELLED | OUTPATIENT
Start: 2018-07-27

## 2018-07-27 RX ORDER — METRONIDAZOLE 500 MG/1
500 TABLET ORAL EVERY 8 HOURS
Status: CANCELLED | OUTPATIENT
Start: 2018-07-27

## 2018-07-27 RX ORDER — CLINDAMYCIN HYDROCHLORIDE 150 MG/1
450 CAPSULE ORAL EVERY 6 HOURS
Qty: 60 CAPSULE | Refills: 0 | Status: SHIPPED | OUTPATIENT
Start: 2018-07-27 | End: 2018-07-31

## 2018-07-27 RX ORDER — METRONIDAZOLE 500 MG/1
500 TABLET ORAL EVERY 8 HOURS
Qty: 21 TABLET | Refills: 0 | Status: SHIPPED | OUTPATIENT
Start: 2018-07-27 | End: 2018-07-31

## 2018-07-27 RX ORDER — CLINDAMYCIN HYDROCHLORIDE 150 MG/1
450 CAPSULE ORAL EVERY 6 HOURS
Qty: 20 CAPSULE | Refills: 0 | Status: SHIPPED | OUTPATIENT
Start: 2018-07-27 | End: 2018-07-27

## 2018-07-27 RX ADMIN — METRONIDAZOLE 500 MG: 500 TABLET ORAL at 02:07

## 2018-07-27 RX ADMIN — RIVASTIGMINE 1 PATCH: 4.6 PATCH, EXTENDED RELEASE TRANSDERMAL at 09:07

## 2018-07-27 RX ADMIN — LEVOTHYROXINE SODIUM 75 MCG: 75 TABLET ORAL at 06:07

## 2018-07-27 RX ADMIN — METRONIDAZOLE 500 MG: 500 TABLET ORAL at 09:07

## 2018-07-27 RX ADMIN — ASPIRIN 81 MG 81 MG: 81 TABLET ORAL at 08:07

## 2018-07-27 RX ADMIN — DOCUSATE SODIUM 100 MG: 50 LIQUID ORAL at 08:07

## 2018-07-27 RX ADMIN — CLINDAMYCIN HYDROCHLORIDE 450 MG: 150 CAPSULE ORAL at 05:07

## 2018-07-27 RX ADMIN — PRAMIPEXOLE DIHYDROCHLORIDE 0.25 MG: 0.12 TABLET ORAL at 08:07

## 2018-07-27 RX ADMIN — PANTOPRAZOLE SODIUM 40 MG: 40 INJECTION, POWDER, LYOPHILIZED, FOR SOLUTION INTRAVENOUS at 08:07

## 2018-07-27 RX ADMIN — SENNA 1 TABLET: 8.6 TABLET, COATED ORAL at 08:07

## 2018-07-27 RX ADMIN — CARBIDOPA AND LEVODOPA 1 TABLET: 25; 100 TABLET ORAL at 05:07

## 2018-07-27 RX ADMIN — IPRATROPIUM BROMIDE AND ALBUTEROL SULFATE 3 ML: .5; 3 SOLUTION RESPIRATORY (INHALATION) at 07:07

## 2018-07-27 RX ADMIN — HYDROCODONE BITARTRATE AND ACETAMINOPHEN 15 ML: 7.5; 325 SOLUTION ORAL at 08:07

## 2018-07-27 RX ADMIN — IPRATROPIUM BROMIDE AND ALBUTEROL SULFATE 3 ML: .5; 3 SOLUTION RESPIRATORY (INHALATION) at 01:07

## 2018-07-27 RX ADMIN — HYDROCODONE BITARTRATE AND ACETAMINOPHEN 15 ML: 7.5; 325 SOLUTION ORAL at 09:07

## 2018-07-27 RX ADMIN — HYDROCODONE BITARTRATE AND ACETAMINOPHEN 15 ML: 7.5; 325 SOLUTION ORAL at 03:07

## 2018-07-27 RX ADMIN — CARBIDOPA AND LEVODOPA 1 TABLET: 25; 100 TABLET ORAL at 02:07

## 2018-07-27 RX ADMIN — CARBIDOPA AND LEVODOPA 1 TABLET: 25; 100 TABLET ORAL at 09:07

## 2018-07-27 RX ADMIN — CLINDAMYCIN HYDROCHLORIDE 450 MG: 150 CAPSULE ORAL at 11:07

## 2018-07-27 RX ADMIN — TRAZODONE HYDROCHLORIDE 50 MG: 50 TABLET ORAL at 09:07

## 2018-07-27 RX ADMIN — PRAMIPEXOLE DIHYDROCHLORIDE 0.25 MG: 0.12 TABLET ORAL at 12:07

## 2018-07-27 RX ADMIN — COLLAGENASE SANTYL: 250 OINTMENT TOPICAL at 09:07

## 2018-07-27 RX ADMIN — PRAMIPEXOLE DIHYDROCHLORIDE 0.25 MG: 0.12 TABLET ORAL at 02:07

## 2018-07-27 RX ADMIN — CLINDAMYCIN HYDROCHLORIDE 450 MG: 150 CAPSULE ORAL at 12:07

## 2018-07-27 RX ADMIN — CARBIDOPA AND LEVODOPA 1 TABLET: 25; 100 TABLET ORAL at 06:07

## 2018-07-27 RX ADMIN — CLINDAMYCIN HYDROCHLORIDE 450 MG: 150 CAPSULE ORAL at 06:07

## 2018-07-27 RX ADMIN — ENOXAPARIN SODIUM 30 MG: 100 INJECTION SUBCUTANEOUS at 05:07

## 2018-07-27 RX ADMIN — METRONIDAZOLE 500 MG: 500 TABLET ORAL at 06:07

## 2018-07-27 NOTE — OP NOTE
DATE OF PROCEDURE:  07/26/2018    PREOPERATIVE DIAGNOSIS:  Sacral decubitus involving skin, subcutaneous fat,   fascia.    POSTOPERATIVE DIAGNOSIS:  Sacral decubitus involving skin, subcutaneous fat,   fascia.    PROCEDURE:  Debridement of sacral decubitus skin, subcutaneous tissue, fat and   fascia were removed with sharp debridement by knife and scissors.    SURGEON:  Fabio Serna M.D.    OPERATIVE SUMMARY:  The patient was prepped and draped and placed in the lateral   position, given IV sedation.  A knife and scissors were then used to remove   dead skin, fat tissue and muscle down and including the fascia overlying the   sacrum.  Bone was not exposed, and has some necrotic tissue and necrotic fascia   on top of the bone to avoid opening it down to the bone.  There was bleeding   tissue noted about 95% of wound.  Dead necrotic tissue was removed.  Pressure   was applied to control the bleeding.  It was irrigated.  Pressure dressing was   applied and the patient was brought back up to her room.  Follow up wound orders   are for Santyl and cleaning daily, offloading increasing nutrition.  The   patient will probably need to be seen in the Wound Clinic and/or have wound care   in the nursing home.      BGL/IN  dd: 07/26/2018 12:09:17 (CDT)  td: 07/26/2018 19:14:06 (CDT)  Doc ID   #[id number]  Job ID #[id number]    CC:

## 2018-07-27 NOTE — ASSESSMENT & PLAN NOTE
Likely due to her decubitus   Covering broad spectrum with vanc and cefepime --7/23/18 will switch to clindamycin and levaquin  Will add flagyl 7/25 for better anaerobic coverage given proximity of wound to her rectum    Follow blood cultures--negative  UA clear  CXR clear  7/26 blood culture negative x 5d- infection seems to be deubitus; cover skin; continue clinda and flagyl. Add in cipro if necessary  7/27- day 5 clinda, day 3 flagyl. She is medically stable and should complete 10 day course of these antibx (5 more days clinda and 7 days flagyl) once discharged

## 2018-07-27 NOTE — PHYSICIAN QUERY
"PT Name: Eddy Cunningham  MR #: [id number]    Physician Query Form - Procedure Clarification     CDS/: Terrie Mooney               Contact information:  This form is a permanent document in the medical record.     Query Date: July 27, 2018  By submitting this query, we are merely seeking further clarification of documentation. Please utilize your independent clinical judgment when addressing the question(s) below.    The Medical record contains the following:     Indicators       Supporting Clinical Findings   Location in Medical Record    Documentation of "Debridement"   "DEBRIDEMENT OF SACRAL WOUND  Pt with full thickness unstageable pressure ulcer to sacrum.  The wound is 3x3cm wih depth of 2cm.  The necrotic tissue seems to involve muscle but unsure if bone is involved.  Wet, black foul smelling skin edges and subcutaneous tissue were removed sharply with scissors.  This area removed was less than 20 cm squared.  The wound was packed with 4x4 and Santyl and covered with Aquacel Ag." Procedure note 7/24/18    Documentation of "I & D"      EBL =      Other:       Excisional debridement is a surgical removal of  nonvitalized tissue, necrosis or slough. The use of a sharp instrument does not always indicate that an excisional debridement was performed.  Non excisional debridement is the scraping away or removal of loose tissue fragments.    Dopctor, please specify type of procedure(s) performed:  Please document type of debridement performed on 7/24/18  as excisional or non-excisional    [ x ] Excisional Debridement    [  ] Non Excisional Debridement     [  ] Other Procedure (Specify) ________________________________    [  ] Clinically Undetermined              "

## 2018-07-27 NOTE — ASSESSMENT & PLAN NOTE
Wound care consult   May need debridement in OR tomoro per surgery   She needs to resume constipation meds but will wait til after surgery to avoid potential of uncontrollable loose stools on wound bed during surgery  7/26 sx today  7/27 sp removal of necrotic skin, fat, muscle and fascia. Per surgery daily dressing changes with santyl. Can follow up in their clinic as well. Complete antibx for presumed infection on admission

## 2018-07-27 NOTE — ASSESSMENT & PLAN NOTE
PEG tube --restarted feeds and tolerating. Nutrition consult  NPO  7/27/18 PEG tube feedings resumed post op--patient tolerating

## 2018-07-27 NOTE — PROGRESS NOTES
Staff Handoff  Report received from Latasha RN and patient assessed per flowsheet. Awake, but quiet, not speaking much tonight. Nodding head. SR with PVC's on telemetry. Peg feeding Impact Peptide 1.5 at 55 cc/hr per kangaroo pump. DNR. Contact Isolation. Arvizu to gravity, clear yellow urine. Dsg to sacrum D/I. Turn q2 hrs schedule in use. Heel prot oxana. SCD's. Instructed daughter to call for needs/asst.    Resident Handoff

## 2018-07-27 NOTE — PLAN OF CARE
Problem: Patient Care Overview  Goal: Plan of Care Review  Outcome: Ongoing (interventions implemented as appropriate)  Patient repositioned Q 2 Hours and or prn for support and comfort. Peg tube feeling. Oral antibiotic via peg. Hydrocodone sol via peg given twice this shift for pain management. Sacral decubitus surgery dressing changed post op day 1; with santyl treatment. Afebrile. Daughter would like her mother to go to [facility] instead of going back to [facility]. Patient remains IP; no info available at change of shift to release patient to NH. Arvizu catheter remains due to lg sacrum wound.  No further questions. Daughter agrees with plan of care.

## 2018-07-27 NOTE — SUBJECTIVE & OBJECTIVE
Interval History: see HC     Review of Systems   Unable to perform ROS: Dementia     Objective:     Vital Signs (Most Recent):  Temp: 97.3 °F (36.3 °C) (07/27/18 0321)  Pulse: 78 (07/27/18 0724)  Resp: 20 (07/27/18 0724)  BP: 116/76 (07/27/18 0321)  SpO2: 96 % (07/27/18 0724) Vital Signs (24h Range):  Temp:  [96.3 °F (35.7 °C)-98.1 °F (36.7 °C)] 97.3 °F (36.3 °C)  Pulse:  [74-88] 78  Resp:  [17-20] 20  SpO2:  [92 %-99 %] 96 %  BP: (104-131)/(53-76) 116/76     Weight: 74.5 kg (164 lb 3.9 oz)  Body mass index is 24.97 kg/m².    Intake/Output Summary (Last 24 hours) at 07/27/18 0804  Last data filed at 07/27/18 0628   Gross per 24 hour   Intake              670 ml   Output             1050 ml   Net             -380 ml      Physical Exam   Constitutional: She is oriented to person, place, and time. She appears well-developed. No distress.   HENT:   Head: Normocephalic and atraumatic.   Right Ear: External ear normal.   Left Ear: External ear normal.   Eyes: Conjunctivae are normal. Pupils are equal, round, and reactive to light.   Neck: Normal range of motion. Neck supple.   Cardiovascular: Normal rate, regular rhythm, normal heart sounds and intact distal pulses.    Pulmonary/Chest: Effort normal and breath sounds normal. No respiratory distress. She has no wheezes.   Coarse breath sounds   Abdominal: Soft. Bowel sounds are normal. There is no tenderness.   Peg tube to abdomen   Genitourinary:   Genitourinary Comments: Arvizu in place   Musculoskeletal: She exhibits no edema.   scds in place   Lymphadenopathy:     She has no cervical adenopathy.   Neurological: She is alert and oriented to person, place, and time.   Skin: Skin is warm and dry. No rash noted.   Large decubitis to sacrum   Nursing note and vitals reviewed.      Significant Labs:   Blood Culture: No results for input(s): LABBLOO in the last 48 hours.  CBC:     Recent Labs  Lab 07/26/18  0517 07/27/18  0527   WBC 6.58 6.08   HGB 9.8* 9.9*   HCT 32.3* 32.7*     202     CMP:     Recent Labs  Lab 07/26/18  0517 07/27/18  0527    142   K 4.1 4.3    109   CO2 27 27   * 133*   BUN 29* 30*   CREATININE 0.6 0.6   CALCIUM 8.6* 8.8   PROT 5.3* 5.4*   ALBUMIN 2.4* 2.3*   BILITOT 0.3 0.3   ALKPHOS 109 102   AST 41* 32   ALT 23 22   ANIONGAP 6* 6*   EGFRNONAA >60 >60       Significant Imaging: I have reviewed and interpreted all pertinent imaging results/findings within the past 24 hours.

## 2018-07-27 NOTE — PROGRESS NOTES
[facility] Medicine  Progress Note    Patient Name: Eddy Cunningham  MRN: [id number]  Patient Class: IP- Inpatient   Admission Date: 7/21/2018  Length of Stay: 5 days  Attending Physician: Kunal Sandoval MD  Primary Care Provider: Ayaka Pollard MD        Subjective:     Principal Problem:Sepsis due to methicillin resistant Staphylococcus aureus (MRSA)    HPI:  78 year old demented female with PEG presents from [facility] with fever of 103. Work up in ED is essentially negative, including influenza, and no WBC. Urine clean. She is prerenal and with hypernatremia. She does meet sepsis criteria with HR greater than 90 and fever. BP is on the low side this am, 87/40. Upon further exam up here in ICU she is found to have an unstagable decubitus of sacrum(but at least stage 3 in my opinion).  She is admitted to ICU and placed on Vanc, Cefepime, IVF at 125/hr.    Hospital Course:  7/23/18  Patient is much improved today. She is answering yes and no questions. No fevers. Still no leukocytosis. Vitals are stable. Back on tube feeds.     7/24/18  She has a large decubitus  Which looks like the source of infection .  She c/o back pain  Black eschar by pictures looks like it would need debridement    Her tachycardia is better   Urine output is better ; sodium is getting down ;She is awake and responsive ; weak wet cough .  Her vanc  And cefepime was stopped ;she is on Levaquin and clindamycin via peg   Her BUN /creatinine is still high .  She is running no fevers .  Her BP is still on low side    7/25/18  BP better   Surgery debrided wound at bedside but would like to take her to OR [facility].  Using santyl, but surgery not confident this will be enough   No BM yet       7/26/18 had partial debridment 2 days ago at bedside but painful. Planned for surgery this am; Sx debridement of decubitus. PEG tube feedings held overnight  Afebrile overnight. WBC 6.58. Levofloxacin/ clinda  per peg tube  day 4. Day 2 metronidazole day 2  Blood cultures negative x 5 days no grwth x 2  Afebrile, BP& HR better 92/54, HR 81.  NA+ 165 max;> 142,   7/27/18  S/P removal of necrotic skin, fat, muscle and fascia per Dr. Serna 7/26  Pt required hydrocodone x 2 overnight. Afebrile. WBC 6.08; clinda day 5 & metronidazole day 2 per PEG   PEG feedings resumed.   Planning for dc to [place]--she is medically stable    Interval History: see HC     Review of Systems   Unable to perform ROS: Dementia     Objective:     Vital Signs (Most Recent):  Temp: 97.3 °F (36.3 °C) (07/27/18 0321)  Pulse: 78 (07/27/18 0724)  Resp: 20 (07/27/18 0724)  BP: 116/76 (07/27/18 0321)  SpO2: 96 % (07/27/18 0724) Vital Signs (24h Range):  Temp:  [96.3 °F (35.7 °C)-98.1 °F (36.7 °C)] 97.3 °F (36.3 °C)  Pulse:  [74-88] 78  Resp:  [17-20] 20  SpO2:  [92 %-99 %] 96 %  BP: (104-131)/(53-76) 116/76     Weight: 74.5 kg (164 lb 3.9 oz)  Body mass index is 24.97 kg/m².    Intake/Output Summary (Last 24 hours) at 07/27/18 0804  Last data filed at 07/27/18 0628   Gross per 24 hour   Intake              670 ml   Output             1050 ml   Net             -380 ml      Physical Exam   Constitutional: She is oriented to person, place, and time. She appears well-developed. No distress.   HENT:   Head: Normocephalic and atraumatic.   Right Ear: External ear normal.   Left Ear: External ear normal.   Eyes: Conjunctivae are normal. Pupils are equal, round, and reactive to light.   Neck: Normal range of motion. Neck supple.   Cardiovascular: Normal rate, regular rhythm, normal heart sounds and intact distal pulses.    Pulmonary/Chest: Effort normal and breath sounds normal. No respiratory distress. She has no wheezes.   Coarse breath sounds   Abdominal: Soft. Bowel sounds are normal. There is no tenderness.   Peg tube to abdomen   Genitourinary:   Genitourinary Comments: Arvizu in place   Musculoskeletal: She exhibits no edema.   scds in place   Lymphadenopathy:     She  has no cervical adenopathy.   Neurological: She is alert and oriented to person, place, and time.   Skin: Skin is warm and dry. No rash noted.   Large decubitis to sacrum   Nursing note and vitals reviewed.      Significant Labs:   Blood Culture: No results for input(s): LABBLOO in the last 48 hours.  CBC:     Recent Labs  Lab 07/26/18 0517 07/27/18  0527   WBC 6.58 6.08   HGB 9.8* 9.9*   HCT 32.3* 32.7*    202     CMP:     Recent Labs  Lab 07/26/18 0517 07/27/18  0527    142   K 4.1 4.3    109   CO2 27 27   * 133*   BUN 29* 30*   CREATININE 0.6 0.6   CALCIUM 8.6* 8.8   PROT 5.3* 5.4*   ALBUMIN 2.4* 2.3*   BILITOT 0.3 0.3   ALKPHOS 109 102   AST 41* 32   ALT 23 22   ANIONGAP 6* 6*   EGFRNONAA >60 >60       Significant Imaging: I have reviewed and interpreted all pertinent imaging results/findings within the past 24 hours.    Assessment/Plan:      * Sepsis due to methicillin resistant Staphylococcus aureus (MRSA)    Likely due to her decubitus   Covering broad spectrum with vanc and cefepime --7/23/18 will switch to clindamycin and levaquin  Will add flagyl 7/25 for better anaerobic coverage given proximity of wound to her rectum    Follow blood cultures--negative  UA clear  CXR clear  7/26 blood culture negative x 5d- infection seems to be deubitus; cover skin; continue clinda and flagyl. Add in cipro if necessary  7/27- day 5 clinda, day 3 flagyl. She is medically stable and should complete 10 day course of these antibx (5 more days clinda and 7 days flagyl) once discharged          Severe protein-calorie malnutrition      Continue isosource        Decubitus ulcer of sacral region, unstageable    Wound care consult   May need debridement in OR [place] per surgery   She needs to resume constipation meds but will wait til after surgery to avoid potential of uncontrollable loose stools on wound bed during surgery  7/26 sx today  7/27 sp removal of necrotic skin, fat, muscle and fascia. Per  surgery daily dressing changes with santyl. Can follow up in their clinic as well. Complete antibx for presumed infection on admission        Dehydration    resolved  BMP daily           Hypernatremia    Improved   Off of IVF  On water flushes via PEG  7/26 Na 142 this am  7/27 same 142        History of airway aspiration    PEG tube --restarted feeds and tolerating. Nutrition consult  NPO  7/27/18 PEG tube feedings resumed post op--patient tolerating          Hypothyroidism due to acquired atrophy of thyroid    Lab Results   Component Value Date    TSH 4.071 (H) 06/10/2018     Continue synthroid 75mcg daily           Macrocytic anemia      Initially hemoconcentrated - with dilution H&H stable        Functional quadriplegia    Air mattress /pressure reduction  She is a DNR   Turn q 2          Hypokalemia    Continue Kcl 20 meq daily   BMP daily   K improved  BMP  Lab Results   Component Value Date     07/27/2018    K 4.3 07/27/2018     07/27/2018    CO2 27 07/27/2018    BUN 30 (H) 07/27/2018    CREATININE 0.6 07/27/2018    CALCIUM 8.8 07/27/2018    ANIONGAP 6 (L) 07/27/2018    ESTGFRAFRICA >60 07/27/2018    EGFRNONAA >60 07/27/2018     DC KCL  7/26 K+stable        Memory disorder     will continue her exelon patch as ordered at home           Parkinson's disease    Continue sinemet   Multiple contractures            VTE Risk Mitigation         Ordered     enoxaparin injection 30 mg  Daily      07/24/18 0749     IP VTE HIGH RISK PATIENT  Once      07/21/18 2210     Place sequential compression device  Until discontinued      07/21/18 2210              Kristin Pinto MD  Department of Hospital Medicine   [facility]

## 2018-07-27 NOTE — PROGRESS NOTES
" [facility]  Adult Nutrition  Progress Note    SUMMARY       Recommendations    Recommendation/Intervention: Continue Current PEG TF regimen as tolerated, monitoring residuals. Please obtain updated wt.  Goals: tolerance TF at goal rate by discharge  Nutrition Goal Status: goal met  Communication of RD Recs:  (POC reviewed)    Nutrition Discharge Planning: PEG feeds per Recs    Reason for Assessment    Reason for Assessment: RD follow-up  Diagnosis: infection/sepsis  Relevant Medical History: Parkinson, HTN, COPD, Hypothyroid, Depression, Dementia, GERD  General Information Comments: Per RN documentation, pt tolerating TF at goal rate with no issues. Pt s/p wound debridement.       Nutrition Risk Screen    Nutrition Risk Screen: tube feeding or parenteral nutrition    Nutrition/Diet History    Patient Reported Diet/Restrictions/Preferences: no oral intake  Do you have any cultural, spiritual, Anabaptism conflicts, given your current situation?: Latter-day  Food Allergies: NKFA  Factors Affecting Nutritional Intake: NPO, impaired cognitive status/motor control  Nutrition Support Formula Prior to Admit: Jevity 1.2  Nutrition Support Rate Prior to Admit: 70 (ml) (question accuracy of order to actual intake PTA due to current status.)  Nutrtion Support Frequency Prior to Admit: [id number]    Anthropometrics    Temp: 97.9 °F (36.6 °C)  Height Method: Stated  Height: 5' 8" (172.7 cm)  Height (inches): 68 in  Weight Method: Bed Scale  Weight: 74.5 kg (164 lb 3.9 oz)  Weight (lb): 164.24 lb  Ideal Body Weight (IBW), Female: 140 lb  % Ideal Body Weight, Female (lb): 100.15 lb  BMI (Calculated): 21.4  BMI Grade: 18.5-24.9 - normal  Weight Loss: unintentional  Usual Body Weight (UBW), k.6 kg (18 admit)  Weight Change Amount: 50 lb 11.3 oz ([id number], significant)  % Usual Body Weight: 73.59  % Weight Change From Usual Weight: -26.56 %       Lab/Procedures/Meds    Pertinent Labs Reviewed: " reviewed  Pertinent Medications Reviewed: reviewed  Scheduled Meds:   albuterol-ipratropium  3 mL Nebulization Q6H WAKE    aspirin  81 mg Per G Tube Daily    carbidopa-levodopa  mg  1 tablet Per G Tube 5x Daily    clindamycin  450 mg Per G Tube Q6H    collagenase   Topical (Top) Daily    docusate  100 mg Per G Tube Daily    enoxaparin  30 mg Subcutaneous Daily    levothyroxine  75 mcg Per G Tube Before breakfast    metroNIDAZOLE  500 mg Per G Tube Q8H    pantoprazole  40 mg Intravenous Daily    pramipexole  0.25 mg Per G Tube TID    rivastigmine  1 patch Transdermal Daily    senna  1 tablet Per G Tube Daily    traZODone  50 mg Per G Tube QHS     Continuous Infusions:  PRN Meds:.acetaminophen, hydrocodone-apap 7.5-325 MG/15 ML, ondansetron, promethazine (PHENERGAN) IVPB, traMADol       Recent Labs  Lab 07/27/18  0527   CALCIUM 8.8   PROT 5.4*      K 4.3   CO2 27      BUN 30*   CREATININE 0.6   ALKPHOS 102   ALT 22   AST 32   BILITOT 0.3     Physical Findings/Assessment    Overall Physical Appearance: on oxygen therapy, loss of muscle mass, loss of subcutaneous fat (functional quadriplegia)  Tubes: gastrostomy tube  Oral/Mouth Cavity:  (dry, cracked, black hairy tongue)  Skin: pressure ulcer(s) (Luca 8)    Estimated/Assessed Needs    Weight Used For Calorie Calculations: 63.6 kg (140 lb 3.4 oz)  Energy Calorie Requirements (kcal): 4571-5742  Energy Need Method: Kcal/kg (30-35)  Protein Requirements:  (1.5-2.0)  Weight Used For Protein Calculations: 63.6 kg (140 lb 3.4 oz)  Fluid Requirements (mL): 1ml/kcal or per MD     RDA Method (mL): 1908         Nutrition Prescription Ordered    Current Diet Order: NPO  Current Nutrition Support Formula Ordered: Impact Peptide 1.5  Current Nutrition Support Rate Ordered: 55 (ml)  Current Nutrition Support Frequency Ordered: ml/hr x24 hrs    Evaluation of Received Nutrient/Fluid Intake    Enteral Calories (kcal): 1980  Enteral Protein (gm):  124  Enteral (Free Water) Fluid (mL): 1307  Free Water Flush Fluid (mL): 800  % Kcal Needs: 100  % Protein Needs: 100  IV Fluid (mL): 3000  Total Fluid Intake (mL): 1976  Energy Calories Required: meeting needs  Protein Required: meeting needs  Fluid Required: meeting needs  Tolerance: tolerating  % Intake of Estimated Energy Needs: 75 - 100 %  % Meal Intake: NPO    Nutrition Risk    Level of Risk/Frequency of Follow-up:  (F/U 1x/wk)     Assessment and Plan    Severe protein-calorie malnutrition    Malnutrition in the context of Social/Environmental Circumstances    Related to (etiology):  Inability to consume adequate energy intake with questionable accuracy/intake of PTA TF order    Signs and Symptoms (as evidenced by):  Energy Intake: 100% per TF order of estimated energy requirement (question accuracy of order/intake)  Body Fat Depletion: moderate depletion of orbitals and triceps   Muscle Mass Depletion: severe depletion of temples and clavicle region, Acromion very prominent   Weight Loss: 26% x 3 month (significant)   Dehydration, new sacral decubitus    Interventions  Modify composition of enteral nutrition  Modify rate of enteral nutrition  Feeding tube flush    Recommendations (treatment strategy):  Impact Peptide 1.5 GR 55ml/hr  160ml FWF Q4    Nutrition Diagnosis Status:  Continues               Monitor and Evaluation    Food and Nutrient Intake: enteral nutrition intake  Food and Nutrient Adminstration: enteral and parenteral nutrition administration  Physical Activity and Function: nutrition-related ADLs and IADLs  Anthropometric Measurements: weight, weight change  Biochemical Data, Medical Tests and Procedures: electrolyte and renal panel, lipid profile, gastrointestinal profile, glucose/endocrine profile, inflammatory profile  Nutrition-Focused Physical Findings: overall appearance     Nutrition Follow-Up    RD Follow-up?: Yes

## 2018-07-27 NOTE — PLAN OF CARE
Problem: Patient Care Overview  Goal: Plan of Care Review  Outcome: Ongoing (interventions implemented as appropriate)  Patient had a good night. Just treated for pain and repositioned, Hydrocodone was given twice tonight. Peg feeding continued. SR with PVC's on telemetry. Arvizu to gravity, clear yellow urine. No neuro changes. POC reviewed with patient and daughter at bedside. Instructed to call for needs/asst.

## 2018-07-27 NOTE — ASSESSMENT & PLAN NOTE
Continue Kcl 20 meq daily   BMP daily   K improved  BMP  Lab Results   Component Value Date     07/27/2018    K 4.3 07/27/2018     07/27/2018    CO2 27 07/27/2018    BUN 30 (H) 07/27/2018    CREATININE 0.6 07/27/2018    CALCIUM 8.8 07/27/2018    ANIONGAP 6 (L) 07/27/2018    ESTGFRAFRICA >60 07/27/2018    EGFRNONAA >60 07/27/2018     DC KCL  7/26 K+stable

## 2018-07-27 NOTE — PLAN OF CARE
Problem: Nutrition, Enteral (Adult)  Intervention: Monitor/Manage Nutrition Support  Nutrition Goals: tolerance TF at goal rate by discharge  Nutrition Goal Status: goal met  Communication of RD Recs:  (POC reviewed)    Nutrition Discharge Planning: PEG feeds per Recs

## 2018-07-27 NOTE — PHYSICIAN QUERY
"PT Name: Eddy Cunningham  MR #: [id number]    Physician Query Form - Procedure Clarification     CDS: Terrie Mooney RN, CCDS         Contact information :[phone]  [email or web address]     This form is a permanent document in the medical record.     Query Date: July 27, 2018  By submitting this query, we are merely seeking further clarification of documentation. Please utilize your independent clinical judgment when addressing the question(s) below.    The Medical record contains the following:     Indicators       Supporting Clinical Findings   Location in Medical Record   x Documentation of "Debridement"   "Debridement of sacral decubitus skin, subcutaneous tissue, fat and   fascia were removed with sharp debridement by knife and scissors." Op Note 7/26/18    Documentation of "I & D"      EBL =      Other:       Excisional debridement is a surgical removal of  nonvitalized tissue, necrosis or slough. The use of a sharp instrument does not always indicate that an excisional debridement was performed.  Non excisional debridement is the scraping away or removal of loose tissue fragments.    Doctor,Please document type of debridement performed on 7/26/18 as excisional or non-excisional.     [x  ] Excisional Debridement     [  ] Non Excisional Debridement     [  ] Other Procedure (Specify) ________________________________    [  ] Clinically Undetermined              "

## 2018-07-27 NOTE — DISCHARGE SUMMARY
[facility] Medicine  Discharge Summary      Patient Name: Eddy Cunningham  MRN: [id number]  Admission Date: 7/21/2018  Hospital Length of Stay: 5 days  Discharge Date and Time:  07/27/2018 10:34 AM  Attending Physician: Kunal Sandoval MD   Discharging Provider: Courtney Arndt NP  Primary Care Provider: Ayaka Pollard MD      HPI:   78 year old demented female with PEG presents from [facility] with fever of 103. Work up in ED is essentially negative, including influenza, and no WBC. Urine clean. She is prerenal and with hypernatremia. She does meet sepsis criteria with HR greater than 90 and fever. BP is on the low side this am, 87/40. Upon further exam up here in ICU she is found to have an unstagable decubitus of sacrum(but at least stage 3 in my opinion).  She is admitted to ICU and placed on Vanc, Cefepime, IVF at 125/hr.    Procedure(s) (LRB):  DEBRIDEMENT, ULCER, SACRAL AREA (N/A)      Hospital Course:   7/23/18  Patient is much improved today. She is answering yes and no questions. No fevers. Still no leukocytosis. Vitals are stable. Back on tube feeds.     7/24/18  She has a large decubitus  Which looks like the source of infection .  She c/o back pain  Black eschar by pictures looks like it would need debridement    Her tachycardia is better   Urine output is better ; sodium is getting down ;She is awake and responsive ; weak wet cough .  Her vanc  And cefepime was stopped ;she is on Levaquin and clindamycin via peg   Her BUN /creatinine is still high .  She is running no fevers .  Her BP is still on low side    7/25/18  BP better   Surgery debrided wound at bedside but would like to take her to OR [facility].  Using santyl, but surgery not confident this will be enough   No BM yet       7/26/18 had partial debridment 2 days ago at bedside but painful. Planned for surgery this am; Sx debridement of decubitus. PEG tube feedings held overnight  Afebrile overnight. WBC  6.58. Levofloxacin/ clinda  per peg tube day 4. Day 2 metronidazole day 2  Blood cultures negative x 5 days no grwth x 2  Afebrile, BP& HR better 92/54, HR 81.  NA+ 165 max;> 142,   7/27/18  S/P removal of necrotic skin, fat, muscle and fascia per Dr. Serna 7/26  Pt required hydrocodone x 2 overnight. Afebrile. WBC 6.08; clinda day 5 & metronidazole day 2 per PEG   PEG feedings resumed.   Planning for dc to [place]--she is medically stable     Consults:   Consults         Status Ordering Provider     Inpatient consult to General Surgery  Once     Provider:  (Not yet assigned)    Acknowledged WHITLEY GONZALEZ     Inpatient consult to General Surgery  Once     Provider:  Robbin Guy Jr., MD    Completed KIMI THIBODEAUX     Inpatient consult to [facility]  Once     Provider:  Scottie Pratt MD    Completed SARAI MORRIS     Inpatient consult to [place] [facility]  Once     Provider:  (Not yet assigned)    Acknowledged CARMEN STRICKLAND     Inpatient consult to Registered Dietitian/Nutritionist  Once     Provider:  (Not yet assigned)    Completed CARMEN STRICKLAND          No new Assessment & Plan notes have been filed under this hospital service since the last note was generated.  Service: Hospital Medicine    Final Active Diagnoses:    Diagnosis Date Noted POA    PRINCIPAL PROBLEM:  Sepsis due to methicillin resistant Staphylococcus aureus (MRSA) [A41.02] 06/12/2018 Yes    Severe protein-calorie malnutrition [E43] 07/23/2018 Yes    History of airway aspiration [Z87.898] 07/22/2018 Yes    Hypernatremia [E87.0] 07/22/2018 Yes    Dehydration [E86.0] 07/22/2018 Yes    Decubitus ulcer of sacral region, unstageable [L89.150] 07/22/2018 Yes    Hypothyroidism due to acquired atrophy of thyroid [E03.4] 06/11/2018 Yes    Macrocytic anemia [D53.9] 05/20/2017 Yes    Functional quadriplegia [R53.2] 05/19/2017 Yes    Hypokalemia [E87.6] 05/18/2017 Yes    Memory disorder [R41.3] 03/14/2013 Yes     Parkinson's disease [G20] 11/13/2012 Yes      Problems Resolved During this Admission:    Diagnosis Date Noted Date Resolved POA    Pressure ulcer of sacrum [L89.159] 07/24/2018 07/25/2018 Yes       Discharged Condition: good    Disposition: Home or Self Care    Follow Up:  Follow-up Information     Ayaka Pollard MD In 1 week.    Specialty:  Family Medicine  Why:  Outpatient Services  Contact information:  [id number]  [place] [id number] [phone]                 Patient Instructions:   No discharge procedures on file.    Significant Diagnostic Studies:     Significant Labs:   Blood Culture: No results for input(s): LABBLOO in the last 48 hours.  CBC:      Recent Labs  Lab 07/26/18 0517 07/27/18 0527   WBC 6.58 6.08   HGB 9.8* 9.9*   HCT 32.3* 32.7*    202      CMP:      Recent Labs  Lab 07/26/18 0517 07/27/18 0527    142   K 4.1 4.3    109   CO2 27 27   * 133*   BUN 29* 30*   CREATININE 0.6 0.6   CALCIUM 8.6* 8.8   PROT 5.3* 5.4*   ALBUMIN 2.4* 2.3*   BILITOT 0.3 0.3   ALKPHOS 109 102   AST 41* 32   ALT 23 22   ANIONGAP 6* 6*   EGFRNONAA >60 >60     Blood cultures NGTD x 5d    Pending Diagnostic Studies:     None         Medications:  Reconciled Home Medications:      Medication List      START taking these medications    clindamycin 150 MG capsule  Commonly known as:  CLEOCIN  3 capsules (450 mg total) by Per G Tube route every 6 (six) hours.     collagenase ointment  Apply topically once daily.  Start taking on:  7/28/2018     metroNIDAZOLE 500 MG tablet  Commonly known as:  FLAGYL  1 tablet (500 mg total) by Per G Tube route every 8 (eight) hours.        CONTINUE taking these medications    aspirin 81 MG Chew  1 tablet (81 mg total) by Per G Tube route once daily.     carbidopa-levodopa  mg  mg per tablet  Commonly known as:  SINEMET  1 tablet by Per G Tube route 5 (five) times daily.     docusate sodium 100 MG capsule  Commonly known as:  COLACE  1 capsule (100  mg total) by Per G Tube route once daily.     EXELON 4.6 mg/24 hr Pt24  Generic drug:  rivastigmine  Place 1 patch onto the skin once daily.     HYDROcodone-acetaminophen 7.5-325 mg per tablet  Commonly known as:  NORCO  Take 1 tablet by mouth every 12 (twelve) hours as needed for Pain.     JEVITY ORAL  65 mL/hr by PEG Tube route once daily. Via pump on at 6 pm off at 6 am     levothyroxine 75 MCG tablet  Commonly known as:  SYNTHROID  1 tablet (75 mcg total) by Per G Tube route before breakfast.     omeprazole 40 MG capsule  Commonly known as:  PRILOSEC  Open 40 mg capsule into g-tube once daily     potassium chloride 10% 20 mEq/15 mL oral solution  Commonly known as:  KAYCIEL  15 mLs (20 mEq total) by Per G Tube route 2 (two) times daily.     pramipexole 0.25 MG tablet  Commonly known as:  MIRAPEX  1 tablet (0.25 mg total) by Per G Tube route 3 (three) times daily.     ranitidine 150 MG capsule  Commonly known as:  ZANTAC  Take 75 mg by mouth 2 (two) times daily.     senna 8.6 mg tablet  Commonly known as:  SENOKOT  1 tablet by Per G Tube route once daily.     tiZANidine 2 mg Cap  Take 4 mg by mouth nightly as needed.     traMADol 50 mg tablet  Commonly known as:  ULTRAM  1 tablet (50 mg total) by Per G Tube route every 6 (six) hours as needed for Pain.     traZODone 50 MG tablet  Commonly known as:  DESYREL  1 tablet (50 mg total) by Per G Tube route every evening.        STOP taking these medications    levoFLOXacin 500 MG tablet  Commonly known as:  LEVAQUIN            Indwelling Lines/Drains at time of discharge:   Lines/Drains/Airways     Drain                 Gastrostomy/Enterostomy 05/26/18 1135 Percutaneous endoscopic gastrostomy (PEG) LUQ 61 days         Urethral Catheter 07/21/18 2000 Double-lumen 5 days                Time spent on the discharge of patient: 20 minutes  Patient was seen and examined on the date of discharge and determined to be suitable for discharge.         Courtney Arndt,  NP  Department of Hospital Medicine  [facility]

## 2018-07-27 NOTE — ASSESSMENT & PLAN NOTE
Malnutrition in the context of Social/Environmental Circumstances    Related to (etiology):  Inability to consume adequate energy intake with questionable accuracy/intake of PTA TF order    Signs and Symptoms (as evidenced by):  Energy Intake: 100% per TF order of estimated energy requirement (question accuracy of order/intake)  Body Fat Depletion: moderate depletion of orbitals and triceps   Muscle Mass Depletion: severe depletion of temples and clavicle region, Acromion very prominent   Weight Loss: 26% x 3 month (significant)   Dehydration, new sacral decubitus    Interventions  Modify composition of enteral nutrition  Modify rate of enteral nutrition  Feeding tube flush    Recommendations (treatment strategy):  Impact Peptide 1.5 GR 55ml/hr  160ml FWF Q4    Nutrition Diagnosis Status:  Continues

## 2018-07-27 NOTE — PLAN OF CARE
07/27/18 0949   Discharge Reassessment   Assessment Type Discharge Planning Reassessment       Sent referral over to the [facility]. Pt's family is requesting her to be moved from [facility] to the [facility].    Chavo Jenkins LMSW

## 2018-07-28 PROBLEM — E86.0 DEHYDRATION: Status: RESOLVED | Noted: 2018-07-22 | Resolved: 2018-07-28

## 2018-07-28 PROBLEM — E87.6 HYPOKALEMIA: Status: RESOLVED | Noted: 2017-05-18 | Resolved: 2018-07-28

## 2018-07-28 LAB
ALBUMIN SERPL BCP-MCNC: 2.2 G/DL
ALP SERPL-CCNC: 97 U/L
ALT SERPL W/O P-5'-P-CCNC: 22 U/L
ANION GAP SERPL CALC-SCNC: 8 MMOL/L
AST SERPL-CCNC: 37 U/L
BASOPHILS # BLD AUTO: 0.03 K/UL
BASOPHILS NFR BLD: 0.5 %
BILIRUB SERPL-MCNC: 0.3 MG/DL
BUN SERPL-MCNC: 32 MG/DL
CALCIUM SERPL-MCNC: 8.6 MG/DL
CHLORIDE SERPL-SCNC: 106 MMOL/L
CO2 SERPL-SCNC: 27 MMOL/L
CREAT SERPL-MCNC: 0.6 MG/DL
DIFFERENTIAL METHOD: ABNORMAL
EOSINOPHIL # BLD AUTO: 0.2 K/UL
EOSINOPHIL NFR BLD: 3.9 %
ERYTHROCYTE [DISTWIDTH] IN BLOOD BY AUTOMATED COUNT: 14.5 %
EST. GFR  (AFRICAN AMERICAN): >60 ML/MIN/1.73 M^2
EST. GFR  (NON AFRICAN AMERICAN): >60 ML/MIN/1.73 M^2
GLUCOSE SERPL-MCNC: 129 MG/DL
HCT VFR BLD AUTO: 32.4 %
HGB BLD-MCNC: 10 G/DL
LYMPHOCYTES # BLD AUTO: 1.1 K/UL
LYMPHOCYTES NFR BLD: 17.8 %
MCH RBC QN AUTO: 30.5 PG
MCHC RBC AUTO-ENTMCNC: 30.9 G/DL
MCV RBC AUTO: 99 FL
MONOCYTES # BLD AUTO: 0.6 K/UL
MONOCYTES NFR BLD: 9.6 %
NEUTROPHILS # BLD AUTO: 4.1 K/UL
NEUTROPHILS NFR BLD: 72.2 %
PLATELET # BLD AUTO: 229 K/UL
PMV BLD AUTO: 11.5 FL
POTASSIUM SERPL-SCNC: 4.1 MMOL/L
PROT SERPL-MCNC: 5.1 G/DL
RBC # BLD AUTO: 3.28 M/UL
SODIUM SERPL-SCNC: 141 MMOL/L
WBC # BLD AUTO: 5.95 K/UL

## 2018-07-28 PROCEDURE — 11000001 HC ACUTE MED/SURG PRIVATE ROOM

## 2018-07-28 PROCEDURE — C9113 INJ PANTOPRAZOLE SODIUM, VIA: HCPCS | Performed by: FAMILY MEDICINE

## 2018-07-28 PROCEDURE — 63600175 PHARM REV CODE 636 W HCPCS: Performed by: INTERNAL MEDICINE

## 2018-07-28 PROCEDURE — 94640 AIRWAY INHALATION TREATMENT: CPT

## 2018-07-28 PROCEDURE — 96372 THER/PROPH/DIAG INJ SC/IM: CPT | Mod: 59

## 2018-07-28 PROCEDURE — 85025 COMPLETE CBC W/AUTO DIFF WBC: CPT

## 2018-07-28 PROCEDURE — 96376 TX/PRO/DX INJ SAME DRUG ADON: CPT

## 2018-07-28 PROCEDURE — 25000003 PHARM REV CODE 250: Performed by: FAMILY MEDICINE

## 2018-07-28 PROCEDURE — 63600175 PHARM REV CODE 636 W HCPCS: Performed by: FAMILY MEDICINE

## 2018-07-28 PROCEDURE — 94761 N-INVAS EAR/PLS OXIMETRY MLT: CPT

## 2018-07-28 PROCEDURE — 36415 COLL VENOUS BLD VENIPUNCTURE: CPT

## 2018-07-28 PROCEDURE — 99232 SBSQ HOSP IP/OBS MODERATE 35: CPT | Mod: ,,, | Performed by: INTERNAL MEDICINE

## 2018-07-28 PROCEDURE — 25000003 PHARM REV CODE 250: Performed by: INTERNAL MEDICINE

## 2018-07-28 PROCEDURE — 80053 COMPREHEN METABOLIC PANEL: CPT

## 2018-07-28 PROCEDURE — 25000242 PHARM REV CODE 250 ALT 637 W/ HCPCS: Performed by: FAMILY MEDICINE

## 2018-07-28 RX ADMIN — PRAMIPEXOLE DIHYDROCHLORIDE 0.25 MG: 0.12 TABLET ORAL at 09:07

## 2018-07-28 RX ADMIN — ENOXAPARIN SODIUM 30 MG: 100 INJECTION SUBCUTANEOUS at 06:07

## 2018-07-28 RX ADMIN — PANTOPRAZOLE SODIUM 40 MG: 40 INJECTION, POWDER, LYOPHILIZED, FOR SOLUTION INTRAVENOUS at 09:07

## 2018-07-28 RX ADMIN — METRONIDAZOLE 500 MG: 500 TABLET ORAL at 05:07

## 2018-07-28 RX ADMIN — DOCUSATE SODIUM 100 MG: 50 LIQUID ORAL at 09:07

## 2018-07-28 RX ADMIN — CLINDAMYCIN HYDROCHLORIDE 450 MG: 150 CAPSULE ORAL at 12:07

## 2018-07-28 RX ADMIN — CLINDAMYCIN HYDROCHLORIDE 450 MG: 150 CAPSULE ORAL at 05:07

## 2018-07-28 RX ADMIN — COLLAGENASE SANTYL: 250 OINTMENT TOPICAL at 09:07

## 2018-07-28 RX ADMIN — CARBIDOPA AND LEVODOPA 1 TABLET: 25; 100 TABLET ORAL at 09:07

## 2018-07-28 RX ADMIN — TRAZODONE HYDROCHLORIDE 50 MG: 50 TABLET ORAL at 09:07

## 2018-07-28 RX ADMIN — HYDROCODONE BITARTRATE AND ACETAMINOPHEN 15 ML: 7.5; 325 SOLUTION ORAL at 02:07

## 2018-07-28 RX ADMIN — ASPIRIN 81 MG 81 MG: 81 TABLET ORAL at 09:07

## 2018-07-28 RX ADMIN — METRONIDAZOLE 500 MG: 500 TABLET ORAL at 02:07

## 2018-07-28 RX ADMIN — RIVASTIGMINE 1 PATCH: 4.6 PATCH, EXTENDED RELEASE TRANSDERMAL at 09:07

## 2018-07-28 RX ADMIN — IPRATROPIUM BROMIDE AND ALBUTEROL SULFATE 3 ML: .5; 3 SOLUTION RESPIRATORY (INHALATION) at 07:07

## 2018-07-28 RX ADMIN — CARBIDOPA AND LEVODOPA 1 TABLET: 25; 100 TABLET ORAL at 06:07

## 2018-07-28 RX ADMIN — SENNA 1 TABLET: 8.6 TABLET, COATED ORAL at 09:07

## 2018-07-28 RX ADMIN — CLINDAMYCIN HYDROCHLORIDE 450 MG: 150 CAPSULE ORAL at 11:07

## 2018-07-28 RX ADMIN — LEVOTHYROXINE SODIUM 75 MCG: 75 TABLET ORAL at 05:07

## 2018-07-28 RX ADMIN — CARBIDOPA AND LEVODOPA 1 TABLET: 25; 100 TABLET ORAL at 02:07

## 2018-07-28 RX ADMIN — IPRATROPIUM BROMIDE AND ALBUTEROL SULFATE 3 ML: .5; 3 SOLUTION RESPIRATORY (INHALATION) at 08:07

## 2018-07-28 RX ADMIN — PRAMIPEXOLE DIHYDROCHLORIDE 0.25 MG: 0.12 TABLET ORAL at 02:07

## 2018-07-28 RX ADMIN — IPRATROPIUM BROMIDE AND ALBUTEROL SULFATE 3 ML: .5; 3 SOLUTION RESPIRATORY (INHALATION) at 01:07

## 2018-07-28 RX ADMIN — CARBIDOPA AND LEVODOPA 1 TABLET: 25; 100 TABLET ORAL at 05:07

## 2018-07-28 RX ADMIN — METRONIDAZOLE 500 MG: 500 TABLET ORAL at 09:07

## 2018-07-28 RX ADMIN — HYDROCODONE BITARTRATE AND ACETAMINOPHEN 15 ML: 7.5; 325 SOLUTION ORAL at 06:07

## 2018-07-28 RX ADMIN — HYDROCODONE BITARTRATE AND ACETAMINOPHEN 15 ML: 7.5; 325 SOLUTION ORAL at 09:07

## 2018-07-28 RX ADMIN — CLINDAMYCIN HYDROCHLORIDE 450 MG: 150 CAPSULE ORAL at 06:07

## 2018-07-28 NOTE — SUBJECTIVE & OBJECTIVE
Interval History: no acute events. Remains stable for discharge to NH when bed available.     Review of Systems   Unable to perform ROS: Dementia     Objective:     Vital Signs (Most Recent):  Temp: 98.4 °F (36.9 °C) (07/28/18 0741)  Pulse: 72 (07/28/18 0818)  Resp: 19 (07/28/18 0818)  BP: (!) 115/57 (07/28/18 0741)  SpO2: 97 % (07/28/18 0818) Vital Signs (24h Range):  Temp:  [97 °F (36.1 °C)-98.4 °F (36.9 °C)] 98.4 °F (36.9 °C)  Pulse:  [70-92] 72  Resp:  [18-19] 19  SpO2:  [93 %-100 %] 97 %  BP: (108-117)/(56-59) 115/57     Weight: 74.5 kg (164 lb 3.9 oz)  Body mass index is 24.97 kg/m².    Intake/Output Summary (Last 24 hours) at 07/28/18 0936  Last data filed at 07/28/18 0447   Gross per 24 hour   Intake             2185 ml   Output             1100 ml   Net             1085 ml      Physical Exam   Constitutional: She appears well-developed. No distress.   HENT:   Head: Normocephalic and atraumatic.   Right Ear: External ear normal.   Left Ear: External ear normal.   Nose: Nose normal.   Eyes: Conjunctivae are normal. Pupils are equal, round, and reactive to light.   Neck: No tracheal deviation present.   Cardiovascular: Normal rate, regular rhythm and normal heart sounds.    Pulmonary/Chest: Effort normal and breath sounds normal. No respiratory distress.   Abdominal: Soft. Bowel sounds are normal. She exhibits no distension. There is no tenderness.   PEG in place   Musculoskeletal: She exhibits deformity (contractures, chronic).   Neurological: She is alert.   Responds yes and no appropriately but can not say where she is  Does know her name   Skin: Skin is warm. No rash noted.   Psychiatric: She has a normal mood and affect. Her behavior is normal.   Nursing note and vitals reviewed.      Significant Labs:   CBC:   Recent Labs  Lab 07/27/18  0527 07/28/18  0557   WBC 6.08 5.95   HGB 9.9* 10.0*   HCT 32.7* 32.4*    229     CMP:   Recent Labs  Lab 07/27/18  0527 07/28/18  0557    141   K 4.3 4.1     106   CO2 27 27   * 129*   BUN 30* 32*   CREATININE 0.6 0.6   CALCIUM 8.8 8.6*   PROT 5.4* 5.1*   ALBUMIN 2.3* 2.2*   BILITOT 0.3 0.3   ALKPHOS 102 97   AST 32 37   ALT 22 22   ANIONGAP 6* 8   EGFRNONAA >60 >60     All pertinent labs within the past 24 hours have been reviewed.    Significant Imaging: I have reviewed all pertinent imaging results/findings within the past 24 hours.

## 2018-07-28 NOTE — ASSESSMENT & PLAN NOTE
Wound care consult     7/26 debridement in OR  7/27 s/p removal of necrotic skin, fat, muscle and fascia. Per surgery daily dressing changes with santyl. Can follow up in their clinic as well. Complete antibx for presumed infection on admission

## 2018-07-28 NOTE — PROGRESS NOTES
[facility] Medicine  Progress Note    Patient Name: Eddy Cunningham  MRN: [id number]  Patient Class: IP- Inpatient   Admission Date: 7/21/2018  Length of Stay: 6 days  Attending Physician: Kunal Sandoval MD  Primary Care Provider: Ayaka Pollard MD        Subjective:     Principal Problem:Sepsis due to methicillin resistant Staphylococcus aureus (MRSA)    HPI:  78 year old demented female with PEG presents from [facility] with fever of 103. Work up in ED is essentially negative, including influenza, and no WBC. Urine clean. She is prerenal and with hypernatremia. She does meet sepsis criteria with HR greater than 90 and fever. BP is on the low side this am, 87/40. Upon further exam up here in ICU she is found to have an unstagable decubitus of sacrum(but at least stage 3 in my opinion).  She is admitted to ICU and placed on Vanc, Cefepime, IVF at 125/hr.    Hospital Course:  7/23/18  Patient is much improved today. She is answering yes and no questions. No fevers. Still no leukocytosis. Vitals are stable. Back on tube feeds.     7/24/18  She has a large decubitus  Which looks like the source of infection .  She c/o back pain  Black eschar by pictures looks like it would need debridement    Her tachycardia is better   Urine output is better ; sodium is getting down ;She is awake and responsive ; weak wet cough .  Her vanc  And cefepime was stopped ;she is on Levaquin and clindamycin via peg   Her BUN /creatinine is still high .  She is running no fevers .  Her BP is still on low side    7/25/18  BP better   Surgery debrided wound at bedside but would like to take her to OR [facility].  Using santyl, but surgery not confident this will be enough   No BM yet       7/26/18 had partial debridment 2 days ago at bedside but painful. Planned for surgery this am; Sx debridement of decubitus. PEG tube feedings held overnight  Afebrile overnight. WBC 6.58. Levofloxacin/ clinda  per peg tube  day 4. Day 2 metronidazole day 2  Blood cultures negative x 5 days no grwth x 2  Afebrile, BP& HR better 92/54, HR 81.  NA+ 165 max;> 142,   7/27/18  S/P removal of necrotic skin, fat, muscle and fascia per Dr. Serna 7/26  Pt required hydrocodone x 2 overnight. Afebrile. WBC 6.08; clinda day 5 & metronidazole day 2 per PEG   PEG feedings resumed.   Planning for dc to [place]--she is medically stable    7/27  Remains stable  Labs reviewed, no changes. No need for continued daily labs  She is at her baseline functional status and remains on antibx  Tolerating tube feeds  Ready for discharge when bed available at NH    Interval History: no acute events. Remains stable for discharge to NH when bed available.     Review of Systems   Unable to perform ROS: Dementia     Objective:     Vital Signs (Most Recent):  Temp: 98.4 °F (36.9 °C) (07/28/18 0741)  Pulse: 72 (07/28/18 0818)  Resp: 19 (07/28/18 0818)  BP: (!) 115/57 (07/28/18 0741)  SpO2: 97 % (07/28/18 0818) Vital Signs (24h Range):  Temp:  [97 °F (36.1 °C)-98.4 °F (36.9 °C)] 98.4 °F (36.9 °C)  Pulse:  [70-92] 72  Resp:  [18-19] 19  SpO2:  [93 %-100 %] 97 %  BP: (108-117)/(56-59) 115/57     Weight: 74.5 kg (164 lb 3.9 oz)  Body mass index is 24.97 kg/m².    Intake/Output Summary (Last 24 hours) at 07/28/18 0936  Last data filed at 07/28/18 0447   Gross per 24 hour   Intake             2185 ml   Output             1100 ml   Net             1085 ml      Physical Exam   Constitutional: She appears well-developed. No distress.   HENT:   Head: Normocephalic and atraumatic.   Right Ear: External ear normal.   Left Ear: External ear normal.   Nose: Nose normal.   Eyes: Conjunctivae are normal. Pupils are equal, round, and reactive to light.   Neck: No tracheal deviation present.   Cardiovascular: Normal rate, regular rhythm and normal heart sounds.    Pulmonary/Chest: Effort normal and breath sounds normal. No respiratory distress.   Abdominal: Soft. Bowel sounds are  normal. She exhibits no distension. There is no tenderness.   PEG in place   Musculoskeletal: She exhibits deformity (contractures, chronic).   Neurological: She is alert.   Responds yes and no appropriately but can not say where she is  Does know her name   Skin: Skin is warm. No rash noted.   Psychiatric: She has a normal mood and affect. Her behavior is normal.   Nursing note and vitals reviewed.      Significant Labs:   CBC:   Recent Labs  Lab 07/27/18  0527 07/28/18  0557   WBC 6.08 5.95   HGB 9.9* 10.0*   HCT 32.7* 32.4*    229     CMP:   Recent Labs  Lab 07/27/18  0527 07/28/18  0557    141   K 4.3 4.1    106   CO2 27 27   * 129*   BUN 30* 32*   CREATININE 0.6 0.6   CALCIUM 8.8 8.6*   PROT 5.4* 5.1*   ALBUMIN 2.3* 2.2*   BILITOT 0.3 0.3   ALKPHOS 102 97   AST 32 37   ALT 22 22   ANIONGAP 6* 8   EGFRNONAA >60 >60     All pertinent labs within the past 24 hours have been reviewed.    Significant Imaging: I have reviewed all pertinent imaging results/findings within the past 24 hours.    Assessment/Plan:      * Sepsis due to methicillin resistant Staphylococcus aureus (MRSA)    Likely due to her decubitus vs severe dehydration on admit with SIRS criteria met  Covering broad spectrum with vanc and cefepime --7/23/18 will switch to clindamycin and levaquin  Will add flagyl 7/25 for better anaerobic coverage given proximity of wound to her rectum    Follow blood cultures--negative  UA clear  CXR clear  7/26 blood culture negative x 5d- infection seems to be deubitus; cover skin; continue clinda and flagyl. Add in cipro if necessary  7/28- day 5 clinda, day 3 flagyl. She is medically stable and should complete 10 day course of these antibx (4 more days clinda and 6 days flagyl) once discharged          Severe protein-calorie malnutrition      Continue isosource        Decubitus ulcer of sacral region, unstageable    Wound care consult     7/26 debridement in OR  7/27 s/p removal of necrotic  skin, fat, muscle and fascia. Per surgery daily dressing changes with santyl. Can follow up in their clinic as well. Complete antibx for presumed infection on admission        Hypernatremia    resolved  Off of IVF  On water flushes via PEG          History of airway aspiration    PEG tube --restarted feeds and tolerating. Nutrition consult  NPO  7/27/18 PEG tube feedings resumed post op--patient tolerating          Hypothyroidism due to acquired atrophy of thyroid    Continue synthroid 75mcg daily           Macrocytic anemia    Initially hemoconcentrated - with dilution H&H stable        Functional quadriplegia    Air mattress /pressure reduction  She is a DNR   Turn q 2          Memory disorder     will continue her exelon patch as ordered at home           Parkinson's disease    Continue sinemet   Multiple contractures--PRN pain meds are acceptable            VTE Risk Mitigation         Ordered     enoxaparin injection 30 mg  Daily      07/24/18 0749     IP VTE HIGH RISK PATIENT  Once      07/21/18 2210     Place sequential compression device  Until discontinued      07/21/18 2210              Kristin Pinto MD  Department of Hospital Medicine   [facility]

## 2018-07-28 NOTE — PLAN OF CARE
Problem: Patient Care Overview  Goal: Plan of Care Review  Outcome: Ongoing (interventions implemented as appropriate)  Pt and Daughter agree with plan of care.  VS stable.  Pt turned q 2hours.  Tube feeding infusing at 55 cc/hr.  No residual noted.  Pt HOB elevated 20-30 degrees this shift.  Arvizu patent draining adequate amount of urine.  Pain med given per peg tube for complaint of pain and controlled well.  Will continue to monitor.

## 2018-07-28 NOTE — ASSESSMENT & PLAN NOTE
Likely due to her decubitus vs severe dehydration on admit with SIRS criteria met  Covering broad spectrum with vanc and cefepime --7/23/18 will switch to clindamycin and levaquin  Will add flagyl 7/25 for better anaerobic coverage given proximity of wound to her rectum    Follow blood cultures--negative  UA clear  CXR clear  7/26 blood culture negative x 5d- infection seems to be deubitus; cover skin; continue clinda and flagyl. Add in cipro if necessary  7/28- day 5 clinda, day 3 flagyl. She is medically stable and should complete 10 day course of these antibx (4 more days clinda and 6 days flagyl) once discharged

## 2018-07-29 PROCEDURE — 25000003 PHARM REV CODE 250: Performed by: FAMILY MEDICINE

## 2018-07-29 PROCEDURE — 25000242 PHARM REV CODE 250 ALT 637 W/ HCPCS: Performed by: FAMILY MEDICINE

## 2018-07-29 PROCEDURE — 63600175 PHARM REV CODE 636 W HCPCS: Performed by: INTERNAL MEDICINE

## 2018-07-29 PROCEDURE — C9113 INJ PANTOPRAZOLE SODIUM, VIA: HCPCS | Performed by: FAMILY MEDICINE

## 2018-07-29 PROCEDURE — 25000003 PHARM REV CODE 250: Performed by: SURGERY

## 2018-07-29 PROCEDURE — 94640 AIRWAY INHALATION TREATMENT: CPT

## 2018-07-29 PROCEDURE — 11000001 HC ACUTE MED/SURG PRIVATE ROOM

## 2018-07-29 PROCEDURE — 96376 TX/PRO/DX INJ SAME DRUG ADON: CPT

## 2018-07-29 PROCEDURE — 63600175 PHARM REV CODE 636 W HCPCS: Performed by: FAMILY MEDICINE

## 2018-07-29 PROCEDURE — 25000003 PHARM REV CODE 250: Performed by: INTERNAL MEDICINE

## 2018-07-29 PROCEDURE — 96372 THER/PROPH/DIAG INJ SC/IM: CPT | Mod: 59

## 2018-07-29 PROCEDURE — 94761 N-INVAS EAR/PLS OXIMETRY MLT: CPT

## 2018-07-29 RX ORDER — POLYETHYLENE GLYCOL 3350 17 G/17G
17 POWDER, FOR SOLUTION ORAL DAILY
Status: DISCONTINUED | OUTPATIENT
Start: 2018-07-29 | End: 2018-07-31 | Stop reason: HOSPADM

## 2018-07-29 RX ADMIN — DOCUSATE SODIUM 100 MG: 50 LIQUID ORAL at 09:07

## 2018-07-29 RX ADMIN — COLLAGENASE SANTYL: 250 OINTMENT TOPICAL at 09:07

## 2018-07-29 RX ADMIN — CLINDAMYCIN HYDROCHLORIDE 450 MG: 150 CAPSULE ORAL at 11:07

## 2018-07-29 RX ADMIN — CLINDAMYCIN HYDROCHLORIDE 450 MG: 150 CAPSULE ORAL at 05:07

## 2018-07-29 RX ADMIN — METRONIDAZOLE 500 MG: 500 TABLET ORAL at 03:07

## 2018-07-29 RX ADMIN — TRAZODONE HYDROCHLORIDE 50 MG: 50 TABLET ORAL at 10:07

## 2018-07-29 RX ADMIN — CLINDAMYCIN HYDROCHLORIDE 450 MG: 150 CAPSULE ORAL at 01:07

## 2018-07-29 RX ADMIN — METRONIDAZOLE 500 MG: 500 TABLET ORAL at 10:07

## 2018-07-29 RX ADMIN — HYDROCODONE BITARTRATE AND ACETAMINOPHEN 15 ML: 7.5; 325 SOLUTION ORAL at 09:07

## 2018-07-29 RX ADMIN — CARBIDOPA AND LEVODOPA 1 TABLET: 25; 100 TABLET ORAL at 05:07

## 2018-07-29 RX ADMIN — IPRATROPIUM BROMIDE AND ALBUTEROL SULFATE 3 ML: .5; 3 SOLUTION RESPIRATORY (INHALATION) at 01:07

## 2018-07-29 RX ADMIN — METRONIDAZOLE 500 MG: 500 TABLET ORAL at 05:07

## 2018-07-29 RX ADMIN — CARBIDOPA AND LEVODOPA 1 TABLET: 25; 100 TABLET ORAL at 03:07

## 2018-07-29 RX ADMIN — ACETAMINOPHEN 650 MG: 325 TABLET ORAL at 03:07

## 2018-07-29 RX ADMIN — POLYETHYLENE GLYCOL 3350 17 G: 17 POWDER, FOR SOLUTION ORAL at 09:07

## 2018-07-29 RX ADMIN — SENNA 1 TABLET: 8.6 TABLET, COATED ORAL at 09:07

## 2018-07-29 RX ADMIN — HYDROCODONE BITARTRATE AND ACETAMINOPHEN 15 ML: 7.5; 325 SOLUTION ORAL at 05:07

## 2018-07-29 RX ADMIN — CARBIDOPA AND LEVODOPA 1 TABLET: 25; 100 TABLET ORAL at 10:07

## 2018-07-29 RX ADMIN — ENOXAPARIN SODIUM 30 MG: 100 INJECTION SUBCUTANEOUS at 05:07

## 2018-07-29 RX ADMIN — RIVASTIGMINE 1 PATCH: 4.6 PATCH, EXTENDED RELEASE TRANSDERMAL at 09:07

## 2018-07-29 RX ADMIN — IPRATROPIUM BROMIDE AND ALBUTEROL SULFATE 3 ML: .5; 3 SOLUTION RESPIRATORY (INHALATION) at 07:07

## 2018-07-29 RX ADMIN — ASPIRIN 81 MG 81 MG: 81 TABLET ORAL at 09:07

## 2018-07-29 RX ADMIN — PRAMIPEXOLE DIHYDROCHLORIDE 0.25 MG: 0.12 TABLET ORAL at 03:07

## 2018-07-29 RX ADMIN — LEVOTHYROXINE SODIUM 75 MCG: 75 TABLET ORAL at 05:07

## 2018-07-29 RX ADMIN — HYDROCODONE BITARTRATE AND ACETAMINOPHEN 15 ML: 7.5; 325 SOLUTION ORAL at 10:07

## 2018-07-29 RX ADMIN — PANTOPRAZOLE SODIUM 40 MG: 40 INJECTION, POWDER, LYOPHILIZED, FOR SOLUTION INTRAVENOUS at 09:07

## 2018-07-29 RX ADMIN — CARBIDOPA AND LEVODOPA 1 TABLET: 25; 100 TABLET ORAL at 09:07

## 2018-07-29 RX ADMIN — PRAMIPEXOLE DIHYDROCHLORIDE 0.25 MG: 0.12 TABLET ORAL at 09:07

## 2018-07-29 RX ADMIN — PRAMIPEXOLE DIHYDROCHLORIDE 0.25 MG: 0.12 TABLET ORAL at 10:07

## 2018-07-29 NOTE — PLAN OF CARE
Problem: Patient Care Overview  Goal: Plan of Care Review  Outcome: Ongoing (interventions implemented as appropriate)  Pt and daughter agree with plan of care.  VS stable.  Tube feeding infusing as ordered.  Turned pt q2 hours as ordered.  Arvizu patent draining adequate amounts of urine.  Pain med per peg tube controlling pain well.  Call bell in reach.  Will continue to monitor.   Dressing dry and intact to sacral wound.

## 2018-07-29 NOTE — PROGRESS NOTES
Patient still awaiting NH bed. She remains medically stable for discharge. Added miralax for constipation otherwise no changes today.

## 2018-07-29 NOTE — PLAN OF CARE
Problem: Patient Care Overview  Goal: Plan of Care Review  Outcome: Ongoing (interventions implemented as appropriate)  Patient without incidence or injury this shift. TQ2H. Oral suction lani anderson prn. Afebrile.Daily wound care to sacrum.  Peg tube site care performed. Stat lock changed and dated. Daughter at bedside. Agrees with plan of care.

## 2018-07-30 PROCEDURE — 25000003 PHARM REV CODE 250: Performed by: INTERNAL MEDICINE

## 2018-07-30 PROCEDURE — 25000003 PHARM REV CODE 250: Performed by: FAMILY MEDICINE

## 2018-07-30 PROCEDURE — 94761 N-INVAS EAR/PLS OXIMETRY MLT: CPT

## 2018-07-30 PROCEDURE — 63600175 PHARM REV CODE 636 W HCPCS: Performed by: INTERNAL MEDICINE

## 2018-07-30 PROCEDURE — 96372 THER/PROPH/DIAG INJ SC/IM: CPT | Mod: 59

## 2018-07-30 PROCEDURE — 94640 AIRWAY INHALATION TREATMENT: CPT

## 2018-07-30 PROCEDURE — 99232 SBSQ HOSP IP/OBS MODERATE 35: CPT | Mod: ,,, | Performed by: INTERNAL MEDICINE

## 2018-07-30 PROCEDURE — 11000001 HC ACUTE MED/SURG PRIVATE ROOM

## 2018-07-30 PROCEDURE — 25000242 PHARM REV CODE 250 ALT 637 W/ HCPCS: Performed by: FAMILY MEDICINE

## 2018-07-30 RX ADMIN — TRAZODONE HYDROCHLORIDE 50 MG: 50 TABLET ORAL at 09:07

## 2018-07-30 RX ADMIN — ENOXAPARIN SODIUM 30 MG: 100 INJECTION SUBCUTANEOUS at 05:07

## 2018-07-30 RX ADMIN — ASPIRIN 81 MG 81 MG: 81 TABLET ORAL at 09:07

## 2018-07-30 RX ADMIN — METRONIDAZOLE 500 MG: 500 TABLET ORAL at 09:07

## 2018-07-30 RX ADMIN — METRONIDAZOLE 500 MG: 500 TABLET ORAL at 02:07

## 2018-07-30 RX ADMIN — DOCUSATE SODIUM 100 MG: 50 LIQUID ORAL at 10:07

## 2018-07-30 RX ADMIN — HYDROCODONE BITARTRATE AND ACETAMINOPHEN 15 ML: 7.5; 325 SOLUTION ORAL at 07:07

## 2018-07-30 RX ADMIN — HYDROCODONE BITARTRATE AND ACETAMINOPHEN 15 ML: 7.5; 325 SOLUTION ORAL at 03:07

## 2018-07-30 RX ADMIN — IPRATROPIUM BROMIDE AND ALBUTEROL SULFATE 3 ML: .5; 3 SOLUTION RESPIRATORY (INHALATION) at 07:07

## 2018-07-30 RX ADMIN — CLINDAMYCIN HYDROCHLORIDE 450 MG: 150 CAPSULE ORAL at 07:07

## 2018-07-30 RX ADMIN — CARBIDOPA AND LEVODOPA 1 TABLET: 25; 100 TABLET ORAL at 05:07

## 2018-07-30 RX ADMIN — POLYETHYLENE GLYCOL 3350 17 G: 17 POWDER, FOR SOLUTION ORAL at 09:07

## 2018-07-30 RX ADMIN — CLINDAMYCIN HYDROCHLORIDE 450 MG: 150 CAPSULE ORAL at 05:07

## 2018-07-30 RX ADMIN — LEVOTHYROXINE SODIUM 75 MCG: 75 TABLET ORAL at 05:07

## 2018-07-30 RX ADMIN — CARBIDOPA AND LEVODOPA 1 TABLET: 25; 100 TABLET ORAL at 09:07

## 2018-07-30 RX ADMIN — PRAMIPEXOLE DIHYDROCHLORIDE 0.25 MG: 0.12 TABLET ORAL at 03:07

## 2018-07-30 RX ADMIN — CLINDAMYCIN HYDROCHLORIDE 450 MG: 150 CAPSULE ORAL at 12:07

## 2018-07-30 RX ADMIN — CLINDAMYCIN HYDROCHLORIDE 450 MG: 150 CAPSULE ORAL at 11:07

## 2018-07-30 RX ADMIN — CARBIDOPA AND LEVODOPA 1 TABLET: 25; 100 TABLET ORAL at 02:07

## 2018-07-30 RX ADMIN — SENNA 1 TABLET: 8.6 TABLET, COATED ORAL at 09:07

## 2018-07-30 RX ADMIN — PRAMIPEXOLE DIHYDROCHLORIDE 0.25 MG: 0.12 TABLET ORAL at 09:07

## 2018-07-30 RX ADMIN — COLLAGENASE SANTYL: 250 OINTMENT TOPICAL at 09:07

## 2018-07-30 RX ADMIN — RIVASTIGMINE 1 PATCH: 4.6 PATCH, EXTENDED RELEASE TRANSDERMAL at 09:07

## 2018-07-30 RX ADMIN — CARBIDOPA AND LEVODOPA 1 TABLET: 25; 100 TABLET ORAL at 07:07

## 2018-07-30 RX ADMIN — IPRATROPIUM BROMIDE AND ALBUTEROL SULFATE 3 ML: .5; 3 SOLUTION RESPIRATORY (INHALATION) at 01:07

## 2018-07-30 RX ADMIN — HYDROCODONE BITARTRATE AND ACETAMINOPHEN 15 ML: 7.5; 325 SOLUTION ORAL at 05:07

## 2018-07-30 RX ADMIN — METRONIDAZOLE 500 MG: 500 TABLET ORAL at 05:07

## 2018-07-30 NOTE — SUBJECTIVE & OBJECTIVE
Interval History: no acute events. Remains stable for discharge to NH when bed available.     Review of Systems   Unable to perform ROS: Dementia     Objective:     Vital Signs (Most Recent):  Temp: 97.4 °F (36.3 °C) (07/30/18 0705)  Pulse: 81 (07/30/18 0714)  Resp: 19 (07/30/18 0714)  BP: (!) 103/51 (07/30/18 0705)  SpO2: 97 % (07/30/18 0714) Vital Signs (24h Range):  Temp:  [96.1 °F (35.6 °C)-98.3 °F (36.8 °C)] 97.4 °F (36.3 °C)  Pulse:  [70-84] 81  Resp:  [18-20] 19  SpO2:  [96 %-100 %] 97 %  BP: (103-124)/(51-63) 103/51     Weight: 74.5 kg (164 lb 3.9 oz)  Body mass index is 24.97 kg/m².    Intake/Output Summary (Last 24 hours) at 07/30/18 0834  Last data filed at 07/30/18 0526   Gross per 24 hour   Intake             2240 ml   Output             1050 ml   Net             1190 ml      Physical Exam   Constitutional: She appears well-developed. No distress.   HENT:   Head: Normocephalic and atraumatic.   Right Ear: External ear normal.   Left Ear: External ear normal.   Nose: Nose normal.   Eyes: Conjunctivae are normal. Pupils are equal, round, and reactive to light.   Neck: No tracheal deviation present.   Cardiovascular: Normal rate, regular rhythm and normal heart sounds.    Pulmonary/Chest: Effort normal and breath sounds normal. No respiratory distress.   Abdominal: Soft. Bowel sounds are normal. She exhibits distension (mild). There is no tenderness.   PEG in place   Musculoskeletal: She exhibits deformity (contractures, chronic).   Neurological: She is alert.   Responds yes and no appropriately but can not say where she is  Does know her name   Skin: Skin is warm. No rash noted.   Psychiatric: She has a normal mood and affect. Her behavior is normal.   Nursing note and vitals reviewed.      Significant Labs and  Significant Imaging:   None over weekend

## 2018-07-30 NOTE — PLAN OF CARE
07/30/18 1155   Discharge Reassessment   Assessment Type Discharge Planning Reassessment       Spoke with daughter about nursing home placement. [facility] still has not made a decision, as per Kiley, but should have a final decision today. The [facility] has patient information and should come interview patient today. CM will continue to follow and assist as needed.

## 2018-07-30 NOTE — ASSESSMENT & PLAN NOTE
Likely due to her decubitus vs severe dehydration on admit with SIRS criteria met  Covering broad spectrum with vanc and cefepime --7/23/18 will switch to clindamycin and levaquin  Will add flagyl 7/25 for better anaerobic coverage given proximity of wound to her rectum    Follow blood cultures--negative  UA clear  CXR clear  7/26 blood culture negative x 5d- infection seems to be deubitus; cover skin; continue clinda and flagyl. Add in cipro if necessary  7/28- day 5 clinda, day 3 flagyl. She is medically stable and should complete 10 day course of these antibx (4 more days clinda and 6 days flagyl) once discharged  7/30 was not d/c over the weekend. Awaiting placement at different NH from where she was admitted from Day 7 clinda and day 5 flagyl, if d/c today needs 3 days clinda and 5 days flagyl)

## 2018-07-30 NOTE — PLAN OF CARE
Problem: Patient Care Overview  Goal: Plan of Care Review  Outcome: Ongoing (interventions implemented as appropriate)  Pt and daughter agree with plan of care.  VS stable.  Pain med per peg controlling pain well.  Tube feeding infusing as ordered per peg tube.  No residual noted. Pt turned q 2hours.  Arvizu patent and draining clear yellow urine noted.  Daughter at bedside.  Will continue to monitor.

## 2018-07-30 NOTE — PLAN OF CARE
07/30/18 1410   Discharge Reassessment   Assessment Type Discharge Planning Reassessment       Spoke with Kiley at [place]. Patient is accepted and will discharge tomorrow. CM will continue to follow and assist as needed.

## 2018-07-30 NOTE — PROGRESS NOTES
[facility] Medicine  Progress Note    Patient Name: Eddy Cunningham  MRN: [id number]  Patient Class: IP- Inpatient   Admission Date: 7/21/2018  Length of Stay: 8 days  Attending Physician: Kunal Sandoval MD  Primary Care Provider: Ayaka Pollard MD        Subjective:     Principal Problem:Sepsis due to methicillin resistant Staphylococcus aureus (MRSA)    HPI:  78 year old demented female with PEG presents from [facility] with fever of 103. Work up in ED is essentially negative, including influenza, and no WBC. Urine clean. She is prerenal and with hypernatremia. She does meet sepsis criteria with HR greater than 90 and fever. BP is on the low side this am, 87/40. Upon further exam up here in ICU she is found to have an unstagable decubitus of sacrum(but at least stage 3 in my opinion).  She is admitted to ICU and placed on Vanc, Cefepime, IVF at 125/hr.    Hospital Course:  7/23/18  Patient is much improved today. She is answering yes and no questions. No fevers. Still no leukocytosis. Vitals are stable. Back on tube feeds.     7/24/18  She has a large decubitus  Which looks like the source of infection .  She c/o back pain  Black eschar by pictures looks like it would need debridement    Her tachycardia is better   Urine output is better ; sodium is getting down ;She is awake and responsive ; weak wet cough .  Her vanc  And cefepime was stopped ;she is on Levaquin and clindamycin via peg   Her BUN /creatinine is still high .  She is running no fevers .  Her BP is still on low side    7/25/18  BP better   Surgery debrided wound at bedside but would like to take her to OR [facility].  Using santyl, but surgery not confident this will be enough   No BM yet       7/26/18 had partial debridment 2 days ago at bedside but painful. Planned for surgery this am; Sx debridement of decubitus. PEG tube feedings held overnight  Afebrile overnight. WBC 6.58. Levofloxacin/ clinda  per peg tube  day 4. Day 2 metronidazole day 2  Blood cultures negative x 5 days no grwth x 2  Afebrile, BP& HR better 92/54, HR 81.  NA+ 165 max;> 142,   7/27/18  S/P removal of necrotic skin, fat, muscle and fascia per Dr. Serna 7/26  Pt required hydrocodone x 2 overnight. Afebrile. WBC 6.08; clinda day 5 & metronidazole day 2 per PEG   PEG feedings resumed.   Planning for dc to [place]--she is medically stable    7/27  Remains stable  Labs reviewed, no changes. No need for continued daily labs  She is at her baseline functional status and remains on antibx  Tolerating tube feeds  Ready for discharge when bed available at NH    7/30  Awaiting bed at nursing home. VSS/afebrile. No labs. Remains on flagyl and clinda per peg. Tolerating feeds. Still no BM    Interval History: no acute events. Remains stable for discharge to NH when bed available.     Review of Systems   Unable to perform ROS: Dementia     Objective:     Vital Signs (Most Recent):  Temp: 97.4 °F (36.3 °C) (07/30/18 0705)  Pulse: 81 (07/30/18 0714)  Resp: 19 (07/30/18 0714)  BP: (!) 103/51 (07/30/18 0705)  SpO2: 97 % (07/30/18 0714) Vital Signs (24h Range):  Temp:  [96.1 °F (35.6 °C)-98.3 °F (36.8 °C)] 97.4 °F (36.3 °C)  Pulse:  [70-84] 81  Resp:  [18-20] 19  SpO2:  [96 %-100 %] 97 %  BP: (103-124)/(51-63) 103/51     Weight: 74.5 kg (164 lb 3.9 oz)  Body mass index is 24.97 kg/m².    Intake/Output Summary (Last 24 hours) at 07/30/18 0834  Last data filed at 07/30/18 0526   Gross per 24 hour   Intake             2240 ml   Output             1050 ml   Net             1190 ml      Physical Exam   Constitutional: She appears well-developed. No distress.   HENT:   Head: Normocephalic and atraumatic.   Right Ear: External ear normal.   Left Ear: External ear normal.   Nose: Nose normal.   Eyes: Conjunctivae are normal. Pupils are equal, round, and reactive to light.   Neck: No tracheal deviation present.   Cardiovascular: Normal rate, regular rhythm and normal heart  sounds.    Pulmonary/Chest: Effort normal and breath sounds normal. No respiratory distress.   Abdominal: Soft. Bowel sounds are normal. She exhibits distension (mild). There is no tenderness.   PEG in place   Musculoskeletal: She exhibits deformity (contractures, chronic).   Neurological: She is alert.   Responds yes and no appropriately but can not say where she is  Does know her name   Skin: Skin is warm. No rash noted.   Psychiatric: She has a normal mood and affect. Her behavior is normal.   Nursing note and vitals reviewed.      Significant Labs and  Significant Imaging:   None over weekend      Assessment/Plan:      * Sepsis due to methicillin resistant Staphylococcus aureus (MRSA)    Likely due to her decubitus vs severe dehydration on admit with SIRS criteria met  Covering broad spectrum with vanc and cefepime --7/23/18 will switch to clindamycin and levaquin  Will add flagyl 7/25 for better anaerobic coverage given proximity of wound to her rectum    Follow blood cultures--negative  UA clear  CXR clear  7/26 blood culture negative x 5d- infection seems to be deubitus; cover skin; continue clinda and flagyl. Add in cipro if necessary  7/28- day 5 clinda, day 3 flagyl. She is medically stable and should complete 10 day course of these antibx (4 more days clinda and 6 days flagyl) once discharged  7/30 was not d/c over the weekend. Awaiting placement at different NH from where she was admitted from Day 7 clinda and day 5 flagyl, if d/c today needs 3 days clinda and 5 days flagyl)        Severe protein-calorie malnutrition      Continue isosource        Decubitus ulcer of sacral region, unstageable    Wound care consult     7/26 debridement in OR  7/27 s/p removal of necrotic skin, fat, muscle and fascia. Per surgery daily dressing changes with santyl. Can follow up in their clinic as well. Complete antibx for presumed infection on admission        Hypernatremia    resolved  Off of IVF  On water flushes via  PEG          History of airway aspiration    PEG tube --restarted feeds and tolerating. Nutrition consult  NPO  7/27/18 PEG tube feedings resumed post op--patient tolerating          Hypothyroidism due to acquired atrophy of thyroid    Continue synthroid 75mcg daily           Macrocytic anemia    Initially hemoconcentrated - with dilution H&H stable        Functional quadriplegia    Air mattress /pressure reduction  She is a DNR   Turn q 2          Memory disorder     will continue her exelon patch as ordered at home           Parkinson's disease    Continue sinemet   Multiple contractures--PRN pain meds are acceptable    Patient is medically stable for discharge but is awaiting NH placement. It is NOT SAFE to discharge this patient home with daughter as caregiver as she is completely dependent for total 24 hour care.             VTE Risk Mitigation         Ordered     enoxaparin injection 30 mg  Daily      07/24/18 0749     IP VTE HIGH RISK PATIENT  Once      07/21/18 2210     Place sequential compression device  Until discontinued      07/21/18 2210              Kristin Pinto MD  Department of Hospital Medicine   [facility]

## 2018-07-30 NOTE — ASSESSMENT & PLAN NOTE
Continue sinemet   Multiple contractures--PRN pain meds are acceptable    Patient is medically stable for discharge but is awaiting NH placement. It is NOT SAFE to discharge this patient home with daughter as caregiver as she is completely dependent for total 24 hour care.

## 2018-07-30 NOTE — PLAN OF CARE
07/30/18 0900   Discharge Reassessment   Assessment Type Discharge Planning Reassessment       Talked with the Daughter Vanessa Galindo and sent a packet to [facility] for placement.    Chavo Jenkins LMSW

## 2018-07-30 NOTE — PLAN OF CARE
Problem: Patient Care Overview  Goal: Plan of Care Review  Outcome: Ongoing (interventions implemented as appropriate)  Pt admitted with sepsis secondary to sacral decubitus. Remains on flagyl and clindamycin via PEG tube. Vitals stable. Afebrile. Tolerating feedings well; no residual noted. Turning pt every 2 hours; positioning with pillows. Wound care daily. Arvizu remains intact; draining clear yellow urine. Pt accepted at [facility] tomorrow. Call bell within reach . Reviewed plan of care with pt and daughter; state agreement.

## 2018-07-31 VITALS
HEART RATE: 81 BPM | OXYGEN SATURATION: 96 % | WEIGHT: 164.25 LBS | TEMPERATURE: 98 F | BODY MASS INDEX: 24.89 KG/M2 | SYSTOLIC BLOOD PRESSURE: 108 MMHG | HEIGHT: 68 IN | RESPIRATION RATE: 20 BRPM | DIASTOLIC BLOOD PRESSURE: 59 MMHG

## 2018-07-31 PROCEDURE — 99239 HOSP IP/OBS DSCHRG MGMT >30: CPT | Mod: ,,, | Performed by: INTERNAL MEDICINE

## 2018-07-31 PROCEDURE — C9113 INJ PANTOPRAZOLE SODIUM, VIA: HCPCS | Performed by: FAMILY MEDICINE

## 2018-07-31 PROCEDURE — 25000003 PHARM REV CODE 250: Performed by: INTERNAL MEDICINE

## 2018-07-31 PROCEDURE — 25000003 PHARM REV CODE 250: Performed by: FAMILY MEDICINE

## 2018-07-31 PROCEDURE — 86580 TB INTRADERMAL TEST: CPT | Performed by: NURSE PRACTITIONER

## 2018-07-31 PROCEDURE — 63600175 PHARM REV CODE 636 W HCPCS: Performed by: NURSE PRACTITIONER

## 2018-07-31 PROCEDURE — 25000242 PHARM REV CODE 250 ALT 637 W/ HCPCS: Performed by: FAMILY MEDICINE

## 2018-07-31 PROCEDURE — 94640 AIRWAY INHALATION TREATMENT: CPT

## 2018-07-31 PROCEDURE — 63600175 PHARM REV CODE 636 W HCPCS: Performed by: FAMILY MEDICINE

## 2018-07-31 RX ORDER — CLINDAMYCIN HYDROCHLORIDE 150 MG/1
450 CAPSULE ORAL EVERY 6 HOURS
Qty: 24 CAPSULE | Refills: 0 | Status: SHIPPED | OUTPATIENT
Start: 2018-07-31 | End: 2018-08-22

## 2018-07-31 RX ORDER — HYDROCODONE BITARTRATE AND ACETAMINOPHEN 7.5; 325 MG/1; MG/1
1 TABLET ORAL EVERY 12 HOURS PRN
Qty: 30 TABLET | Refills: 0 | Status: ON HOLD | OUTPATIENT
Start: 2018-07-31 | End: 2019-11-08 | Stop reason: HOSPADM

## 2018-07-31 RX ORDER — METRONIDAZOLE 500 MG/1
500 TABLET ORAL EVERY 8 HOURS
Qty: 10 TABLET | Refills: 0 | Status: SHIPPED | OUTPATIENT
Start: 2018-07-31 | End: 2018-08-22

## 2018-07-31 RX ADMIN — LEVOTHYROXINE SODIUM 75 MCG: 75 TABLET ORAL at 05:07

## 2018-07-31 RX ADMIN — RIVASTIGMINE 1 PATCH: 4.6 PATCH, EXTENDED RELEASE TRANSDERMAL at 08:07

## 2018-07-31 RX ADMIN — IPRATROPIUM BROMIDE AND ALBUTEROL SULFATE 3 ML: .5; 3 SOLUTION RESPIRATORY (INHALATION) at 01:07

## 2018-07-31 RX ADMIN — METRONIDAZOLE 500 MG: 500 TABLET ORAL at 05:07

## 2018-07-31 RX ADMIN — POLYETHYLENE GLYCOL 3350 17 G: 17 POWDER, FOR SOLUTION ORAL at 08:07

## 2018-07-31 RX ADMIN — TUBERCULIN PURIFIED PROTEIN DERIVATIVE 5 UNITS: 5 INJECTION INTRADERMAL at 12:07

## 2018-07-31 RX ADMIN — PRAMIPEXOLE DIHYDROCHLORIDE 0.25 MG: 0.12 TABLET ORAL at 02:07

## 2018-07-31 RX ADMIN — DOCUSATE SODIUM 100 MG: 50 LIQUID ORAL at 08:07

## 2018-07-31 RX ADMIN — METRONIDAZOLE 500 MG: 500 TABLET ORAL at 01:07

## 2018-07-31 RX ADMIN — PRAMIPEXOLE DIHYDROCHLORIDE 0.25 MG: 0.12 TABLET ORAL at 08:07

## 2018-07-31 RX ADMIN — COLLAGENASE SANTYL: 250 OINTMENT TOPICAL at 08:07

## 2018-07-31 RX ADMIN — HYDROCODONE BITARTRATE AND ACETAMINOPHEN 15 ML: 7.5; 325 SOLUTION ORAL at 02:07

## 2018-07-31 RX ADMIN — CARBIDOPA AND LEVODOPA 1 TABLET: 25; 100 TABLET ORAL at 01:07

## 2018-07-31 RX ADMIN — CARBIDOPA AND LEVODOPA 1 TABLET: 25; 100 TABLET ORAL at 10:07

## 2018-07-31 RX ADMIN — ASPIRIN 81 MG 81 MG: 81 TABLET ORAL at 08:07

## 2018-07-31 RX ADMIN — SENNA 1 TABLET: 8.6 TABLET, COATED ORAL at 08:07

## 2018-07-31 RX ADMIN — CLINDAMYCIN HYDROCHLORIDE 450 MG: 150 CAPSULE ORAL at 12:07

## 2018-07-31 RX ADMIN — IPRATROPIUM BROMIDE AND ALBUTEROL SULFATE 3 ML: .5; 3 SOLUTION RESPIRATORY (INHALATION) at 07:07

## 2018-07-31 RX ADMIN — CARBIDOPA AND LEVODOPA 1 TABLET: 25; 100 TABLET ORAL at 05:07

## 2018-07-31 RX ADMIN — CLINDAMYCIN HYDROCHLORIDE 450 MG: 150 CAPSULE ORAL at 05:07

## 2018-07-31 NOTE — ASSESSMENT & PLAN NOTE
Likely due to her decubitus vs severe dehydration on admit with SIRS criteria met  Covering broad spectrum with vanc and cefepime --7/23/18 will switch to clindamycin and levaquin  Will add flagyl 7/25 for better anaerobic coverage given proximity of wound to her rectum    Follow blood cultures--negative  UA clear  CXR clear  7/26 blood culture negative x 5d- infection seems to be deubitus; cover skin; continue clinda and flagyl. Add in cipro if necessary  7/28- day 5 clinda, day 3 flagyl. She is medically stable and should complete 10 day course of these antibx (4 more days clinda and 6 days flagyl) once discharged  7/30 was not d/c over the weekend. Awaiting placement at different NH from where she was admitted from Day 7 clinda and day 5 flagyl, if d/c today needs 3 days clinda and 5 days flagyl)    Will also maintain zee so in view of sacral decubitus to that wound is not exposed to urine; will need to monitor closely and decide on future timing of zee removal.

## 2018-07-31 NOTE — DISCHARGE SUMMARY
[facility] Hospital Medicine  Discharge Summary      Patient Name: Eddy Cunningham  MRN: [id number]  Admission Date: 7/21/2018  Hospital Length of Stay: 9 days  Discharge Date and Time:  07/31/2018 10:42 AM  Attending Physician: Kunal Sandoval MD   Discharging Provider: Courtney Arndt NP  Primary Care Provider: Ayaka Pollard MD      HPI:   78 year old demented female with PEG presents from [facility] with fever of 103. Work up in ED is essentially negative, including influenza, and no WBC. Urine clean. She is prerenal and with hypernatremia. She does meet sepsis criteria with HR greater than 90 and fever. BP is on the low side this am, 87/40. Upon further exam up here in ICU she is found to have an unstagable decubitus of sacrum(but at least stage 3 in my opinion).  She is admitted to ICU and placed on Vanc, Cefepime, IVF at 125/hr.    Procedure(s) (LRB):  DEBRIDEMENT OF SACRAL DECUBITUS SKIN, SUBCUTANEOUS TISSUE, FAT,  AND FASCIA (N/A)      Hospital Course:   7/23/18  Patient is much improved today. She is answering yes and no questions. No fevers. Still no leukocytosis. Vitals are stable. Back on tube feeds.     7/24/18  She has a large decubitus  Which looks like the source of infection .  She c/o back pain  Black eschar by pictures looks like it would need debridement    Her tachycardia is better   Urine output is better ; sodium is getting down ;She is awake and responsive ; weak wet cough .  Her vanc  And cefepime was stopped ;she is on Levaquin and clindamycin via peg   Her BUN /creatinine is still high .  She is running no fevers .  Her BP is still on low side    7/25/18  BP better   Surgery debrided wound at bedside [facility]  Using santyl, but surgery not confident this will be enough   No BM yet       7/26/18 had partial debridment 2 days ago at bedside but painful. Planned for surgery this am; Sx debridement of decubitus. PEG tube  feedings held overnight  Afebrile overnight. WBC 6.58. Levofloxacin/ clinda  per peg tube day 4. Day 2 metronidazole day 2  Blood cultures negative x 5 days no grwth x 2  Afebrile, BP& HR better 92/54, HR 81.  NA+ 165 max;> 142,   7/27/18  S/P removal of necrotic skin, fat, muscle and fascia per Dr. Serna 7/26  Pt required hydrocodone x 2 overnight. Afebrile. WBC 6.08; clinda day 5 & metronidazole day 2 per PEG   PEG feedings resumed.   Planning for dc to [place]--she is medically stable    7/27  Remains stable  Labs reviewed, no changes. No need for continued daily labs  She is at her baseline functional status and remains on antibx  Tolerating tube feeds  Ready for discharge when bed available at NH    7/30  Awaiting bed at nursing home. VSS/afebrile. No labs. Remains on flagyl and clinda per peg. Tolerating feeds. Still no BM    7/31/18 Bed available today nursing home placement- will D/C today Remains on flagyl day 7/10 & clinda day 9/10 per PEG tube.        Consults:   Consults         Status Ordering Provider     Inpatient consult to General Surgery  Once     Provider:  (Not yet assigned)    Acknowledged WHITLEY GONZALEZ     Inpatient consult to General Surgery  Once     Provider:  Robbin Guy Jr., MD    Completed KIMI THIBODEAUX     Inpatient consult to [place] [facility]  Once     Provider:  Scottie Pratt MD    Completed SARAI MORRIS     Inpatient consult to [place] [facility]  Once     Provider:  (Not yet assigned)    Acknowledged CARMEN STRICKLAND     Inpatient consult to Registered Dietitian/Nutritionist  Once     Provider:  (Not yet assigned)    Completed CARMEN STRICKLAND          No new Assessment & Plan notes have been filed under this hospital service since the last note was generated.  Service: Hospital Medicine    Final Active Diagnoses:    Diagnosis Date Noted POA    PRINCIPAL PROBLEM:  Sepsis due to methicillin resistant Staphylococcus aureus (MRSA) [A41.02] 06/12/2018 Yes    Severe  protein-calorie malnutrition [E43] 07/23/2018 Yes    History of airway aspiration [Z87.898] 07/22/2018 Yes    Hypernatremia [E87.0] 07/22/2018 Yes    Decubitus ulcer of sacral region, unstageable [L89.150] 07/22/2018 Yes    Hypothyroidism due to acquired atrophy of thyroid [E03.4] 06/11/2018 Yes    Macrocytic anemia [D53.9] 05/20/2017 Yes    Functional quadriplegia [R53.2] 05/19/2017 Yes    Memory disorder [R41.3] 03/14/2013 Yes    Parkinson's disease [G20] 11/13/2012 Yes      Problems Resolved During this Admission:    Diagnosis Date Noted Date Resolved POA    Pressure ulcer of sacrum [L89.159] 07/24/2018 07/25/2018 Yes    Dehydration [E86.0] 07/22/2018 07/28/2018 Yes    Hypokalemia [E87.6] 05/18/2017 07/28/2018 Yes       Discharged Condition: good    Disposition: Home or Self Care    Follow Up:  Follow-up Information     Ayaka Pollard MD In 1 week.    Specialty:  Family Medicine  Why:  Outpatient Services  Contact information:  [address]  [place] [id number] [phone]             Robbin Guy Jr, MD. Schedule an appointment as soon as possible for a visit today.    Specialty:  General Surgery  Why:  1  Contact information:  [address]   [address]  [place] [address]  [phone]                 Patient Instructions:   No discharge procedures on file.    Significant Diagnostic Studies:   Significant Labs:   CBC:   Recent Labs  Lab 07/27/18  0527 07/28/18  0557   WBC 6.08 5.95   HGB 9.9* 10.0*   HCT 32.7* 32.4*    229      CMP:   Recent Labs  Lab 07/27/18  0527 07/28/18  0557    141   K 4.3 4.1    106   CO2 27 27   * 129*   BUN 30* 32*   CREATININE 0.6 0.6   CALCIUM 8.8 8.6*   PROT 5.4* 5.1*   ALBUMIN 2.3* 2.2*   BILITOT 0.3 0.3   ALKPHOS 102 97   AST 32 37   ALT 22 22   ANIONGAP 6* 8   EGFRNONAA >60 >60      All pertinent labs within the past 24 hours have been reviewed.    Pending Diagnostic Studies:     None       PPD ordered on D/C today Tuesday- will need to  be read on Thursday 8/2  Medications:  Reconciled Home Medications:      Medication List      START taking these medications    clindamycin 150 MG capsule  Commonly known as:  CLEOCIN  Take 3 capsules (450 mg total) by Per G Tube route every 6 (six) hours. This PM dosing + 1 more day to =10 days     collagenase ointment  Apply topically once daily.     metroNIDAZOLE 500 MG tablet  Commonly known as:  FLAGYL  Take 1 tablet (500 mg total) by Per G Tube route every 8 (eight) hours tonights dose + 3 more days to =10 days        CONTINUE taking these medications    aspirin 81 MG Chew  1 tablet (81 mg total) by Per G Tube route once daily.     carbidopa-levodopa  mg  mg per tablet  Commonly known as:  SINEMET  1 tablet by Per G Tube route 5 (five) times daily.     docusate sodium 100 MG capsule  Commonly known as:  COLACE  1 capsule (100 mg total) by Per G Tube route once daily.     EXELON 4.6 mg/24 hr Pt24  Generic drug:  rivastigmine  Place 1 patch onto the skin once daily.     HYDROcodone-acetaminophen 7.5-325 mg per tablet  Commonly known as:  NORCO  Take 1 tablet by mouth every 12 (twelve) hours as needed for Pain.     JEVITY ORAL  65 mL/hr by PEG Tube route once daily. Via pump on at 6 pm off at 6 am     levothyroxine 75 MCG tablet  Commonly known as:  SYNTHROID  1 tablet (75 mcg total) by Per G Tube route before breakfast.     omeprazole 40 MG capsule  Commonly known as:  PRILOSEC  Open 40 mg capsule into g-tube once daily     potassium chloride 10% 20 mEq/15 mL oral solution  Commonly known as:  KAYCIEL  15 mLs (20 mEq total) by Per G Tube route 2 (two) times daily.     pramipexole 0.25 MG tablet  Commonly known as:  MIRAPEX  1 tablet (0.25 mg total) by Per G Tube route 3 (three) times daily.     ranitidine 150 MG capsule  Commonly known as:  ZANTAC  Take 75 mg by mouth 2 (two) times daily.     senna 8.6 mg tablet  Commonly known as:  SENOKOT  1 tablet by Per G Tube route once daily.     tiZANidine 2  mg Cap  Take 4 mg by mouth nightly as needed.     traZODone 50 MG tablet  Commonly known as:  DESYREL  1 tablet (50 mg total) by Per G Tube route every evening.        STOP taking these medications    levoFLOXacin 500 MG tablet  Commonly known as:  LEVAQUIN     traMADol 50 mg tablet  Commonly known as:  ULTRAM            Indwelling Lines/Drains at time of discharge:   Lines/Drains/Airways     Drain                 Gastrostomy/Enterostomy 05/26/18 1135 Percutaneous endoscopic gastrostomy (PEG) LUQ 65 days         Urethral Catheter 07/21/18 2000 Double-lumen 9 days                Time spent on the discharge of patient: 20 minutes  Patient was seen and examined on the date of discharge and determined to be suitable for discharge.         Courtney Arndt NP  Department of Hospital Medicine  [facility]

## 2018-07-31 NOTE — PLAN OF CARE
07/31/18 1111   Discharge Reassessment   Assessment Type Discharge Planning Reassessment       Spoke with Kiley at [facility] and she is expecting the patient today for alf care.

## 2018-07-31 NOTE — PLAN OF CARE
Problem: Patient Care Overview  Goal: Plan of Care Review  Outcome: Ongoing (interventions implemented as appropriate)  Patient had a good night. Slept well. Treated once overnight for pain. No neuro changes. Tolerated peg feedings and medications per peg. Will continue to monitor.

## 2018-07-31 NOTE — PROGRESS NOTES
Staff Handoff  Report received from Latasha RN and patient assessed per flowsheet. Reports feeling better. Peg feeding cont per pump at 55 cc/hr. Arvizu to gravity, clear yellow urine. Turn schedule in use. Heel prot oxana. SCD's. DNR. Will continue to monitor.     Resident Handoff

## 2018-07-31 NOTE — PLAN OF CARE
07/31/18 1013   Medicare Message   Important Message from Medicare regarding Discharge Appeal Rights Given to patient/caregiver;Explained to patient/caregiver;Signed/date by patient/caregiver   Date IMM was signed 07/31/18   Time IMM was signed 1007       Signed for Discharge.

## 2018-07-31 NOTE — PROGRESS NOTES
[facility] Hospital Medicine  Progress Note    Patient Name: Eddy Cunningham  MRN: [id number]  Patient Class: IP- Inpatient   Admission Date: 7/21/2018  Length of Stay: 9 days  Attending Physician: Kunal Sandoval MD  Primary Care Provider: Ayaka Pollard MD        Subjective:     Principal Problem:Sepsis due to methicillin resistant Staphylococcus aureus (MRSA)    HPI:  78 year old demented female with PEG presents from [facility] with fever of 103. Work up in ED is essentially negative, including influenza, and no WBC. Urine clean. She is prerenal and with hypernatremia. She does meet sepsis criteria with HR greater than 90 and fever. BP is on the low side this am, 87/40. Upon further exam up here in ICU she is found to have an unstagable decubitus of sacrum(but at least stage 3 in my opinion).  She is admitted to ICU and placed on Vanc, Cefepime, IVF at 125/hr.    Hospital Course:  7/23/18  Patient is much improved today. She is answering yes and no questions. No fevers. Still no leukocytosis. Vitals are stable. Back on tube feeds.     7/24/18  She has a large decubitus  Which looks like the source of infection .  She c/o back pain  Black eschar by pictures looks like it would need debridement    Her tachycardia is better   Urine output is better ; sodium is getting down ;She is awake and responsive ; weak wet cough .  Her vanc  And cefepime was stopped ;she is on Levaquin and clindamycin via peg   Her BUN /creatinine is still high .  She is running no fevers .  Her BP is still on low side    7/25/18  BP better   Surgery debrided wound at bedside but would like to take her [facility].  Using santyl, but surgery not confident this will be enough   No BM yet       7/26/18 had partial debridment 2 days ago at bedside but painful. Planned for surgery this am; Sx debridement of decubitus. PEG tube feedings held overnight  Afebrile overnight. WBC 6.58. Levofloxacin/ clinda  per peg tube  day 4. Day 2 metronidazole day 2  Blood cultures negative x 5 days no grwth x 2  Afebrile, BP& HR better 92/54, HR 81.  NA+ 165 max;> 142,   7/27/18  S/P removal of necrotic skin, fat, muscle and fascia per Dr. Serna 7/26  Pt required hydrocodone x 2 overnight. Afebrile. WBC 6.08; clinda day 5 & metronidazole day 2 per PEG   PEG feedings resumed.   Planning for dc to [place]--she is medically stable    7/27  Remains stable  Labs reviewed, no changes. No need for continued daily labs  She is at her baseline functional status and remains on antibx  Tolerating tube feeds  Ready for discharge when bed available at NH    7/30  Awaiting bed at nursing home. VSS/afebrile. No labs. Remains on flagyl and clinda per peg. Tolerating feeds. Still no BM    7/31/18 Bed available today nursing home placement- will D/C today Remains on flagyl day 7/10 & clinda day 9/10 per PEG tube.       Interval History: no acute events. Remains stable for discharge to NH when bed available.     Review of Systems   Constitutional: Negative for chills and fever.   HENT: Negative for congestion, hearing loss, sinus pressure and sore throat.    Eyes: Negative for photophobia.   Respiratory: Negative for cough, choking, chest tightness and wheezing.    Cardiovascular: Negative for chest pain and palpitations.   Gastrointestinal: Negative for abdominal pain, blood in stool, nausea and vomiting.   Genitourinary: Negative for dysuria and hematuria.   Musculoskeletal: Negative for arthralgias and myalgias.   Skin: Negative for pallor.   Neurological: Negative for dizziness and numbness.   Hematological: Does not bruise/bleed easily.   Psychiatric/Behavioral: Negative for confusion and suicidal ideas. The patient is not nervous/anxious.      Objective:     Vital Signs (Most Recent):  Temp: 97.4 °F (36.3 °C) (07/31/18 0354)  Pulse: 81 (07/31/18 0709)  Resp: 20 (07/31/18 0709)  BP: 130/74 (07/31/18 0354)  SpO2: 96 % (07/31/18 0709) Vital Signs (24h  Range):  Temp:  [96.8 °F (36 °C)-98.1 °F (36.7 °C)] 97.4 °F (36.3 °C)  Pulse:  [74-84] 81  Resp:  [17-20] 20  SpO2:  [96 %-100 %] 96 %  BP: (107-130)/(54-74) 130/74     Weight: 74.5 kg (164 lb 3.9 oz)  Body mass index is 24.97 kg/m².    Intake/Output Summary (Last 24 hours) at 07/31/18 0730  Last data filed at 07/31/18 0531   Gross per 24 hour   Intake             1320 ml   Output           1400.5 ml   Net            -80.5 ml      Physical Exam   Constitutional: She appears well-developed. No distress.   HENT:   Head: Normocephalic and atraumatic.   Right Ear: External ear normal.   Left Ear: External ear normal.   Nose: Nose normal.   Eyes: Conjunctivae are normal. Pupils are equal, round, and reactive to light.   Neck: No tracheal deviation present.   Cardiovascular: Normal rate, regular rhythm and normal heart sounds.    Pulmonary/Chest: Effort normal and breath sounds normal. No respiratory distress.   Abdominal: Soft. Bowel sounds are normal. She exhibits distension (mild). There is no tenderness.   PEG in place   Musculoskeletal: She exhibits deformity (contractures, chronic).   Neurological: She is alert.   Responds yes and no appropriately but can not say where she is  Does know her name   Skin: Skin is warm. No rash noted.   Psychiatric: She has a normal mood and affect. Her behavior is normal.   Nursing note and vitals reviewed.      Significant Labs and  Significant Imaging:   None over weekend      Assessment/Plan:      * Sepsis due to methicillin resistant Staphylococcus aureus (MRSA)    Likely due to her decubitus vs severe dehydration on admit with SIRS criteria met  Covering broad spectrum with vanc and cefepime --7/23/18 will switch to clindamycin and levaquin  Will add flagyl 7/25 for better anaerobic coverage given proximity of wound to her rectum    Follow blood cultures--negative  UA clear  CXR clear  7/26 blood culture negative x 5d- infection seems to be deubitus; cover skin; continue clinda  and flagyl. Add in cipro if necessary  7/28- day 5 clinda, day 3 flagyl. She is medically stable and should complete 10 day course of these antibx (4 more days clinda and 6 days flagyl) once discharged  7/30 was not d/c over the weekend. Awaiting placement at different NH from where she was admitted from Day 7 clinda and day 5 flagyl, if d/c today needs 3 days clinda and 5 days flagyl)    Will also maintain zee so in view of sacral decubitus to that wound is not exposed to urine; will need to monitor closely and decide on future timing of zee removal.         Severe protein-calorie malnutrition      Continue isosource        Decubitus ulcer of sacral region, unstageable    Wound care consult     7/26 debridement in OR  7/27 s/p removal of necrotic skin, fat, muscle and fascia. Per surgery daily dressing changes with santyl. Can follow up in their clinic as well. Complete antibx for presumed infection on admission        Hypernatremia    resolved  Off of IVF  On water flushes via PEG          History of airway aspiration    PEG tube --restarted feeds and tolerating. Nutrition consult  NPO  7/27/18 PEG tube feedings resumed post op--patient tolerating          Hypothyroidism due to acquired atrophy of thyroid    Continue synthroid 75mcg daily           Macrocytic anemia    Initially hemoconcentrated - with dilution H&H stable        Functional quadriplegia    Air mattress /pressure reduction  She is a DNR   Turn q 2          Memory disorder     will continue her exelon patch as ordered at home           Parkinson's disease    Continue sinemet   Multiple contractures--PRN pain meds are acceptable    Patient is medically stable for discharge but is awaiting NH placement. It is NOT SAFE to discharge this patient home with daughter as caregiver as she is completely dependent for total 24 hour care.             VTE Risk Mitigation         Ordered     enoxaparin injection 30 mg  Daily      07/24/18 0749     IP VTE HIGH  RISK PATIENT  Once      07/21/18 2210     Place sequential compression device  Until discontinued      07/21/18 2210              Hayder Khalil MD  Department of Hospital Medicine   [facility]

## 2018-07-31 NOTE — PLAN OF CARE
Problem: Patient Care Overview  Goal: Plan of Care Review  Outcome: Outcome(s) achieved Date Met: 07/31/18  Patient vitals stable. Denies pain. Denies SOB. Turning Q2. Positioned with pillows. Wound care daily. Continuous PEG tube feeding. Telemetry monitoring. Arvizu in place. Remains free from falls. Patient discharged to [facility]. [facility] ambulance for transport.

## 2018-07-31 NOTE — SUBJECTIVE & OBJECTIVE
Interval History: no acute events. Remains stable for discharge to NH when bed available.     Review of Systems   Unable to perform ROS: Dementia   Constitutional: Negative for chills and fever.   HENT: Negative for congestion, hearing loss, sinus pressure and sore throat.    Eyes: Negative for photophobia.   Respiratory: Negative for cough, choking, chest tightness and wheezing.    Cardiovascular: Negative for chest pain and palpitations.   Gastrointestinal: Negative for abdominal pain, blood in stool, nausea and vomiting.   Genitourinary: Negative for dysuria and hematuria.   Musculoskeletal: Negative for arthralgias and myalgias.   Skin: Negative for pallor.   Neurological: Negative for dizziness and numbness.   Hematological: Does not bruise/bleed easily.   Psychiatric/Behavioral: Negative for confusion and suicidal ideas. The patient is not nervous/anxious.      Objective:     Vital Signs (Most Recent):  Temp: 97.4 °F (36.3 °C) (07/31/18 0354)  Pulse: 81 (07/31/18 0709)  Resp: 20 (07/31/18 0709)  BP: 130/74 (07/31/18 0354)  SpO2: 96 % (07/31/18 0709) Vital Signs (24h Range):  Temp:  [96.8 °F (36 °C)-98.1 °F (36.7 °C)] 97.4 °F (36.3 °C)  Pulse:  [74-84] 81  Resp:  [17-20] 20  SpO2:  [96 %-100 %] 96 %  BP: (107-130)/(54-74) 130/74     Weight: 74.5 kg (164 lb 3.9 oz)  Body mass index is 24.97 kg/m².    Intake/Output Summary (Last 24 hours) at 07/31/18 0730  Last data filed at 07/31/18 0531   Gross per 24 hour   Intake             1320 ml   Output           1400.5 ml   Net            -80.5 ml      Physical Exam   Constitutional: She appears well-developed. No distress.   HENT:   Head: Normocephalic and atraumatic.   Right Ear: External ear normal.   Left Ear: External ear normal.   Nose: Nose normal.   Eyes: Conjunctivae are normal. Pupils are equal, round, and reactive to light.   Neck: No tracheal deviation present.   Cardiovascular: Normal rate, regular rhythm and normal heart sounds.    Pulmonary/Chest: Effort  normal and breath sounds normal. No respiratory distress.   Abdominal: Soft. Bowel sounds are normal. She exhibits distension (mild). There is no tenderness.   PEG in place   Musculoskeletal: She exhibits deformity (contractures, chronic).   Neurological: She is alert.   Responds yes and no appropriately but can not say where she is  Does know her name   Skin: Skin is warm. No rash noted.   Psychiatric: She has a normal mood and affect. Her behavior is normal.   Nursing note and vitals reviewed.      Significant Labs and  Significant Imaging:   None over weekend

## 2018-08-01 NOTE — PLAN OF CARE
08/01/18 0858   Final Note   Assessment Type Final Discharge Note   Discharge Disposition SNF   What phone number can be called within the next 1-3 days to see how you are doing after discharge? [phone]   Hospital Follow Up  Appt(s) scheduled? Yes   Discharge plans and expectations educations in teach back method with documentation complete? Yes   Right Care Referral Info   Post Acute Recommendation Home-care   Referral Type SNF   Facility Name [address] [address]

## 2018-08-11 ENCOUNTER — HOSPITAL ENCOUNTER (EMERGENCY)
Facility: HOSPITAL | Age: 78
Discharge: HOME | End: 2018-08-11
Attending: SURGERY
Payer: MEDICARE

## 2018-08-11 VITALS
HEIGHT: 68 IN | HEART RATE: 77 BPM | TEMPERATURE: 97 F | WEIGHT: 164.19 LBS | DIASTOLIC BLOOD PRESSURE: 59 MMHG | SYSTOLIC BLOOD PRESSURE: 116 MMHG | OXYGEN SATURATION: 97 % | RESPIRATION RATE: 16 BRPM | BODY MASS INDEX: 24.89 KG/M2

## 2018-08-11 DIAGNOSIS — K94.23 PEG TUBE MALFUNCTION: Primary | ICD-10-CM

## 2018-08-11 DIAGNOSIS — Z93.1 S/P PERCUTANEOUS ENDOSCOPIC GASTROSTOMY (PEG) TUBE PLACEMENT: ICD-10-CM

## 2018-08-11 PROCEDURE — 99285 EMERGENCY DEPT VISIT HI MDM: CPT | Mod: 25

## 2018-08-11 PROCEDURE — 49450 REPLACE G/C TUBE PERC: CPT

## 2018-08-11 PROCEDURE — 25500020 PHARM REV CODE 255: Performed by: SURGERY

## 2018-08-11 PROCEDURE — 43760 *HC REPLACEMENT GASTROS TUBE: CPT

## 2018-08-11 RX ADMIN — DIATRIZOATE MEGLUMINE AND DIATRIZOATE SODIUM 30 ML: 600; 100 SOLUTION ORAL; RECTAL at 06:08

## 2018-08-11 NOTE — ED PROVIDER NOTES
[facility]                                                 Chief Complaint  78 y.o. female with PEG Tube Displaced (arrives via [facility] from [facility])    History of Present Illness  Eddy Cunningham presents to the emergency room with PEG tube malfunction  The patient's PEG tube fell out tonight per nursing home report, accidental issue  Patient presents needing a PEG tube replacement, had no other complaint listed  Pt on exam has a patent PEG tube skin site on the left anterior abdominal wall  PEG tube replaced in the ER without incident, we confirmed placement with x-ray    The history is provided by the nursing home staff   device was not used during this ER visit    Past Medical History   -- COPD (chronic obstructive pulmonary disease)     -- Dementia     -- Depression     -- Elevated C-reactive protein (CRP)     -- Falls frequently     -- Homocystinuria     -- Hypertension     -- Hypopotassemia     -- Hypothyroidism     -- Narcolepsy without cataplexy     -- Parkinson disease     -- RLS (restless legs syndrome)     -- Stress incontinence     -- Venous stasis ulcers        Past Surgical History   -- CARPAL TUNNEL RELEASE       -- CHOLECYSTECTOMY       -- PARTIAL HYSTERECTOMY       -- ROTATOR CUFF REPAIR       -- TONSILLECTOMY, ADENOIDECTOMY       -- TOTAL KNEE ARTHROPLASTY          Review of patient's allergies   -- Oxycodone     -- Sulfa (sulfonamide antibiotics)      Review of Systems and Physical Exam      Review of Systems  -- Nursing home states the patient has PEG tube fell out tonight    BP (!) 116/59  Pulse 77   Temp 96.7 °F (35.9 °C) (Tympanic)   Resp 16    Physical Exam  -- Nursing note and vitals reviewed  -- Constitutional: Appears well-developed and well-nourished  -- Head: Atraumatic. Normocephalic. No obvious abnormality  -- Eyes: Pupils are equal and reactive to light. Normal conjunctiva and lids  -- Cardiac: Normal rate, regular rhythm and normal  heart sounds  -- Pulmonary: Normal respiratory effort, breath sounds clear to auscultation  -- Abdominal: Soft, no tenderness. Normal bowel sounds. Normal liver edge  -- Musculoskeletal: Normal range of motion, no effusions. Joints stable   -- Neurological: No focal deficits. Showed good interaction with staff  -- Vascular: Posterior tibial, dorsalis pedis and radial pulses 2+ bilaterally    -- Lymphatics: No cervical or peripheral lymphadenopathy. No edema noted  -- Skin: Patent PEG tube skin site on the abdominal wall, no cellulitis    Emergency Room Course      Treatment and Evaluation  -- PEG tube replaced by the ER physician in the ER  -- PEG tube placement confirmed by Gastrografin x-ray  -- Site was verified before the start of the procedure  -- No complications were noted from the procedure  -- The patient tolerated the procedure well     Diagnosis  -- The encounter diagnosis was PEG tube malfunction.    Disposition and Plan  -- Disposition: home  -- Condition: stable  -- Follow-up: Patient to follow up with Ayaka Pollard MD in 1-2 days.    This note is dictated on Dragon Natural Speaking word recognition program.  There are word recognition mistakes that are occasionally missed on review.            Ivan Gutierres MD  08/11/18 0601

## 2018-08-11 NOTE — ED NOTES
Report called to the [facility], nurse advised to have patient return via [facility]. [facility] called for transportation.

## 2018-08-11 NOTE — ED TRIAGE NOTES
78 y.o. female presents to ER ED 05/ED 05   Chief Complaint   Patient presents with    PEG Tube Displaced     arrives via [facility] from [facility] with PEG Tube displaced   . No acute distress noted.  Dr. Gutierres at the bedside for tube replacement.  Radiology is at the bedside for X-Ray testing.

## 2018-08-22 ENCOUNTER — HOSPITAL ENCOUNTER (INPATIENT)
Facility: HOSPITAL | Age: 78
LOS: 6 days | Discharge: NURSING, REHAB OR LONG-TERM CARE | DRG: 871 | End: 2018-08-28
Attending: SURGERY | Admitting: FAMILY MEDICINE
Payer: MEDICARE

## 2018-08-22 ENCOUNTER — LAB VISIT (OUTPATIENT)
Dept: LAB | Facility: HOSPITAL | Age: 78
End: 2018-08-22
Attending: INTERNAL MEDICINE
Payer: MEDICARE

## 2018-08-22 DIAGNOSIS — N39.0 UTI (URINARY TRACT INFECTION): Primary | ICD-10-CM

## 2018-08-22 DIAGNOSIS — A41.9 SEVERE SEPSIS: Primary | ICD-10-CM

## 2018-08-22 DIAGNOSIS — R65.20 SEVERE SEPSIS: Primary | ICD-10-CM

## 2018-08-22 DIAGNOSIS — K94.23 PEG TUBE MALFUNCTION: ICD-10-CM

## 2018-08-22 DIAGNOSIS — A41.50 SEPSIS DUE TO GRAM NEGATIVE BACTERIA: ICD-10-CM

## 2018-08-22 DIAGNOSIS — J69.0 ASPIRATION PNEUMONIA OF RIGHT LOWER LOBE, UNSPECIFIED ASPIRATION PNEUMONIA TYPE: ICD-10-CM

## 2018-08-22 DIAGNOSIS — R06.02 SOB (SHORTNESS OF BREATH): ICD-10-CM

## 2018-08-22 DIAGNOSIS — N30.00 ACUTE CYSTITIS WITHOUT HEMATURIA: ICD-10-CM

## 2018-08-22 LAB
ALBUMIN SERPL BCP-MCNC: 2.5 G/DL
ALP SERPL-CCNC: 190 U/L
ALT SERPL W/O P-5'-P-CCNC: 11 U/L
ANION GAP SERPL CALC-SCNC: 13 MMOL/L
APTT BLDCRRT: 30.1 SEC
AST SERPL-CCNC: 21 U/L
BACTERIA #/AREA URNS HPF: ABNORMAL /HPF
BACTERIA #/AREA URNS HPF: ABNORMAL /HPF
BASOPHILS # BLD AUTO: ABNORMAL K/UL
BASOPHILS NFR BLD: 0 %
BILIRUB SERPL-MCNC: 0.6 MG/DL
BILIRUB UR QL STRIP: NEGATIVE
BILIRUB UR QL STRIP: NEGATIVE
BNP SERPL-MCNC: 461 PG/ML
BUN SERPL-MCNC: 73 MG/DL
CALCIUM SERPL-MCNC: 9.4 MG/DL
CHLORIDE SERPL-SCNC: 105 MMOL/L
CK MB SERPL-MCNC: 1 NG/ML
CK MB SERPL-RTO: 3.3 %
CK SERPL-CCNC: 30 U/L
CK SERPL-CCNC: 30 U/L
CLARITY UR: ABNORMAL
CLARITY UR: CLEAR
CO2 SERPL-SCNC: 25 MMOL/L
COLOR UR: YELLOW
COLOR UR: YELLOW
CREAT SERPL-MCNC: 1 MG/DL
D DIMER PPP IA.FEU-MCNC: 3.33 MG/L FEU
DIFFERENTIAL METHOD: ABNORMAL
EOSINOPHIL # BLD AUTO: ABNORMAL K/UL
EOSINOPHIL NFR BLD: 0 %
ERYTHROCYTE [DISTWIDTH] IN BLOOD BY AUTOMATED COUNT: 14.9 %
EST. GFR  (AFRICAN AMERICAN): >60 ML/MIN/1.73 M^2
EST. GFR  (NON AFRICAN AMERICAN): 54 ML/MIN/1.73 M^2
GLUCOSE SERPL-MCNC: 166 MG/DL
GLUCOSE UR QL STRIP: NEGATIVE
GLUCOSE UR QL STRIP: NEGATIVE
HCT VFR BLD AUTO: 39 %
HGB BLD-MCNC: 12 G/DL
HGB UR QL STRIP: ABNORMAL
HGB UR QL STRIP: ABNORMAL
HYALINE CASTS #/AREA URNS LPF: 0 /LPF
HYALINE CASTS #/AREA URNS LPF: 0 /LPF
INR PPP: 1.3
KETONES UR QL STRIP: ABNORMAL
KETONES UR QL STRIP: NEGATIVE
LACTATE SERPL-SCNC: 2.4 MMOL/L
LEUKOCYTE ESTERASE UR QL STRIP: ABNORMAL
LEUKOCYTE ESTERASE UR QL STRIP: ABNORMAL
LYMPHOCYTES # BLD AUTO: ABNORMAL K/UL
LYMPHOCYTES NFR BLD: 9 %
MCH RBC QN AUTO: 29.3 PG
MCHC RBC AUTO-ENTMCNC: 30.8 G/DL
MCV RBC AUTO: 95 FL
MICROSCOPIC COMMENT: ABNORMAL
MICROSCOPIC COMMENT: ABNORMAL
MONOCYTES # BLD AUTO: ABNORMAL K/UL
MONOCYTES NFR BLD: 2 %
NEUTROPHILS NFR BLD: 72 %
NEUTS BAND NFR BLD MANUAL: 17 %
NITRITE UR QL STRIP: NEGATIVE
NITRITE UR QL STRIP: NEGATIVE
PH UR STRIP: 7 [PH] (ref 5–8)
PH UR STRIP: >8 [PH] (ref 5–8)
PLATELET # BLD AUTO: 658 K/UL
PMV BLD AUTO: 9.6 FL
POTASSIUM SERPL-SCNC: 5.1 MMOL/L
PROT SERPL-MCNC: 6.6 G/DL
PROT UR QL STRIP: ABNORMAL
PROT UR QL STRIP: ABNORMAL
PROTHROMBIN TIME: 12.7 SEC
RBC # BLD AUTO: 4.1 M/UL
RBC #/AREA URNS HPF: 2 /HPF (ref 0–4)
RBC #/AREA URNS HPF: 3 /HPF (ref 0–4)
SODIUM SERPL-SCNC: 143 MMOL/L
SP GR UR STRIP: 1.01 (ref 1–1.03)
SP GR UR STRIP: 1.01 (ref 1–1.03)
TROPONIN I SERPL DL<=0.01 NG/ML-MCNC: 0.03 NG/ML
URN SPEC COLLECT METH UR: ABNORMAL
URN SPEC COLLECT METH UR: ABNORMAL
UROBILINOGEN UR STRIP-ACNC: NEGATIVE EU/DL
UROBILINOGEN UR STRIP-ACNC: NEGATIVE EU/DL
WBC # BLD AUTO: 20.58 K/UL
WBC #/AREA URNS HPF: >100 /HPF (ref 0–5)
WBC #/AREA URNS HPF: >100 /HPF (ref 0–5)

## 2018-08-22 PROCEDURE — 96365 THER/PROPH/DIAG IV INF INIT: CPT

## 2018-08-22 PROCEDURE — 82553 CREATINE MB FRACTION: CPT

## 2018-08-22 PROCEDURE — 25000242 PHARM REV CODE 250 ALT 637 W/ HCPCS: Performed by: SURGERY

## 2018-08-22 PROCEDURE — 85610 PROTHROMBIN TIME: CPT

## 2018-08-22 PROCEDURE — 36556 INSERT NON-TUNNEL CV CATH: CPT

## 2018-08-22 PROCEDURE — 93005 ELECTROCARDIOGRAM TRACING: CPT

## 2018-08-22 PROCEDURE — 94640 AIRWAY INHALATION TREATMENT: CPT

## 2018-08-22 PROCEDURE — 83880 ASSAY OF NATRIURETIC PEPTIDE: CPT

## 2018-08-22 PROCEDURE — 81000 URINALYSIS NONAUTO W/SCOPE: CPT

## 2018-08-22 PROCEDURE — 85007 BL SMEAR W/DIFF WBC COUNT: CPT

## 2018-08-22 PROCEDURE — 87088 URINE BACTERIA CULTURE: CPT

## 2018-08-22 PROCEDURE — 20000000 HC ICU ROOM

## 2018-08-22 PROCEDURE — 82550 ASSAY OF CK (CPK): CPT

## 2018-08-22 PROCEDURE — 27000221 HC OXYGEN, UP TO 24 HOURS

## 2018-08-22 PROCEDURE — 96375 TX/PRO/DX INJ NEW DRUG ADDON: CPT

## 2018-08-22 PROCEDURE — 85379 FIBRIN DEGRADATION QUANT: CPT

## 2018-08-22 PROCEDURE — 87070 CULTURE OTHR SPECIMN AEROBIC: CPT

## 2018-08-22 PROCEDURE — 93010 ELECTROCARDIOGRAM REPORT: CPT | Mod: ,,, | Performed by: INTERNAL MEDICINE

## 2018-08-22 PROCEDURE — 87086 URINE CULTURE/COLONY COUNT: CPT

## 2018-08-22 PROCEDURE — 85027 COMPLETE CBC AUTOMATED: CPT

## 2018-08-22 PROCEDURE — 02HV33Z INSERTION OF INFUSION DEVICE INTO SUPERIOR VENA CAVA, PERCUTANEOUS APPROACH: ICD-10-PCS | Performed by: SURGERY

## 2018-08-22 PROCEDURE — 63600175 PHARM REV CODE 636 W HCPCS: Performed by: SURGERY

## 2018-08-22 PROCEDURE — 31720 CLEARANCE OF AIRWAYS: CPT

## 2018-08-22 PROCEDURE — 87186 SC STD MICRODIL/AGAR DIL: CPT

## 2018-08-22 PROCEDURE — 87077 CULTURE AEROBIC IDENTIFY: CPT | Mod: 59

## 2018-08-22 PROCEDURE — 83605 ASSAY OF LACTIC ACID: CPT

## 2018-08-22 PROCEDURE — 96374 THER/PROPH/DIAG INJ IV PUSH: CPT | Mod: 59

## 2018-08-22 PROCEDURE — 25000003 PHARM REV CODE 250: Performed by: SURGERY

## 2018-08-22 PROCEDURE — 87040 BLOOD CULTURE FOR BACTERIA: CPT

## 2018-08-22 PROCEDURE — 84484 ASSAY OF TROPONIN QUANT: CPT

## 2018-08-22 PROCEDURE — 96361 HYDRATE IV INFUSION ADD-ON: CPT | Mod: 59

## 2018-08-22 PROCEDURE — 87205 SMEAR GRAM STAIN: CPT

## 2018-08-22 PROCEDURE — 85730 THROMBOPLASTIN TIME PARTIAL: CPT

## 2018-08-22 PROCEDURE — 80053 COMPREHEN METABOLIC PANEL: CPT

## 2018-08-22 PROCEDURE — 94760 N-INVAS EAR/PLS OXIMETRY 1: CPT

## 2018-08-22 PROCEDURE — 99291 CRITICAL CARE FIRST HOUR: CPT | Mod: 25

## 2018-08-22 PROCEDURE — 36415 COLL VENOUS BLD VENIPUNCTURE: CPT

## 2018-08-22 RX ORDER — ACETAMINOPHEN 10 MG/ML
1000 INJECTION, SOLUTION INTRAVENOUS
Status: COMPLETED | OUTPATIENT
Start: 2018-08-22 | End: 2018-08-22

## 2018-08-22 RX ORDER — NOREPINEPHRINE BITARTRATE/D5W 4MG/250ML
0.05 PLASTIC BAG, INJECTION (ML) INTRAVENOUS CONTINUOUS
Status: DISCONTINUED | OUTPATIENT
Start: 2018-08-23 | End: 2018-08-27

## 2018-08-22 RX ORDER — SODIUM CHLORIDE 9 MG/ML
1000 INJECTION, SOLUTION INTRAVENOUS
Status: COMPLETED | OUTPATIENT
Start: 2018-08-22 | End: 2018-08-22

## 2018-08-22 RX ORDER — MULTIVITAMIN
1 TABLET ORAL DAILY
COMMUNITY

## 2018-08-22 RX ORDER — ASCORBIC ACID 500 MG
500 TABLET ORAL DAILY
COMMUNITY

## 2018-08-22 RX ORDER — LEVALBUTEROL 1.25 MG/.5ML
1.25 SOLUTION, CONCENTRATE RESPIRATORY (INHALATION)
Status: COMPLETED | OUTPATIENT
Start: 2018-08-22 | End: 2018-08-22

## 2018-08-22 RX ORDER — FUROSEMIDE 40 MG/1
40 TABLET ORAL DAILY
Status: ON HOLD | COMMUNITY
End: 2019-12-08 | Stop reason: CLARIF

## 2018-08-22 RX ORDER — ONDANSETRON 2 MG/ML
4 INJECTION INTRAMUSCULAR; INTRAVENOUS
Status: DISCONTINUED | OUTPATIENT
Start: 2018-08-22 | End: 2018-08-22

## 2018-08-22 RX ORDER — VANCOMYCIN HCL IN 5 % DEXTROSE 1G/250ML
15 PLASTIC BAG, INJECTION (ML) INTRAVENOUS
Status: DISCONTINUED | OUTPATIENT
Start: 2018-08-22 | End: 2018-08-25

## 2018-08-22 RX ORDER — VANCOMYCIN HCL IN 5 % DEXTROSE 1G/250ML
15 PLASTIC BAG, INJECTION (ML) INTRAVENOUS
Status: DISCONTINUED | OUTPATIENT
Start: 2018-08-23 | End: 2018-08-22

## 2018-08-22 RX ORDER — MORPHINE SULFATE 2 MG/ML
2 INJECTION, SOLUTION INTRAMUSCULAR; INTRAVENOUS
Status: DISCONTINUED | OUTPATIENT
Start: 2018-08-22 | End: 2018-08-22

## 2018-08-22 RX ADMIN — SODIUM CHLORIDE 1000 ML: 0.9 INJECTION, SOLUTION INTRAVENOUS at 10:08

## 2018-08-22 RX ADMIN — LEVALBUTEROL 1.25 MG: 1.25 SOLUTION, CONCENTRATE RESPIRATORY (INHALATION) at 10:08

## 2018-08-22 RX ADMIN — Medication 0.05 MCG/KG/MIN: at 11:08

## 2018-08-22 RX ADMIN — ACETAMINOPHEN 1000 MG: 10 INJECTION, SOLUTION INTRAVENOUS at 10:08

## 2018-08-23 LAB
ALBUMIN SERPL BCP-MCNC: 2.1 G/DL
ALP SERPL-CCNC: 154 U/L
ALT SERPL W/O P-5'-P-CCNC: 20 U/L
ANION GAP SERPL CALC-SCNC: 11 MMOL/L
AST SERPL-CCNC: 17 U/L
BASOPHILS # BLD AUTO: 0.04 K/UL
BASOPHILS NFR BLD: 0.2 %
BILIRUB SERPL-MCNC: 0.6 MG/DL
BUN SERPL-MCNC: 64 MG/DL
CALCIUM SERPL-MCNC: 8.4 MG/DL
CHLORIDE SERPL-SCNC: 108 MMOL/L
CO2 SERPL-SCNC: 23 MMOL/L
CREAT SERPL-MCNC: 0.8 MG/DL
DIFFERENTIAL METHOD: ABNORMAL
EOSINOPHIL # BLD AUTO: 0 K/UL
EOSINOPHIL NFR BLD: 0.1 %
ERYTHROCYTE [DISTWIDTH] IN BLOOD BY AUTOMATED COUNT: 14.8 %
EST. GFR  (AFRICAN AMERICAN): >60 ML/MIN/1.73 M^2
EST. GFR  (NON AFRICAN AMERICAN): >60 ML/MIN/1.73 M^2
GLUCOSE SERPL-MCNC: 176 MG/DL
HCT VFR BLD AUTO: 33 %
HGB BLD-MCNC: 9.9 G/DL
LACTATE SERPL-SCNC: 1.4 MMOL/L
LACTATE SERPL-SCNC: 2.3 MMOL/L
LYMPHOCYTES # BLD AUTO: 1.4 K/UL
LYMPHOCYTES NFR BLD: 5.4 %
MCH RBC QN AUTO: 29.1 PG
MCHC RBC AUTO-ENTMCNC: 30 G/DL
MCV RBC AUTO: 97 FL
MONOCYTES # BLD AUTO: 1.2 K/UL
MONOCYTES NFR BLD: 4.6 %
NEUTROPHILS # BLD AUTO: 22.6 K/UL
NEUTROPHILS NFR BLD: 89.7 %
PLATELET # BLD AUTO: 630 K/UL
PMV BLD AUTO: 9.3 FL
POTASSIUM SERPL-SCNC: 4.4 MMOL/L
PROT SERPL-MCNC: 5.6 G/DL
RBC # BLD AUTO: 3.4 M/UL
SODIUM SERPL-SCNC: 142 MMOL/L
WBC # BLD AUTO: 25.18 K/UL

## 2018-08-23 PROCEDURE — 99291 CRITICAL CARE FIRST HOUR: CPT | Mod: ,,, | Performed by: FAMILY MEDICINE

## 2018-08-23 PROCEDURE — 25000003 PHARM REV CODE 250: Performed by: FAMILY MEDICINE

## 2018-08-23 PROCEDURE — 94760 N-INVAS EAR/PLS OXIMETRY 1: CPT

## 2018-08-23 PROCEDURE — 25000003 PHARM REV CODE 250: Performed by: SURGERY

## 2018-08-23 PROCEDURE — 36415 COLL VENOUS BLD VENIPUNCTURE: CPT

## 2018-08-23 PROCEDURE — 94668 MNPJ CHEST WALL SBSQ: CPT

## 2018-08-23 PROCEDURE — 94761 N-INVAS EAR/PLS OXIMETRY MLT: CPT

## 2018-08-23 PROCEDURE — 20000000 HC ICU ROOM

## 2018-08-23 PROCEDURE — 85025 COMPLETE CBC W/AUTO DIFF WBC: CPT

## 2018-08-23 PROCEDURE — 83605 ASSAY OF LACTIC ACID: CPT | Mod: 91

## 2018-08-23 PROCEDURE — 97802 MEDICAL NUTRITION INDIV IN: CPT

## 2018-08-23 PROCEDURE — 83605 ASSAY OF LACTIC ACID: CPT

## 2018-08-23 PROCEDURE — 80053 COMPREHEN METABOLIC PANEL: CPT

## 2018-08-23 PROCEDURE — 94640 AIRWAY INHALATION TREATMENT: CPT

## 2018-08-23 PROCEDURE — 25000242 PHARM REV CODE 250 ALT 637 W/ HCPCS: Performed by: FAMILY MEDICINE

## 2018-08-23 PROCEDURE — 94667 MNPJ CHEST WALL 1ST: CPT

## 2018-08-23 PROCEDURE — 27000221 HC OXYGEN, UP TO 24 HOURS

## 2018-08-23 PROCEDURE — 63600175 PHARM REV CODE 636 W HCPCS: Performed by: FAMILY MEDICINE

## 2018-08-23 PROCEDURE — 63600175 PHARM REV CODE 636 W HCPCS: Performed by: SURGERY

## 2018-08-23 RX ORDER — CARBIDOPA AND LEVODOPA 25; 100 MG/1; MG/1
1 TABLET ORAL
Status: DISCONTINUED | OUTPATIENT
Start: 2018-08-23 | End: 2018-08-28 | Stop reason: HOSPADM

## 2018-08-23 RX ORDER — IPRATROPIUM BROMIDE AND ALBUTEROL SULFATE 2.5; .5 MG/3ML; MG/3ML
3 SOLUTION RESPIRATORY (INHALATION) EVERY 6 HOURS PRN
Status: DISCONTINUED | OUTPATIENT
Start: 2018-08-23 | End: 2018-08-28 | Stop reason: HOSPADM

## 2018-08-23 RX ORDER — SODIUM CHLORIDE 9 MG/ML
INJECTION, SOLUTION INTRAVENOUS CONTINUOUS
Status: DISCONTINUED | OUTPATIENT
Start: 2018-08-23 | End: 2018-08-26

## 2018-08-23 RX ORDER — PRAMIPEXOLE DIHYDROCHLORIDE 0.12 MG/1
0.25 TABLET ORAL 3 TIMES DAILY
Status: DISCONTINUED | OUTPATIENT
Start: 2018-08-23 | End: 2018-08-28 | Stop reason: HOSPADM

## 2018-08-23 RX ORDER — ACETAMINOPHEN 10 MG/ML
1000 INJECTION, SOLUTION INTRAVENOUS EVERY 8 HOURS
Status: DISCONTINUED | OUTPATIENT
Start: 2018-08-23 | End: 2018-08-23

## 2018-08-23 RX ORDER — GUAIFENESIN 100 MG/5ML
400 SOLUTION ORAL EVERY 8 HOURS PRN
Status: DISCONTINUED | OUTPATIENT
Start: 2018-08-23 | End: 2018-08-28 | Stop reason: HOSPADM

## 2018-08-23 RX ORDER — LEVOTHYROXINE SODIUM 75 UG/1
75 TABLET ORAL
Status: DISCONTINUED | OUTPATIENT
Start: 2018-08-24 | End: 2018-08-28 | Stop reason: HOSPADM

## 2018-08-23 RX ORDER — HYDROCODONE BITARTRATE AND ACETAMINOPHEN 7.5; 325 MG/1; MG/1
1 TABLET ORAL EVERY 6 HOURS PRN
Status: DISCONTINUED | OUTPATIENT
Start: 2018-08-23 | End: 2018-08-28 | Stop reason: HOSPADM

## 2018-08-23 RX ORDER — ONDANSETRON 2 MG/ML
4 INJECTION INTRAMUSCULAR; INTRAVENOUS EVERY 8 HOURS PRN
Status: DISCONTINUED | OUTPATIENT
Start: 2018-08-23 | End: 2018-08-28 | Stop reason: HOSPADM

## 2018-08-23 RX ORDER — PANTOPRAZOLE SODIUM 40 MG/1
40 TABLET, DELAYED RELEASE ORAL DAILY
Status: DISCONTINUED | OUTPATIENT
Start: 2018-08-23 | End: 2018-08-24

## 2018-08-23 RX ORDER — CIPROFLOXACIN 2 MG/ML
400 INJECTION, SOLUTION INTRAVENOUS
Status: DISCONTINUED | OUTPATIENT
Start: 2018-08-23 | End: 2018-08-26

## 2018-08-23 RX ADMIN — CARBIDOPA AND LEVODOPA 1 TABLET: 25; 100 TABLET ORAL at 05:08

## 2018-08-23 RX ADMIN — SODIUM CHLORIDE: 0.9 INJECTION, SOLUTION INTRAVENOUS at 12:08

## 2018-08-23 RX ADMIN — CARBIDOPA AND LEVODOPA 1 TABLET: 25; 100 TABLET ORAL at 03:08

## 2018-08-23 RX ADMIN — VANCOMYCIN HYDROCHLORIDE 1000 MG: 1 INJECTION, POWDER, LYOPHILIZED, FOR SOLUTION INTRAVENOUS at 11:08

## 2018-08-23 RX ADMIN — PIPERACILLIN SODIUM AND TAZOBACTAM SODIUM 4.5 G: 4; .5 INJECTION, POWDER, LYOPHILIZED, FOR SOLUTION INTRAVENOUS at 05:08

## 2018-08-23 RX ADMIN — PANTOPRAZOLE SODIUM 40 MG: 40 TABLET, DELAYED RELEASE ORAL at 08:08

## 2018-08-23 RX ADMIN — HYDROCODONE BITARTRATE AND ACETAMINOPHEN 1 TABLET: 7.5; 325 TABLET ORAL at 08:08

## 2018-08-23 RX ADMIN — IPRATROPIUM BROMIDE AND ALBUTEROL SULFATE 3 ML: .5; 3 SOLUTION RESPIRATORY (INHALATION) at 07:08

## 2018-08-23 RX ADMIN — IPRATROPIUM BROMIDE AND ALBUTEROL SULFATE 3 ML: .5; 3 SOLUTION RESPIRATORY (INHALATION) at 12:08

## 2018-08-23 RX ADMIN — Medication 0.1 MCG/KG/MIN: at 11:08

## 2018-08-23 RX ADMIN — ACETAMINOPHEN 1000 MG: 10 INJECTION, SOLUTION INTRAVENOUS at 05:08

## 2018-08-23 RX ADMIN — PRAMIPEXOLE DIHYDROCHLORIDE 0.25 MG: 0.12 TABLET ORAL at 08:08

## 2018-08-23 RX ADMIN — CIPROFLOXACIN 400 MG: 2 INJECTION, SOLUTION INTRAVENOUS at 08:08

## 2018-08-23 RX ADMIN — CARBIDOPA AND LEVODOPA 1 TABLET: 25; 100 TABLET ORAL at 09:08

## 2018-08-23 RX ADMIN — PIPERACILLIN SODIUM AND TAZOBACTAM SODIUM 4.5 G: 4; .5 INJECTION, POWDER, LYOPHILIZED, FOR SOLUTION INTRAVENOUS at 12:08

## 2018-08-23 RX ADMIN — GUAIFENESIN 400 MG: 200 SOLUTION ORAL at 03:08

## 2018-08-23 RX ADMIN — Medication 0.1 MCG/KG/MIN: at 12:08

## 2018-08-23 RX ADMIN — PRAMIPEXOLE DIHYDROCHLORIDE 0.25 MG: 0.12 TABLET ORAL at 09:08

## 2018-08-23 RX ADMIN — CARBIDOPA AND LEVODOPA 1 TABLET: 25; 100 TABLET ORAL at 11:08

## 2018-08-23 RX ADMIN — VANCOMYCIN HYDROCHLORIDE 1000 MG: 1 INJECTION, POWDER, LYOPHILIZED, FOR SOLUTION INTRAVENOUS at 12:08

## 2018-08-23 RX ADMIN — HYDROCODONE BITARTRATE AND ACETAMINOPHEN 1 TABLET: 7.5; 325 TABLET ORAL at 09:08

## 2018-08-23 RX ADMIN — PIPERACILLIN SODIUM AND TAZOBACTAM SODIUM 4.5 G: 4; .5 INJECTION, POWDER, LYOPHILIZED, FOR SOLUTION INTRAVENOUS at 09:08

## 2018-08-23 RX ADMIN — PRAMIPEXOLE DIHYDROCHLORIDE 0.25 MG: 0.12 TABLET ORAL at 03:08

## 2018-08-23 RX ADMIN — Medication 0.2 MCG/KG/MIN: at 05:08

## 2018-08-23 NOTE — EICU
Video assessment done.  Pt continues on vent support, sedated on propofol gtt, also continues on norepinephrine gtt.

## 2018-08-23 NOTE — ASSESSMENT & PLAN NOTE
Arvizu in place from NH.  Will replace today.  Vanc/zosyn and cipro ordered.  Most recent uti - e coli

## 2018-08-23 NOTE — PLAN OF CARE
Dr Marvin assessed pt spoke with daughter concerning plan of care, daughter voiced understanding. New orders noted.

## 2018-08-23 NOTE — ED TRIAGE NOTES
"Here per DEEPAK from [facility] w c/o "fever, high heart rate and low blood pressure" as reported per Veronica @ [facility].   "

## 2018-08-23 NOTE — PLAN OF CARE
Wound care nurse assessed sacral wound and cleansed with NS, then packed with Aquacel AG packing them applied aquacel foam drsg to site. Tolerated fairly.

## 2018-08-23 NOTE — PROVIDER PROGRESS NOTES - EMERGENCY DEPT.
[facility]  6 Hours Sepsis Perfusion Exam   Provider Attestation           I attest, a sepsis perfusion exam was performed within 6 hours of Septic Shock   presentation, following fluid resuscitation.          Ivan Gutierres M.D. 11:30 PM 8/22/2018

## 2018-08-23 NOTE — SUBJECTIVE & OBJECTIVE
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)     Parkinson disease     RLS (restless legs syndrome)     Stress incontinence     Venous stasis ulcers        Past Surgical History:   Procedure Laterality Date    CARPAL TUNNEL RELEASE      Right    CHOLECYSTECTOMY      GASTROSTOMY TUBE PLACEMENT      PARTIAL HYSTERECTOMY      ROTATOR CUFF REPAIR      TONSILLECTOMY, ADENOIDECTOMY      tonsiles    TOTAL KNEE ARTHROPLASTY         Review of patient's allergies indicates:   Allergen Reactions    Oxycodone      Hallucination    Sulfa (sulfonamide antibiotics)      Other reaction(s): when on contact       No current facility-administered medications on file prior to encounter.      Current Outpatient Medications on File Prior to Encounter   Medication Sig    amino ac/protein hydr/whey pro (LIQUACEL ORAL) 30 mLs 2 (two) times daily.    ascorbic acid, vitamin C, (VITAMIN C) 500 MG tablet 500 mg once daily.    aspirin 81 MG Chew 1 tablet (81 mg total) by Per G Tube route once daily.    carbidopa-levodopa  mg (SINEMET)  mg per tablet 1 tablet by Per G Tube route 5 (five) times daily.    docusate sodium (COLACE) 100 MG capsule 1 capsule (100 mg total) by Per G Tube route once daily.    furosemide (LASIX) 40 MG tablet Take 40 mg by mouth once daily.    HYDROcodone-acetaminophen (NORCO) 7.5-325 mg per tablet Take 1 tablet by mouth every 12 (twelve) hours as needed for Pain.    lactose-reduced food/fiber (JEVITY 1.5 HUSAM ORAL)     levothyroxine (SYNTHROID) 75 MCG tablet 1 tablet (75 mcg total) by Per G Tube route before breakfast.    multivitamin (ONE DAILY MULTIVITAMIN) per tablet 1 tablet once daily.     omeprazole (PRILOSEC) 40 MG capsule Open 40 mg capsule into g-tube once daily    potassium chloride 10% (KAYCIEL) 20 mEq/15 mL solution 15 mLs (20 mEq total) by Per G Tube route 2 (two) times daily.    pramipexole (MIRAPEX) 0.25 MG tablet 1 tablet (0.25 mg total) by Per G Tube route 3 (three) times daily.    ranitidine (ZANTAC) 150 MG capsule 75 mg 2 (two) times daily.     rivastigmine (EXELON) 4.6 mg/24 hour PT24 Place 1 patch onto the skin once daily.      senna (SENOKOT) 8.6 mg tablet 1 tablet by Per G Tube route once daily.    tiZANidine 2 mg Cap Take 4 mg by mouth nightly as needed.    trazodone (DESYREL) 50 MG tablet 1 tablet (50 mg total) by Per G Tube route every evening.    collagenase ointment Apply topically once daily.    LACTOSE-REDUCED FOOD (JEVITY ORAL) 65 mL/hr by PEG Tube route once daily. Via pump on at 6 pm off at 6 am     Family History     None        Tobacco Use    Smoking status: Former Smoker    Smokeless tobacco: Never Used   Substance and Sexual Activity    Alcohol use: No    Drug use: No    Sexual activity: Not on file     Review of Systems   Unable to perform ROS: Dementia     Objective:     Vital Signs (Most Recent):  Temp: 99.1 °F (37.3 °C) (08/23/18 0714)  Pulse: 78 (08/23/18 0750)  Resp: (!) 25 (08/23/18 0750)  BP: (!) 121/57 (08/23/18 0750)  SpO2: 100 % (08/23/18 0750) Vital Signs (24h Range):  Temp:  [98.9 °F (37.2 °C)-101.5 °F (38.6 °C)] 99.1 °F (37.3 °C)  Pulse:  [] 78  Resp:  [16-32] 25  SpO2:  [92 %-100 %] 100 %  BP: ()/(26-57) 121/57     Weight: 66.2 kg (145 lb 15.1 oz)  Body mass index is 22.19 kg/m².    Physical Exam   Constitutional: She is oriented to person, place, and time. She appears well-developed. No distress.   venti in place   HENT:   Head: Normocephalic and atraumatic.   Eyes: Conjunctivae are normal. Pupils are equal, round, and reactive to light.   Neck: Normal range of motion. Neck supple. No thyromegaly present.   Cardiovascular:  Normal rate, normal heart sounds and intact distal pulses.   Pulmonary/Chest: Effort normal. No respiratory distress. She has no wheezes.   Rhonchi.  Diminished breath sounds to right    venti in place   Abdominal: Soft. Bowel sounds are normal. There is no tenderness.   PEG   Musculoskeletal: She exhibits edema (trace upper and lower).   Lymphadenopathy:     She has no cervical adenopathy.   Neurological: She is alert and oriented to person, place, and time.   Right arm posturing, some movement of left   Skin: Skin is warm and dry. No rash noted.   Psychiatric: Her behavior is normal.   Nursing note and vitals reviewed.        CRANIAL NERVES     CN III, IV, VI   Pupils are equal, round, and reactive to light.       Significant Labs:   ABGs: No results for input(s): PH, PCO2, HCO3, POCSATURATED, BE, TOTALHB, COHB, METHB, O2HB, POCFIO2 in the last 48 hours.  Blood Culture: No results for input(s): LABBLOO in the last 48 hours.  CBC:   Recent Labs   Lab  08/22/18 2222 08/23/18   0425   WBC  20.58*  25.18*   HGB  12.0  9.9*   HCT  39.0  33.0*   PLT  658*  630*     CMP:   Recent Labs   Lab  08/22/18 2222 08/23/18   0425   NA  143  142   K  5.1  4.4   CL  105  108   CO2  25  23   GLU  166*  176*   BUN  73*  64*   CREATININE  1.0  0.8   CALCIUM  9.4  8.4*   PROT  6.6  5.6*   ALBUMIN  2.5*  2.1*   BILITOT  0.6  0.6   ALKPHOS  190*  154*   AST  21  17   ALT  11  20   ANIONGAP  13  11   EGFRNONAA  54*  >60     Lactic Acid:   Recent Labs   Lab  08/22/18 2222 08/23/18   0234   LACTATE  2.4*  2.3*     Urine Culture: No results for input(s): LABURIN in the last 48 hours.  Urine Studies:   Recent Labs   Lab  08/22/18 2215   COLORU  Yellow   APPEARANCEUA  Clear   PHUR  7.0   SPECGRAV  1.015   PROTEINUA  3+*   GLUCUA  Negative   KETONESU  Trace*   BILIRUBINUA  Negative   OCCULTUA  2+*   NITRITE  Negative   UROBILINOGEN  Negative   LEUKOCYTESUR  2+*   RBCUA  2   WBCUA  >100*   BACTERIA  Few*   HYALINECASTS  0        Significant Imaging: I have reviewed all pertinent imaging results/findings within the past 24 hours.     CXR with RLL pneumonia

## 2018-08-23 NOTE — ASSESSMENT & PLAN NOTE
This is presumed.  Cultures pending.  On levophed - discussed with daughter, will not escalate pressors but will cont on levophed and wean.  Lovenox.  No protonix.  Replace zee.  Start tube feeds when possible.  Support blood pressure, cont fluids, cont abx - vanc zosyn and cipro and de-escalate as possible

## 2018-08-23 NOTE — ED NOTES
Patient transported to ICU via stretcher w continuous cardiac monitoring maintained, oxygen maintained at 50% venti mask, IV medication infusing without difficulty.

## 2018-08-23 NOTE — HPI
78 year old female comes in from NH because of fevers and aspiration. She is tube fed there. 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 She was recently here and treated for a MDI ecoli UTI. Her urine today is once again with leukocytes but cough, fever and witnessed aspiration is what brought her in. She was found to have BPs in the 70s in the ER and was started on levophed. At baseline she is mainly bedridden.

## 2018-08-23 NOTE — NURSING
Rec'd Pt via stretcher from ED with Cardiac monitor and 50% VM.  Pt transferred to ICU 1 Bed via sliding board without difficulty. Pt awake alert and follows commands. Confused to time and situation. Attempt to reorient. Pt with Levophed gtt 0.1 mcg/kg/min infusing to TLC to RT groin. All ports patent and +blood return noted. Pt with LUQ peg tube intact, clamped. No residual at this time. Drsg changed and site asymptomatic. Large decubitus to sacral and buttocks [place] [facility] [place] to be taken and documented.  POC discussed with Daughter and Pt. Time allowed for questions.

## 2018-08-23 NOTE — PLAN OF CARE
Pt presents awake, alert confused, oriented to person, reoriented to place time and situation. HOB elevated 30 degrees. VS stable at present VM in progress Pox 100%. Infrequent cough noted, fair in nature. Right femoral TLC intact secured with all ports flushed and patent. Bilateral hands with swelling and stiffness noted hands in closed position difficult to move fingers.Arvizu to BSD. PIV to left arm intact secured flushed and patent. Discussed plan of care with pt and pts daughter, pt needs reinforcement. daughter voiced understanding. Monitoring closely.

## 2018-08-23 NOTE — CONSULTS
Recommendation for wound care to Sacral decubitus Stage IV as follows:  Cleanse using NS and gauze, pack wound, including undermining using silver alginate, apply 6x6 border foam to cover every other day and as needed for drainage/dislodgment.    Optimize nutrition with increased protein and fluids for wound healing.  Turn pt at least every two hours to offload ulcer and reduce pressure to bony prominences.

## 2018-08-23 NOTE — PROGRESS NOTES
[facility]  Adult Nutrition  Progress Note    SUMMARY       Recommendations    Recommendation/Intervention: When feasible, initiate PEG feeds of Impact 1.5 at 15ml/hr increasing as tolerated to Goal Rate 55ml/hr (1320ml volume) with 160ml/hr every 4hrs. projected will provide 1980 calories (100% EEN), 124gm protein (100% EPN), and 1976ml fluid. If unable to initiate enteral feeds within 24hrs, Consider Parenteral Nutrition for support.  Goals: advancement energy intake within 24 hrs.  Nutrition Goal Status: new  Communication of RD Recs: reviewed with RN    Nutrition Discharge Planning: to be determined.    Reason for Assessment    Reason for Assessment: identified at risk by screening criteria  Diagnosis: infection/sepsis  Relevant Medical History: Parkinson, HTN, COPD, Depression, Dementia, GERD  General Information Comments: Known Pt discussed with RN, Pt admitted with aspiration from NH, pt with multiple recent admits with chronic sacral wound that was debridement last admit. Last admit 7/21 pt was very dehydrated with wt 63.6kg with severe malnutrition. Pt tolerated impact peptide 1.5 at 55ml/hr last admit. This admit pt now with third spacing with edema, per RN nutrition held at this time.    Malnutrition Assessment:  Energy Intake:No intake at this time; PTA  100% of estimated energy requirement  Body Fat Depletion: moderate depletion of orbitals and triceps   Muscle Mass Depletion: severe depletion of temples and clavicle region   Weight Loss: 23% x 5months   Fluid Accumulation: third spacing    Nutrition Risk Screen    Nutrition Risk Screen: tube feeding or parenteral nutrition    Nutrition/Diet History    Patient Reported Diet/Restrictions/Preferences: no oral intake  Do you have any cultural, spiritual, Latter day conflicts, given your current situation?: [employer] reported  Food Allergies: NKFA  Factors Affecting Nutritional Intake: altered gastrointestinal function, impaired cognitive  "status/motor control, difficulty/impaired swallowing, NPO  Nutrition Support Formula Prior to Admit: Jevity 1.2    Anthropometrics    Temp: 99.1 °F (37.3 °C)  Height Method: Stated  Height: 5' 8" (172.7 cm)  Height (inches): 68 in  Weight Method: Bed Scale  Weight: 66.2 kg (145 lb 15.1 oz)(RD reviewed)  Weight (lb): 145.95 lb  Ideal Body Weight (IBW), Female: 140 lb  % Ideal Body Weight, Female (lb): 104.25 lb  BMI (Calculated): 22.2  BMI Grade: 18.5-24.9 - normal  Weight Loss: unintentional  Usual Body Weight (UBW), k.6 kg(18 admit)  Weight Change Amount: 44 lb 15.6 oz(5 months (significant))  % Usual Body Weight: 76.6  % Weight Change From Usual Weight: -23.56 %       Lab/Procedures/Meds    Pertinent Labs Reviewed: reviewed  Pertinent Medications Reviewed: reviewed    Recent Labs   Lab  18   0425   CALCIUM  8.4*   PROT  5.6*   NA  142   K  4.4   CO2  23   CL  108   BUN  64*   CREATININE  0.8   ALKPHOS  154*   ALT  20   AST  17   BILITOT  0.6     Scheduled Meds:   acetaminophen  1,000 mg Intravenous Q8H    carbidopa-levodopa  mg  1 tablet Per G Tube 5x Daily    ciprofloxacin  400 mg Intravenous Q12H    [START ON 2018] levothyroxine  75 mcg Per G Tube Before breakfast    pantoprazole  40 mg Oral Daily    piperacillin-tazobactam (ZOSYN) IVPB  4.5 g Intravenous Q8H    pramipexole  0.25 mg Per G Tube TID    vancomycin (VANCOCIN) IVPB  15 mg/kg Intravenous Q24H     Continuous Infusions:   sodium chloride 0.9% 125 mL/hr at 18 1000    norepinephrine bitartrate-D5W 0.15 mcg/kg/min (18 1000)     PRN Meds:.albuterol-ipratropium, guaifenesin 100 mg/5 ml, HYDROcodone-acetaminophen, ondansetron, promethazine (PHENERGAN) IVPB     Physical Findings/Assessment    Overall Physical Appearance: loss of muscle mass, loss of subcutaneous fat, on oxygen therapy(functional quadriplegia)  Tubes: (-)  Oral/Mouth Cavity: (UTO)  Skin: edema, non-healing wound(s)(sacral wound, third " spacing)    Estimated/Assessed Needs    Weight Used For Calorie Calculations: 66.2 kg (145 lb 15.1 oz)  Energy Calorie Requirements (kcal): 1986-2317  Energy Need Method: Kcal/kg(30-35)  Protein Requirements: (1.5-2.0)  Weight Used For Protein Calculations: 66.2 kg (145 lb 15.1 oz)  Fluid Requirements (mL): 1ml/kcal or per MD     RDA Method (mL): 1986         Nutrition Prescription Ordered    Current Diet Order: NPO    Evaluation of Received Nutrient/Fluid Intake    IV Fluid (mL): 3000  Energy Calories Required: not meeting needs  Protein Required: not meeting needs  Fluid Required: exceed needs  Tolerance: tolerating  % Intake of Estimated Energy Needs: 0 - 25 %  % Meal Intake: NPO    Nutrition Risk    Level of Risk/Frequency of Follow-up: (f/U 2x/wk)     Assessment and Plan    Severe protein-calorie malnutrition    Malnutrition in the context of Social/Environmental Circumstances     Related to (etiology):  Inability to consume adequate energy intake with questionable accuracy/intake of PTA TF order     Signs and Symptoms (as evidenced by):  Energy Intake: No intake at this time; PTA= 100% per TF order of estimated energy requirement (question accuracy of order/intake)  Body Fat Depletion: moderate depletion of orbitals and triceps   Muscle Mass Depletion: severe depletion of temples and clavicle region  Weight Loss: 23% x 5 month (significant)   Fluid: +edema with third spacing   Sacral Decubitus    Interventions  Modify composition of enteral nutrition  Modify rate of enteral nutrition  Feeding tube flush     Recommendations (treatment strategy):  When feasible initiate TF of:  Impact Peptide 1.5 GR 55ml/hr  160ml FWF Q4  Monitor days NPO and rec Parenteral Nutrition if unable to initiate Enteral Nutrition.     Nutrition Diagnosis Status:  Continues             Monitor and Evaluation    Food and Nutrient Adminstration: enteral and parenteral nutrition administration  Physical Activity and Function:  nutrition-related ADLs and IADLs  Anthropometric Measurements: weight, weight change  Biochemical Data, Medical Tests and Procedures: electrolyte and renal panel, gastrointestinal profile, glucose/endocrine profile, lipid profile, inflammatory profile  Nutrition-Focused Physical Findings: overall appearance, skin     Nutrition Follow-Up    RD Follow-up?: Yes

## 2018-08-23 NOTE — ASSESSMENT & PLAN NOTE
Malnutrition in the context of Social/Environmental Circumstances     Related to (etiology):  Inability to consume adequate energy intake with questionable accuracy/intake of PTA TF order     Signs and Symptoms (as evidenced by):  Energy Intake: No intake at this time; PTA= 100% per TF order of estimated energy requirement (question accuracy of order/intake)  Body Fat Depletion: moderate depletion of orbitals and triceps   Muscle Mass Depletion: severe depletion of temples and clavicle region  Weight Loss: 23% x 5 month (significant)   Fluid: +edema with third spacing   Sacral Decubitus    Interventions  Modify composition of enteral nutrition  Modify rate of enteral nutrition  Feeding tube flush     Recommendations (treatment strategy):  When feasible initiate TF of:  Impact Peptide 1.5 GR 55ml/hr  160ml FWF Q4  Monitor days NPO and rec Parenteral Nutrition if unable to initiate Enteral Nutrition.     Nutrition Diagnosis Status:  Continues

## 2018-08-23 NOTE — PLAN OF CARE
08/23/18 1124   Discharge Assessment   Assessment Type Discharge Planning Assessment   Confirmed/corrected address and phone number on facesheet? Yes   Assessment information obtained from? Medical Record;Caregiver   Expected Length of Stay (days) 3   Communicated expected length of stay with patient/caregiver yes   Prior to hospitilization cognitive status: Not Oriented to Person;Not Oriented to Place;Not Oriented to Time   Prior to hospitalization functional status: Needs Assistance   Current cognitive status: Not Oriented to Person;Not Oriented to Place;Not Oriented to Time   Current Functional Status: Needs Assistance   Lives With facility resident   Able to Return to Prior Arrangements yes   Is patient able to care for self after discharge? No   Who are your caregiver(s) and their phone number(s)? The staff at [facility]   Patient's perception of discharge disposition skilled nursing facility   Readmission Within The Last 30 Days current reason for admission unrelated to previous admission   Patient currently being followed by outpatient case management? No   Patient currently receives any other outside agency services? No   Equipment Currently Used at Home wheelchair   Do you have any problems affording any of your prescribed medications? No   Is the patient taking medications as prescribed? yes   Does the patient have transportation home? Yes   Transportation Available ambulance   Does the patient receive services at [facility]? No   Discharge Plan A Skilled Nursing Facility   Discharge Plan B Skilled Nursing Facility   Patient/Family In Agreement With Plan yes       Pt is a 78 year old female admitted for SOB.  Pt is a resident of [facility].  No Social Work needs noted at this time. Will continue to follow and offer support as needed.    Chavo Jenkins LMSW

## 2018-08-23 NOTE — PLAN OF CARE
Pt presents awake, alert confused, oriented to person, reoriented to place time and situation. HOB elevated 30 degrees. VS stable at present VM in progress Pox 100%. Infrequent cough noted, fair in nature. Right femoral TLC intact secured with all ports flushed and patent. Arvizu to BSD. PIV to left arm intact secured flushed and patent. Discussed plan of care with pt and pts daughter, pt needs reinforcement. daughter voiced understanding. Monitoring closely.

## 2018-08-23 NOTE — PLAN OF CARE
Pt awake at intervals. Humming at moments, remains confused oriented to person. Infrequent cough noted, unable to clear secretions NT suctioning performed, thick whitish secretions suctioned, pt stable.

## 2018-08-23 NOTE — H&P
[facility] Medicine  History & Physical    Patient Name: Eddy Cunningham  MRN: [id number]  Admission Date: 8/22/2018  Attending Physician: Kunal Sandoval MD   Primary Care Provider: Ayaka Pollard MD         Patient information was obtained from relative(s) and ER records.     Subjective:     Principal Problem:Severe sepsis    Chief Complaint:   Chief Complaint   Patient presents with    Fever        HPI: 78 year old female comes in from NH because of fevers and aspiration. She is tube fed there. 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 She was recently here and treated for a MDI ecoli UTI. Her urine today is once again with leukocytes but cough, fever and witnessed aspiration is what brought her in. She was found to have BPs in the 70s in the ER and was started on levophed. At baseline she is mainly bedridden.    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)     Parkinson disease     RLS (restless legs syndrome)     Stress incontinence     Venous stasis ulcers        Past Surgical History:   Procedure Laterality Date    CARPAL TUNNEL RELEASE      Right    CHOLECYSTECTOMY      GASTROSTOMY TUBE PLACEMENT      PARTIAL HYSTERECTOMY      ROTATOR CUFF REPAIR      TONSILLECTOMY, ADENOIDECTOMY      tonsiles    TOTAL KNEE ARTHROPLASTY         Review of patient's allergies indicates:   Allergen Reactions    Oxycodone      Hallucination    Sulfa (sulfonamide antibiotics)      Other reaction(s): when on contact       No current facility-administered medications on file prior to encounter.      Current Outpatient Medications on  File Prior to Encounter   Medication Sig    amino ac/protein hydr/whey pro (LIQUACEL ORAL) 30 mLs 2 (two) times daily.    ascorbic acid, vitamin C, (VITAMIN C) 500 MG tablet 500 mg once daily.    aspirin 81 MG Chew 1 tablet (81 mg total) by Per G Tube route once daily.    carbidopa-levodopa  mg (SINEMET)  mg per tablet 1 tablet by Per G Tube route 5 (five) times daily.    docusate sodium (COLACE) 100 MG capsule 1 capsule (100 mg total) by Per G Tube route once daily.    furosemide (LASIX) 40 MG tablet Take 40 mg by mouth once daily.    HYDROcodone-acetaminophen (NORCO) 7.5-325 mg per tablet Take 1 tablet by mouth every 12 (twelve) hours as needed for Pain.    lactose-reduced food/fiber (JEVITY 1.5 HUSAM ORAL)     levothyroxine (SYNTHROID) 75 MCG tablet 1 tablet (75 mcg total) by Per G Tube route before breakfast.    multivitamin (ONE DAILY MULTIVITAMIN) per tablet 1 tablet once daily.    omeprazole (PRILOSEC) 40 MG capsule Open 40 mg capsule into g-tube once daily    potassium chloride 10% (KAYCIEL) 20 mEq/15 mL solution 15 mLs (20 mEq total) by Per G Tube route 2 (two) times daily.    pramipexole (MIRAPEX) 0.25 MG tablet 1 tablet (0.25 mg total) by Per G Tube route 3 (three) times daily.    ranitidine (ZANTAC) 150 MG capsule 75 mg 2 (two) times daily.     rivastigmine (EXELON) 4.6 mg/24 hour PT24 Place 1 patch onto the skin once daily.      senna (SENOKOT) 8.6 mg tablet 1 tablet by Per G Tube route once daily.    tiZANidine 2 mg Cap Take 4 mg by mouth nightly as needed.    trazodone (DESYREL) 50 MG tablet 1 tablet (50 mg total) by Per G Tube route every evening.    collagenase ointment Apply topically once daily.    LACTOSE-REDUCED FOOD (JEVITY ORAL) 65 mL/hr by PEG Tube route once daily. Via pump on at 6 pm off at 6 am     Family History     None        Tobacco Use    Smoking status: Former Smoker    Smokeless tobacco: Never Used   Substance and Sexual Activity    Alcohol use: No     Drug use: No    Sexual activity: Not on file     Review of Systems   Unable to perform ROS: Dementia     Objective:     Vital Signs (Most Recent):  Temp: 99.1 °F (37.3 °C) (08/23/18 0714)  Pulse: 78 (08/23/18 0750)  Resp: (!) 25 (08/23/18 0750)  BP: (!) 121/57 (08/23/18 0750)  SpO2: 100 % (08/23/18 0750) Vital Signs (24h Range):  Temp:  [98.9 °F (37.2 °C)-101.5 °F (38.6 °C)] 99.1 °F (37.3 °C)  Pulse:  [] 78  Resp:  [16-32] 25  SpO2:  [92 %-100 %] 100 %  BP: ()/(26-57) 121/57     Weight: 66.2 kg (145 lb 15.1 oz)  Body mass index is 22.19 kg/m².    Physical Exam   Constitutional: She is oriented to person, place, and time. She appears well-developed. No distress.   venti in place   HENT:   Head: Normocephalic and atraumatic.   Eyes: Conjunctivae are normal. Pupils are equal, round, and reactive to light.   Neck: Normal range of motion. Neck supple. No thyromegaly present.   Cardiovascular: Normal rate, normal heart sounds and intact distal pulses.   Pulmonary/Chest: Effort normal. No respiratory distress. She has no wheezes.   Rhonchi.  Diminished breath sounds to right    venti in place   Abdominal: Soft. Bowel sounds are normal. There is no tenderness.   PEG   Musculoskeletal: She exhibits edema (trace upper and lower).   Lymphadenopathy:     She has no cervical adenopathy.   Neurological: She is alert and oriented to person, place, and time.   Right arm posturing, some movement of left   Skin: Skin is warm and dry. No rash noted.   Psychiatric: Her behavior is normal.   Nursing note and vitals reviewed.        CRANIAL NERVES     CN III, IV, VI   Pupils are equal, round, and reactive to light.       Significant Labs:   ABGs: No results for input(s): PH, PCO2, HCO3, POCSATURATED, BE, TOTALHB, COHB, METHB, O2HB, POCFIO2 in the last 48 hours.  Blood Culture: No results for input(s): LABBLOO in the last 48 hours.  CBC:   Recent Labs   Lab  08/22/18   2222  08/23/18   0425   WBC  20.58*  25.18*   HGB   12.0  9.9*   HCT  39.0  33.0*   PLT  658*  630*     CMP:   Recent Labs   Lab  08/22/18 2222 08/23/18   0425   NA  143  142   K  5.1  4.4   CL  105  108   CO2  25  23   GLU  166*  176*   BUN  73*  64*   CREATININE  1.0  0.8   CALCIUM  9.4  8.4*   PROT  6.6  5.6*   ALBUMIN  2.5*  2.1*   BILITOT  0.6  0.6   ALKPHOS  190*  154*   AST  21  17   ALT  11  20   ANIONGAP  13  11   EGFRNONAA  54*  >60     Lactic Acid:   Recent Labs   Lab  08/22/18 2222 08/23/18   0234   LACTATE  2.4*  2.3*     Urine Culture: No results for input(s): LABURIN in the last 48 hours.  Urine Studies:   Recent Labs   Lab  08/22/18 2215   COLORU  Yellow   APPEARANCEUA  Clear   PHUR  7.0   SPECGRAV  1.015   PROTEINUA  3+*   GLUCUA  Negative   KETONESU  Trace*   BILIRUBINUA  Negative   OCCULTUA  2+*   NITRITE  Negative   UROBILINOGEN  Negative   LEUKOCYTESUR  2+*   RBCUA  2   WBCUA  >100*   BACTERIA  Few*   HYALINECASTS  0       Significant Imaging: I have reviewed all pertinent imaging results/findings within the past 24 hours.     CXR with RLL pneumonia    Assessment/Plan:     * Severe sepsis    See above.  Patient is DNR.  La post disucssed and redone today.        Decubitus ulcer of sacral region, unstageable    Wound care consult          Acute cystitis without hematuria    Zee in place from NH.  Will replace today.  Vanc/zosyn and cipro ordered.  Most recent uti - e coli          Aspiration into airway    PEG fed.  Coughing on exam.  Takes nothing by mouth.  No need for swallow study.  Will not give protonix.  Cont pulm toileting  On zosyn and cipro to cover aspiration pna, but likely this is a pneumonitis.  On 02 and will check xr tomorrow.  satting okay.  Mucinex per G tube          Sepsis due to Gram negative bacteria    This is presumed.  Cultures pending.  On levophed - discussed with daughter, will not escalate pressors but will cont on levophed and wean.  Lovenox.  No protonix.  Replace zee.  Start tube feeds when  possible.  Support blood pressure, cont fluids, cont abx - vanc zosyn and cipro and de-escalate as possible          Parkinson's disease    Debilitating.  Bed bound.  Cont sinemet and mirapex            VTE Risk Mitigation (From admission, onward)        Ordered     IP VTE HIGH RISK PATIENT  Once      08/23/18 0028     Place sequential compression device  Until discontinued      08/23/18 0028        Critical care time spent on the evaluation and treatment of severe organ dysfunction, review of pertinent labs and imaging studies, discussions with consulting providers and discussions with patient/family: 45 minutes.     Tessy Marvin MD  Department of Hospital Medicine   [facility]

## 2018-08-23 NOTE — PLAN OF CARE
Problem: Patient Care Overview  Goal: Plan of Care Review  Outcome: Ongoing (interventions implemented as appropriate)  Nutrition Goals: advancement energy intake within 24 hrs.  Nutrition Goal Status: new  Communication of RD Recs: reviewed with RN    Nutrition Discharge Planning: to be determined.

## 2018-08-23 NOTE — ED PROVIDER NOTES
[facility]                                                 Chief Complaint  78 y.o. female with Fever    History of Present Illness  Eddy Cunningham presents to the emergency room with probable aspiration  Patient was found vomiting at the nursing home with decreased saturation PTA  EMS states the patient's saturation was 84% with obvious rhonchi in all fields  Patient was vomiting up tube feeds per the EMS workers, 101.5° F temperature  Patient has dementia and Parkinson's, is unable to give an accurate history today  Chest x-ray shows of probable right lower lobe aspiration pneumonia this evening  Patient has a chronic indwelling Arvizu with an obvious urinary tract infection also  Patient meet severe sepsis criteria, will be admitted to the intensive care unit    The history is provided by the nursing home staff   device was not used during this ER visit     Past Medical History   -- COPD (chronic obstructive pulmonary disease)     -- Dementia     -- Depression     -- Elevated C-reactive protein (CRP)     -- Falls frequently     -- Homocystinuria     -- Hypertension     -- Hypopotassemia     -- Hypothyroidism     -- Narcolepsy without cataplexy     -- Parkinson disease     -- RLS (restless legs syndrome)     -- Stress incontinence     -- Venous stasis ulcers        Past Surgical History   -- CARPAL TUNNEL RELEASE       -- CHOLECYSTECTOMY       -- PARTIAL HYSTERECTOMY       -- ROTATOR CUFF REPAIR       -- TONSILLECTOMY, ADENOIDECTOMY       -- TOTAL KNEE ARTHROPLASTY          Review of patient's allergies   -- Oxycodone     -- Sulfa (sulfonamide antibiotics)        Review of Systems and Physical Exam      Review of Systems  -- patient is unable to give any appreciable history    BP (!) 84/37   Pulse (!) 114   Temp (!) 101.5 °F (38.6 °C) (Axillary)   Resp (!) 32   SpO2 98%      Physical Exam  -- Nursing note and vitals reviewed  -- Constitutional: Appears well-developed  and well-nourished  -- Head: Atraumatic. Normocephalic. No obvious abnormality  -- Eyes: Pupils are equal and reactive to light. Normal conjunctiva and lids  -- Cardiac: Normal rate, regular rhythm and normal heart sounds  -- Pulmonary:  Coarse breath sounds in all fields with obvious rhonchi  -- Abdominal: Soft, no tenderness. Normal bowel sounds. Normal liver edge  -- Genitourinary: no flank pain on exam, no suprapubic pain by palpation   -- Musculoskeletal: Normal range of motion, no effusions. Joints stable   -- Neurological: No focal deficits. Showed good interaction with staff  -- Vascular: Posterior tibial, dorsalis pedis and radial pulses 2+ bilaterally      Emergency Room Course      Lab Results     K 5.1      CO2 25   BUN 73 (H)   CREATININE 1.0    (H)   ALKPHOS 190 (H)   AST 21   ALT 11   BILITOT 0.6   ALBUMIN 2.5 (L)   PROT 6.6   WBC 20.58 (H)   HGB 12.0   HCT 39.0    (H)   CPK 30   CPK 30   CPKMB 1.0   TROPONINI 0.027 (H)   INR 1.3 (H)    (H)   DDIMER 3.33 (H)   LACTATE 2.4 (H)   MG 2.1   TSH 4.071 (H)     Urinalysis  -- Urinalysis performed during this ER visit showed signs of infection  -- The urine today has been sent for lab culture, results pending      EKG  -- The EKG findings today were without concerning findings from baseline    Radiology  -- chest x-ray shows right lower lobe pneumonia    Additional Work up  -- Blood cultures have also been drawn, results are pending    Medications Given  vancomycin in dextrose 5 % 1 gram/250 mL IVPB 1,000 mg (not administered)   piperacillin-tazobactam 4.5 g in dextrose 5 % 100 mL IVPB (ready to mix system) (not administered)   azithromycin 500 mg in dextrose 5 % 250 mL IVPB (ready to mix system) (not administered)   norepinephrine 4 mg in dextrose 5% 250 mL infusion (premix) (titrating) (not administered)   levalbuterol nebulizer solution 1.25 mg (1.25 mg Nebulization Given 8/22/18 221)   0.9%  NaCl infusion (1,000 mLs  Intravenous New Bag 8/22/18 6976)   acetaminophen (10 mg/mL) injection 1,000 mg (0 mg Intravenous Stopped 8/22/18 2243)   0.9%  NaCl infusion (1,000 mLs Intravenous New Bag 8/22/18 2229)     Central Line  -- Performed by: Ivan Gutierres MD  -- Date/Time: 11:28 PM 8/22/2018   -- Consent Done: Emergent Situation  -- Indications: vascular access  -- Anesthesia: local infiltration  -- Local anesthetic: lidocaine 1% without epinephrine  -- Anesthetic total: 5 ml  -- Preparation: skin prepped with ChloraPrep  -- Location details: right femoral  -- Catheter type: triple lumen  -- Catheter size: 7.5 Fr  -- Number of attempts: 1  -- Post-procedure: line sutured  -- Complications: none     Critical Care ED Physician Time (minutes):  -- Performed by: Ivan Gutierres M.D.  -- Date/Time: 11:26 PM 8/22/2018   -- Direct Patient Care (Face Time): 20  -- Additional History from Records or Taking Additional History: 10  -- Ordering, Reviewing, and Interpreting Diagnostic Studies: 10  -- Total Time in Documentation: 10  -- Consultation with Other Physicians: 5  -- Consultation with Family Related to Condition: 5  -- Total Critical Care Time: 60    Diagnosis  -- Severe sepsis  -- SOB (shortness of breath)  -- Acute cystitis without hematuria  -- Aspiration pneumonia of right lower lobe    Disposition and Plan  -- Disposition: Admit  -- Condition: Critical  -- Telemetry monitoring  -- Morning labs  -- SCD hoses  -- Home medications  -- Nausea medication when necessary  -- Pain medication when necessary  -- Intravenous fluids  -- Routine monitoring  -- Bed rest until otherwise stated  -- Breathing treatments  -- IV antibiotics  -- Blood cultures pending  -- Urine culture pending     This note is dictated on Dragon Natural Speaking word recognition program.  There are word recognition mistakes that are occasionally missed on review.                Ivan Gutierres MD  08/22/18 1026

## 2018-08-23 NOTE — ASSESSMENT & PLAN NOTE
PEG fed.  Coughing on exam.  Takes nothing by mouth.  No need for swallow study.  Will not give protonix.  Cont pulm toileting  On zosyn and cipro to cover aspiration pna, but likely this is a pneumonitis.  On 02 and will check xr tomorrow.  satting okay.  Mucinex per G tube

## 2018-08-24 LAB
ALBUMIN SERPL BCP-MCNC: 2.1 G/DL
ALP SERPL-CCNC: 148 U/L
ALT SERPL W/O P-5'-P-CCNC: 9 U/L
ANION GAP SERPL CALC-SCNC: 10 MMOL/L
AST SERPL-CCNC: 18 U/L
BACTERIA UR CULT: NORMAL
BACTERIA UR CULT: NORMAL
BASOPHILS # BLD AUTO: 0.03 K/UL
BASOPHILS NFR BLD: 0.2 %
BILIRUB SERPL-MCNC: 0.5 MG/DL
BUN SERPL-MCNC: 31 MG/DL
CALCIUM SERPL-MCNC: 8.7 MG/DL
CHLORIDE SERPL-SCNC: 108 MMOL/L
CO2 SERPL-SCNC: 24 MMOL/L
CREAT SERPL-MCNC: 0.6 MG/DL
DIFFERENTIAL METHOD: ABNORMAL
EOSINOPHIL # BLD AUTO: 0.3 K/UL
EOSINOPHIL NFR BLD: 1.5 %
ERYTHROCYTE [DISTWIDTH] IN BLOOD BY AUTOMATED COUNT: 14.7 %
EST. GFR  (AFRICAN AMERICAN): >60 ML/MIN/1.73 M^2
EST. GFR  (NON AFRICAN AMERICAN): >60 ML/MIN/1.73 M^2
GLUCOSE SERPL-MCNC: 120 MG/DL
HCT VFR BLD AUTO: 29 %
HGB BLD-MCNC: 8.8 G/DL
LYMPHOCYTES # BLD AUTO: 0.9 K/UL
LYMPHOCYTES NFR BLD: 5.5 %
MCH RBC QN AUTO: 29.1 PG
MCHC RBC AUTO-ENTMCNC: 30.3 G/DL
MCV RBC AUTO: 96 FL
MONOCYTES # BLD AUTO: 0.8 K/UL
MONOCYTES NFR BLD: 4.5 %
NEUTROPHILS # BLD AUTO: 14.9 K/UL
NEUTROPHILS NFR BLD: 88.3 %
PLATELET # BLD AUTO: 500 K/UL
PMV BLD AUTO: 9.3 FL
POTASSIUM SERPL-SCNC: 3.5 MMOL/L
PROT SERPL-MCNC: 5.7 G/DL
RBC # BLD AUTO: 3.02 M/UL
SODIUM SERPL-SCNC: 142 MMOL/L
VANCOMYCIN TROUGH SERPL-MCNC: 5.7 UG/ML
WBC # BLD AUTO: 16.83 K/UL

## 2018-08-24 PROCEDURE — 80053 COMPREHEN METABOLIC PANEL: CPT

## 2018-08-24 PROCEDURE — 25500020 PHARM REV CODE 255: Performed by: FAMILY MEDICINE

## 2018-08-24 PROCEDURE — 36415 COLL VENOUS BLD VENIPUNCTURE: CPT

## 2018-08-24 PROCEDURE — 63600175 PHARM REV CODE 636 W HCPCS: Performed by: SURGERY

## 2018-08-24 PROCEDURE — 25000003 PHARM REV CODE 250: Performed by: FAMILY MEDICINE

## 2018-08-24 PROCEDURE — 94668 MNPJ CHEST WALL SBSQ: CPT

## 2018-08-24 PROCEDURE — 94761 N-INVAS EAR/PLS OXIMETRY MLT: CPT

## 2018-08-24 PROCEDURE — 27000221 HC OXYGEN, UP TO 24 HOURS

## 2018-08-24 PROCEDURE — 85025 COMPLETE CBC W/AUTO DIFF WBC: CPT

## 2018-08-24 PROCEDURE — 25000003 PHARM REV CODE 250: Performed by: SURGERY

## 2018-08-24 PROCEDURE — 94640 AIRWAY INHALATION TREATMENT: CPT

## 2018-08-24 PROCEDURE — C9113 INJ PANTOPRAZOLE SODIUM, VIA: HCPCS | Performed by: INTERNAL MEDICINE

## 2018-08-24 PROCEDURE — 99222 1ST HOSP IP/OBS MODERATE 55: CPT | Mod: ,,, | Performed by: SURGERY

## 2018-08-24 PROCEDURE — 63600175 PHARM REV CODE 636 W HCPCS: Performed by: FAMILY MEDICINE

## 2018-08-24 PROCEDURE — 97803 MED NUTRITION INDIV SUBSEQ: CPT

## 2018-08-24 PROCEDURE — 99233 SBSQ HOSP IP/OBS HIGH 50: CPT | Mod: ,,, | Performed by: INTERNAL MEDICINE

## 2018-08-24 PROCEDURE — 20000000 HC ICU ROOM

## 2018-08-24 PROCEDURE — 80202 ASSAY OF VANCOMYCIN: CPT

## 2018-08-24 PROCEDURE — 63600175 PHARM REV CODE 636 W HCPCS: Performed by: INTERNAL MEDICINE

## 2018-08-24 PROCEDURE — 25000242 PHARM REV CODE 250 ALT 637 W/ HCPCS: Performed by: FAMILY MEDICINE

## 2018-08-24 RX ORDER — ACETAMINOPHEN 325 MG/1
650 TABLET ORAL EVERY 6 HOURS PRN
Status: DISCONTINUED | OUTPATIENT
Start: 2018-08-24 | End: 2018-08-28 | Stop reason: HOSPADM

## 2018-08-24 RX ORDER — PANTOPRAZOLE SODIUM 40 MG/10ML
40 INJECTION, POWDER, LYOPHILIZED, FOR SOLUTION INTRAVENOUS DAILY
Status: DISCONTINUED | OUTPATIENT
Start: 2018-08-24 | End: 2018-08-28 | Stop reason: HOSPADM

## 2018-08-24 RX ADMIN — CARBIDOPA AND LEVODOPA 1 TABLET: 25; 100 TABLET ORAL at 06:08

## 2018-08-24 RX ADMIN — PRAMIPEXOLE DIHYDROCHLORIDE 0.25 MG: 0.12 TABLET ORAL at 03:08

## 2018-08-24 RX ADMIN — CARBIDOPA AND LEVODOPA 1 TABLET: 25; 100 TABLET ORAL at 01:08

## 2018-08-24 RX ADMIN — CIPROFLOXACIN 400 MG: 2 INJECTION, SOLUTION INTRAVENOUS at 08:08

## 2018-08-24 RX ADMIN — IPRATROPIUM BROMIDE AND ALBUTEROL SULFATE 3 ML: .5; 3 SOLUTION RESPIRATORY (INHALATION) at 07:08

## 2018-08-24 RX ADMIN — HYDROCODONE BITARTRATE AND ACETAMINOPHEN 1 TABLET: 7.5; 325 TABLET ORAL at 02:08

## 2018-08-24 RX ADMIN — PANTOPRAZOLE SODIUM 40 MG: 40 INJECTION, POWDER, LYOPHILIZED, FOR SOLUTION INTRAVENOUS at 08:08

## 2018-08-24 RX ADMIN — DIATRIZOATE MEGLUMINE AND DIATRIZOATE SODIUM 40 ML: 660; 100 LIQUID ORAL; RECTAL at 09:08

## 2018-08-24 RX ADMIN — PRAMIPEXOLE DIHYDROCHLORIDE 0.25 MG: 0.12 TABLET ORAL at 08:08

## 2018-08-24 RX ADMIN — CARBIDOPA AND LEVODOPA 1 TABLET: 25; 100 TABLET ORAL at 10:08

## 2018-08-24 RX ADMIN — SODIUM CHLORIDE: 0.9 INJECTION, SOLUTION INTRAVENOUS at 08:08

## 2018-08-24 RX ADMIN — IPRATROPIUM BROMIDE AND ALBUTEROL SULFATE 3 ML: .5; 3 SOLUTION RESPIRATORY (INHALATION) at 12:08

## 2018-08-24 RX ADMIN — HYDROCODONE BITARTRATE AND ACETAMINOPHEN 1 TABLET: 7.5; 325 TABLET ORAL at 10:08

## 2018-08-24 RX ADMIN — Medication 0.06 MCG/KG/MIN: at 01:08

## 2018-08-24 RX ADMIN — PIPERACILLIN SODIUM AND TAZOBACTAM SODIUM 4.5 G: 4; .5 INJECTION, POWDER, LYOPHILIZED, FOR SOLUTION INTRAVENOUS at 06:08

## 2018-08-24 RX ADMIN — PIPERACILLIN SODIUM AND TAZOBACTAM SODIUM 4.5 G: 4; .5 INJECTION, POWDER, LYOPHILIZED, FOR SOLUTION INTRAVENOUS at 10:08

## 2018-08-24 RX ADMIN — PIPERACILLIN SODIUM AND TAZOBACTAM SODIUM 4.5 G: 4; .5 INJECTION, POWDER, LYOPHILIZED, FOR SOLUTION INTRAVENOUS at 02:08

## 2018-08-24 RX ADMIN — LEVOTHYROXINE SODIUM 75 MCG: 75 TABLET ORAL at 06:08

## 2018-08-24 RX ADMIN — HYDROCODONE BITARTRATE AND ACETAMINOPHEN 1 TABLET: 7.5; 325 TABLET ORAL at 08:08

## 2018-08-24 NOTE — PLAN OF CARE
Problem: Nutrition, Enteral (Adult)  Intervention: Monitor/Manage Nutrition Support  Goals: advancement energy intake within 24 hrs  Nutrition Goal Status: progressing towards goal  Communication of RD Recs: reviewed with RN    Nutrition Discharge Planning: to be determined

## 2018-08-24 NOTE — CONSULTS
[facility]  General Surgery  Consult Note    Consults  Subjective:     Chief Complaint/Reason for Admission:     History of Present Illness:  78-year-old female admitted with aspiration pneumonia.  She has a left upper quadrant PEG tube in place. Patient had fluoro exam which appears that the PEG tube is in the small bowel. Contrast goes into the small bowel but does not opacify the stomach. When I went to see her, the outer flange was all the way up against the very proximal part of the PEG tube and so the PEG tube which is migrated weight too far.  I deflated the balloon and took out 10 cc pulled back very easily on the PEG tube until the flange was right at the 4 cm zain and re-instilled 10 mL of fluid.  I then put some tape around the tube so that the flange would not slip on the tube.  I will order a repeat contrast exam.    No current facility-administered medications on file prior to encounter.      Current Outpatient Medications on File Prior to Encounter   Medication Sig    amino ac/protein hydr/whey pro (LIQUACEL ORAL) 30 mLs 2 (two) times daily.    ascorbic acid, vitamin C, (VITAMIN C) 500 MG tablet 500 mg once daily.    aspirin 81 MG Chew 1 tablet (81 mg total) by Per G Tube route once daily.    carbidopa-levodopa  mg (SINEMET)  mg per tablet 1 tablet by Per G Tube route 5 (five) times daily.    docusate sodium (COLACE) 100 MG capsule 1 capsule (100 mg total) by Per G Tube route once daily.    furosemide (LASIX) 40 MG tablet Take 40 mg by mouth once daily.    HYDROcodone-acetaminophen (NORCO) 7.5-325 mg per tablet Take 1 tablet by mouth every 12 (twelve) hours as needed for Pain.    lactose-reduced food/fiber (JEVITY 1.5 HUSAM ORAL)     levothyroxine (SYNTHROID) 75 MCG tablet 1 tablet (75 mcg total) by Per G Tube route before breakfast.    multivitamin (ONE DAILY MULTIVITAMIN) per tablet 1 tablet once daily.    omeprazole (PRILOSEC) 40 MG capsule Open 40 mg  capsule into g-tube once daily    potassium chloride 10% (KAYCIEL) 20 mEq/15 mL solution 15 mLs (20 mEq total) by Per G Tube route 2 (two) times daily.    pramipexole (MIRAPEX) 0.25 MG tablet 1 tablet (0.25 mg total) by Per G Tube route 3 (three) times daily.    ranitidine (ZANTAC) 150 MG capsule 75 mg 2 (two) times daily.     rivastigmine (EXELON) 4.6 mg/24 hour PT24 Place 1 patch onto the skin once daily.      senna (SENOKOT) 8.6 mg tablet 1 tablet by Per G Tube route once daily.    tiZANidine 2 mg Cap Take 4 mg by mouth nightly as needed.    trazodone (DESYREL) 50 MG tablet 1 tablet (50 mg total) by Per G Tube route every evening.    collagenase ointment Apply topically once daily.    LACTOSE-REDUCED FOOD (JEVITY ORAL) 65 mL/hr by PEG Tube route once daily. Via pump on at 6 pm off at 6 am       Review of patient's allergies indicates:   Allergen Reactions    Oxycodone      Hallucination    Sulfa (sulfonamide antibiotics)      Other reaction(s): when on contact       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)     Parkinson disease     RLS (restless legs syndrome)     Stress incontinence     Venous stasis ulcers      Past Surgical History:   Procedure Laterality Date    CARPAL TUNNEL RELEASE      Right    CHOLECYSTECTOMY      GASTROSTOMY TUBE PLACEMENT      PARTIAL HYSTERECTOMY      ROTATOR CUFF REPAIR      TONSILLECTOMY, ADENOIDECTOMY      tonsiles    TOTAL KNEE ARTHROPLASTY       Family History     None        Tobacco Use    Smoking status: Former Smoker    Smokeless tobacco: Never Used   Substance and Sexual Activity    Alcohol use: No    Drug use: No    Sexual activity: Not on file      Review of Systems   Unable to perform ROS: Dementia     Objective:     Vital Signs (Most Recent):  Temp: 98.2 °F (36.8 °C) (08/24/18 0800)  Pulse: 72 (08/24/18 1000)  Resp: (!) 21 (08/24/18 1000)  BP: 104/67 (08/24/18 1000)  SpO2: 100 % (08/24/18 1000) Vital Signs (24h Range):  Temp:  [97.5 °F (36.4 °C)-99.3 °F (37.4 °C)] 98.2 °F (36.8 °C)  Pulse:  [67-86] 72  Resp:  [16-37] 21  SpO2:  [97 %-100 %] 100 %  BP: ()/(36-67) 104/67     Weight: 66.2 kg (145 lb 15.1 oz)(RD reviewed)  Body mass index is 22.19 kg/m².      Intake/Output Summary (Last 24 hours) at 8/24/2018 1112  Last data filed at 8/24/2018 0900  Gross per 24 hour   Intake 4124.3 ml   Output 1215 ml   Net 2909.3 ml       Physical Exam   Abdominal:   Patient with left upper quadrant PEG tube.  The tube is in weight too far and migrated distally.  I deflated the balloon and removed 10 cc of fluid pulled back gently on the tube until the 4 cm zain was just above the flange and then reinflated the balloon with 10 cc of fluid and then taped the tube so that the flange would not back up anymore.       Significant Labs:  All pertinent labs from the last 24 hours have been reviewed.    Significant Diagnostics:  I have reviewed all pertinent imaging results/findings within the past 24 hours.    Assessment/Plan:     I will repeat the PEG tube study    Active Diagnoses:    Diagnosis Date Noted POA    PRINCIPAL PROBLEM:  Severe sepsis [A41.9, R65.20] 08/22/2018 Yes    Severe protein-calorie malnutrition [E43] 07/23/2018 Yes    Decubitus ulcer of sacral region, unstageable [L89.150] 07/22/2018 Yes    Acute cystitis without hematuria [N30.00] 05/17/2017 Yes    Aspiration into airway [T17.908A] 05/17/2017 Yes    Sepsis due to Gram negative bacteria [A41.50] 05/17/2017 Yes    Memory disorder [R41.3] 03/14/2013 Yes    Parkinson's disease [G20] 11/13/2012 Yes    Mood disorder [F39] 11/13/2012 Yes      Problems Resolved During this Admission:       Thank you  for your consult. I will sign off. Please contact us if you have any additional questions.    Robbin Guy Jr, MD  General Surgery  [facility]

## 2018-08-24 NOTE — NURSING
PEG tube with dressing saturated with bile/green drainage. Dressing and gown changed. Pulled back on tube as it appears to be too short, but retracts back in. Complains of pain upon manipulation of tube. Dr. Khalil notified. Will hold feedings and medications until tube is assessed by xray.

## 2018-08-24 NOTE — PROGRESS NOTES
" [facility]  Adult Nutrition  Progress Note    SUMMARY       Recommendations    Recommendation/Intervention: Change TF order to Impact 1.5 initiating at 15ml/hr increasing as tolerated to Goal Rate 55ml/hr (1320ml volume) with 160ml/hr every 4hrs. projected will provide 1980 calories (100% EEN), 124gm protein (100% EPN), and 1976ml fluid. If unable to initiate enteral feeds , begin Parenteral Nutrition for support as pt on day 2 with no nutrition with Malnutrition  Goals: advancement energy intake within 24 hrs  Nutrition Goal Status: progressing towards goal  Communication of RD Recs: reviewed with RN    Nutrition Discharge Planning: to be determined    Reason for Assessment    Reason for Assessment: RD follow-up  Diagnosis: infection/sepsis  Relevant Medical History: Parkinson, HTN, COPD, Depression, Dementia, GERD  General Information Comments: Isosource ordered to be initaited but held due to a green drainage from PEG site. Per Sx assessment, PEG migrated,now pulled back and secure. awaiting xray reassess placement      Nutrition Risk Screen    Nutrition Risk Screen: tube feeding or parenteral nutrition    Nutrition/Diet History    Patient Reported Diet/Restrictions/Preferences: no oral intake  Do you have any cultural, spiritual, Rastafarian conflicts, given your current situation?: Bahai reported  Food Allergies: NKFA  Factors Affecting Nutritional Intake: altered gastrointestinal function, impaired cognitive status/motor control, difficulty/impaired swallowing, NPO  Nutrition Support Formula Prior to Admit: Jevity 1.2    Anthropometrics    Temp: 97.8 °F (36.6 °C)  Height Method: Stated  Height: 5' 8" (172.7 cm)  Height (inches): 68 in  Weight Method: Bed Scale  Weight: 66.2 kg (145 lb 15.1 oz)(RD reviewed)  Weight (lb): 145.95 lb  Ideal Body Weight (IBW), Female: 140 lb  % Ideal Body Weight, Female (lb): 104.25 lb  BMI (Calculated): 22.2  BMI Grade: 18.5-24.9 - normal  Weight Loss: " unintentional  Usual Body Weight (UBW), k.6 kg(18 admit)  Weight Change Amount: 44 lb 15.6 oz(5 months (significant))  % Usual Body Weight: 76.6  % Weight Change From Usual Weight: -23.56 %       Lab/Procedures/Meds    Pertinent Labs Reviewed: reviewed  Pertinent Medications Reviewed: reviewed  Recent Labs   Lab  18   0525   CALCIUM  8.7   PROT  5.7*   NA  142   K  3.5   CO2  24   CL  108   BUN  31*   CREATININE  0.6   ALKPHOS  148*   ALT  9*   AST  18   BILITOT  0.5     Scheduled Meds:   carbidopa-levodopa  mg  1 tablet Per G Tube 5x Daily    ciprofloxacin  400 mg Intravenous Q12H    levothyroxine  75 mcg Per G Tube Before breakfast    pantoprazole  40 mg Intravenous Daily    piperacillin-tazobactam (ZOSYN) IVPB  4.5 g Intravenous Q8H    pramipexole  0.25 mg Per G Tube TID    vancomycin (VANCOCIN) IVPB  15 mg/kg Intravenous Q24H     Continuous Infusions:   sodium chloride 0.9% 125 mL/hr at 18 1400    norepinephrine bitartrate-D5W 0.05 mcg/kg/min (18 1400)     PRN Meds:.albuterol-ipratropium, guaifenesin 100 mg/5 ml, HYDROcodone-acetaminophen, ondansetron, promethazine (PHENERGAN) IVPB     Physical Findings/Assessment    Overall Physical Appearance: loss of muscle mass, loss of subcutaneous fat, on oxygen therapy(functional quadriplegia)  Tubes: (-)  Oral/Mouth Cavity: (UTO)  Skin: edema, non-healing wound(s)(sacral wound, third spacing)    Estimated/Assessed Needs    Weight Used For Calorie Calculations: 66.2 kg (145 lb 15.1 oz)  Energy Calorie Requirements (kcal):   Energy Need Method: Kcal/kg(30-35)  Protein Requirements: (1.5-2.0)  Weight Used For Protein Calculations: 66.2 kg (145 lb 15.1 oz)  Fluid Requirements (mL): 1ml/kcal or per MD     RDA Method (mL):          Nutrition Prescription Ordered    Current Diet Order: NPO  Current Nutrition Support Formula Ordered: Isosource 1.5  Current Nutrition Support Rate Ordered: 30 (ml)  Current Nutrition  Support Frequency Ordered: HELD at this time    Evaluation of Received Nutrient/Fluid Intake    IV Fluid (mL): 3000  Energy Calories Required: not meeting needs  Protein Required: not meeting needs  Fluid Required: exceed needs  Tolerance: tolerating  % Intake of Estimated Energy Needs: Other: No Intake  % Meal Intake: NPO    Nutrition Risk    Level of Risk/Frequency of Follow-up: (F/U 2x/wk)     Assessment and Plan    Severe protein-calorie malnutrition    Malnutrition in the context of Social/Environmental Circumstances     Related to (etiology):  Inability to consume adequate energy intake with questionable accuracy/intake of PTA TF order     Signs and Symptoms (as evidenced by):  Energy Intake: No intake at this time; PTA= 100% per TF order of estimated energy requirement (question accuracy of order/intake)  Body Fat Depletion: moderate depletion of orbitals and triceps   Muscle Mass Depletion: severe depletion of temples and clavicle region  Weight Loss: 23% x 5 month (significant)   Fluid: +edema with third spacing   Sacral Decubitus    Interventions  Modify composition of enteral nutrition  Modify rate of enteral nutrition  Feeding tube flush     Recommendations (treatment strategy):  When feasible initiate TF of:  Impact Peptide 1.5 GR 55ml/hr  160ml FWF Q4  Monitor days NPO and rec Parenteral Nutrition if unable to initiate Enteral Nutrition.     Nutrition Diagnosis Status:  Continues             Monitor and Evaluation    Food and Nutrient Adminstration: enteral and parenteral nutrition administration  Physical Activity and Function: nutrition-related ADLs and IADLs  Anthropometric Measurements: weight, weight change  Biochemical Data, Medical Tests and Procedures: electrolyte and renal panel, gastrointestinal profile, glucose/endocrine profile, inflammatory profile, lipid profile  Nutrition-Focused Physical Findings: overall appearance, skin     Nutrition Follow-Up    RD Follow-up?: Yes

## 2018-08-24 NOTE — NURSING
After reviewing KUB with contrast, Dr. Khalil states it is okay to give medications per PEG but to hold feeding until consulting surgeon for placement. Dr. Pratt will be here to see patient later today.

## 2018-08-24 NOTE — SUBJECTIVE & OBJECTIVE
Interval History: see below.    Review of Systems   Constitutional: Negative for chills and fever.   HENT: Negative for congestion, hearing loss, sinus pressure and sore throat.    Eyes: Negative for photophobia.   Respiratory: Negative for cough, choking, chest tightness and wheezing.    Cardiovascular: Negative for chest pain and palpitations.   Gastrointestinal: Negative for blood in stool, nausea and vomiting.   Genitourinary: Negative for dysuria and hematuria.   Musculoskeletal: Negative for arthralgias and myalgias.   Skin: Negative for pallor.   Neurological: Negative for dizziness and numbness.   Hematological: Does not bruise/bleed easily.   Psychiatric/Behavioral: Negative for confusion and suicidal ideas. The patient is not nervous/anxious.      Objective:     Vital Signs (Most Recent):  Temp: 98 °F (36.7 °C) (08/24/18 0400)  Pulse: 77 (08/24/18 0600)  Resp: (!) 27 (08/24/18 0600)  BP: (!) 116/55 (08/24/18 0600)  SpO2: 100 % (08/24/18 0600) Vital Signs (24h Range):  Temp:  [97.5 °F (36.4 °C)-99.3 °F (37.4 °C)] 98 °F (36.7 °C)  Pulse:  [67-86] 77  Resp:  [16-37] 27  SpO2:  [97 %-100 %] 100 %  BP: ()/(34-68) 116/55     Weight: 66.2 kg (145 lb 15.1 oz)(RD reviewed)  Body mass index is 22.19 kg/m².    Intake/Output Summary (Last 24 hours) at 8/24/2018 0724  Last data filed at 8/24/2018 0600  Gross per 24 hour   Intake 4704.7 ml   Output 1575 ml   Net 3129.7 ml      Physical Exam   Constitutional: She is oriented to person, place, and time. No distress.   HENT:   Head: Normocephalic and atraumatic.   Eyes: Conjunctivae are normal. Pupils are equal, round, and reactive to light.   Neck: Normal range of motion. Neck supple. No thyromegaly present.   Cardiovascular: Normal rate, normal heart sounds and intact distal pulses.   Pulmonary/Chest: Effort normal. No respiratory distress. She has no wheezes.   Rhonchi.  Diminished breath sounds to right    venti in place   Abdominal: Soft. Bowel sounds are  normal. There is no tenderness.   PEG   Musculoskeletal: She exhibits edema (trace upper and lower).   Lymphadenopathy:     She has no cervical adenopathy.   Neurological: She is alert and oriented to person, place, and time.   Right arm posturing, some movement of left   Skin: Skin is warm and dry. No rash noted.   Psychiatric: Her behavior is normal.   Nursing note and vitals reviewed.      Significant Labs:   CBC:   Recent Labs   Lab  08/22/18 2222 08/23/18   0425  08/24/18   0525   WBC  20.58*  25.18*  16.83*   HGB  12.0  9.9*  8.8*   HCT  39.0  33.0*  29.0*   PLT  658*  630*  500*     CMP:   Recent Labs   Lab  08/22/18 2222 08/23/18   0425  08/24/18   0525   NA  143  142  142   K  5.1  4.4  3.5   CL  105  108  108   CO2  25  23  24   GLU  166*  176*  120*   BUN  73*  64*  31*   CREATININE  1.0  0.8  0.6   CALCIUM  9.4  8.4*  8.7   PROT  6.6  5.6*  5.7*   ALBUMIN  2.5*  2.1*  2.1*   BILITOT  0.6  0.6  0.5   ALKPHOS  190*  154*  148*   AST  21  17  18   ALT  11  20  9*   ANIONGAP  13  11  10   EGFRNONAA  54*  >60  >60       Significant Imaging: CXR: I have reviewed all pertinent results/findings within the past 24 hours and my personal findings are:  see below     The heart is normal in size.  Interval development of right lower lobe infiltrate.  The left lung is clear.  Postop change right shoulder.

## 2018-08-24 NOTE — PLAN OF CARE
Problem: Patient Care Overview  Goal: Plan of Care Review  Outcome: Ongoing (interventions implemented as appropriate)  Patient admit 8/22 from nursing home with aspiration PNA/UTI; to CCU for vasopressor support. Patient currently on Levo at 0.07mcg/kg and IVF at 125cc/hr; U/O improving. Remaining vitals stable, afebrile at this time.  Patient much more alert throughout this shift; oriented to person only(baseline). PEG tube clamped at this time. Sacral wound noted per flow sheet with wound care following. Instructed patient/family on POC with verbalized understanding.

## 2018-08-24 NOTE — PROGRESS NOTES
[facility] Hospital Medicine  Progress Note    Patient Name: Eddy Cunningham  MRN: [id number]  Patient Class: IP- Inpatient   Admission Date: 8/22/2018  Length of Stay: 2 days  Attending Physician: Kunal Sandoval MD  Primary Care Provider: Ayaka Pollard MD        Subjective:     Principal Problem:Severe sepsis    HPI:  78 year old female comes in from NH because of fevers and aspiration. She is tube fed there. 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 She was recently here and treated for a MDI ecoli UTI. Her urine today is once again with leukocytes but cough, fever and witnessed aspiration is what brought her in. She was found to have BPs in the 70s in the ER and was started on levophed. At baseline she is mainly bedridden.    Hospital Course:  8/24/18  She is still on levophed. On IV vancomycin, zosyn  And cipro.  Maintaining SBP in 100s.  Also on IVF ;   No tube feedings for two days .  Awake and responsive   Family by bedside ; discussed care and plans for today .  Wbcs is  down.    Interval History: see below.    Review of Systems   Constitutional: Negative for chills and fever.   HENT: Negative for congestion, hearing loss, sinus pressure and sore throat.    Eyes: Negative for photophobia.   Respiratory: Negative for cough, choking, chest tightness and wheezing.    Cardiovascular: Negative for chest pain and palpitations.   Gastrointestinal: Negative for blood in stool, nausea and vomiting.   Genitourinary: Negative for dysuria and hematuria.   Musculoskeletal: Negative for arthralgias and myalgias.   Skin: Negative for pallor.   Neurological: Negative for dizziness and numbness.   Hematological: Does not bruise/bleed easily.   Psychiatric/Behavioral: Negative for confusion and suicidal ideas. The patient is not nervous/anxious.      Objective:     Vital Signs (Most Recent):  Temp: 98 °F (36.7 °C) (08/24/18 0400)  Pulse:  77 (08/24/18 0600)  Resp: (!) 27 (08/24/18 0600)  BP: (!) 116/55 (08/24/18 0600)  SpO2: 100 % (08/24/18 0600) Vital Signs (24h Range):  Temp:  [97.5 °F (36.4 °C)-99.3 °F (37.4 °C)] 98 °F (36.7 °C)  Pulse:  [67-86] 77  Resp:  [16-37] 27  SpO2:  [97 %-100 %] 100 %  BP: ()/(34-68) 116/55     Weight: 66.2 kg (145 lb 15.1 oz)(RD reviewed)  Body mass index is 22.19 kg/m².    Intake/Output Summary (Last 24 hours) at 8/24/2018 0724  Last data filed at 8/24/2018 0600  Gross per 24 hour   Intake 4704.7 ml   Output 1575 ml   Net 3129.7 ml      Physical Exam   Constitutional: She is oriented to person, place, and time. No distress.   HENT:   Head: Normocephalic and atraumatic.   Eyes: Conjunctivae are normal. Pupils are equal, round, and reactive to light.   Neck: Normal range of motion. Neck supple. No thyromegaly present.   Cardiovascular: Normal rate, normal heart sounds and intact distal pulses.   Pulmonary/Chest: Effort normal. No respiratory distress. She has no wheezes.   Rhonchi.  Diminished breath sounds to right    venti in place   Abdominal: Soft. Bowel sounds are normal. There is no tenderness.   PEG   Musculoskeletal: She exhibits edema (trace upper and lower).   Lymphadenopathy:     She has no cervical adenopathy.   Neurological: She is alert and oriented to person, place, and time.   Right arm posturing, some movement of left   Skin: Skin is warm and dry. No rash noted.   Psychiatric: Her behavior is normal.   Nursing note and vitals reviewed.      Significant Labs:   CBC:   Recent Labs   Lab  08/22/18 2222 08/23/18   0425  08/24/18   0525   WBC  20.58*  25.18*  16.83*   HGB  12.0  9.9*  8.8*   HCT  39.0  33.0*  29.0*   PLT  658*  630*  500*     CMP:   Recent Labs   Lab  08/22/18 2222 08/23/18   0425  08/24/18   0525   NA  143  142  142   K  5.1  4.4  3.5   CL  105  108  108   CO2  25  23  24   GLU  166*  176*  120*   BUN  73*  64*  31*   CREATININE  1.0  0.8  0.6   CALCIUM  9.4  8.4*  8.7   PROT  6.6   5.6*  5.7*   ALBUMIN  2.5*  2.1*  2.1*   BILITOT  0.6  0.6  0.5   ALKPHOS  190*  154*  148*   AST  21  17  18   ALT  11  20  9*   ANIONGAP  13  11  10   EGFRNONAA  54*  >60  >60       Significant Imaging: CXR: I have reviewed all pertinent results/findings within the past 24 hours and my personal findings are:  see below     The heart is normal in size.  Interval development of right lower lobe infiltrate.  The left lung is clear.  Postop change right shoulder.            Assessment/Plan:      * Severe sepsis    See above.  Patient is DNR.  La post disucssed and redone today.        Severe protein-calorie malnutrition    Start her feedings           Decubitus ulcer of sacral region, unstageable    Wound care consult          Acute cystitis without hematuria    Zee in place from NH.  Will replace today.  Vanc/zosyn and cipro ordered.  Most recent uti - e coli          Aspiration into airway    PEG fed.  Coughing on exam.  Takes nothing by mouth.  No need for swallow study.  Will not give protonix.  Cont pulm toileting  On zosyn and cipro to cover aspiration pna, but likely this is a pneumonitis.  On 02 and will check xr tomorrow.  satting okay.  Mucinex per G tube          Sepsis due to Gram negative bacteria    This is presumed.  Cultures pending.  On levophed - discussed with daughter, will not escalate pressors but will cont on levophed and wean.  Lovenox.  No protonix.  Replace zee.  Start tube feeds when possible.  Support blood pressure, cont fluids, cont abx - vanc zosyn and cipro and de-escalate as possible          Parkinson's disease    Debilitating.  Bed bound.  Cont sinemet and mirapex            VTE Risk Mitigation (From admission, onward)        Ordered     IP VTE HIGH RISK PATIENT  Once      08/23/18 0028     Place sequential compression device  Until discontinued      08/23/18 0028          Critical care time spent on the evaluation and treatment of severe organ dysfunction, review of pertinent  labs and imaging studies, discussions with consulting providers and discussions with patient/family: 25 minutes.    Hayder Khalil MD  Department of Hospital Medicine   [facility]

## 2018-08-25 PROBLEM — R19.7 DIARRHEA OF PRESUMED INFECTIOUS ORIGIN: Status: ACTIVE | Noted: 2018-08-25

## 2018-08-25 LAB
ALBUMIN SERPL BCP-MCNC: 2 G/DL
ALP SERPL-CCNC: 135 U/L
ALT SERPL W/O P-5'-P-CCNC: 7 U/L
ANION GAP SERPL CALC-SCNC: 10 MMOL/L
AST SERPL-CCNC: 16 U/L
BACTERIA UR CULT: NORMAL
BASOPHILS # BLD AUTO: 0.02 K/UL
BASOPHILS NFR BLD: 0.2 %
BILIRUB SERPL-MCNC: 0.4 MG/DL
BUN SERPL-MCNC: 19 MG/DL
C DIFF GDH STL QL: POSITIVE
C DIFF TOX A+B STL QL IA: NEGATIVE
C DIFF TOX GENS STL QL NAA+PROBE: NEGATIVE
CALCIUM SERPL-MCNC: 8.2 MG/DL
CHLORIDE SERPL-SCNC: 108 MMOL/L
CO2 SERPL-SCNC: 25 MMOL/L
CREAT SERPL-MCNC: 0.6 MG/DL
DIFFERENTIAL METHOD: ABNORMAL
EOSINOPHIL # BLD AUTO: 0.2 K/UL
EOSINOPHIL NFR BLD: 2.2 %
ERYTHROCYTE [DISTWIDTH] IN BLOOD BY AUTOMATED COUNT: 14.7 %
EST. GFR  (AFRICAN AMERICAN): >60 ML/MIN/1.73 M^2
EST. GFR  (NON AFRICAN AMERICAN): >60 ML/MIN/1.73 M^2
GLUCOSE SERPL-MCNC: 136 MG/DL
HCT VFR BLD AUTO: 27.8 %
HGB BLD-MCNC: 8.3 G/DL
LYMPHOCYTES # BLD AUTO: 0.9 K/UL
LYMPHOCYTES NFR BLD: 7.9 %
MCH RBC QN AUTO: 29.2 PG
MCHC RBC AUTO-ENTMCNC: 29.9 G/DL
MCV RBC AUTO: 98 FL
MONOCYTES # BLD AUTO: 0.7 K/UL
MONOCYTES NFR BLD: 6 %
NEUTROPHILS # BLD AUTO: 9.2 K/UL
NEUTROPHILS NFR BLD: 83.7 %
PLATELET # BLD AUTO: 452 K/UL
PMV BLD AUTO: 9.5 FL
POTASSIUM SERPL-SCNC: 3.1 MMOL/L
PROT SERPL-MCNC: 5.6 G/DL
RBC # BLD AUTO: 2.84 M/UL
SODIUM SERPL-SCNC: 143 MMOL/L
WBC # BLD AUTO: 11.01 K/UL

## 2018-08-25 PROCEDURE — 25000003 PHARM REV CODE 250: Performed by: FAMILY MEDICINE

## 2018-08-25 PROCEDURE — 20000000 HC ICU ROOM

## 2018-08-25 PROCEDURE — 94761 N-INVAS EAR/PLS OXIMETRY MLT: CPT

## 2018-08-25 PROCEDURE — 99233 SBSQ HOSP IP/OBS HIGH 50: CPT | Mod: ,,, | Performed by: INTERNAL MEDICINE

## 2018-08-25 PROCEDURE — 63600175 PHARM REV CODE 636 W HCPCS: Performed by: INTERNAL MEDICINE

## 2018-08-25 PROCEDURE — 80053 COMPREHEN METABOLIC PANEL: CPT

## 2018-08-25 PROCEDURE — 94668 MNPJ CHEST WALL SBSQ: CPT

## 2018-08-25 PROCEDURE — 85025 COMPLETE CBC W/AUTO DIFF WBC: CPT

## 2018-08-25 PROCEDURE — 87493 C DIFF AMPLIFIED PROBE: CPT

## 2018-08-25 PROCEDURE — 36415 COLL VENOUS BLD VENIPUNCTURE: CPT

## 2018-08-25 PROCEDURE — C9113 INJ PANTOPRAZOLE SODIUM, VIA: HCPCS | Performed by: INTERNAL MEDICINE

## 2018-08-25 PROCEDURE — 25000003 PHARM REV CODE 250: Performed by: SURGERY

## 2018-08-25 PROCEDURE — 27000221 HC OXYGEN, UP TO 24 HOURS

## 2018-08-25 PROCEDURE — 63600175 PHARM REV CODE 636 W HCPCS: Performed by: SURGERY

## 2018-08-25 PROCEDURE — 94640 AIRWAY INHALATION TREATMENT: CPT

## 2018-08-25 PROCEDURE — 87449 NOS EACH ORGANISM AG IA: CPT

## 2018-08-25 PROCEDURE — 25000242 PHARM REV CODE 250 ALT 637 W/ HCPCS: Performed by: FAMILY MEDICINE

## 2018-08-25 PROCEDURE — 63600175 PHARM REV CODE 636 W HCPCS: Performed by: FAMILY MEDICINE

## 2018-08-25 RX ORDER — POTASSIUM CHLORIDE 14.9 MG/ML
40 INJECTION INTRAVENOUS ONCE
Status: COMPLETED | OUTPATIENT
Start: 2018-08-25 | End: 2018-08-25

## 2018-08-25 RX ADMIN — HYDROCODONE BITARTRATE AND ACETAMINOPHEN 1 TABLET: 7.5; 325 TABLET ORAL at 08:08

## 2018-08-25 RX ADMIN — IPRATROPIUM BROMIDE AND ALBUTEROL SULFATE 3 ML: .5; 3 SOLUTION RESPIRATORY (INHALATION) at 07:08

## 2018-08-25 RX ADMIN — IPRATROPIUM BROMIDE AND ALBUTEROL SULFATE 3 ML: .5; 3 SOLUTION RESPIRATORY (INHALATION) at 01:08

## 2018-08-25 RX ADMIN — PIPERACILLIN SODIUM AND TAZOBACTAM SODIUM 4.5 G: 4; .5 INJECTION, POWDER, LYOPHILIZED, FOR SOLUTION INTRAVENOUS at 06:08

## 2018-08-25 RX ADMIN — CARBIDOPA AND LEVODOPA 1 TABLET: 25; 100 TABLET ORAL at 02:08

## 2018-08-25 RX ADMIN — PRAMIPEXOLE DIHYDROCHLORIDE 0.25 MG: 0.12 TABLET ORAL at 08:08

## 2018-08-25 RX ADMIN — CARBIDOPA AND LEVODOPA 1 TABLET: 25; 100 TABLET ORAL at 09:08

## 2018-08-25 RX ADMIN — SODIUM CHLORIDE: 0.9 INJECTION, SOLUTION INTRAVENOUS at 01:08

## 2018-08-25 RX ADMIN — VANCOMYCIN HYDROCHLORIDE 1000 MG: 1 INJECTION, POWDER, LYOPHILIZED, FOR SOLUTION INTRAVENOUS at 12:08

## 2018-08-25 RX ADMIN — CARBIDOPA AND LEVODOPA 1 TABLET: 25; 100 TABLET ORAL at 06:08

## 2018-08-25 RX ADMIN — Medication 0.04 MCG/KG/MIN: at 12:08

## 2018-08-25 RX ADMIN — CIPROFLOXACIN 400 MG: 2 INJECTION, SOLUTION INTRAVENOUS at 09:08

## 2018-08-25 RX ADMIN — LEVOTHYROXINE SODIUM 75 MCG: 75 TABLET ORAL at 06:08

## 2018-08-25 RX ADMIN — PIPERACILLIN SODIUM AND TAZOBACTAM SODIUM 4.5 G: 4; .5 INJECTION, POWDER, LYOPHILIZED, FOR SOLUTION INTRAVENOUS at 10:08

## 2018-08-25 RX ADMIN — PANTOPRAZOLE SODIUM 40 MG: 40 INJECTION, POWDER, LYOPHILIZED, FOR SOLUTION INTRAVENOUS at 09:08

## 2018-08-25 RX ADMIN — CIPROFLOXACIN 400 MG: 2 INJECTION, SOLUTION INTRAVENOUS at 08:08

## 2018-08-25 RX ADMIN — PRAMIPEXOLE DIHYDROCHLORIDE 0.25 MG: 0.12 TABLET ORAL at 09:08

## 2018-08-25 RX ADMIN — PRAMIPEXOLE DIHYDROCHLORIDE 0.25 MG: 0.12 TABLET ORAL at 02:08

## 2018-08-25 RX ADMIN — HYDROCODONE BITARTRATE AND ACETAMINOPHEN 1 TABLET: 7.5; 325 TABLET ORAL at 09:08

## 2018-08-25 RX ADMIN — PIPERACILLIN SODIUM AND TAZOBACTAM SODIUM 4.5 G: 4; .5 INJECTION, POWDER, LYOPHILIZED, FOR SOLUTION INTRAVENOUS at 03:08

## 2018-08-25 RX ADMIN — POTASSIUM CHLORIDE 40 MEQ: 200 INJECTION, SOLUTION INTRAVENOUS at 09:08

## 2018-08-25 RX ADMIN — IPRATROPIUM BROMIDE AND ALBUTEROL SULFATE 3 ML: .5; 3 SOLUTION RESPIRATORY (INHALATION) at 12:08

## 2018-08-25 NOTE — PLAN OF CARE
Problem: Patient Care Overview  Goal: Plan of Care Review  Outcome: Ongoing (interventions implemented as appropriate)  Patient admit 8/22 from nursing home with aspiration PNA/UTI; to CCU for vasopressor support. Patient currently on Levo at 0.05mcg/kg and IVF at 125cc/hr; U/O improving. Remaining vitals stable, T-max 100.2 this shift.  Patient much more alert; oriented to person only(baseline). PEG tube with feedings at 30cc/hr, tolerating well. Sacral wound noted per flow sheet with wound care following. Instructed patient/family on POC with verbalized understanding.

## 2018-08-25 NOTE — PROGRESS NOTES
[facility] Hospital Medicine  Progress Note    Patient Name: Eddy Cunningham  MRN: [id number]  Patient Class: IP- Inpatient   Admission Date: 8/22/2018  Length of Stay: 3 days  Attending Physician: Kunal Sandoval MD  Primary Care Provider: Ayaka Pollard MD        Subjective:     Principal Problem:Severe sepsis    HPI:  78 year old female comes in from NH because of fevers and aspiration. She is tube fed there. 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 She was recently here and treated for a MDI ecoli UTI. Her urine today is once again with leukocytes but cough, fever and witnessed aspiration is what brought her in. She was found to have BPs in the 70s in the ER and was started on levophed. At baseline she is mainly bedridden.    Hospital Course:  8/24/18  She is still on levophed. On IV vancomycin, zosyn  And cipro.  Maintaining SBP in 100s.  Also on IVF ;   No tube feedings for two days .  Awake and responsive   Family by bedside ; discussed care and plans for today .  Wbcs is  Down.    8/25/18  Still on levophed  Having loose Bms  C/o belly pain   Much more alert this am.  Still in icu requiring pressors   Her potasium is low.  Her PEG tube was fixed yesterday .  Started on feedings.    Interval History: see above  Review of Systems   Constitutional: Negative for chills and fever.   HENT: Negative for congestion, hearing loss, sinus pressure and sore throat.    Eyes: Negative for photophobia.   Respiratory: Negative for cough, choking, chest tightness and wheezing.    Cardiovascular: Negative for chest pain and palpitations.   Gastrointestinal: Positive for abdominal pain and diarrhea. Negative for blood in stool, nausea and vomiting.   Genitourinary: Negative for dysuria and hematuria.   Musculoskeletal: Negative for arthralgias and myalgias.   Skin: Negative for pallor.   Neurological: Negative for dizziness and numbness.    Hematological: Does not bruise/bleed easily.   Psychiatric/Behavioral: Negative for confusion and suicidal ideas. The patient is not nervous/anxious.      Objective:     Vital Signs (Most Recent):  Temp: 98.2 °F (36.8 °C) (08/25/18 0419)  Pulse: 69 (08/25/18 0600)  Resp: (!) 26 (08/25/18 0600)  BP: (!) 113/57 (08/25/18 0600)  SpO2: 98 % (08/25/18 0500) Vital Signs (24h Range):  Temp:  [97.8 °F (36.6 °C)-100.2 °F (37.9 °C)] 98.2 °F (36.8 °C)  Pulse:  [65-83] 69  Resp:  [13-39] 26  SpO2:  [97 %-100 %] 98 %  BP: ()/(46-67) 113/57     Weight: 66.2 kg (145 lb 15.1 oz)(RD reviewed)  Body mass index is 22.19 kg/m².    Intake/Output Summary (Last 24 hours) at 8/25/2018 0737  Last data filed at 8/25/2018 0500  Gross per 24 hour   Intake 4617 ml   Output 1100 ml   Net 3517 ml      Physical Exam   Constitutional: She is oriented to person, place, and time. No distress.   HENT:   Head: Normocephalic and atraumatic.   Eyes: Conjunctivae are normal. Pupils are equal, round, and reactive to light.   Neck: Normal range of motion. Neck supple. No thyromegaly present.   Cardiovascular: Normal rate, normal heart sounds and intact distal pulses.   Pulmonary/Chest: Effort normal. No respiratory distress. She has no wheezes.   Rhonchi.  Diminished breath sounds to right    venti in place   Abdominal: Soft. Bowel sounds are normal. There is no tenderness.   PEG   Musculoskeletal: She exhibits edema (trace upper and lower).   Lymphadenopathy:     She has no cervical adenopathy.   Neurological: She is alert and oriented to person, place, and time.   Right arm posturing, some movement of left   Skin: Skin is warm and dry. No rash noted.   Psychiatric: Her behavior is normal.   Nursing note and vitals reviewed.      Significant Labs:   CBC:   Recent Labs   Lab  08/24/18   0525  08/25/18   0342   WBC  16.83*  11.01   HGB  8.8*  8.3*   HCT  29.0*  27.8*   PLT  500*  452*     CMP:   Recent Labs   Lab  08/24/18   0525  08/25/18   0342    NA  142  143   K  3.5  3.1*   CL  108  108   CO2  24  25   GLU  120*  136*   BUN  31*  19   CREATININE  0.6  0.6   CALCIUM  8.7  8.2*   PROT  5.7*  5.6*   ALBUMIN  2.1*  2.0*   BILITOT  0.5  0.4   ALKPHOS  148*  135   AST  18  16   ALT  9*  7*   ANIONGAP  10  10   EGFRNONAA  >60  >60         Assessment/Plan:      * Severe sepsis    See above.  Patient is DNR.  La post disucssed and redone today.        Diarrhea of presumed infectious origin    Check C diff   Decrease feedings   Replace potasium          Severe protein-calorie malnutrition    Start her feedings           Decubitus ulcer of sacral region, unstageable    Wound care consult          Acute cystitis without hematuria    Zee in place from NH.  Will replace today.  Vanc/zosyn and cipro ordered.  Most recent uti - e coli          Aspiration into airway    PEG fed.  Coughing on exam.  Takes nothing by mouth.  No need for swallow study.  Will not give protonix.  Cont pulm toileting  On zosyn and cipro to cover aspiration pna, but likely this is a pneumonitis.  On 02 and will check xr tomorrow.  satting okay.  Mucinex per G tube          Sepsis due to Gram negative bacteria    This is presumed.  Cultures pending.  On levophed - discussed with daughter, will not escalate pressors but will cont on levophed and wean.  Lovenox.  No protonix.  Replace zee.  Start tube feeds when possible.  Support blood pressure, cont fluids, cont abx - vanc zosyn and cipro and de-escalate as possible    8/25/18  Urine culture proteus     Will keep on zosyn   DC vancomycin          Memory disorder    stable          Mood disorder    Stable; much more alert           Parkinson's disease    Debilitating.  Bed bound.  Cont sinemet and mirapex            VTE Risk Mitigation (From admission, onward)        Ordered     IP VTE HIGH RISK PATIENT  Once      08/23/18 0028     Place sequential compression device  Until discontinued      08/23/18 0028          Critical care time spent on  the evaluation and treatment of severe organ dysfunction, review of pertinent labs and imaging studies, discussions with consulting providers and discussions with patient/family: 30minutes.    Hayder Khalil MD  Department of Hospital Medicine   [facility]

## 2018-08-25 NOTE — NURSING
Bedside report received. Patient is awake and alert, resting quietly with no complaints at this time. IV fluids to right femoral TLC with dressing D&I. O2 per nc at 2 lpm. Arvizu cath to urimeter with clear yellow urine present.  Assessment per flow sheet.

## 2018-08-25 NOTE — ASSESSMENT & PLAN NOTE
This is presumed.  Cultures pending.  On levophed - discussed with daughter, will not escalate pressors but will cont on levophed and wean.  Lovenox.  No protonix.  Replace zee.  Start tube feeds when possible.  Support blood pressure, cont fluids, cont abx - vanc zosyn and cipro and de-escalate as possible    8/25/18  Urine culture proteus     Will keep on zosyn   DC vancomycin

## 2018-08-25 NOTE — PLAN OF CARE
Problem: Patient Care Overview  Goal: Plan of Care Review  Outcome: Ongoing (interventions implemented as appropriate)  Patient improving slowly. Still on very slight dose of norepinephrine for BP support. TLC intact to rt femoral area. Site care done today. Received K+ rider of 40 mEq for hypokalemia. Has had 5-6 liquid watery BM's this shift. Stool specimen obtained and sent to lab for C-Diff. (will be in special contact isolation unless results are negative) Tube feeding changed from Isosource to Impact Peptide 1.5 as recommended by dietician. Low grade temp of 99 degrees this PM. Much more alert today. Answering questions appropriately. Sacral wound with dressing intact. Turned q2hr. Fall precautions maintained.  Will continue to monitor.

## 2018-08-25 NOTE — SUBJECTIVE & OBJECTIVE
Interval History: see above  Review of Systems   Constitutional: Negative for chills and fever.   HENT: Negative for congestion, hearing loss, sinus pressure and sore throat.    Eyes: Negative for photophobia.   Respiratory: Negative for cough, choking, chest tightness and wheezing.    Cardiovascular: Negative for chest pain and palpitations.   Gastrointestinal: Positive for abdominal pain and diarrhea. Negative for blood in stool, nausea and vomiting.   Genitourinary: Negative for dysuria and hematuria.   Musculoskeletal: Negative for arthralgias and myalgias.   Skin: Negative for pallor.   Neurological: Negative for dizziness and numbness.   Hematological: Does not bruise/bleed easily.   Psychiatric/Behavioral: Negative for confusion and suicidal ideas. The patient is not nervous/anxious.      Objective:     Vital Signs (Most Recent):  Temp: 98.2 °F (36.8 °C) (08/25/18 0419)  Pulse: 69 (08/25/18 0600)  Resp: (!) 26 (08/25/18 0600)  BP: (!) 113/57 (08/25/18 0600)  SpO2: 98 % (08/25/18 0500) Vital Signs (24h Range):  Temp:  [97.8 °F (36.6 °C)-100.2 °F (37.9 °C)] 98.2 °F (36.8 °C)  Pulse:  [65-83] 69  Resp:  [13-39] 26  SpO2:  [97 %-100 %] 98 %  BP: ()/(46-67) 113/57     Weight: 66.2 kg (145 lb 15.1 oz)(RD reviewed)  Body mass index is 22.19 kg/m².    Intake/Output Summary (Last 24 hours) at 8/25/2018 0737  Last data filed at 8/25/2018 0500  Gross per 24 hour   Intake 4617 ml   Output 1100 ml   Net 3517 ml      Physical Exam   Constitutional: She is oriented to person, place, and time. No distress.   HENT:   Head: Normocephalic and atraumatic.   Eyes: Conjunctivae are normal. Pupils are equal, round, and reactive to light.   Neck: Normal range of motion. Neck supple. No thyromegaly present.   Cardiovascular: Normal rate, normal heart sounds and intact distal pulses.   Pulmonary/Chest: Effort normal. No respiratory distress. She has no wheezes.   Rhonchi.  Diminished breath sounds to right    venti in place    Abdominal: Soft. Bowel sounds are normal. There is no tenderness.   PEG   Musculoskeletal: She exhibits edema (trace upper and lower).   Lymphadenopathy:     She has no cervical adenopathy.   Neurological: She is alert and oriented to person, place, and time.   Right arm posturing, some movement of left   Skin: Skin is warm and dry. No rash noted.   Psychiatric: Her behavior is normal.   Nursing note and vitals reviewed.      Significant Labs:   CBC:   Recent Labs   Lab  08/24/18   0525  08/25/18   0342   WBC  16.83*  11.01   HGB  8.8*  8.3*   HCT  29.0*  27.8*   PLT  500*  452*     CMP:   Recent Labs   Lab  08/24/18   0525  08/25/18   0342   NA  142  143   K  3.5  3.1*   CL  108  108   CO2  24  25   GLU  120*  136*   BUN  31*  19   CREATININE  0.6  0.6   CALCIUM  8.7  8.2*   PROT  5.7*  5.6*   ALBUMIN  2.1*  2.0*   BILITOT  0.5  0.4   ALKPHOS  148*  135   AST  18  16   ALT  9*  7*   ANIONGAP  10  10   EGFRNONAA  >60  >60

## 2018-08-25 NOTE — PLAN OF CARE
Problem: Patient Care Overview  Goal: Plan of Care Review  Outcome: Ongoing (interventions implemented as appropriate)  Pt doing well with 2L/min O2 by nasal cannula with SpO2 100%. CPT scheduled Q6, MA tx scheduled PRN. BBS diminished. No distress noted at this time.

## 2018-08-25 NOTE — PLAN OF CARE
Problem: Patient Care Overview  Goal: Plan of Care Review  Outcome: Ongoing (interventions implemented as appropriate)  Patient is slowly improving but still requiring norepinephrine for BP support. PEG tube feedings started slowly today. Tolerating well so far, no residual. Had BM x1. Moist non-productive cough noted, breath sounds coarse. Sacral decubitus with dressing intact. Pain control adequate with norco 7.5 mg. Will do wound care tomorrow, scheduled every other day. SCD's in use. Daughter aware of plan of care and agree with plan. Will continue to monitor.

## 2018-08-26 LAB
ALBUMIN SERPL BCP-MCNC: 1.9 G/DL
ALP SERPL-CCNC: 124 U/L
ALT SERPL W/O P-5'-P-CCNC: <5 U/L
ANION GAP SERPL CALC-SCNC: 9 MMOL/L
AST SERPL-CCNC: 15 U/L
BASOPHILS # BLD AUTO: 0.03 K/UL
BASOPHILS NFR BLD: 0.3 %
BILIRUB SERPL-MCNC: 0.5 MG/DL
BUN SERPL-MCNC: 12 MG/DL
CALCIUM SERPL-MCNC: 8.2 MG/DL
CHLORIDE SERPL-SCNC: 109 MMOL/L
CO2 SERPL-SCNC: 23 MMOL/L
CREAT SERPL-MCNC: 0.5 MG/DL
DIFFERENTIAL METHOD: ABNORMAL
EOSINOPHIL # BLD AUTO: 0.2 K/UL
EOSINOPHIL NFR BLD: 1.8 %
ERYTHROCYTE [DISTWIDTH] IN BLOOD BY AUTOMATED COUNT: 14.4 %
EST. GFR  (AFRICAN AMERICAN): >60 ML/MIN/1.73 M^2
EST. GFR  (NON AFRICAN AMERICAN): >60 ML/MIN/1.73 M^2
GLUCOSE SERPL-MCNC: 117 MG/DL
HCT VFR BLD AUTO: 27.5 %
HGB BLD-MCNC: 8.2 G/DL
LYMPHOCYTES # BLD AUTO: 0.8 K/UL
LYMPHOCYTES NFR BLD: 9.1 %
MCH RBC QN AUTO: 29 PG
MCHC RBC AUTO-ENTMCNC: 29.8 G/DL
MCV RBC AUTO: 97 FL
MONOCYTES # BLD AUTO: 0.7 K/UL
MONOCYTES NFR BLD: 8 %
NEUTROPHILS # BLD AUTO: 7.1 K/UL
NEUTROPHILS NFR BLD: 80.8 %
PLATELET # BLD AUTO: 399 K/UL
PMV BLD AUTO: 9 FL
POTASSIUM SERPL-SCNC: 3.1 MMOL/L
PROT SERPL-MCNC: 5.4 G/DL
RBC # BLD AUTO: 2.83 M/UL
SODIUM SERPL-SCNC: 141 MMOL/L
WBC # BLD AUTO: 8.78 K/UL

## 2018-08-26 PROCEDURE — 94640 AIRWAY INHALATION TREATMENT: CPT

## 2018-08-26 PROCEDURE — 25000003 PHARM REV CODE 250: Performed by: INTERNAL MEDICINE

## 2018-08-26 PROCEDURE — 99233 SBSQ HOSP IP/OBS HIGH 50: CPT | Mod: ,,, | Performed by: INTERNAL MEDICINE

## 2018-08-26 PROCEDURE — 80053 COMPREHEN METABOLIC PANEL: CPT

## 2018-08-26 PROCEDURE — 25000003 PHARM REV CODE 250: Performed by: FAMILY MEDICINE

## 2018-08-26 PROCEDURE — 63600175 PHARM REV CODE 636 W HCPCS: Performed by: INTERNAL MEDICINE

## 2018-08-26 PROCEDURE — C9113 INJ PANTOPRAZOLE SODIUM, VIA: HCPCS | Performed by: INTERNAL MEDICINE

## 2018-08-26 PROCEDURE — 94761 N-INVAS EAR/PLS OXIMETRY MLT: CPT

## 2018-08-26 PROCEDURE — 63600175 PHARM REV CODE 636 W HCPCS: Performed by: SURGERY

## 2018-08-26 PROCEDURE — 27000221 HC OXYGEN, UP TO 24 HOURS

## 2018-08-26 PROCEDURE — 85025 COMPLETE CBC W/AUTO DIFF WBC: CPT

## 2018-08-26 PROCEDURE — 36415 COLL VENOUS BLD VENIPUNCTURE: CPT

## 2018-08-26 PROCEDURE — 25000003 PHARM REV CODE 250: Performed by: SURGERY

## 2018-08-26 PROCEDURE — 25000242 PHARM REV CODE 250 ALT 637 W/ HCPCS: Performed by: FAMILY MEDICINE

## 2018-08-26 PROCEDURE — 94668 MNPJ CHEST WALL SBSQ: CPT

## 2018-08-26 PROCEDURE — 99900035 HC TECH TIME PER 15 MIN (STAT)

## 2018-08-26 PROCEDURE — 20000000 HC ICU ROOM

## 2018-08-26 RX ORDER — DIPHENOXYLATE HYDROCHLORIDE AND ATROPINE SULFATE 2.5; .025 MG/1; MG/1
1 TABLET ORAL 4 TIMES DAILY PRN
Status: DISCONTINUED | OUTPATIENT
Start: 2018-08-26 | End: 2018-08-28 | Stop reason: HOSPADM

## 2018-08-26 RX ORDER — POTASSIUM CHLORIDE 14.9 MG/ML
40 INJECTION INTRAVENOUS ONCE
Status: COMPLETED | OUTPATIENT
Start: 2018-08-26 | End: 2018-08-26

## 2018-08-26 RX ADMIN — PRAMIPEXOLE DIHYDROCHLORIDE 0.25 MG: 0.12 TABLET ORAL at 02:08

## 2018-08-26 RX ADMIN — PRAMIPEXOLE DIHYDROCHLORIDE 0.25 MG: 0.12 TABLET ORAL at 08:08

## 2018-08-26 RX ADMIN — CARBIDOPA AND LEVODOPA 1 TABLET: 25; 100 TABLET ORAL at 02:08

## 2018-08-26 RX ADMIN — PIPERACILLIN SODIUM AND TAZOBACTAM SODIUM 4.5 G: 4; .5 INJECTION, POWDER, LYOPHILIZED, FOR SOLUTION INTRAVENOUS at 06:08

## 2018-08-26 RX ADMIN — PANTOPRAZOLE SODIUM 40 MG: 40 INJECTION, POWDER, LYOPHILIZED, FOR SOLUTION INTRAVENOUS at 08:08

## 2018-08-26 RX ADMIN — DIPHENOXYLATE HYDROCHLORIDE AND ATROPINE SULFATE 1 TABLET: 2.5; .025 TABLET ORAL at 08:08

## 2018-08-26 RX ADMIN — CARBIDOPA AND LEVODOPA 1 TABLET: 25; 100 TABLET ORAL at 05:08

## 2018-08-26 RX ADMIN — ACETAMINOPHEN 650 MG: 325 TABLET ORAL at 08:08

## 2018-08-26 RX ADMIN — DIPHENOXYLATE HYDROCHLORIDE AND ATROPINE SULFATE 1 TABLET: 2.5; .025 TABLET ORAL at 03:08

## 2018-08-26 RX ADMIN — CARBIDOPA AND LEVODOPA 1 TABLET: 25; 100 TABLET ORAL at 10:08

## 2018-08-26 RX ADMIN — CARBIDOPA AND LEVODOPA 1 TABLET: 25; 100 TABLET ORAL at 09:08

## 2018-08-26 RX ADMIN — IPRATROPIUM BROMIDE AND ALBUTEROL SULFATE 3 ML: .5; 3 SOLUTION RESPIRATORY (INHALATION) at 12:08

## 2018-08-26 RX ADMIN — CARBIDOPA AND LEVODOPA 1 TABLET: 25; 100 TABLET ORAL at 06:08

## 2018-08-26 RX ADMIN — PIPERACILLIN SODIUM AND TAZOBACTAM SODIUM 4.5 G: 4; .5 INJECTION, POWDER, LYOPHILIZED, FOR SOLUTION INTRAVENOUS at 09:08

## 2018-08-26 RX ADMIN — HYDROCODONE BITARTRATE AND ACETAMINOPHEN 1 TABLET: 7.5; 325 TABLET ORAL at 08:08

## 2018-08-26 RX ADMIN — HYDROCODONE BITARTRATE AND ACETAMINOPHEN 1 TABLET: 7.5; 325 TABLET ORAL at 09:08

## 2018-08-26 RX ADMIN — POTASSIUM CHLORIDE 40 MEQ: 200 INJECTION, SOLUTION INTRAVENOUS at 08:08

## 2018-08-26 RX ADMIN — PIPERACILLIN SODIUM AND TAZOBACTAM SODIUM 4.5 G: 4; .5 INJECTION, POWDER, LYOPHILIZED, FOR SOLUTION INTRAVENOUS at 01:08

## 2018-08-26 RX ADMIN — LEVOTHYROXINE SODIUM 75 MCG: 75 TABLET ORAL at 05:08

## 2018-08-26 NOTE — SUBJECTIVE & OBJECTIVE
Interval History:     Review of Systems   Constitutional: Negative for chills and fever.   HENT: Negative for congestion, hearing loss, sinus pressure and sore throat.    Eyes: Negative for photophobia.   Respiratory: Negative for cough, choking, chest tightness and wheezing.    Cardiovascular: Negative for chest pain and palpitations.   Gastrointestinal: Positive for diarrhea. Negative for blood in stool, nausea and vomiting.   Genitourinary: Negative for dysuria and hematuria.   Musculoskeletal: Negative for arthralgias and myalgias.   Skin: Negative for pallor.   Neurological: Negative for dizziness and numbness.   Hematological: Does not bruise/bleed easily.   Psychiatric/Behavioral: Negative for confusion and suicidal ideas. The patient is not nervous/anxious.      Objective:     Vital Signs (Most Recent):  Temp: 98.2 °F (36.8 °C) (08/26/18 0745)  Pulse: 77 (08/26/18 0600)  Resp: 20 (08/26/18 0600)  BP: (!) 113/59 (08/26/18 0600)  SpO2: 98 % (08/26/18 0745) Vital Signs (24h Range):  Temp:  [97.4 °F (36.3 °C)-100.6 °F (38.1 °C)] 98.2 °F (36.8 °C)  Pulse:  [65-87] 77  Resp:  [19-38] 20  SpO2:  [94 %-100 %] 98 %  BP: ()/(39-63) 113/59     Weight: 66.2 kg (145 lb 15.1 oz)(RD reviewed)  Body mass index is 22.19 kg/m².    Intake/Output Summary (Last 24 hours) at 8/26/2018 0818  Last data filed at 8/26/2018 0600  Gross per 24 hour   Intake 5174 ml   Output 775 ml   Net 4399 ml      Physical Exam   Constitutional: She is oriented to person, place, and time. No distress.   HENT:   Head: Normocephalic and atraumatic.   Eyes: Conjunctivae are normal. Pupils are equal, round, and reactive to light.   Neck: Normal range of motion. Neck supple. No thyromegaly present.   Cardiovascular: Normal rate, normal heart sounds and intact distal pulses.   Pulmonary/Chest: Effort normal. No respiratory distress. She has no wheezes.   Abdominal: Soft. Bowel sounds are normal. There is no tenderness.   PEG   Musculoskeletal: She  exhibits edema (trace upper and lower).   Lymphadenopathy:     She has no cervical adenopathy.   Neurological: She is alert and oriented to person, place, and time.   Right arm posturing, some movement of left   Skin: Skin is warm and dry. No rash noted.   Psychiatric: Her behavior is normal.   Nursing note and vitals reviewed.      Significant Labs:   CBC:   Recent Labs   Lab  08/25/18   0342  08/26/18   0333   WBC  11.01  8.78   HGB  8.3*  8.2*   HCT  27.8*  27.5*   PLT  452*  399*     CMP:   Recent Labs   Lab  08/25/18   0342  08/26/18   0333   NA  143  141   K  3.1*  3.1*   CL  108  109   CO2  25  23   GLU  136*  117*   BUN  19  12   CREATININE  0.6  0.5   CALCIUM  8.2*  8.2*   PROT  5.6*  5.4*   ALBUMIN  2.0*  1.9*   BILITOT  0.4  0.5   ALKPHOS  135  124   AST  16  15   ALT  7*  <5*   ANIONGAP  10  9   EGFRNONAA  >60  >60

## 2018-08-26 NOTE — PLAN OF CARE
Problem: Patient Care Overview  Goal: Plan of Care Review  Outcome: Ongoing (interventions implemented as appropriate)  Patient is improving, was able to wean off of Norepinephrine infusion. IV fluids discontinued. Femoral TLC discontinued. Peripheral IV Saline lock access obtained. More alert and able to answer questions. Norco 7.5 given for sacral decubitus pain with adequate relief. Wound care done to sacrum. She has had several liquid BM's, small amounts. (Stool neg for C Diff toxin.) lomotil given for diarrhea. Rectal area reddened. Calmoseptine ointment applied. Tube feeding residual max 20 ml. Impact Peptide 1.5 in progress at 55 ml/hr. BP stable, low grade temp of 99.2 ax this pm. SCD's to BLE. Fall precautions maintained. Daughter at bedside, aware and agree with plan of care.

## 2018-08-26 NOTE — PROGRESS NOTES
[facility] Hospital Medicine  Progress Note    Patient Name: Eddy Cunningham  MRN: [id number]  Patient Class: IP- Inpatient   Admission Date: 8/22/2018  Length of Stay: 4 days  Attending Physician: Kunal Sandoval MD  Primary Care Provider: Ayaka Pollard MD        Subjective:     Principal Problem:Severe sepsis    HPI:  78 year old female comes in from NH because of fevers and aspiration. She is tube fed there. 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 She was recently here and treated for a MDI ecoli UTI. Her urine today is once again with leukocytes but cough, fever and witnessed aspiration is what brought her in. She was found to have BPs in the 70s in the ER and was started on levophed. At baseline she is mainly bedridden.    Hospital Course:  8/24/18  She is still on levophed. On IV vancomycin, zosyn  And cipro.  Maintaining SBP in 100s.  Also on IVF ;   No tube feedings for two days .  Awake and responsive   Family by bedside ; discussed care and plans for today .  Wbcs is  Down.    8/25/18  Still on levophed  Having loose Bms  C/o belly pain   Much more alert this am.  Still in icu requiring pressors   Her potasium is low.  Her PEG tube was fixed yesterday .  Started on feedings.    8/26/18  Still having diarrhea   c diff toxin negative   Still on levophed ;trying to get her off;  15 L positive   Her feedings are bumped up .        Interval History:     Review of Systems   Constitutional: Negative for chills and fever.   HENT: Negative for congestion, hearing loss, sinus pressure and sore throat.    Eyes: Negative for photophobia.   Respiratory: Negative for cough, choking, chest tightness and wheezing.    Cardiovascular: Negative for chest pain and palpitations.   Gastrointestinal: Positive for diarrhea. Negative for blood in stool, nausea and vomiting.   Genitourinary: Negative for dysuria and hematuria.    Musculoskeletal: Negative for arthralgias and myalgias.   Skin: Negative for pallor.   Neurological: Negative for dizziness and numbness.   Hematological: Does not bruise/bleed easily.   Psychiatric/Behavioral: Negative for confusion and suicidal ideas. The patient is not nervous/anxious.      Objective:     Vital Signs (Most Recent):  Temp: 98.2 °F (36.8 °C) (08/26/18 0745)  Pulse: 77 (08/26/18 0600)  Resp: 20 (08/26/18 0600)  BP: (!) 113/59 (08/26/18 0600)  SpO2: 98 % (08/26/18 0745) Vital Signs (24h Range):  Temp:  [97.4 °F (36.3 °C)-100.6 °F (38.1 °C)] 98.2 °F (36.8 °C)  Pulse:  [65-87] 77  Resp:  [19-38] 20  SpO2:  [94 %-100 %] 98 %  BP: ()/(39-63) 113/59     Weight: 66.2 kg (145 lb 15.1 oz)(RD reviewed)  Body mass index is 22.19 kg/m².    Intake/Output Summary (Last 24 hours) at 8/26/2018 0818  Last data filed at 8/26/2018 0600  Gross per 24 hour   Intake 5174 ml   Output 775 ml   Net 4399 ml      Physical Exam   Constitutional: She is oriented to person, place, and time. No distress.   HENT:   Head: Normocephalic and atraumatic.   Eyes: Conjunctivae are normal. Pupils are equal, round, and reactive to light.   Neck: Normal range of motion. Neck supple. No thyromegaly present.   Cardiovascular: Normal rate, normal heart sounds and intact distal pulses.   Pulmonary/Chest: Effort normal. No respiratory distress. She has no wheezes.   Abdominal: Soft. Bowel sounds are normal. There is no tenderness.   PEG   Musculoskeletal: She exhibits edema (trace upper and lower).   Lymphadenopathy:     She has no cervical adenopathy.   Neurological: She is alert and oriented to person, place, and time.   Right arm posturing, some movement of left   Skin: Skin is warm and dry. No rash noted.   Psychiatric: Her behavior is normal.   Nursing note and vitals reviewed.      Significant Labs:   CBC:   Recent Labs   Lab  08/25/18   0342  08/26/18   0333   WBC  11.01  8.78   HGB  8.3*  8.2*   HCT  27.8*  27.5*   PLT  452*   399*     CMP:   Recent Labs   Lab  08/25/18   0342  08/26/18   0333   NA  143  141   K  3.1*  3.1*   CL  108  109   CO2  25  23   GLU  136*  117*   BUN  19  12   CREATININE  0.6  0.5   CALCIUM  8.2*  8.2*   PROT  5.6*  5.4*   ALBUMIN  2.0*  1.9*   BILITOT  0.4  0.5   ALKPHOS  135  124   AST  16  15   ALT  7*  <5*   ANIONGAP  10  9   EGFRNONAA  >60  >60           Assessment/Plan:      * Severe sepsis    See above.  Patient is DNR.  La post disucssed and redone today.        Diarrhea of presumed infectious origin    Check C diff NEGATIVE   Decrease feedings   Replace potasium    Lomotil ordered          Severe protein-calorie malnutrition    Start her feedings           Decubitus ulcer of sacral region, unstageable    Wound care consult          Acute cystitis without hematuria    Zee in place from NH.  Will replace today.  Vanc/zosyn and cipro ordered.  Most recent uti - e coli      8/26/18  DC cipro         Aspiration into airway    PEG fed.  Coughing on exam.  Takes nothing by mouth.  No need for swallow study.  Will not give protonix.  Cont pulm toileting  On zosyn and cipro to cover aspiration pna, but likely this is a pneumonitis.  On 02 and will check xr tomorrow.  satting okay.  Mucinex per G tube          Sepsis due to Gram negative bacteria    This is presumed.  Cultures pending.  On levophed - discussed with daughter, will not escalate pressors but will cont on levophed and wean.  Lovenox.  No protonix.  Replace zee.  Start tube feeds when possible.  Support blood pressure, cont fluids, cont abx - vanc zosyn and cipro and de-escalate as possible    8/25/18  Urine culture proteus     Will keep on zosyn   DC vancomycin          Memory disorder    stable          Mood disorder    Stable; much more alert           Parkinson's disease    Debilitating.  Bed bound.  Cont sinemet and mirapex            VTE Risk Mitigation (From admission, onward)        Ordered     IP VTE HIGH RISK PATIENT  Once      08/23/18  0028     Place sequential compression device  Until discontinued      08/23/18 0028          Critical care time spent on the evaluation and treatment of severe organ dysfunction, review of pertinent labs and imaging studies, discussions with consulting providers and discussions with patient/family: 25 minutes.    Hayder Khalil MD  Department of Hospital Medicine   [facility]

## 2018-08-26 NOTE — ASSESSMENT & PLAN NOTE
Arvizu in place from NH.  Will replace today.  Vanc/zosyn and cipro ordered.  Most recent uti - e coli      8/26/18  DC cipro

## 2018-08-27 PROBLEM — L89.90 DECUBITUS ULCER: Status: ACTIVE | Noted: 2018-08-27

## 2018-08-27 LAB
ALBUMIN SERPL BCP-MCNC: 1.8 G/DL
ALP SERPL-CCNC: 121 U/L
ALT SERPL W/O P-5'-P-CCNC: 6 U/L
ANION GAP SERPL CALC-SCNC: 8 MMOL/L
AST SERPL-CCNC: 18 U/L
BACTERIA SPEC AEROBE CULT: NORMAL
BACTERIA SPEC AEROBE CULT: NORMAL
BASOPHILS # BLD AUTO: 0.02 K/UL
BASOPHILS NFR BLD: 0.2 %
BILIRUB SERPL-MCNC: 0.5 MG/DL
BUN SERPL-MCNC: 15 MG/DL
CALCIUM SERPL-MCNC: 8.3 MG/DL
CHLORIDE SERPL-SCNC: 108 MMOL/L
CO2 SERPL-SCNC: 25 MMOL/L
CREAT SERPL-MCNC: 0.5 MG/DL
DIFFERENTIAL METHOD: ABNORMAL
EOSINOPHIL # BLD AUTO: 0.3 K/UL
EOSINOPHIL NFR BLD: 2.5 %
ERYTHROCYTE [DISTWIDTH] IN BLOOD BY AUTOMATED COUNT: 14.5 %
EST. GFR  (AFRICAN AMERICAN): >60 ML/MIN/1.73 M^2
EST. GFR  (NON AFRICAN AMERICAN): >60 ML/MIN/1.73 M^2
GLUCOSE SERPL-MCNC: 144 MG/DL
GRAM STN SPEC: NORMAL
HCT VFR BLD AUTO: 29.2 %
HGB BLD-MCNC: 8.7 G/DL
LYMPHOCYTES # BLD AUTO: 0.9 K/UL
LYMPHOCYTES NFR BLD: 8.7 %
MCH RBC QN AUTO: 28.8 PG
MCHC RBC AUTO-ENTMCNC: 29.8 G/DL
MCV RBC AUTO: 97 FL
MONOCYTES # BLD AUTO: 0.6 K/UL
MONOCYTES NFR BLD: 6.1 %
NEUTROPHILS # BLD AUTO: 8.3 K/UL
NEUTROPHILS NFR BLD: 82.5 %
PLATELET # BLD AUTO: 391 K/UL
PMV BLD AUTO: 9.3 FL
POTASSIUM SERPL-SCNC: 3.7 MMOL/L
PROT SERPL-MCNC: 5.3 G/DL
RBC # BLD AUTO: 3.02 M/UL
SODIUM SERPL-SCNC: 141 MMOL/L
WBC # BLD AUTO: 10 K/UL

## 2018-08-27 PROCEDURE — 25000242 PHARM REV CODE 250 ALT 637 W/ HCPCS: Performed by: FAMILY MEDICINE

## 2018-08-27 PROCEDURE — 25000003 PHARM REV CODE 250: Performed by: FAMILY MEDICINE

## 2018-08-27 PROCEDURE — C9113 INJ PANTOPRAZOLE SODIUM, VIA: HCPCS | Performed by: INTERNAL MEDICINE

## 2018-08-27 PROCEDURE — 80053 COMPREHEN METABOLIC PANEL: CPT

## 2018-08-27 PROCEDURE — 25000003 PHARM REV CODE 250: Performed by: SURGERY

## 2018-08-27 PROCEDURE — 94761 N-INVAS EAR/PLS OXIMETRY MLT: CPT

## 2018-08-27 PROCEDURE — 63600175 PHARM REV CODE 636 W HCPCS: Performed by: SURGERY

## 2018-08-27 PROCEDURE — 27000221 HC OXYGEN, UP TO 24 HOURS

## 2018-08-27 PROCEDURE — 25000003 PHARM REV CODE 250: Performed by: INTERNAL MEDICINE

## 2018-08-27 PROCEDURE — 85025 COMPLETE CBC W/AUTO DIFF WBC: CPT

## 2018-08-27 PROCEDURE — 97802 MEDICAL NUTRITION INDIV IN: CPT

## 2018-08-27 PROCEDURE — 99233 SBSQ HOSP IP/OBS HIGH 50: CPT | Mod: ,,, | Performed by: INTERNAL MEDICINE

## 2018-08-27 PROCEDURE — 94640 AIRWAY INHALATION TREATMENT: CPT

## 2018-08-27 PROCEDURE — 36415 COLL VENOUS BLD VENIPUNCTURE: CPT

## 2018-08-27 PROCEDURE — 11000001 HC ACUTE MED/SURG PRIVATE ROOM

## 2018-08-27 PROCEDURE — 63600175 PHARM REV CODE 636 W HCPCS: Performed by: INTERNAL MEDICINE

## 2018-08-27 RX ORDER — TRAZODONE HYDROCHLORIDE 50 MG/1
50 TABLET ORAL NIGHTLY PRN
Status: DISCONTINUED | OUTPATIENT
Start: 2018-08-27 | End: 2018-08-28 | Stop reason: HOSPADM

## 2018-08-27 RX ORDER — RIVASTIGMINE 4.6 MG/24H
1 PATCH, EXTENDED RELEASE TRANSDERMAL DAILY
Status: DISCONTINUED | OUTPATIENT
Start: 2018-08-27 | End: 2018-08-28 | Stop reason: HOSPADM

## 2018-08-27 RX ADMIN — HYDROCODONE BITARTRATE AND ACETAMINOPHEN 1 TABLET: 7.5; 325 TABLET ORAL at 03:08

## 2018-08-27 RX ADMIN — CARBIDOPA AND LEVODOPA 1 TABLET: 25; 100 TABLET ORAL at 09:08

## 2018-08-27 RX ADMIN — PIPERACILLIN SODIUM AND TAZOBACTAM SODIUM 4.5 G: 4; .5 INJECTION, POWDER, LYOPHILIZED, FOR SOLUTION INTRAVENOUS at 02:08

## 2018-08-27 RX ADMIN — CARBIDOPA AND LEVODOPA 1 TABLET: 25; 100 TABLET ORAL at 06:08

## 2018-08-27 RX ADMIN — CARBIDOPA AND LEVODOPA 1 TABLET: 25; 100 TABLET ORAL at 05:08

## 2018-08-27 RX ADMIN — PIPERACILLIN SODIUM AND TAZOBACTAM SODIUM 4.5 G: 4; .5 INJECTION, POWDER, LYOPHILIZED, FOR SOLUTION INTRAVENOUS at 06:08

## 2018-08-27 RX ADMIN — RIVASTIGMINE 1 PATCH: 4.6 PATCH, EXTENDED RELEASE TRANSDERMAL at 02:08

## 2018-08-27 RX ADMIN — PIPERACILLIN SODIUM AND TAZOBACTAM SODIUM 4.5 G: 4; .5 INJECTION, POWDER, LYOPHILIZED, FOR SOLUTION INTRAVENOUS at 09:08

## 2018-08-27 RX ADMIN — PRAMIPEXOLE DIHYDROCHLORIDE 0.25 MG: 0.12 TABLET ORAL at 09:08

## 2018-08-27 RX ADMIN — CARBIDOPA AND LEVODOPA 1 TABLET: 25; 100 TABLET ORAL at 02:08

## 2018-08-27 RX ADMIN — PRAMIPEXOLE DIHYDROCHLORIDE 0.25 MG: 0.12 TABLET ORAL at 04:08

## 2018-08-27 RX ADMIN — IPRATROPIUM BROMIDE AND ALBUTEROL SULFATE 3 ML: .5; 3 SOLUTION RESPIRATORY (INHALATION) at 07:08

## 2018-08-27 RX ADMIN — PANTOPRAZOLE SODIUM 40 MG: 40 INJECTION, POWDER, LYOPHILIZED, FOR SOLUTION INTRAVENOUS at 09:08

## 2018-08-27 RX ADMIN — LEVOTHYROXINE SODIUM 75 MCG: 75 TABLET ORAL at 05:08

## 2018-08-27 NOTE — ASSESSMENT & PLAN NOTE
PEG fed.  Coughing on exam.  Takes nothing by mouth.  No need for swallow study.    Cont pulm toileting  Cont IV Zosyn for aspiration pneumonia based on cx  O2 sats stable on 2L NC

## 2018-08-27 NOTE — PHYSICIAN QUERY
PT Name: Eddy Cunningham  MR #: [id number]     Physician Query Form - Documentation Clarification      CDS/: Ena Hinojosa RN CCDS              Contact information: [email or web address]    This form is a permanent document in the medical record.     Query Date: August 27, 2018    By submitting this query, we are merely seeking further clarification of documentation. Please utilize your independent clinical judgment when addressing the question(s) below.    The Medical record reflects the following:    Supporting Clinical Findings Location in Medical Record   Potassium 3.1       Lab 8/25-26   Potassium chloride 20 meq in 100 ml IVPB       MAR 8/25-26                                                                            Doctor, Please specify diagnosis or diagnoses associated with above clinical findings.    Provider Use Only     [ X ] Hypokalemia     [  ] Other (please specify)____________                                                                                                                           [  ] Clinically undetermined

## 2018-08-27 NOTE — PROGRESS NOTES
" [facility]  Adult Nutrition  Progress Note    SUMMARY       Recommendations    Recommendation/Intervention: Continue current TF regimen of Impact Peptide 1.5 at 55ml/hr as tolerated. Monitor residuals.  Goals: adequate energy intake >=75% by discharge  Nutrition Goal Status: goal met  Communication of RD Recs: (POC reviewed)    Nutrition Discharge Planning: PEG feeds per Recs    Reason for Assessment    Reason for Assessment: RD follow-up  Diagnosis: infection/sepsis  Relevant Medical History: Parkinson, HTN, COPD, Depression, Dementia, GERD  General Information Comments: TF changed to Impact per recs and initiated at low rate, now at goal rate with max 20ml residuals noted.      Nutrition Risk Screen    Nutrition Risk Screen: tube feeding or parenteral nutrition    Nutrition/Diet History    Patient Reported Diet/Restrictions/Preferences: no oral intake  Do you have any cultural, spiritual, Adventism conflicts, given your current situation?: Episcopal reported  Food Allergies: NKFA  Factors Affecting Nutritional Intake: altered gastrointestinal function, impaired cognitive status/motor control, difficulty/impaired swallowing, NPO  Nutrition Support Formula Prior to Admit: Jevity 1.2    Anthropometrics    Temp: 98.2 °F (36.8 °C)  Height Method: Stated  Height: 5' 8" (172.7 cm)  Height (inches): 68 in  Weight Method: Bed Scale  Weight: 66.2 kg (145 lb 15.1 oz)(RD reviewed, no new)  Weight (lb): 145.95 lb  Ideal Body Weight (IBW), Female: 140 lb  % Ideal Body Weight, Female (lb): 104.25 lb  BMI (Calculated): 22.2  BMI Grade: 18.5-24.9 - normal  Weight Loss: unintentional  Usual Body Weight (UBW), k.6 kg(18 admit)  Weight Change Amount: 44 lb 15.6 oz(5 months (significant))  % Usual Body Weight: 76.6  % Weight Change From Usual Weight: -23.56 %       Lab/Procedures/Meds    Pertinent Labs Reviewed: reviewed  Pertinent Medications Reviewed: reviewed  Scheduled Meds:   carbidopa-levodopa "  mg  1 tablet Per G Tube 5x Daily    levothyroxine  75 mcg Per G Tube Before breakfast    pantoprazole  40 mg Intravenous Daily    piperacillin-tazobactam (ZOSYN) IVPB  4.5 g Intravenous Q8H    pramipexole  0.25 mg Per G Tube TID    rivastigmine  1 patch Transdermal Daily     Continuous Infusions:  PRN Meds:.acetaminophen, albuterol-ipratropium, diphenoxylate-atropine 2.5-0.025 mg, guaifenesin 100 mg/5 ml, HYDROcodone-acetaminophen, ondansetron, promethazine (PHENERGAN) IVPB, traZODone     Recent Labs   Lab  08/27/18   0330   CALCIUM  8.3*   PROT  5.3*   NA  141   K  3.7   CO2  25   CL  108   BUN  15   CREATININE  0.5   ALKPHOS  121   ALT  6*   AST  18   BILITOT  0.5     Physical Findings/Assessment    Overall Physical Appearance: loss of muscle mass, loss of subcutaneous fat, on oxygen therapy(functional quadriplegia)  Tubes: (-)  Oral/Mouth Cavity: (UTO)  Skin: edema, non-healing wound(s)(sacral wound, third spacing)    Estimated/Assessed Needs    Weight Used For Calorie Calculations: 66.2 kg (145 lb 15.1 oz)  Energy Calorie Requirements (kcal): 1986-2317  Energy Need Method: Kcal/kg(30-35)  Protein Requirements: (1.5-2.0)  Weight Used For Protein Calculations: 66.2 kg (145 lb 15.1 oz)  Fluid Requirements (mL): 1ml/kcal or per MD     RDA Method (mL): 1986         Nutrition Prescription Ordered    Current Diet Order: NPO  Current Nutrition Support Formula Ordered: Impact Peptide 1.5  Current Nutrition Support Rate Ordered: 55 (ml)  Current Nutrition Support Frequency Ordered: ml/hrx24 hrs with 160ml every 4 hrs    Evaluation of Received Nutrient/Fluid Intake    Enteral Calories (kcal): 1980  Enteral Protein (gm): 124  IV Fluid (mL): 3000  Energy Calories Required: meeting needs  Protein Required: meeting needs  Fluid Required: meeting needs  Tolerance: tolerating  % Intake of Estimated Energy Needs: 75 - 100 %  % Meal Intake: NPO    Nutrition Risk    Level of Risk/Frequency of Follow-up: (F/U  1x/wk)     Assessment and Plan    Severe protein-calorie malnutrition    Malnutrition in the context of Social/Environmental Circumstances     Related to (etiology):  Inability to consume adequate energy intake with questionable accuracy/intake of PTA TF order     Signs and Symptoms (as evidenced by):  Energy Intake: No intake at this time; PTA= 100% per TF order of estimated energy requirement (question accuracy of order/intake)  Body Fat Depletion: moderate depletion of orbitals and triceps   Muscle Mass Depletion: severe depletion of temples and clavicle region  Weight Loss: 23% x 5 month (significant)   Fluid: +edema with third spacing   Sacral Decubitus    Interventions  Modify composition of enteral nutrition  Modify rate of enteral nutrition  Feeding tube flush     Recommendations (treatment strategy):  When feasible initiate TF of:  Impact Peptide 1.5 GR 55ml/hr  160ml FWF Q4  Monitor residuals.     Nutrition Diagnosis Status:  Continues             Monitor and Evaluation    Food and Nutrient Intake: enteral nutrition intake  Food and Nutrient Adminstration: enteral and parenteral nutrition administration  Physical Activity and Function: nutrition-related ADLs and IADLs  Anthropometric Measurements: weight, weight change  Biochemical Data, Medical Tests and Procedures: electrolyte and renal panel, gastrointestinal profile, glucose/endocrine profile, inflammatory profile, lipid profile  Nutrition-Focused Physical Findings: skin, overall appearance     Nutrition Follow-Up    RD Follow-up?: Yes

## 2018-08-27 NOTE — ASSESSMENT & PLAN NOTE
Malnutrition in the context of Social/Environmental Circumstances     Related to (etiology):  Inability to consume adequate energy intake with questionable accuracy/intake of PTA TF order     Signs and Symptoms (as evidenced by):  Energy Intake: No intake at this time; PTA= 100% per TF order of estimated energy requirement (question accuracy of order/intake)  Body Fat Depletion: moderate depletion of orbitals and triceps   Muscle Mass Depletion: severe depletion of temples and clavicle region  Weight Loss: 23% x 5 month (significant)   Fluid: +edema with third spacing   Sacral Decubitus    Interventions  Modify composition of enteral nutrition  Modify rate of enteral nutrition  Feeding tube flush     Recommendations (treatment strategy):  When feasible initiate TF of:  Impact Peptide 1.5 GR 55ml/hr  160ml FWF Q4  Monitor residuals.     Nutrition Diagnosis Status:  Continues

## 2018-08-27 NOTE — PLAN OF CARE
Problem: Nutrition, Enteral (Adult)  Intervention: Monitor/Manage Nutrition Support  Nutrition Goals: adequate energy intake >=75% by discharge  Nutrition Goal Status: goal met  Communication of RD Recs: (POC reviewed)    Nutrition Discharge Planning: PEG feeds per Recs

## 2018-08-27 NOTE — PLAN OF CARE
Problem: Patient Care Overview  Goal: Plan of Care Review  Outcome: Ongoing (interventions implemented as appropriate)  Patient admit 8/22 from nursing home with aspiration PNA/UTI; to CCU for vasopressor support. Levo/IVF weaned to off with vitals now stable per flow sheet; T-max 101.4 this shift, responsive to Tylenol.  Patient  alert; oriented to person only(baseline). PEG tube with feedings at 55cc/hr currently, tolerating well. Sacral wound noted per flow sheet with wound care following. Frequent wet cough noted. Instructed patient/family on POC with verbalized understanding.

## 2018-08-27 NOTE — ASSESSMENT & PLAN NOTE
Check C diff NEGATIVE   Decrease feedings   Replace potasium    Lomotil ordered---WATCH VERY CLOSELY FOR CONSTIPATION AS DEVELOPED IMPACTION LAST ADMISSION

## 2018-08-27 NOTE — ASSESSMENT & PLAN NOTE
Cx from sputum: e. Coli and proteus. Due to aspiration PNA  On levophed - discussed with daughter, will not escalate pressors but will cont on levophed and wean. 8/27/18 she is off pressors  Lovenox.  Replace zee.  Tube feeds restarted  Cont IV Zosyn

## 2018-08-27 NOTE — PROGRESS NOTES
[facility] Medicine  Progress Note    Patient Name: Eddy Cunningham  MRN: [id number]  Patient Class: IP- Inpatient   Admission Date: 8/22/2018  Length of Stay: 5 days  Attending Physician: Kunal Sandoval MD  Primary Care Provider: Ayaka Pollard MD        Subjective:     Principal Problem:Sepsis due to Gram negative bacteria    HPI:  78 year old female comes in from NH because of fevers and aspiration. She is tube fed there. 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 She was recently here and treated for a MDI ecoli UTI. Her urine today is once again with leukocytes but cough, fever and witnessed aspiration is what brought her in. She was found to have BPs in the 70s in the ER and was started on levophed. At baseline she is mainly bedridden.    Hospital Course:  8/24/18  She is still on levophed. On IV vancomycin, zosyn  And cipro.  Maintaining SBP in 100s.  Also on IVF ;   No tube feedings for two days .  Awake and responsive   Family by bedside ; discussed care and plans for today .  Wbcs is  Down.    8/25/18  Still on levophed  Having loose Bms  C/o belly pain   Much more alert this am.  Still in icu requiring pressors   Her potasium is low.  Her PEG tube was fixed yesterday .  Started on feedings.    8/26/18  Still having diarrhea   c diff toxin negative   Still on levophed ;trying to get her off;  15 L positive   Her feedings are bumped up .    8/27/18  C. Diff antigen is positive but toxin and PCR is negative. Off of levophed. She is tolerating her tube feeds. Remains on Zosyn for aspiratory PNA. Sputuem cx grew e. Coli and proteus.     Interval History: no acute events. Doing better.     Review of Systems   Unable to perform ROS: Dementia     Objective:     Vital Signs (Most Recent):  Temp: 97.4 °F (36.3 °C) (08/27/18 0715)  Pulse: 84 (08/27/18 1057)  Resp: 20 (08/27/18 0749)  BP: 107/64 (08/27/18 0600)  SpO2: 99 %  (08/27/18 0749) Vital Signs (24h Range):  Temp:  [97.4 °F (36.3 °C)-101.4 °F (38.6 °C)] 97.4 °F (36.3 °C)  Pulse:  [72-89] 84  Resp:  [16-39] 20  SpO2:  [94 %-100 %] 99 %  BP: ()/(43-88) 107/64     Weight: 66.2 kg (145 lb 15.1 oz)(RD reviewed)  Body mass index is 22.19 kg/m².    Intake/Output Summary (Last 24 hours) at 8/27/2018 1127  Last data filed at 8/27/2018 0730  Gross per 24 hour   Intake 2185 ml   Output 475 ml   Net 1710 ml      Physical Exam   Constitutional: She appears well-developed and well-nourished. No distress.   HENT:   Head: Normocephalic and atraumatic.   Right Ear: External ear normal.   Left Ear: External ear normal.   Eyes: Conjunctivae and EOM are normal. Pupils are equal, round, and reactive to light. Right eye exhibits no discharge. Left eye exhibits no discharge.   Neck: Neck supple. No tracheal deviation present.   Cardiovascular: Normal rate and regular rhythm. Exam reveals no friction rub.   No murmur heard.  Pulmonary/Chest: Effort normal and breath sounds normal. No respiratory distress. She has no wheezes.   rhonchi   Abdominal: Soft. Bowel sounds are normal. She exhibits no distension. There is no tenderness.   Neurological: She is alert. No cranial nerve deficit.   Oriented to person   Skin: Skin is warm and dry.   Psychiatric: She has a normal mood and affect. Her behavior is normal.   Nursing note and vitals reviewed.      Significant Labs:   Blood Culture: No results for input(s): LABBLOO in the last 48 hours.  CBC:   Recent Labs   Lab  08/26/18   0333  08/27/18   0330   WBC  8.78  10.00   HGB  8.2*  8.7*   HCT  27.5*  29.2*   PLT  399*  391*     CMP:   Recent Labs   Lab  08/26/18   0333  08/27/18   0330   NA  141  141   K  3.1*  3.7   CL  109  108   CO2  23  25   GLU  117*  144*   BUN  12  15   CREATININE  0.5  0.5   CALCIUM  8.2*  8.3*   PROT  5.4*  5.3*   ALBUMIN  1.9*  1.8*   BILITOT  0.5  0.5   ALKPHOS  124  121   AST  15  18   ALT  <5*  6*   ANIONGAP  9  8    EGFRNONAA  >60  >60     Respiratory culture 8/22/18:  Respiratory Culture ESCHERICHIA COLI ESBL   Many       Respiratory Culture PROTEUS MIRABILIS   Moderate   Normal respiratory roxane also present          All pertinent labs within the past 24 hours have been reviewed.    Significant Imaging: I have reviewed all pertinent imaging results/findings within the past 24 hours.    Assessment/Plan:      * Sepsis due to Gram negative bacteria    Cx from sputum: e. Coli and proteus. Due to aspiration PNA  On levophed - discussed with daughter, will not escalate pressors but will cont on levophed and wean. 8/27/18 she is off pressors  Lovenox.  Replace zee.  Tube feeds restarted  Cont IV Zosyn            Diarrhea of presumed infectious origin    Check C diff NEGATIVE   Decrease feedings   Replace potasium    Lomotil ordered---WATCH VERY CLOSELY FOR CONSTIPATION AS DEVELOPED IMPACTION LAST ADMISSION          Severe sepsis    See above.  Patient is DNR.  La post disucssed and redone today.        Severe protein-calorie malnutrition    Start her feedings --tolerating well          Decubitus ulcer of sacral region, unstageable    Wound care consult          Acute cystitis without hematuria    Zee in place from NH.  Will replace today.  Vanc/zosyn and cipro ordered.  Most recent uti - e coli      8/26/18  DC cipro     8/27  Cont IV Zosyn for respiratory issues. Urine cx proteus sensitive to zosyn as well        Aspiration into airway    PEG fed.  Coughing on exam.  Takes nothing by mouth.  No need for swallow study.    Cont pulm toileting  Cont IV Zosyn for aspiration pneumonia based on cx  O2 sats stable on 2L NC          Memory disorder    Stable  Cont home meds: exelon patch          Mood disorder    Stable            Parkinson's disease    Debilitating.  Bed bound.  Cont sinemet and mirapex  At baseline  PT for ROM            VTE Risk Mitigation (From admission, onward)        Ordered     IP VTE HIGH RISK PATIENT  Once       08/23/18 0028     Place sequential compression device  Until discontinued      08/23/18 0028              Kristin Pinto MD  Department of Hospital Medicine   [facility]

## 2018-08-27 NOTE — PLAN OF CARE
Problem: Patient Care Overview  Goal: Plan of Care Review  Outcome: Ongoing (interventions implemented as appropriate)  Pt transferred from ICU today. On contact isolation for ESBL. Continuous PEG feedings. Little to no residual noted. Coarse breath sounds; occasional non productive cough. Remains on IV zosyn. Arvizu intact. Vitals stable. Afebrile. Sinus rhythm on telemetry. Turning every 2 hours; decubitus to sacral area. Reviewed plan of care with pt and daughter; state agreement.

## 2018-08-27 NOTE — ASSESSMENT & PLAN NOTE
Arvizu in place from NH.  Will replace today.  Vanc/zosyn and cipro ordered.  Most recent uti - e coli      8/26/18  DC cipro     8/27  Cont IV Zosyn for respiratory issues. Urine cx proteus sensitive to zosyn as well

## 2018-08-27 NOTE — SUBJECTIVE & OBJECTIVE
Interval History: no acute events. Doing better.     Review of Systems   Unable to perform ROS: Dementia     Objective:     Vital Signs (Most Recent):  Temp: 97.4 °F (36.3 °C) (08/27/18 0715)  Pulse: 84 (08/27/18 1057)  Resp: 20 (08/27/18 0749)  BP: 107/64 (08/27/18 0600)  SpO2: 99 % (08/27/18 0749) Vital Signs (24h Range):  Temp:  [97.4 °F (36.3 °C)-101.4 °F (38.6 °C)] 97.4 °F (36.3 °C)  Pulse:  [72-89] 84  Resp:  [16-39] 20  SpO2:  [94 %-100 %] 99 %  BP: ()/(43-88) 107/64     Weight: 66.2 kg (145 lb 15.1 oz)(RD reviewed)  Body mass index is 22.19 kg/m².    Intake/Output Summary (Last 24 hours) at 8/27/2018 1127  Last data filed at 8/27/2018 0730  Gross per 24 hour   Intake 2185 ml   Output 475 ml   Net 1710 ml      Physical Exam   Constitutional: She appears well-developed and well-nourished. No distress.   HENT:   Head: Normocephalic and atraumatic.   Right Ear: External ear normal.   Left Ear: External ear normal.   Eyes: Conjunctivae and EOM are normal. Pupils are equal, round, and reactive to light. Right eye exhibits no discharge. Left eye exhibits no discharge.   Neck: Neck supple. No tracheal deviation present.   Cardiovascular: Normal rate and regular rhythm. Exam reveals no friction rub.   No murmur heard.  Pulmonary/Chest: Effort normal and breath sounds normal. No respiratory distress. She has no wheezes.   rhonchi   Abdominal: Soft. Bowel sounds are normal. She exhibits no distension. There is no tenderness.   Neurological: She is alert. No cranial nerve deficit.   Oriented to person   Skin: Skin is warm and dry.   Psychiatric: She has a normal mood and affect. Her behavior is normal.   Nursing note and vitals reviewed.      Significant Labs:   Blood Culture: No results for input(s): LABBLOO in the last 48 hours.  CBC:   Recent Labs   Lab  08/26/18   0333  08/27/18   0330   WBC  8.78  10.00   HGB  8.2*  8.7*   HCT  27.5*  29.2*   PLT  399*  391*     CMP:   Recent Labs   Lab  08/26/18   0331   08/27/18   0330   NA  141  141   K  3.1*  3.7   CL  109  108   CO2  23  25   GLU  117*  144*   BUN  12  15   CREATININE  0.5  0.5   CALCIUM  8.2*  8.3*   PROT  5.4*  5.3*   ALBUMIN  1.9*  1.8*   BILITOT  0.5  0.5   ALKPHOS  124  121   AST  15  18   ALT  <5*  6*   ANIONGAP  9  8   EGFRNONAA  >60  >60     Respiratory culture 8/22/18:  Respiratory Culture ESCHERICHIA COLI ESBL   Many       Respiratory Culture PROTEUS MIRABILIS   Moderate   Normal respiratory roxane also present          All pertinent labs within the past 24 hours have been reviewed.    Significant Imaging: I have reviewed all pertinent imaging results/findings within the past 24 hours.

## 2018-08-28 VITALS
WEIGHT: 145.94 LBS | TEMPERATURE: 98 F | HEIGHT: 68 IN | HEART RATE: 86 BPM | DIASTOLIC BLOOD PRESSURE: 59 MMHG | RESPIRATION RATE: 18 BRPM | BODY MASS INDEX: 22.12 KG/M2 | SYSTOLIC BLOOD PRESSURE: 120 MMHG | OXYGEN SATURATION: 99 %

## 2018-08-28 LAB
ALBUMIN SERPL BCP-MCNC: 1.7 G/DL
ALP SERPL-CCNC: 113 U/L
ALT SERPL W/O P-5'-P-CCNC: 13 U/L
ANION GAP SERPL CALC-SCNC: 9 MMOL/L
AST SERPL-CCNC: 27 U/L
BACTERIA BLD CULT: NORMAL
BACTERIA BLD CULT: NORMAL
BILIRUB SERPL-MCNC: 0.3 MG/DL
BUN SERPL-MCNC: 17 MG/DL
CALCIUM SERPL-MCNC: 8.6 MG/DL
CHLORIDE SERPL-SCNC: 106 MMOL/L
CO2 SERPL-SCNC: 25 MMOL/L
CREAT SERPL-MCNC: 0.5 MG/DL
EST. GFR  (AFRICAN AMERICAN): >60 ML/MIN/1.73 M^2
EST. GFR  (NON AFRICAN AMERICAN): >60 ML/MIN/1.73 M^2
GLUCOSE SERPL-MCNC: 105 MG/DL
POTASSIUM SERPL-SCNC: 3.8 MMOL/L
PROT SERPL-MCNC: 5.3 G/DL
SODIUM SERPL-SCNC: 140 MMOL/L

## 2018-08-28 PROCEDURE — 25000003 PHARM REV CODE 250: Performed by: INTERNAL MEDICINE

## 2018-08-28 PROCEDURE — C9113 INJ PANTOPRAZOLE SODIUM, VIA: HCPCS | Performed by: INTERNAL MEDICINE

## 2018-08-28 PROCEDURE — 25000003 PHARM REV CODE 250: Performed by: FAMILY MEDICINE

## 2018-08-28 PROCEDURE — 99233 SBSQ HOSP IP/OBS HIGH 50: CPT | Mod: ,,, | Performed by: INTERNAL MEDICINE

## 2018-08-28 PROCEDURE — 36415 COLL VENOUS BLD VENIPUNCTURE: CPT

## 2018-08-28 PROCEDURE — 25000003 PHARM REV CODE 250: Performed by: SURGERY

## 2018-08-28 PROCEDURE — 63600175 PHARM REV CODE 636 W HCPCS: Performed by: SURGERY

## 2018-08-28 PROCEDURE — 27000221 HC OXYGEN, UP TO 24 HOURS

## 2018-08-28 PROCEDURE — 80053 COMPREHEN METABOLIC PANEL: CPT

## 2018-08-28 PROCEDURE — 94760 N-INVAS EAR/PLS OXIMETRY 1: CPT

## 2018-08-28 PROCEDURE — 63600175 PHARM REV CODE 636 W HCPCS: Performed by: INTERNAL MEDICINE

## 2018-08-28 RX ADMIN — CARBIDOPA AND LEVODOPA 1 TABLET: 25; 100 TABLET ORAL at 09:08

## 2018-08-28 RX ADMIN — PRAMIPEXOLE DIHYDROCHLORIDE 0.25 MG: 0.12 TABLET ORAL at 09:08

## 2018-08-28 RX ADMIN — HYDROCODONE BITARTRATE AND ACETAMINOPHEN 1 TABLET: 7.5; 325 TABLET ORAL at 09:08

## 2018-08-28 RX ADMIN — CARBIDOPA AND LEVODOPA 1 TABLET: 25; 100 TABLET ORAL at 06:08

## 2018-08-28 RX ADMIN — PIPERACILLIN SODIUM AND TAZOBACTAM SODIUM 4.5 G: 4; .5 INJECTION, POWDER, LYOPHILIZED, FOR SOLUTION INTRAVENOUS at 09:08

## 2018-08-28 RX ADMIN — PIPERACILLIN SODIUM AND TAZOBACTAM SODIUM 4.5 G: 4; .5 INJECTION, POWDER, LYOPHILIZED, FOR SOLUTION INTRAVENOUS at 01:08

## 2018-08-28 RX ADMIN — PANTOPRAZOLE SODIUM 40 MG: 40 INJECTION, POWDER, LYOPHILIZED, FOR SOLUTION INTRAVENOUS at 09:08

## 2018-08-28 RX ADMIN — RIVASTIGMINE 1 PATCH: 4.6 PATCH, EXTENDED RELEASE TRANSDERMAL at 09:08

## 2018-08-28 RX ADMIN — LEVOTHYROXINE SODIUM 75 MCG: 75 TABLET ORAL at 06:08

## 2018-08-28 NOTE — DISCHARGE SUMMARY
[facility] Medicine  Discharge Summary      Patient Name: Eddy Cunningham  MRN: [id number]  Admission Date: 8/22/2018  Hospital Length of Stay: 6 days  Discharge Date and Time:  08/28/2018 10:46 AM  Attending Physician: Kunal Sandoval MD   Discharging Provider: Courtney Arndt NP  Primary Care Provider: Ayaka Pollard MD      HPI:   78 year old female comes in from NH because of fevers and aspiration. She is tube fed there. 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 She was recently here and treated for a MDI ecoli UTI. Her urine today is once again with leukocytes but cough, fever and witnessed aspiration is what brought her in. She was found to have BPs in the 70s in the ER and was started on levophed. At baseline she is mainly bedridden.    * No surgery found *      Hospital Course:   8/24/18  She is still on levophed. On IV vancomycin, zosyn  And cipro.  Maintaining SBP in 100s.  Also on IVF ;   No tube feedings for two days .  Awake and responsive   Family by bedside ; discussed care and plans for today .  Wbcs is  Down.    8/25/18  Still on levophed  Having loose Bms  C/o belly pain   Much more alert this am.  Still in icu requiring pressors   Her potasium is low.  Her PEG tube was fixed yesterday .  Started on feedings.    8/26/18  Still having diarrhea   c diff toxin negative   Still on levophed ;trying to get her off;  15 L positive   Her feedings are bumped up .    8/27/18  C. Diff antigen is positive but toxin and PCR is negative. Off of levophed. She is tolerating her tube feeds. Remains on Zosyn for aspiratory PNA. Sputuem cx grew e. Coli and proteus.     8/28/18 Day 7 Zosyn; Tmax 99.3   BP much better 122/58, 134/61 off pressors, Creat stable H&H 8.7/29.2  O2 sat 94-95%, last CXR 8/24   PT had 3 BMs overnight. Tolerating PEG tube feedings. No residuals overnight- 40ml this am.   States she is feeling  good. Alert and cooperative       Consults:   Consults (From admission, onward)        Status Ordering Provider     Inpatient consult to General Surgery  Once     Provider:  Scottie Pratt MD    Completed KIMI THIBODEAUX     Inpatient consult to [facility]  Once     Provider:  [facility]    Completed ASTRID ARENAS     IP consult to case management  Once     Provider:  (Not yet assigned)    Completed CARMEN STRICKLAND          No new Assessment & Plan notes have been filed under this hospital service since the last note was generated.  Service: Hospital Medicine    Final Active Diagnoses:    Diagnosis Date Noted POA    PRINCIPAL PROBLEM:  Sepsis due to Gram negative bacteria [A41.50] 05/17/2017 Yes    Decubitus ulcer [L89.90] 08/27/2018 Yes    Diarrhea of presumed infectious origin [R19.7] 08/25/2018 No    Severe protein-calorie malnutrition [E43] 07/23/2018 Yes    Decubitus ulcer of sacral region, unstageable [L89.150] 07/22/2018 Yes    Acute cystitis without hematuria [N30.00] 05/17/2017 Yes    Aspiration into airway [T17.908A] 05/17/2017 Yes    Memory disorder [R41.3] 03/14/2013 Yes    Parkinson's disease [G20] 11/13/2012 Yes    Mood disorder [F39] 11/13/2012 Yes      Problems Resolved During this Admission:       Discharged Condition: fair    Disposition: Home or Self Care    Follow Up:  Follow-up Information     Ayaka Pollard MD.    Specialty:  Family Medicine  Why:  Outpatient Services  Contact information:  [id number] [address] [address] [phone]                 Patient Instructions:   No discharge procedures on file.    Significant Diagnostic Studies:   Significant Labs:   Blood Culture: No results for input(s): LABBLOO in the last 48 hours.  CBC:       Recent Labs   Lab  08/27/18   0330   WBC  10.00   HGB  8.7*   HCT  29.2*   PLT  391*      CMP:       Recent Labs   Lab  08/27/18   0330   NA  141   K  3.7   CL  108   CO2  25   GLU  144*   BUN  15   CREATININE  0.5    CALCIUM  8.3*   PROT  5.3*   ALBUMIN  1.8*   BILITOT  0.5   ALKPHOS  121   AST  18   ALT  6*   ANIONGAP  8   EGFRNONAA  >60      Respiratory culture 8/22/18:  Respiratory Culture ESCHERICHIA COLI ESBL   Many       Respiratory Culture PROTEUS MIRABILIS   Moderate   Normal respiratory roxane also present         8/24 CXR- Continued mixed opacities are seen throughout the right lung with more focal airspace opacity in the right midlung.  The left lung is grossly clear.  The heart is normal in size.  Calcified atheromatous disease affects the aorta.  Age-appropriate degenerative changes affect the skeleton.   All pertinent labs within the past 24 hours have been reviewed.        Pending Diagnostic Studies:     None         Medications:  Reconciled Home Medications:      Medication List      START taking these medications    nut.tx.comp. immune systm,reg 0.09 gram- 1.5 kcal/mL Liqd  Commonly known as:  IMPACT PEPTIDE 1.5 HUSAM  55 mLs by FEEDING TUBE route 4 (four) times daily. 1.5 GR 55ml/hr  160ml FWF Q4- check residuals  Keep HOB >30 degress  30 day supply        CONTINUE taking these medications    aspirin 81 MG Chew  1 tablet (81 mg total) by Per G Tube route once daily.     carbidopa-levodopa  mg  mg per tablet  Commonly known as:  SINEMET  1 tablet by Per G Tube route 5 (five) times daily.     collagenase ointment  Apply topically once daily.     docusate sodium 100 MG capsule  Commonly known as:  COLACE  1 capsule (100 mg total) by Per G Tube route once daily.     EXELON 4.6 mg/24 hr Pt24  Generic drug:  rivastigmine  Place 1 patch onto the skin once daily.     HYDROcodone-acetaminophen 7.5-325 mg per tablet  Commonly known as:  NORCO  Take 1 tablet by mouth every 12 (twelve) hours as needed for Pain.     levothyroxine 75 MCG tablet  Commonly known as:  SYNTHROID  1 tablet (75 mcg total) by Per G Tube route before breakfast.     LIQUACEL ORAL  30 mLs 2 (two) times daily.     omeprazole 40 MG  capsule  Commonly known as:  PRILOSEC  Open 40 mg capsule into g-tube once daily     ONE DAILY MULTIVITAMIN per tablet  Generic drug:  multivitamin  1 tablet once daily.     potassium chloride 10% 20 mEq/15 mL oral solution  Commonly known as:  KAYCIEL  15 mLs (20 mEq total) by Per G Tube route 2 (two) times daily.     pramipexole 0.25 MG tablet  Commonly known as:  MIRAPEX  1 tablet (0.25 mg total) by Per G Tube route 3 (three) times daily.     ranitidine 150 MG capsule  Commonly known as:  ZANTAC  75 mg 2 (two) times daily.     senna 8.6 mg tablet  Commonly known as:  SENOKOT  1 tablet by Per G Tube route once daily.     tiZANidine 2 mg Cap  Take 4 mg by mouth nightly as needed.     traZODone 50 MG tablet  Commonly known as:  DESYREL  1 tablet (50 mg total) by Per G Tube route every evening.     VITAMIN C 500 MG tablet  Generic drug:  ascorbic acid (vitamin C)  500 mg once daily.        STOP taking these medications    JEVITY 1.5 HUSAM ORAL     JEVITY ORAL        ASK your doctor about these medications    furosemide 40 MG tablet  Commonly known as:  LASIX  Take 40 mg by mouth once daily.            Indwelling Lines/Drains at time of discharge:   Lines/Drains/Airways     Drain                 Urethral Catheter 07/21/18 2000 Double-lumen 37 days         Gastrostomy/Enterostomy 08/11/18 0600 Percutaneous endoscopic gastrostomy (PEG) LUQ 17 days          Pressure Ulcer                 Pressure Injury 08/22/18 2215 Coccyx 5 days                Time spent on the discharge of patient: 20 minutes  Patient was seen and examined on the date of discharge and determined to be suitable for discharge.         Courtney Arndt, NP  Department of Hospital Medicine  [facility]

## 2018-08-28 NOTE — PLAN OF CARE
08/28/18 1005   Medicare Message   Important Message from Medicare regarding Discharge Appeal Rights Given to patient/caregiver;Explained to patient/caregiver;Signed/date by patient/caregiver   Date IMM was signed 08/28/18   Time IMM was signed 1005

## 2018-08-28 NOTE — ASSESSMENT & PLAN NOTE
Cx from sputum: e. Coli and proteus. Due to aspiration PNA  On levophed - discussed with daughter, will not escalate pressors but will cont on levophed and wean. 8/27/18 she is off pressors  Lovenox.  Replace zee.  Tube feeds restarted  Cont IV Zosyn- day 7

## 2018-08-28 NOTE — PROGRESS NOTES
CALL MADE PT'S DAUGHTER INFORMED OF TRANS BACK [facility] GENE.PT'S ALSO AWARE OF W/O ANY QUESTIONS OR CONCERNS

## 2018-08-28 NOTE — NURSING
Liquid stools dark brown.  Depends diaper changed x2.  Bed changed.  Reposition to left side.  Tolerated movement turning, linen change without SOB, c/o pain. Dressing sacral wound dislodged and removed with linen.

## 2018-08-28 NOTE — PROGRESS NOTES
[facility] Medicine  Progress Note    Patient Name: Eddy Cunningham  MRN: [id number]  Patient Class: IP- Inpatient   Admission Date: 8/22/2018  Length of Stay: 6 days  Attending Physician: Kunal Sandoval MD  Primary Care Provider: Ayaka Pollard MD        Subjective:     Principal Problem:Sepsis due to Gram negative bacteria    HPI:  78 year old female comes in from NH because of fevers and aspiration. She is tube fed there. 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 She was recently here and treated for a MDI ecoli UTI. Her urine today is once again with leukocytes but cough, fever and witnessed aspiration is what brought her in. She was found to have BPs in the 70s in the ER and was started on levophed. At baseline she is mainly bedridden.    Hospital Course:  8/24/18  She is still on levophed. On IV vancomycin, zosyn  And cipro.  Maintaining SBP in 100s.  Also on IVF ;   No tube feedings for two days .  Awake and responsive   Family by bedside ; discussed care and plans for today .  Wbcs is  Down.    8/25/18  Still on levophed  Having loose Bms  C/o belly pain   Much more alert this am.  Still in icu requiring pressors   Her potasium is low.  Her PEG tube was fixed yesterday .  Started on feedings.    8/26/18  Still having diarrhea   c diff toxin negative   Still on levophed ;trying to get her off;  15 L positive   Her feedings are bumped up .    8/27/18  C. Diff antigen is positive but toxin and PCR is negative. Off of levophed. She is tolerating her tube feeds. Remains on Zosyn for aspiratory PNA. Sputuem cx grew e. Coli and proteus.     8/28/18 Day 7 Zosyn; Tmax 99.3   BP much better 122/58, 134/61 off pressors, Creat stable H&H 8.7/29.2  O2 sat 94-95%, last CXR 8/24   PT had 3 BMs overnight. Tolerating PEG tube feedings. No residuals overnight- 40ml this am.   States she is feeling good. Alert and  cooperative      Interval History: no acute events. Doing better.     Review of Systems   Unable to perform ROS: Dementia   but she does report now no chest pain   No cough   Objective:     Vital Signs (Most Recent):  Temp: 97.2 °F (36.2 °C) (08/28/18 0359)  Pulse: 75 (08/28/18 0600)  Resp: 16 (08/28/18 0359)  BP: 134/61 (08/28/18 0359)  SpO2: 95 % (08/28/18 0359) Vital Signs (24h Range):  Temp:  [94.6 °F (34.8 °C)-99.3 °F (37.4 °C)] 97.2 °F (36.2 °C)  Pulse:  [70-91] 75  Resp:  [16-20] 16  SpO2:  [94 %-99 %] 95 %  BP: ()/(54-65) 134/61     Weight: 66.2 kg (145 lb 15.1 oz)(RD reviewed, no new)  Body mass index is 22.19 kg/m².    Intake/Output Summary (Last 24 hours) at 8/28/2018 0745  Last data filed at 8/28/2018 0643  Gross per 24 hour   Intake 1120 ml   Output 1301 ml   Net -181 ml      Physical Exam   Constitutional: She appears well-developed and well-nourished. No distress.   HENT:   Head: Normocephalic and atraumatic.   Right Ear: External ear normal.   Left Ear: External ear normal.   Eyes: Conjunctivae and EOM are normal. Pupils are equal, round, and reactive to light. Right eye exhibits no discharge. Left eye exhibits no discharge.   Neck: Neck supple. No tracheal deviation present.   Cardiovascular: Normal rate and regular rhythm. Exam reveals no friction rub.   No murmur heard.  Pulmonary/Chest: Effort normal and breath sounds normal. No respiratory distress. She has no wheezes.   rhonchi   Abdominal: Soft. Bowel sounds are normal. She exhibits no distension. There is no tenderness.   Neurological: She is alert. No cranial nerve deficit.   Oriented to person   Skin: Skin is warm and dry.   Psychiatric: She has a normal mood and affect. Her behavior is normal.   Nursing note and vitals reviewed.      Significant Labs:   Blood Culture: No results for input(s): LABBLOO in the last 48 hours.  CBC:   Recent Labs   Lab  08/27/18   0330   WBC  10.00   HGB  8.7*   HCT  29.2*   PLT  391*     CMP:   Recent  Labs   Lab  08/27/18   0330   NA  141   K  3.7   CL  108   CO2  25   GLU  144*   BUN  15   CREATININE  0.5   CALCIUM  8.3*   PROT  5.3*   ALBUMIN  1.8*   BILITOT  0.5   ALKPHOS  121   AST  18   ALT  6*   ANIONGAP  8   EGFRNONAA  >60     Respiratory culture 8/22/18:  Respiratory Culture ESCHERICHIA COLI ESBL   Many       Respiratory Culture PROTEUS MIRABILIS   Moderate   Normal respiratory roxane also present          All pertinent labs within the past 24 hours have been reviewed.    Significant Imaging: I have reviewed all pertinent imaging results/findings within the past 24 hours.    Assessment/Plan:      * Sepsis due to Gram negative bacteria    Cx from sputum: e. Coli and proteus. Due to aspiration PNA  On levophed - discussed with daughter, will not escalate pressors but will cont on levophed and wean. 8/27/18 she is off pressors  Lovenox.  Replace zee.  Tube feeds restarted  Cont IV Zosyn- day 7            Decubitus ulcer              Diarrhea of presumed infectious origin    Check C diff NEGATIVE   Decrease feedings   Replace potasium    Lomotil ordered---WATCH VERY CLOSELY FOR CONSTIPATION AS DEVELOPED IMPACTION LAST ADMISSION          Severe sepsis    See above.  Patient is DNR.  La post disucssed and redone today.        Severe protein-calorie malnutrition    Start her feedings --tolerating well  8/28 Per nutrition recs; TF of:Impact Peptide 1.5 GR 55ml/hr; 160ml FWF Q4  Monitor residuals;           Decubitus ulcer of sacral region, unstageable    Wound care consult          Acute cystitis without hematuria    Zee in place from NH.  Will replace today.  Vanc/zosyn and cipro ordered.  Most recent uti - e coli      8/26/18  DC cipro     8/27  Cont IV Zosyn for respiratory issues. Urine cx proteus sensitive to zosyn as well  8/28 day 7 zosyn        Aspiration into airway    PEG fed.  Coughing on exam.  Takes nothing by mouth.  No need for swallow study.    Cont pulm toileting  Cont IV Zosyn for aspiration  pneumonia based on cx  O2 sats stable on 2L NC          Memory disorder    Stable  Cont home meds: exelon patch          Mood disorder    Stable            Parkinson's disease    Debilitating.  Bed bound.  Cont sinemet and mirapex  At baseline  PT for ROM            VTE Risk Mitigation (From admission, onward)        Ordered     IP VTE HIGH RISK PATIENT  Once      08/23/18 0028     Place sequential compression device  Until discontinued      08/23/18 0028              Hayder Khalil MD  Department of Hospital Medicine   [facility]

## 2018-08-28 NOTE — ASSESSMENT & PLAN NOTE
Arvizu in place from NH.  Will replace today.  Vanc/zosyn and cipro ordered.  Most recent uti - e coli      8/26/18  DC cipro     8/27  Cont IV Zosyn for respiratory issues. Urine cx proteus sensitive to zosyn as well  8/28 day 7 zosyn

## 2018-08-28 NOTE — PLAN OF CARE
Problem: Patient Care Overview  Goal: Plan of Care Review  Outcome: Ongoing (interventions implemented as appropriate)  Informed of PEG feed flush/Impact at 55 cc/hr kangaroo pump, towel rolls for hands to prevent further contractures, turning every 2 hours, contact isolation.

## 2018-08-28 NOTE — ASSESSMENT & PLAN NOTE
Start her feedings --tolerating well  8/28 Per nutrition recs; TF of:Impact Peptide 1.5 GR 55ml/hr; 160ml FWF Q4  Monitor residuals;

## 2018-08-28 NOTE — NURSING
1000 cc Impact Peptide 1.5 with new tubing and flush to PEG.  Kangaroo pump at 55 cc/hr with water flush 160 cc every 4 hours.  No residual patent.

## 2018-08-28 NOTE — PROGRESS NOTES
ASST W/DIAPER CHANGE MED BROWN BM NOTED.FLUSHED & CLAMPED OFF PEG TUB,EMPTIED PUENTES & RECORDED,O2 @3L/M VIA NC WELL APPLIED & WILL REMAIN FOR TRANS.HOB UP,BELONGINGS PACKED.PT'S READY FOR TRANS.

## 2018-08-28 NOTE — PROGRESS NOTES
Nursing Notes    Impact Peptide 1.5 added 1 Liter with new tubing.  PEG flushed patent.      Huddle Comments

## 2018-08-28 NOTE — SUBJECTIVE & OBJECTIVE
Interval History: no acute events. Doing better.     Review of Systems   Unable to perform ROS: Dementia   but she does report now no chest pain   No cough   Objective:     Vital Signs (Most Recent):  Temp: 97.2 °F (36.2 °C) (08/28/18 0359)  Pulse: 75 (08/28/18 0600)  Resp: 16 (08/28/18 0359)  BP: 134/61 (08/28/18 0359)  SpO2: 95 % (08/28/18 0359) Vital Signs (24h Range):  Temp:  [94.6 °F (34.8 °C)-99.3 °F (37.4 °C)] 97.2 °F (36.2 °C)  Pulse:  [70-91] 75  Resp:  [16-20] 16  SpO2:  [94 %-99 %] 95 %  BP: ()/(54-65) 134/61     Weight: 66.2 kg (145 lb 15.1 oz)(RD reviewed, no new)  Body mass index is 22.19 kg/m².    Intake/Output Summary (Last 24 hours) at 8/28/2018 0745  Last data filed at 8/28/2018 0643  Gross per 24 hour   Intake 1120 ml   Output 1301 ml   Net -181 ml      Physical Exam   Constitutional: She appears well-developed and well-nourished. No distress.   HENT:   Head: Normocephalic and atraumatic.   Right Ear: External ear normal.   Left Ear: External ear normal.   Eyes: Conjunctivae and EOM are normal. Pupils are equal, round, and reactive to light. Right eye exhibits no discharge. Left eye exhibits no discharge.   Neck: Neck supple. No tracheal deviation present.   Cardiovascular: Normal rate and regular rhythm. Exam reveals no friction rub.   No murmur heard.  Pulmonary/Chest: Effort normal and breath sounds normal. No respiratory distress. She has no wheezes.   rhonchi   Abdominal: Soft. Bowel sounds are normal. She exhibits no distension. There is no tenderness.   Neurological: She is alert. No cranial nerve deficit.   Oriented to person   Skin: Skin is warm and dry.   Psychiatric: She has a normal mood and affect. Her behavior is normal.   Nursing note and vitals reviewed.      Significant Labs:   Blood Culture: No results for input(s): LABBLOO in the last 48 hours.  CBC:   Recent Labs   Lab  08/27/18   0330   WBC  10.00   HGB  8.7*   HCT  29.2*   PLT  391*     CMP:   Recent Labs   Lab   08/27/18   0330   NA  141   K  3.7   CL  108   CO2  25   GLU  144*   BUN  15   CREATININE  0.5   CALCIUM  8.3*   PROT  5.3*   ALBUMIN  1.8*   BILITOT  0.5   ALKPHOS  121   AST  18   ALT  6*   ANIONGAP  8   EGFRNONAA  >60     Respiratory culture 8/22/18:  Respiratory Culture ESCHERICHIA COLI ESBL   Many       Respiratory Culture PROTEUS MIRABILIS   Moderate   Normal respiratory roxane also present          All pertinent labs within the past 24 hours have been reviewed.    Significant Imaging: I have reviewed all pertinent imaging results/findings within the past 24 hours.

## 2018-08-29 NOTE — PLAN OF CARE
08/29/18 0910   Final Note   Assessment Type Final Discharge Note   Discharge Disposition Int Care Fac   What phone number can be called within the next 1-3 days to see how you are doing after discharge? [phone]   Hospital Follow Up  Appt(s) scheduled? Yes   Discharge plans and expectations educations in teach back method with documentation complete? Yes   Right Care Referral Info   Post Acute Recommendation IRF   Referral Type assisted   Facility Name [address]  [address]

## 2018-12-10 NOTE — PT/OT/SLP PROGRESS
Physical Therapy Treatment    Patient Name:  Eddy Cunningham   MRN:  [id number]    Recommendations:     Discharge Recommendations:  nursing facility, basic   Discharge Equipment Recommendations: none   Barriers to discharge: None    Assessment:     Eddy Cunningham is a 78 y.o. female admitted with a medical diagnosis of Acute cystitis without hematuria.  She presents with the following impairments/functional limitations:  weakness, gait instability, decreased upper extremity function, decreased lower extremity function, impaired balance, impaired endurance, impaired functional mobilty, impaired self care skills, decreased safety awareness .  Pt. Has achieved PT POC goals and anticipates D/C to NH today.  Please see PT discharge note.    Rehab Prognosis:  Fair; patient would benefit from acute skilled PT services to address these deficits and reach maximum level of function.      Recent Surgery: * No surgery found *      Plan:     During this hospitalization, patient to be seen  (anticipated d/c 6/14/2018 to NH) to address the above listed problems via therapeutic exercises  · Plan of Care Expires:  06/14/18 (following a.m. PT tx. session.)   Plan of Care Reviewed with: patient    Subjective     Communicated with patient prior to session.  Patient found supine in bed upon PT entry to room, agreeable to treatment.      Chief Complaint: None voiced  Patient comments/goals: Minimal pt. verbalization.  Pain/Comfort:  · Pain Rating 1: 0/10  · Pain Rating Post-Intervention 1: 0/10    Patients cultural, spiritual, Hindu conflicts given the current situation:      Objective:     Patient found with: peripheral IV     General Precautions: Standard, fall   Orthopedic Precautions:N/A   Braces: N/A       AM-PAC 6 CLICK MOBILITY  Turning over in bed (including adjusting bedclothes, sheets and blankets)?: 2  Sitting down on and standing up from a chair with arms (e.g., wheelchair, bedside commode, etc.): 1  Moving from lying  See MyChart comments on back to sitting on the side of the bed?: 1  Moving to and from a bed to a chair (including a wheelchair)?: 1  Need to walk in hospital room?: 1  Climbing 3-5 steps with a railing?: 1  Total Score: 7       Therapeutic Activities and Exercises:   Ther. Ex.:  PT rendered gentle ther. Ex. of all four extremities in available planes of motion, at major joints, while pt. supine in bed, for N.M. tone and fluid dynamics.  1-on-1 BLE HC stretches performed with pt.      Patient left HOB elevated with all lines intact, call button in reach and RN notified..    GOALS:    Physical Therapy Goals     Not on file          Multidisciplinary Problems (Resolved)        Problem: Physical Therapy Goal    Goal Priority Disciplines Outcome Goal Variances Interventions   Physical Therapy Goal   (Resolved)     PT/OT, PT Outcome(s) achieved     Description:  Goals to be met by: upon D/C from facility     Patient will increase functional independence with mobility by performin. Pt will tolerate PROM to BUE/BLE at all joints in available planes of motion with rest breaks as needed to prevent contractures and skin breakdown.                       Time Tracking:     PT Received On: 18  PT Start Time: 1110     PT Stop Time: 1125  PT Total Time (min): 15 min     Billable Minutes: Therapeutic Exercise 15 minutes    Treatment Type: Treatment  PT/PTA: PT           Karlo Gonsalez, PT  2018

## 2019-03-05 ENCOUNTER — HOSPITAL ENCOUNTER (EMERGENCY)
Facility: HOSPITAL | Age: 79
Discharge: HOME | End: 2019-03-05
Payer: MEDICARE

## 2019-03-05 VITALS
HEART RATE: 76 BPM | OXYGEN SATURATION: 97 % | RESPIRATION RATE: 18 BRPM | DIASTOLIC BLOOD PRESSURE: 78 MMHG | SYSTOLIC BLOOD PRESSURE: 132 MMHG | TEMPERATURE: 97 F

## 2019-03-05 DIAGNOSIS — Z46.6 URINARY CATHETER CHANGE REQUIRED: Primary | ICD-10-CM

## 2019-03-05 PROCEDURE — 51702 INSERT TEMP BLADDER CATH: CPT

## 2019-03-05 PROCEDURE — 99285 EMERGENCY DEPT VISIT HI MDM: CPT | Mod: 25

## 2019-03-05 NOTE — ED PROVIDER NOTES
Encounter Date: 3/5/2019       History     Chief Complaint   Patient presents with    Catheter Change     Patient is 78-year-old white female who is in need of routine Arvizu catheter change, a resident at the [facility].  Patient presents to the ED for catheter change.  No problems are reported.          Review of patient's allergies indicates:   Allergen Reactions    Oxycodone      Hallucination    Sulfa (sulfonamide antibiotics)      Other reaction(s): when on contact     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)     Parkinson disease     RLS (restless legs syndrome)     Stress incontinence     Venous stasis ulcers      Past Surgical History:   Procedure Laterality Date    CARPAL TUNNEL RELEASE      Right    CHOLECYSTECTOMY      DEBRIDEMENT OF SACRAL DECUBITUS SKIN, SUBCUTANEOUS TISSUE, FAT,  AND FASCIA N/A 7/26/2018    Performed by Fabio Serna MD at [facility] OR    GASTROSTOMY TUBE PLACEMENT      PARTIAL HYSTERECTOMY      ROTATOR CUFF REPAIR      TONSILLECTOMY, ADENOIDECTOMY      tonsiles    TOTAL KNEE ARTHROPLASTY       No family history on file.  Social History     Tobacco Use    Smoking status: Former Smoker    Smokeless tobacco: Never Used   Substance Use Topics    Alcohol use: No    Drug use: No     Review of Systems   Constitutional: Negative for fever.   HENT: Negative for congestion, ear pain, rhinorrhea, sore throat and trouble swallowing.    Eyes: Negative for pain.   Respiratory: Negative for cough, shortness of breath and wheezing.    Cardiovascular: Negative for chest pain, palpitations and leg swelling.   Gastrointestinal: Negative for abdominal pain, constipation,  diarrhea and nausea.   Genitourinary: Positive for difficulty urinating. Negative for dysuria, flank pain, frequency, hematuria and urgency.   Musculoskeletal: Negative for arthralgias, back pain, myalgias and neck pain.   Skin: Negative for rash and wound.   Neurological: Negative for speech difficulty, weakness and headaches.   Hematological: Does not bruise/bleed easily.       Physical Exam     Initial Vitals [03/05/19 0718]   BP Pulse Resp Temp SpO2   132/78 76 18 97.4 °F (36.3 °C) 97 %      MAP       --         Physical Exam    Constitutional: No distress.   HENT:   Head: Normocephalic and atraumatic.   Nose: Nose normal.   Mouth/Throat: Oropharynx is clear and moist.   Eyes: Conjunctivae and EOM are normal. Pupils are equal, round, and reactive to light.   Neck: Normal range of motion. Neck supple.   Cardiovascular: Normal rate, regular rhythm, normal heart sounds and intact distal pulses.   Pulmonary/Chest: Breath sounds normal.   Abdominal: Soft. Bowel sounds are normal. There is no rebound.   Neurological: She is alert.   Skin: Skin is warm and dry.         ED Course   Procedures  Labs Reviewed - No data to display       Imaging Results    None                               Clinical Impression:       ICD-10-CM ICD-9-CM   1. Urinary catheter change required Z46.6 V53.6         Disposition:   Disposition: Discharged  Condition: Stable                        Darian Harrison Jr., MD  03/05/19 8355

## 2019-03-05 NOTE — ED NOTES
The patient was seen, evaluated and discharged. All questions were asked and/or answered and the pt was discharged with written and verbal instructions.  Discharged to home/self care.    - Condition at discharge: Good  - Mode of Discharge: [facility]  - The patient left the ED accompanied by EMS  - The discharge instructions were discussed with the patient.  - Report called to [facility], spoke to ERINN Masters

## 2019-04-05 NOTE — ED TRIAGE NOTES
"Assumed care of 77 y.o. female presents to ER ED 01/ED 01A   Chief Complaint   Patient presents with    Emesis    Abdominal Pain   pt brought to ER from [facility] by EMS. Nurse reports vomit "coffee ground like color." No acute distress noted.     "
no

## 2019-06-09 ENCOUNTER — HOSPITAL ENCOUNTER (EMERGENCY)
Facility: HOSPITAL | Age: 79
Discharge: HOME | End: 2019-06-09
Attending: SURGERY
Payer: MEDICARE

## 2019-06-09 VITALS
HEART RATE: 73 BPM | WEIGHT: 141 LBS | RESPIRATION RATE: 20 BRPM | BODY MASS INDEX: 24.98 KG/M2 | TEMPERATURE: 97 F | HEIGHT: 63 IN | DIASTOLIC BLOOD PRESSURE: 60 MMHG | OXYGEN SATURATION: 96 % | SYSTOLIC BLOOD PRESSURE: 118 MMHG

## 2019-06-09 DIAGNOSIS — K94.23 PEG TUBE MALFUNCTION: Primary | ICD-10-CM

## 2019-06-09 PROCEDURE — 25500020 PHARM REV CODE 255: Performed by: SURGERY

## 2019-06-09 PROCEDURE — 99284 EMERGENCY DEPT VISIT MOD MDM: CPT | Mod: 25

## 2019-06-09 PROCEDURE — 43762 RPLC GTUBE NO REVJ TRC: CPT

## 2019-06-09 RX ADMIN — DIATRIZOATE MEGLUMINE AND DIATRIZOATE SODIUM 30 ML: 600; 100 SOLUTION ORAL; RECTAL at 06:06

## 2019-06-09 NOTE — ED PROVIDER NOTES
[facility]                                                 Chief Complaint  79 y.o. female with PEG TUBE ISSUE (left upper Quad)    History of Present Illness  Eddy Cunningham presents to the emergency room with peg tube malfunction  The patient is PEG tube balloon ruptured per nursing home staff, now dislodged  Patient presents with no PEG tube in at this time, nursing home asking to replace  Patient has no obvious other issues with severe dementia, she can give no history  PEG tube replaced in the emergency room without problem or difficulty this p.m.    The history is provided by the patient   device was not used during this ER visit    Past Medical History   -- COPD (chronic obstructive pulmonary disease)     -- Dementia     -- Depression     -- Elevated C-reactive protein (CRP)     -- Falls frequently     -- Homocystinuria     -- Hypertension     -- Hypopotassemia     -- Hypothyroidism     -- Narcolepsy without cataplexy     -- Parkinson disease     -- RLS (restless legs syndrome)     -- Stress incontinence     -- Venous stasis ulcers        Past Surgical History   -- CARPAL TUNNEL RELEASE       -- CHOLECYSTECTOMY       -- PARTIAL HYSTERECTOMY       -- ROTATOR CUFF REPAIR       -- TONSILLECTOMY, ADENOIDECTOMY       -- TOTAL KNEE ARTHROPLASTY          Review of patient's allergies   -- Oxycodone     -- Sulfa (sulfonamide antibiotics)      I have reviewed all of this patient's past medical, surgical, family, and social   histories as well as active allergies and medications documented in the  electronic medical record    Review of Systems and Physical Exam      Review of Systems  -- patient with severe dementia and cannot give any history    Vital Signs  Her tympanic temperature is 97.2 °F (36.2 °C).   Her blood pressure is 120/57 and her pulse is 72.   Her respiration is 20 and oxygen saturation is 96%.     Physical Exam  -- Nursing note and vitals reviewed  --  Constitutional: Appears well-developed and well-nourished  -- Head: Atraumatic. Normocephalic. No obvious abnormality  -- Eyes: Pupils are equal and reactive to light. Normal conjunctiva and lids  -- Nose: Nose normal in appearance, nares grossly normal. No discharge  -- Throat: Mucous membranes moist, pharynx normal, normal tonsils. No lesions   -- Ears: External ears and TM normal bilaterally. Normal hearing and no drainage  -- Neck: Normal range of motion. Neck supple. No masses, trachea midline  -- Cardiac: Normal rate, regular rhythm and normal heart sounds  -- Pulmonary: Normal respiratory effort, breath sounds clear to auscultation  -- Abdominal: Soft, no tenderness. Normal bowel sounds. Normal liver edge  -- Musculoskeletal: Normal range of motion, no effusions. Joints stable   -- Neurological: No focal deficits. Showed good interaction with staff  -- Skin:  PEG tube skin site is clean dry and intact    Emergency Room Course      Treatment and Evaluation  -- PEG tube replaced through the previous PEG tube skin site on left abdomen  -- Site was verified before the start of the procedure  -- No complications were noted from the procedure  -- The patient tolerated the procedure well   -- KUB with Gastrografin performed; peg tube is in place    Diagnosis  -- The encounter diagnosis was PEG tube malfunction.    Disposition and Plan  -- Disposition: home  -- Condition: stable  -- Follow-up: Patient to follow up with Ayaka Pollard MD in 1-2 days.  -- I advised the patient that we have found no life threatening condition today  -- At this time, I believe the patient is clinically stable for discharge.   -- The patient acknowledges that close follow up with a MD is required   -- Patient agrees to comply with all instruction and direction given in the ER    This note is dictated on M*Modal word recognition program.  There are word recognition mistakes that are occasionally missed on review.          Ivan GUZMÁN  MD N[place]  06/09/19 0092

## 2019-06-09 NOTE — ED TRIAGE NOTES
Pt presents via [facility] from nursing home with C/O peg tube balloon not coming out when peg tube was removed

## 2019-06-10 NOTE — ED NOTES
The patient is awake, alert, calm. Airway is open and patent, respirations are spontaneous, normal respiratory effort and rate noted, full ROM in all extremities, no distress noted, resting comfortably. No change from previous assessment. Bed in low, locked position, SR x2. Pt able to change position independently. Will continue to monitor.

## 2019-06-15 ENCOUNTER — LAB VISIT (OUTPATIENT)
Dept: LAB | Facility: HOSPITAL | Age: 79
End: 2019-06-15
Attending: INTERNAL MEDICINE
Payer: MEDICARE

## 2019-06-15 DIAGNOSIS — N39.0 UTI (URINARY TRACT INFECTION): Primary | ICD-10-CM

## 2019-06-15 LAB
BACTERIA #/AREA URNS HPF: ABNORMAL /HPF
BILIRUB UR QL STRIP: ABNORMAL
CLARITY UR: ABNORMAL
COLOR UR: YELLOW
GLUCOSE UR QL STRIP: NEGATIVE
HGB UR QL STRIP: ABNORMAL
HYALINE CASTS #/AREA URNS LPF: 0 /LPF
KETONES UR QL STRIP: ABNORMAL
LEUKOCYTE ESTERASE UR QL STRIP: ABNORMAL
MICROSCOPIC COMMENT: ABNORMAL
NITRITE UR QL STRIP: NEGATIVE
PH UR STRIP: >8 [PH] (ref 5–8)
PROT UR QL STRIP: ABNORMAL
RBC #/AREA URNS HPF: 60 /HPF (ref 0–4)
SP GR UR STRIP: 1.02 (ref 1–1.03)
URN SPEC COLLECT METH UR: ABNORMAL
UROBILINOGEN UR STRIP-ACNC: 1 EU/DL
WBC #/AREA URNS HPF: >100 /HPF (ref 0–5)

## 2019-06-15 PROCEDURE — 87086 URINE CULTURE/COLONY COUNT: CPT

## 2019-06-15 PROCEDURE — 81000 URINALYSIS NONAUTO W/SCOPE: CPT

## 2019-06-17 LAB
BACTERIA UR CULT: NORMAL
BACTERIA UR CULT: NORMAL

## 2019-06-18 ENCOUNTER — HOSPITAL ENCOUNTER (OUTPATIENT)
Dept: RADIOLOGY | Facility: HOSPITAL | Age: 79
Discharge: HOME | End: 2019-06-18
Attending: INTERNAL MEDICINE
Payer: MEDICARE

## 2019-06-18 DIAGNOSIS — R13.10 DYSPHAGIA: ICD-10-CM

## 2019-06-18 PROCEDURE — 74240 X-RAY XM UPR GI TRC 1CNTRST: CPT | Mod: TC

## 2019-06-18 PROCEDURE — 25500020 PHARM REV CODE 255

## 2019-06-18 RX ADMIN — DIATRIZOATE MEGLUMINE AND DIATRIZOATE SODIUM 30 ML: 600; 100 SOLUTION ORAL; RECTAL at 11:06

## 2019-09-25 ENCOUNTER — HOSPITAL ENCOUNTER (INPATIENT)
Facility: HOSPITAL | Age: 79
LOS: 4 days | Discharge: HOME | DRG: 698 | End: 2019-09-29
Attending: SURGERY | Admitting: FAMILY MEDICINE
Payer: MEDICARE

## 2019-09-25 DIAGNOSIS — A41.9 SEPSIS: ICD-10-CM

## 2019-09-25 DIAGNOSIS — R06.02 SOB (SHORTNESS OF BREATH): ICD-10-CM

## 2019-09-25 DIAGNOSIS — T17.908A ASPIRATION INTO AIRWAY, INITIAL ENCOUNTER: ICD-10-CM

## 2019-09-25 DIAGNOSIS — N30.00 ACUTE CYSTITIS WITHOUT HEMATURIA: ICD-10-CM

## 2019-09-25 DIAGNOSIS — A41.9 SEPSIS, DUE TO UNSPECIFIED ORGANISM: Primary | ICD-10-CM

## 2019-09-25 LAB
ALBUMIN SERPL BCP-MCNC: 3.9 G/DL (ref 3.5–5.2)
ALP SERPL-CCNC: 109 U/L (ref 55–135)
ALT SERPL W/O P-5'-P-CCNC: 17 U/L (ref 10–44)
AMORPH CRY URNS QL MICRO: ABNORMAL
ANION GAP SERPL CALC-SCNC: 10 MMOL/L (ref 8–16)
APTT BLDCRRT: 32.2 SEC (ref 21–32)
AST SERPL-CCNC: 26 U/L (ref 10–40)
BACTERIA #/AREA URNS HPF: ABNORMAL /HPF
BASOPHILS # BLD AUTO: 0.04 K/UL (ref 0–0.2)
BASOPHILS NFR BLD: 0.3 % (ref 0–1.9)
BILIRUB SERPL-MCNC: 0.6 MG/DL (ref 0.1–1)
BILIRUB UR QL STRIP: NEGATIVE
BNP SERPL-MCNC: 399 PG/ML (ref 0–99)
BUN SERPL-MCNC: 26 MG/DL (ref 8–23)
CALCIUM SERPL-MCNC: 9.5 MG/DL (ref 8.7–10.5)
CAOX CRY URNS QL MICRO: ABNORMAL
CHLORIDE SERPL-SCNC: 102 MMOL/L (ref 95–110)
CK MB SERPL-MCNC: 3.6 NG/ML (ref 0.1–6.5)
CK MB SERPL-RTO: 3.1 % (ref 0–5)
CK SERPL-CCNC: 115 U/L (ref 20–180)
CK SERPL-CCNC: 115 U/L (ref 20–180)
CLARITY UR: ABNORMAL
CO2 SERPL-SCNC: 26 MMOL/L (ref 23–29)
COLOR UR: YELLOW
CREAT SERPL-MCNC: 0.6 MG/DL (ref 0.5–1.4)
DIFFERENTIAL METHOD: ABNORMAL
EOSINOPHIL # BLD AUTO: 0 K/UL (ref 0–0.5)
EOSINOPHIL NFR BLD: 0.1 % (ref 0–8)
ERYTHROCYTE [DISTWIDTH] IN BLOOD BY AUTOMATED COUNT: 15.1 % (ref 11.5–14.5)
EST. GFR  (AFRICAN AMERICAN): >60 ML/MIN/1.73 M^2
EST. GFR  (NON AFRICAN AMERICAN): >60 ML/MIN/1.73 M^2
GLUCOSE SERPL-MCNC: 125 MG/DL (ref 70–110)
GLUCOSE UR QL STRIP: NEGATIVE
HCT VFR BLD AUTO: 42.7 % (ref 37–48.5)
HGB BLD-MCNC: 13 G/DL (ref 12–16)
HGB UR QL STRIP: ABNORMAL
HYALINE CASTS #/AREA URNS LPF: 0 /LPF
IMM GRANULOCYTES # BLD AUTO: 0.04 K/UL (ref 0–0.04)
IMM GRANULOCYTES NFR BLD AUTO: 0.3 % (ref 0–0.5)
INR PPP: 1.1 (ref 0.8–1.2)
KETONES UR QL STRIP: ABNORMAL
LACTATE SERPL-SCNC: 0.9 MMOL/L (ref 0.5–2.2)
LACTATE SERPL-SCNC: 0.9 MMOL/L (ref 0.5–2.2)
LEUKOCYTE ESTERASE UR QL STRIP: ABNORMAL
LIPASE SERPL-CCNC: 4 U/L (ref 4–60)
LYMPHOCYTES # BLD AUTO: 0.4 K/UL (ref 1–4.8)
LYMPHOCYTES NFR BLD: 2.8 % (ref 18–48)
MAGNESIUM SERPL-MCNC: 1.8 MG/DL (ref 1.6–2.6)
MCH RBC QN AUTO: 29.1 PG (ref 27–31)
MCHC RBC AUTO-ENTMCNC: 30.4 G/DL (ref 32–36)
MCV RBC AUTO: 96 FL (ref 82–98)
MICROSCOPIC COMMENT: ABNORMAL
MONOCYTES # BLD AUTO: 0.9 K/UL (ref 0.3–1)
MONOCYTES NFR BLD: 6.4 % (ref 4–15)
NEUTROPHILS # BLD AUTO: 12.8 K/UL (ref 1.8–7.7)
NEUTROPHILS NFR BLD: 90.1 % (ref 38–73)
NITRITE UR QL STRIP: POSITIVE
NRBC BLD-RTO: 0 /100 WBC
PH UR STRIP: 7 [PH] (ref 5–8)
PHOSPHATE SERPL-MCNC: 3.1 MG/DL (ref 2.7–4.5)
PLATELET # BLD AUTO: 247 K/UL (ref 150–350)
PMV BLD AUTO: 9.9 FL (ref 9.2–12.9)
POTASSIUM SERPL-SCNC: 4 MMOL/L (ref 3.5–5.1)
PROCALCITONIN SERPL IA-MCNC: 0.2 NG/ML
PROT SERPL-MCNC: 7.4 G/DL (ref 6–8.4)
PROT UR QL STRIP: ABNORMAL
PROTHROMBIN TIME: 11.1 SEC (ref 9–12.5)
RBC # BLD AUTO: 4.46 M/UL (ref 4–5.4)
RBC #/AREA URNS HPF: 2 /HPF (ref 0–4)
SODIUM SERPL-SCNC: 138 MMOL/L (ref 136–145)
SP GR UR STRIP: 1.02 (ref 1–1.03)
SQUAMOUS #/AREA URNS HPF: 40 /HPF
TROPONIN I SERPL DL<=0.01 NG/ML-MCNC: 0.01 NG/ML (ref 0–0.03)
URN SPEC COLLECT METH UR: ABNORMAL
UROBILINOGEN UR STRIP-ACNC: NEGATIVE EU/DL
WBC # BLD AUTO: 14.15 K/UL (ref 3.9–12.7)
WBC #/AREA URNS HPF: 5 /HPF (ref 0–5)

## 2019-09-25 PROCEDURE — 84100 ASSAY OF PHOSPHORUS: CPT

## 2019-09-25 PROCEDURE — 99291 CRITICAL CARE FIRST HOUR: CPT | Mod: 25

## 2019-09-25 PROCEDURE — 84145 PROCALCITONIN (PCT): CPT

## 2019-09-25 PROCEDURE — 80053 COMPREHEN METABOLIC PANEL: CPT

## 2019-09-25 PROCEDURE — 87040 BLOOD CULTURE FOR BACTERIA: CPT

## 2019-09-25 PROCEDURE — 82553 CREATINE MB FRACTION: CPT

## 2019-09-25 PROCEDURE — 94760 N-INVAS EAR/PLS OXIMETRY 1: CPT

## 2019-09-25 PROCEDURE — 83605 ASSAY OF LACTIC ACID: CPT

## 2019-09-25 PROCEDURE — 93010 ELECTROCARDIOGRAM REPORT: CPT | Mod: ,,, | Performed by: INTERNAL MEDICINE

## 2019-09-25 PROCEDURE — 25000003 PHARM REV CODE 250: Performed by: FAMILY MEDICINE

## 2019-09-25 PROCEDURE — 84484 ASSAY OF TROPONIN QUANT: CPT

## 2019-09-25 PROCEDURE — 85730 THROMBOPLASTIN TIME PARTIAL: CPT

## 2019-09-25 PROCEDURE — 93010 EKG 12-LEAD: ICD-10-PCS | Mod: ,,, | Performed by: INTERNAL MEDICINE

## 2019-09-25 PROCEDURE — 63600175 PHARM REV CODE 636 W HCPCS: Performed by: SURGERY

## 2019-09-25 PROCEDURE — 25000003 PHARM REV CODE 250: Performed by: SURGERY

## 2019-09-25 PROCEDURE — 25000242 PHARM REV CODE 250 ALT 637 W/ HCPCS: Performed by: SURGERY

## 2019-09-25 PROCEDURE — 20000000 HC ICU ROOM

## 2019-09-25 PROCEDURE — 82550 ASSAY OF CK (CPK): CPT

## 2019-09-25 PROCEDURE — 83880 ASSAY OF NATRIURETIC PEPTIDE: CPT

## 2019-09-25 PROCEDURE — 27100171 HC OXYGEN HIGH FLOW UP TO 24 HOURS

## 2019-09-25 PROCEDURE — 85025 COMPLETE CBC W/AUTO DIFF WBC: CPT

## 2019-09-25 PROCEDURE — 87086 URINE CULTURE/COLONY COUNT: CPT

## 2019-09-25 PROCEDURE — 94640 AIRWAY INHALATION TREATMENT: CPT

## 2019-09-25 PROCEDURE — 94761 N-INVAS EAR/PLS OXIMETRY MLT: CPT

## 2019-09-25 PROCEDURE — 83735 ASSAY OF MAGNESIUM: CPT

## 2019-09-25 PROCEDURE — 83690 ASSAY OF LIPASE: CPT

## 2019-09-25 PROCEDURE — 93005 ELECTROCARDIOGRAM TRACING: CPT

## 2019-09-25 PROCEDURE — 85610 PROTHROMBIN TIME: CPT

## 2019-09-25 PROCEDURE — 36415 COLL VENOUS BLD VENIPUNCTURE: CPT

## 2019-09-25 PROCEDURE — 96374 THER/PROPH/DIAG INJ IV PUSH: CPT

## 2019-09-25 PROCEDURE — 81000 URINALYSIS NONAUTO W/SCOPE: CPT

## 2019-09-25 PROCEDURE — S0077 INJECTION, CLINDAMYCIN PHOSP: HCPCS | Performed by: SURGERY

## 2019-09-25 RX ORDER — ACETAMINOPHEN 500 MG
1000 TABLET ORAL EVERY 8 HOURS PRN
Status: DISCONTINUED | OUTPATIENT
Start: 2019-09-25 | End: 2019-09-25

## 2019-09-25 RX ORDER — ACETAMINOPHEN 500 MG
1000 TABLET ORAL EVERY 8 HOURS PRN
Status: DISCONTINUED | OUTPATIENT
Start: 2019-09-25 | End: 2019-09-27

## 2019-09-25 RX ORDER — PANTOPRAZOLE SODIUM 40 MG/1
40 TABLET, DELAYED RELEASE ORAL DAILY
Status: DISCONTINUED | OUTPATIENT
Start: 2019-09-26 | End: 2019-09-26

## 2019-09-25 RX ORDER — CLINDAMYCIN PHOSPHATE 600 MG/50ML
600 INJECTION, SOLUTION INTRAVENOUS
Status: DISCONTINUED | OUTPATIENT
Start: 2019-09-25 | End: 2019-09-26

## 2019-09-25 RX ORDER — IPRATROPIUM BROMIDE AND ALBUTEROL SULFATE 2.5; .5 MG/3ML; MG/3ML
3 SOLUTION RESPIRATORY (INHALATION)
Status: COMPLETED | OUTPATIENT
Start: 2019-09-25 | End: 2019-09-25

## 2019-09-25 RX ORDER — IPRATROPIUM BROMIDE AND ALBUTEROL SULFATE 2.5; .5 MG/3ML; MG/3ML
3 SOLUTION RESPIRATORY (INHALATION) EVERY 4 HOURS
Status: DISCONTINUED | OUTPATIENT
Start: 2019-09-26 | End: 2019-09-29 | Stop reason: HOSPADM

## 2019-09-25 RX ORDER — SODIUM CHLORIDE 0.9 % (FLUSH) 0.9 %
10 SYRINGE (ML) INJECTION
Status: DISCONTINUED | OUTPATIENT
Start: 2019-09-25 | End: 2019-09-29 | Stop reason: HOSPADM

## 2019-09-25 RX ORDER — ONDANSETRON 2 MG/ML
4 INJECTION INTRAMUSCULAR; INTRAVENOUS EVERY 8 HOURS PRN
Status: DISCONTINUED | OUTPATIENT
Start: 2019-09-25 | End: 2019-09-29 | Stop reason: HOSPADM

## 2019-09-25 RX ADMIN — SODIUM CHLORIDE 1000 ML: 0.9 INJECTION, SOLUTION INTRAVENOUS at 09:09

## 2019-09-25 RX ADMIN — ACETAMINOPHEN 1000 MG: 500 TABLET, FILM COATED ORAL at 10:09

## 2019-09-25 RX ADMIN — CLINDAMYCIN IN 5 PERCENT DEXTROSE 600 MG: 12 INJECTION, SOLUTION INTRAVENOUS at 09:09

## 2019-09-25 RX ADMIN — CEFTRIAXONE 1 G: 1 INJECTION, SOLUTION INTRAVENOUS at 08:09

## 2019-09-25 RX ADMIN — IPRATROPIUM BROMIDE AND ALBUTEROL SULFATE 3 ML: .5; 3 SOLUTION RESPIRATORY (INHALATION) at 08:09

## 2019-09-25 RX ADMIN — IPRATROPIUM BROMIDE AND ALBUTEROL SULFATE 3 ML: .5; 3 SOLUTION RESPIRATORY (INHALATION) at 11:09

## 2019-09-26 PROBLEM — L89.90 DECUBITUS ULCER: Status: RESOLVED | Noted: 2018-08-27 | Resolved: 2019-09-26

## 2019-09-26 PROBLEM — R06.02 SOB (SHORTNESS OF BREATH): Status: RESOLVED | Noted: 2018-06-10 | Resolved: 2019-09-26

## 2019-09-26 PROBLEM — R19.7 DIARRHEA OF PRESUMED INFECTIOUS ORIGIN: Status: RESOLVED | Noted: 2018-08-25 | Resolved: 2019-09-26

## 2019-09-26 PROBLEM — A41.02 SEPSIS DUE TO METHICILLIN RESISTANT STAPHYLOCOCCUS AUREUS (MRSA): Status: RESOLVED | Noted: 2018-06-12 | Resolved: 2019-09-26

## 2019-09-26 PROBLEM — L89.150 DECUBITUS ULCER OF SACRAL REGION, UNSTAGEABLE: Status: RESOLVED | Noted: 2018-07-22 | Resolved: 2019-09-26

## 2019-09-26 LAB
ALBUMIN SERPL BCP-MCNC: 3.3 G/DL (ref 3.5–5.2)
ALP SERPL-CCNC: 86 U/L (ref 55–135)
ALT SERPL W/O P-5'-P-CCNC: 20 U/L (ref 10–44)
ANION GAP SERPL CALC-SCNC: 10 MMOL/L (ref 8–16)
AST SERPL-CCNC: 23 U/L (ref 10–40)
BASOPHILS # BLD AUTO: ABNORMAL K/UL (ref 0–0.2)
BASOPHILS NFR BLD: 0 % (ref 0–1.9)
BILIRUB SERPL-MCNC: 0.7 MG/DL (ref 0.1–1)
BUN SERPL-MCNC: 25 MG/DL (ref 8–23)
CALCIUM SERPL-MCNC: 9 MG/DL (ref 8.7–10.5)
CHLORIDE SERPL-SCNC: 103 MMOL/L (ref 95–110)
CO2 SERPL-SCNC: 26 MMOL/L (ref 23–29)
CREAT SERPL-MCNC: 0.6 MG/DL (ref 0.5–1.4)
DIFFERENTIAL METHOD: ABNORMAL
EOSINOPHIL # BLD AUTO: ABNORMAL K/UL (ref 0–0.5)
EOSINOPHIL NFR BLD: 0 % (ref 0–8)
ERYTHROCYTE [DISTWIDTH] IN BLOOD BY AUTOMATED COUNT: 15.3 % (ref 11.5–14.5)
EST. GFR  (AFRICAN AMERICAN): >60 ML/MIN/1.73 M^2
EST. GFR  (NON AFRICAN AMERICAN): >60 ML/MIN/1.73 M^2
GLUCOSE SERPL-MCNC: 104 MG/DL (ref 70–110)
HCT VFR BLD AUTO: 38.6 % (ref 37–48.5)
HGB BLD-MCNC: 11.9 G/DL (ref 12–16)
IMM GRANULOCYTES # BLD AUTO: ABNORMAL K/UL (ref 0–0.04)
IMM GRANULOCYTES NFR BLD AUTO: ABNORMAL % (ref 0–0.5)
LACTATE SERPL-SCNC: 1.5 MMOL/L (ref 0.5–2.2)
LYMPHOCYTES # BLD AUTO: ABNORMAL K/UL (ref 1–4.8)
LYMPHOCYTES NFR BLD: 11 % (ref 18–48)
MCH RBC QN AUTO: 29.5 PG (ref 27–31)
MCHC RBC AUTO-ENTMCNC: 30.8 G/DL (ref 32–36)
MCV RBC AUTO: 96 FL (ref 82–98)
MONOCYTES # BLD AUTO: ABNORMAL K/UL (ref 0.3–1)
MONOCYTES NFR BLD: 6 % (ref 4–15)
NEUTROPHILS NFR BLD: 68 % (ref 38–73)
NEUTS BAND NFR BLD MANUAL: 15 %
NRBC BLD-RTO: 0 /100 WBC
PLATELET # BLD AUTO: 228 K/UL (ref 150–350)
PMV BLD AUTO: 10.3 FL (ref 9.2–12.9)
POTASSIUM SERPL-SCNC: 4.2 MMOL/L (ref 3.5–5.1)
PROT SERPL-MCNC: 6.4 G/DL (ref 6–8.4)
RBC # BLD AUTO: 4.04 M/UL (ref 4–5.4)
SODIUM SERPL-SCNC: 139 MMOL/L (ref 136–145)
WBC # BLD AUTO: 11.88 K/UL (ref 3.9–12.7)

## 2019-09-26 PROCEDURE — 20000000 HC ICU ROOM

## 2019-09-26 PROCEDURE — 85027 COMPLETE CBC AUTOMATED: CPT

## 2019-09-26 PROCEDURE — 63600175 PHARM REV CODE 636 W HCPCS: Performed by: FAMILY MEDICINE

## 2019-09-26 PROCEDURE — 94761 N-INVAS EAR/PLS OXIMETRY MLT: CPT

## 2019-09-26 PROCEDURE — 99223 PR INITIAL HOSPITAL CARE,LEVL III: ICD-10-PCS | Mod: ,,, | Performed by: FAMILY MEDICINE

## 2019-09-26 PROCEDURE — 25000242 PHARM REV CODE 250 ALT 637 W/ HCPCS: Performed by: SURGERY

## 2019-09-26 PROCEDURE — 97162 PT EVAL MOD COMPLEX 30 MIN: CPT

## 2019-09-26 PROCEDURE — 25000003 PHARM REV CODE 250: Performed by: FAMILY MEDICINE

## 2019-09-26 PROCEDURE — 94664 DEMO&/EVAL PT USE INHALER: CPT

## 2019-09-26 PROCEDURE — 27000492 HC SLEEVE, SCD T/L

## 2019-09-26 PROCEDURE — 25000003 PHARM REV CODE 250: Performed by: SURGERY

## 2019-09-26 PROCEDURE — 99223 1ST HOSP IP/OBS HIGH 75: CPT | Mod: ,,, | Performed by: FAMILY MEDICINE

## 2019-09-26 PROCEDURE — 83605 ASSAY OF LACTIC ACID: CPT

## 2019-09-26 PROCEDURE — 27000221 HC OXYGEN, UP TO 24 HOURS

## 2019-09-26 PROCEDURE — 80053 COMPREHEN METABOLIC PANEL: CPT

## 2019-09-26 PROCEDURE — 36415 COLL VENOUS BLD VENIPUNCTURE: CPT

## 2019-09-26 PROCEDURE — 97802 MEDICAL NUTRITION INDIV IN: CPT

## 2019-09-26 PROCEDURE — 97110 THERAPEUTIC EXERCISES: CPT

## 2019-09-26 PROCEDURE — 85007 BL SMEAR W/DIFF WBC COUNT: CPT

## 2019-09-26 PROCEDURE — 99900035 HC TECH TIME PER 15 MIN (STAT)

## 2019-09-26 PROCEDURE — 25000242 PHARM REV CODE 250 ALT 637 W/ HCPCS: Performed by: FAMILY MEDICINE

## 2019-09-26 PROCEDURE — 94640 AIRWAY INHALATION TREATMENT: CPT

## 2019-09-26 PROCEDURE — S0077 INJECTION, CLINDAMYCIN PHOSP: HCPCS | Performed by: SURGERY

## 2019-09-26 RX ORDER — NAPROXEN SODIUM 220 MG/1
81 TABLET, FILM COATED ORAL DAILY
Status: DISCONTINUED | OUTPATIENT
Start: 2019-09-26 | End: 2019-09-29 | Stop reason: HOSPADM

## 2019-09-26 RX ORDER — DEXTROSE MONOHYDRATE, SODIUM CHLORIDE, AND POTASSIUM CHLORIDE 50; 1.49; 4.5 G/1000ML; G/1000ML; G/1000ML
INJECTION, SOLUTION INTRAVENOUS CONTINUOUS
Status: DISCONTINUED | OUTPATIENT
Start: 2019-09-26 | End: 2019-09-27

## 2019-09-26 RX ORDER — PRAMIPEXOLE DIHYDROCHLORIDE 0.12 MG/1
0.25 TABLET ORAL 3 TIMES DAILY
Status: DISCONTINUED | OUTPATIENT
Start: 2019-09-26 | End: 2019-09-29 | Stop reason: HOSPADM

## 2019-09-26 RX ORDER — CARBIDOPA AND LEVODOPA 25; 100 MG/1; MG/1
1 TABLET ORAL
Status: DISCONTINUED | OUTPATIENT
Start: 2019-09-26 | End: 2019-09-29 | Stop reason: HOSPADM

## 2019-09-26 RX ORDER — HYDROCODONE BITARTRATE AND ACETAMINOPHEN 7.5; 325 MG/1; MG/1
1 TABLET ORAL EVERY 12 HOURS PRN
Status: DISCONTINUED | OUTPATIENT
Start: 2019-09-26 | End: 2019-09-26

## 2019-09-26 RX ORDER — LORAZEPAM 2 MG/ML
0.5 INJECTION INTRAMUSCULAR ONCE
Status: COMPLETED | OUTPATIENT
Start: 2019-09-26 | End: 2019-09-26

## 2019-09-26 RX ORDER — GUAIFENESIN 100 MG/5ML
600 SOLUTION ORAL EVERY 12 HOURS
Status: DISCONTINUED | OUTPATIENT
Start: 2019-09-26 | End: 2019-09-29 | Stop reason: HOSPADM

## 2019-09-26 RX ORDER — SENNOSIDES 8.6 MG/1
1 TABLET ORAL DAILY
Status: DISCONTINUED | OUTPATIENT
Start: 2019-09-26 | End: 2019-09-29 | Stop reason: HOSPADM

## 2019-09-26 RX ORDER — RIVASTIGMINE 4.6 MG/24H
1 PATCH, EXTENDED RELEASE TRANSDERMAL DAILY
Status: DISCONTINUED | OUTPATIENT
Start: 2019-09-26 | End: 2019-09-29 | Stop reason: HOSPADM

## 2019-09-26 RX ORDER — LEVOTHYROXINE SODIUM 75 UG/1
75 TABLET ORAL
Status: DISCONTINUED | OUTPATIENT
Start: 2019-09-27 | End: 2019-09-29 | Stop reason: HOSPADM

## 2019-09-26 RX ORDER — TRAZODONE HYDROCHLORIDE 50 MG/1
50 TABLET ORAL NIGHTLY
Status: DISCONTINUED | OUTPATIENT
Start: 2019-09-26 | End: 2019-09-29 | Stop reason: HOSPADM

## 2019-09-26 RX ORDER — DOCUSATE SODIUM 100 MG/1
100 CAPSULE, LIQUID FILLED ORAL DAILY
Status: DISCONTINUED | OUTPATIENT
Start: 2019-09-26 | End: 2019-09-27

## 2019-09-26 RX ORDER — HYDROCODONE BITARTRATE AND ACETAMINOPHEN 7.5; 325 MG/15ML; MG/15ML
15 SOLUTION ORAL EVERY 12 HOURS PRN
Status: DISCONTINUED | OUTPATIENT
Start: 2019-09-26 | End: 2019-09-27

## 2019-09-26 RX ADMIN — IPRATROPIUM BROMIDE AND ALBUTEROL SULFATE 3 ML: .5; 3 SOLUTION RESPIRATORY (INHALATION) at 07:09

## 2019-09-26 RX ADMIN — PRAMIPEXOLE DIHYDROCHLORIDE 0.25 MG: 0.12 TABLET ORAL at 09:09

## 2019-09-26 RX ADMIN — IPRATROPIUM BROMIDE AND ALBUTEROL SULFATE 3 ML: .5; 3 SOLUTION RESPIRATORY (INHALATION) at 04:09

## 2019-09-26 RX ADMIN — SENNA 1 TABLET: 8.6 TABLET, COATED ORAL at 09:09

## 2019-09-26 RX ADMIN — TRAZODONE HYDROCHLORIDE 50 MG: 50 TABLET ORAL at 09:09

## 2019-09-26 RX ADMIN — DEXTROSE, SODIUM CHLORIDE, AND POTASSIUM CHLORIDE: 5; .45; .15 INJECTION INTRAVENOUS at 09:09

## 2019-09-26 RX ADMIN — CLINDAMYCIN IN 5 PERCENT DEXTROSE 600 MG: 12 INJECTION, SOLUTION INTRAVENOUS at 04:09

## 2019-09-26 RX ADMIN — PRAMIPEXOLE DIHYDROCHLORIDE 0.25 MG: 0.12 TABLET ORAL at 02:09

## 2019-09-26 RX ADMIN — CARBIDOPA AND LEVODOPA 1 TABLET: 25; 100 TABLET ORAL at 09:09

## 2019-09-26 RX ADMIN — GUAIFENESIN 600 MG: 200 SOLUTION ORAL at 09:09

## 2019-09-26 RX ADMIN — IPRATROPIUM BROMIDE AND ALBUTEROL SULFATE 3 ML: .5; 3 SOLUTION RESPIRATORY (INHALATION) at 11:09

## 2019-09-26 RX ADMIN — DOCUSATE SODIUM 100 MG: 100 CAPSULE, LIQUID FILLED ORAL at 09:09

## 2019-09-26 RX ADMIN — ASPIRIN 81 MG 81 MG: 81 TABLET ORAL at 09:09

## 2019-09-26 RX ADMIN — RIVASTIGMINE 1 PATCH: 4.6 PATCH, EXTENDED RELEASE TRANSDERMAL at 09:09

## 2019-09-26 RX ADMIN — PIPERACILLIN AND TAZOBACTAM 4.5 G: 4; .5 INJECTION, POWDER, LYOPHILIZED, FOR SOLUTION INTRAVENOUS; PARENTERAL at 09:09

## 2019-09-26 RX ADMIN — LORAZEPAM 0.5 MG: 2 INJECTION INTRAMUSCULAR; INTRAVENOUS at 01:09

## 2019-09-26 RX ADMIN — CARBIDOPA AND LEVODOPA 1 TABLET: 25; 100 TABLET ORAL at 05:09

## 2019-09-26 RX ADMIN — PIPERACILLIN AND TAZOBACTAM 4.5 G: 4; .5 INJECTION, POWDER, LYOPHILIZED, FOR SOLUTION INTRAVENOUS; PARENTERAL at 05:09

## 2019-09-26 RX ADMIN — CARBIDOPA AND LEVODOPA 1 TABLET: 25; 100 TABLET ORAL at 02:09

## 2019-09-26 RX ADMIN — DEXTROSE, SODIUM CHLORIDE, AND POTASSIUM CHLORIDE: 5; .45; .15 INJECTION INTRAVENOUS at 07:09

## 2019-09-26 RX ADMIN — IPRATROPIUM BROMIDE AND ALBUTEROL SULFATE 3 ML: .5; 3 SOLUTION RESPIRATORY (INHALATION) at 08:09

## 2019-09-26 RX ADMIN — ACETAMINOPHEN 1000 MG: 500 TABLET, FILM COATED ORAL at 07:09

## 2019-09-26 RX ADMIN — HYDROCODONE BITARTRATE AND ACETAMINOPHEN 15 ML: 7.5; 325 SOLUTION ORAL at 09:09

## 2019-09-26 NOTE — ED PROVIDER NOTES
[facility]                                                 Chief Complaint  79 y.o. female with Shortness of Breath and Respiratory Distress    History of Present Illness  Eddy Cunningham presents to the emergency room with shortness of breath  Patient feeds by PEG tube in obviously aspirated per nursing home history  Patient has coarse breath sounds of majority of the day, febrile on ER triage  Patient has had issues with sepsis COPD and lung issues in the recent past  Patient is a DNR and the family states they do not want intubation at any point  Patient has obvious coarse breath sounds with no up oxygen, likely aspiration    The history is provided by the patient   device was not used during this ER visit     Past Medical History   -- COPD (chronic obstructive pulmonary disease)     -- Dementia     -- Depression     -- Elevated C-reactive protein (CRP)     -- Falls frequently     -- Homocystinuria     -- Hypertension     -- Hypopotassemia     -- Hypothyroidism     -- Narcolepsy without cataplexy     -- Parkinson disease     -- RLS (restless legs syndrome)     -- Stress incontinence     -- Venous stasis ulcers        Past Surgical History   -- CARPAL TUNNEL RELEASE       -- CHOLECYSTECTOMY       -- PARTIAL HYSTERECTOMY       -- ROTATOR CUFF REPAIR       -- TONSILLECTOMY, ADENOIDECTOMY       -- TOTAL KNEE ARTHROPLASTY          Review of patient's allergies   -- Oxycodone     -- Sulfa (sulfonamide antibiotics)        I have reviewed all of this patient's past medical, surgical, family, and social   histories as well as active allergies and medications documented in the  electronic medical record    Review of Systems and Physical Exam      Review of Systems  -- unable to get any history from this demented patient    Vital Signs  Temperature is 101.2 °F (38.4 °C)  Her blood pressure is 159/63 and her pulse is 102.   Her respiration is 32 and oxygen saturation is 97%.      Physical Exam  -- Nursing note and vitals reviewed  -- Constitutional: Pleasantly demented white female  -- Head: Atraumatic. Normocephalic. No obvious abnormality  -- Eyes: Pupils are equal and reactive to light. Normal conjunctiva and lids  -- Nose: Nose normal in appearance, nares grossly normal. No discharge  -- Throat: Mucous membranes moist, pharynx normal, normal tonsils. No lesions   -- Ears: External ears and TM normal bilaterally. Normal hearing and no drainage  -- Neck: Normal range of motion. Neck supple. No masses, trachea midline  -- Cardiac: Normal rate, regular rhythm and normal heart sounds  -- Pulmonary:  oarse breath sounds in all fields with active wheezing noted  -- Abdominal: Soft, no tenderness. Normal bowel sounds. Normal liver edge  -- Musculoskeletal: Normal range of motion, no effusions. Joints stable   -- Neurological: No focal deficits. Showed good interaction with staff  -- Vascular: Posterior tibial, dorsalis pedis and radial pulses 2+ bilaterally        Emergency Room Course      Lab Results     K 4.0      CO2 26   BUN 26 (H)   CREATININE 0.6    (H)   ALKPHOS 109   AST 26   ALT 17   BILITOT 0.6   ALBUMIN 3.9   PROT 7.4   WBC 14.15 (H)   HGB 13.0   HCT 42.7            CPKMB 3.6   TROPONINI 0.010   INR 1.1    (H)   LACTATE 0.9   MG 1.8     Urinalysis  -- Urinalysis performed during this ER visit showed no signs of infection  -- The urine today has been sent for lab culture, results pending      EKG   -- The EKG findings today were without concerning findings from baseline     Radiology  -- Chest x-ray showed no infiltrate and showed no acute pathology     Additional Work up  -- Blood cultures have also been drawn, results are pending    Medications Given  clindamycin 600 MG/50 ML D5W 600 mg/50 mL IVPB 600 mg (600 mg Intravenous New Bag 9/25/19 2104)   cefTRIAXone (ROCEPHIN) 2 g in dextrose 5 % 50 mL IVPB (has no administration in time  range)   sodium chloride 0.9% flush 10 mL (has no administration in time range)   acetaminophen tablet 1,000 mg (has no administration in time range)   pantoprazole EC tablet 40 mg (has no administration in time range)   ondansetron injection 4 mg (has no administration in time range)   promethazine (PHENERGAN) 12.5 mg in dextrose 5 % 50 mL IVPB (has no administration in time range)   albuterol-ipratropium 2.5 mg-0.5 mg/3 mL nebulizer solution 3 mL (has no administration in time range)   albuterol-ipratropium 2.5 mg-0.5 mg/3 mL nebulizer solution 3 mL (3 mLs Nebulization Given 9/25/19 2013)   sodium chloride 0.9% bolus 1,000 mL (1,000 mLs Intravenous New Bag 9/25/19 2101)     Critical Care ED Physician Time (minutes):  -- Performed by: Ivan Gutierres M.D.  -- Date/Time: 10:00 PM 9/25/2019   -- Direct Patient Care (Face Time): 10  -- Additional History from Records or Taking Additional History: 10  -- Ordering, Reviewing, and Interpreting Diagnostic Studies: 10  -- Total Time in Documentation: 10  -- Consultation with Other Physicians: 10  -- Consultation with Family Related to Condition: 10  -- Total Critical Care Time: 60     ED Management  -- patient with obvious aspiration also with urinary tract infection  -- I spoke with the patient's daughter at length, she does not want hospice  -- she does not want the patient intubated, she does not want the patient to be coded  -- IV antibiotics for UTI as well as aspiration and breathing treatment  -- admit to the ICU overnight with fever control & pulmonary toilet    Diagnosis  -- Sepsis  -- SOB (shortness of breath)  -- Aspiration into airway  -- Acute cystitis without hematuria    Disposition and Plan  -- Disposition: admit  -- Condition: stable    This note is dictated on M*Modal word recognition program.  There are word recognition mistakes that are occasionally missed on review.             Ivan Gutierres MD  09/26/19 0000

## 2019-09-26 NOTE — H&P
[facility] Medicine  History & Physical    Patient Name: Eddy Cunningham  MRN: [id number]  Admission Date: 9/25/2019  Attending Physician: Kunal Sandoval MD   Primary Care Provider: Ayaka Pollard MD         Patient information was obtained from patient and ER records.     Subjective:     Principal Problem:Sepsis    Chief Complaint:   Chief Complaint   Patient presents with    Shortness of Breath    Respiratory Distress        HPI: 79 year old female was sent here from the NH for increased chest congestion as well as fever that started yesterday. She has known parkinsons and is bed bound with h/o dysphagia and peg feedings (continuous at 55cc/hr) and chronic indwelling zee catheter. Per her daughter, she has been getting excellent care, her decubitis is completely healed and she has had no recent issues until yesterday. She has continued to get all of her same meds. She has had no obvious alteration in mental status and is still having more good days than bad - singing to old country western songs in her room at the NH.    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)     Parkinson disease     RLS (restless legs syndrome)     Stress incontinence     Venous stasis ulcers        Past Surgical History:   Procedure Laterality Date    CARPAL TUNNEL RELEASE      Right    CHOLECYSTECTOMY      DEBRIDEMENT OF SACRAL WOUND N/A 7/26/2018    Procedure: DEBRIDEMENT OF SACRAL DECUBITUS SKIN, SUBCUTANEOUS TISSUE, FAT,  AND FASCIA;  Surgeon: Fabio Serna MD;  Location: [facility] OR;  Service: General;  Laterality: N/A;    GASTROSTOMY TUBE PLACEMENT      PARTIAL HYSTERECTOMY      ROTATOR CUFF  REPAIR      TONSILLECTOMY, ADENOIDECTOMY      tonsiles    TOTAL KNEE ARTHROPLASTY         Review of patient's allergies indicates:   Allergen Reactions    Oxycodone      Hallucination    Sulfa (sulfonamide antibiotics)      Other reaction(s): when on contact       No current facility-administered medications on file prior to encounter.      Current Outpatient Medications on File Prior to Encounter   Medication Sig    amino ac/protein hydr/whey pro (LIQUACEL ORAL) 30 mLs 2 (two) times daily.    carbidopa-levodopa  mg (SINEMET)  mg per tablet 1 tablet by Per G Tube route 5 (five) times daily.    collagenase ointment Apply topically once daily.    levothyroxine (SYNTHROID) 75 MCG tablet 1 tablet (75 mcg total) by Per G Tube route before breakfast.    omeprazole (PRILOSEC) 40 MG capsule Open 40 mg capsule into g-tube once daily    pramipexole (MIRAPEX) 0.25 MG tablet 1 tablet (0.25 mg total) by Per G Tube route 3 (three) times daily.    rivastigmine (EXELON) 4.6 mg/24 hour PT24 Place 1 patch onto the skin once daily.      trazodone (DESYREL) 50 MG tablet 1 tablet (50 mg total) by Per G Tube route every evening.    ascorbic acid, vitamin C, (VITAMIN C) 500 MG tablet 500 mg once daily.    aspirin 81 MG Chew 1 tablet (81 mg total) by Per G Tube route once daily.    docusate sodium (COLACE) 100 MG capsule 1 capsule (100 mg total) by Per G Tube route once daily.    furosemide (LASIX) 40 MG tablet Take 40 mg by mouth once daily.    HYDROcodone-acetaminophen (NORCO) 7.5-325 mg per tablet Take 1 tablet by mouth every 12 (twelve) hours as needed for Pain.    multivitamin (ONE DAILY MULTIVITAMIN) per tablet 1 tablet once daily.    potassium chloride 10% (KAYCIEL) 20 mEq/15 mL solution 15 mLs (20 mEq total) by Per G Tube route 2 (two) times daily.    ranitidine (ZANTAC) 150 MG capsule 75 mg 2 (two) times daily.     senna (SENOKOT) 8.6 mg tablet 1 tablet by Per G Tube route once daily.     tiZANidine 2 mg Cap Take 4 mg by mouth nightly as needed.     Family History     None        Tobacco Use    Smoking status: Former Smoker    Smokeless tobacco: Never Used   Substance and Sexual Activity    Alcohol use: No    Drug use: No    Sexual activity: Not on file     Review of Systems   Unable to perform ROS: Dementia   Constitutional: Positive for fever.   HENT: Positive for congestion and trouble swallowing.    Respiratory: Positive for cough and shortness of breath.    Genitourinary: Positive for difficulty urinating.   Neurological: Positive for weakness.     Objective:     Vital Signs (Most Recent):  Temp: 100.2 °F (37.9 °C)(axillary) (09/26/19 0734)  Pulse: 82 (09/26/19 0801)  Resp: (!) 26 (09/26/19 0801)  BP: (!) 96/52 (09/26/19 0600)  SpO2: 100 % (09/26/19 0801) Vital Signs (24h Range):  Temp:  [98 °F (36.7 °C)-101.2 °F (38.4 °C)] 100.2 °F (37.9 °C)  Pulse:  [] 82  Resp:  [22-35] 26  SpO2:  [95 %-100 %] 100 %  BP: ()/(48-74) 96/52     Weight: 60.8 kg (134 lb)  Body mass index is 23.74 kg/m².    Physical Exam   Constitutional: She is oriented to person, place, and time. She appears well-developed. No distress.   Frail elderly woman   HENT:   Head: Normocephalic and atraumatic.   venti in place   Eyes: Pupils are equal, round, and reactive to light. Conjunctivae are normal.   Neck: Normal range of motion. Neck supple. No thyromegaly present.   Cardiovascular: Normal rate, regular rhythm, normal heart sounds and intact distal pulses.   Pulmonary/Chest: Effort normal. No respiratory distress. She has no wheezes. She has rales.   Coarse rhonchi with UAN.  Rhonchi slightly more pronounced on right   Abdominal: Soft. Bowel sounds are normal. There is no tenderness.   G tube in place   Genitourinary:   Genitourinary Comments: Arvizu in place   Musculoskeletal: Normal range of motion. She exhibits no edema.   Lymphadenopathy:     She has no cervical adenopathy.   Neurological: She is alert and  oriented to person, place, and time.   Skin: Skin is warm and dry. No rash noted. There is erythema (surrounding old gtube site).   Psychiatric: She has a normal mood and affect.   echolalia   Nursing note and vitals reviewed.        CRANIAL NERVES     CN III, IV, VI   Pupils are equal, round, and reactive to light.       Significant Labs:   CBC:   Recent Labs   Lab 09/25/19 1959 09/26/19 0339   WBC 14.15* 11.88   HGB 13.0 11.9*   HCT 42.7 38.6    228     CMP:   Recent Labs   Lab 09/25/19 1959 09/26/19 0339    139   K 4.0 4.2    103   CO2 26 26   * 104   BUN 26* 25*   CREATININE 0.6 0.6   CALCIUM 9.5 9.0   PROT 7.4 6.4   ALBUMIN 3.9 3.3*   BILITOT 0.6 0.7   ALKPHOS 109 86   AST 26 23   ALT 17 20   ANIONGAP 10 10   EGFRNONAA >60 >60     Lactic Acid:   Recent Labs   Lab 09/25/19 1959 09/25/19 2338 09/26/19 0339   LACTATE 0.9 0.9 1.5     Urine Culture: No results for input(s): LABURIN in the last 48 hours.  Urine Studies:   Recent Labs   Lab 09/25/19 1948   COLORU Yellow   APPEARANCEUA Cloudy*   PHUR 7.0   SPECGRAV 1.025   PROTEINUA 3+*   GLUCUA Negative   KETONESU Trace*   BILIRUBINUA Negative   OCCULTUA 3+*   NITRITE Positive*   UROBILINOGEN Negative   LEUKOCYTESUR 1+*   RBCUA 2   WBCUA 5   BACTERIA Moderate*   SQUAMEPITHEL 40   HYALINECASTS 0       Significant Imaging: I have reviewed all pertinent imaging results/findings within the past 24 hours.     CXR:  The patient's head and chin obscures evaluation of the lung apices bilaterally.  No definite infiltrates.  Normal size heart.    Assessment/Plan:     * Sepsis  Likely secondary to UTI.  Blood and urine cultures pending.  Treat with zosyn for both aspiration and UTI.  For now, stay in ICU.  Received fluids initially.  Blood pressures okay.    Of note is her normal lactate and procalcitonin.  Maybe this is just a viral URI. For now supportive care and cover with abx until cultures return.      Hypothyroidism due to acquired  atrophy of thyroid  Cont on synthroid      Constipation  Cont on home bowel regimen donnie with cont home narcotics      Functional quadriplegia  Turn frequently.  PT for ROM      Acute cystitis without hematuria  Change abx to zosyn to cover both urine and lungs      Aspiration into airway  Resume feeds slowly this morning with goal to get back to 55/hr.  Check residuals and watch for aspiration.  Avoid PPI if possible.      Esophageal dysphagia  No need for speech eval - patient does not swallow, but will ask for nutrition to come by and verify feeds/goals.      Memory disorder  Cont patch      Mood disorder  Cont home trazodone      Parkinson's disease  Cont sinemet        VTE Risk Mitigation (From admission, onward)         Ordered     IP VTE HIGH RISK PATIENT  Once      09/25/19 2154     Place sequential compression device  Until discontinued      09/25/19 2154              Critical care time spent on the evaluation and treatment of severe organ dysfunction, review of pertinent labs and imaging studies, discussions with consulting providers and discussions with patient/family: 35 minutes.     Patient has written DNR. This is patient and family's desire and is appropriate given her chronic and irreversible condition.    Tessy Marvin MD  Department of Hospital Medicine   [facility]

## 2019-09-26 NOTE — ASSESSMENT & PLAN NOTE
Resume feeds slowly this morning with goal to get back to 55/hr.  Check residuals and watch for aspiration.  Avoid PPI if possible.

## 2019-09-26 NOTE — PLAN OF CARE
09/26/19 0832   Discharge Assessment   Assessment Type Discharge Planning Assessment   Confirmed/corrected address and phone number on facesheet? Yes   Assessment information obtained from? Medical Record;Caregiver   Expected Length of Stay (days) 4   Communicated expected length of stay with patient/caregiver yes   Prior to hospitalization functional status: Completely Dependent   Current cognitive status: Unable to Assess  (patient resting)   Current Functional Status: Completely Dependent   Facility Arrived From: [facility]   Lives With facility resident   Able to Return to Prior Arrangements yes   Is patient able to care for self after discharge? No   Who are your caregiver(s) and their phone number(s)? Opal (daughter)  [phone]   Patient's perception of discharge disposition nursing home   Readmission Within the Last 30 Days no previous admission in last 30 days   Patient currently being followed by outpatient case management? No   Patient currently receives any other outside agency services? No   Equipment Currently Used at Home hospital bed   Do you have any problems affording any of your prescribed medications? No   Is the patient taking medications as prescribed? yes   Does the patient have transportation home? Yes   Transportation Anticipated agency   Dialysis Name and Scheduled days N/A   Does the patient receive services at the [facility]mad[facility] Clinic? No   Discharge Plan A Return to nursing home   Discharge Plan B Return to Nursing Home   DME Needed Upon Discharge  none   Patient/Family in Agreement with Plan yes         Patient admitted due to meeting sepsis criteria with no identified source. Patient is a resident of [facility]. Once medically stable, patient will discharge back to [facility]. There are no post acute needs identified at this time. Spoke with daughter at bedside. Family educated on diagnosis for this admission, and patient verbalizes understanding.  Discharge planning brochure and educational handout of what signs and symptoms to look for after discharge, and how to manage health at home, given to patient and family. Family are encouraged to call with any questions or help the would need. Contact information given. CM will continue to follow patient throughout the transitions of care, and assist with any discharge needs.

## 2019-09-26 NOTE — PLAN OF CARE
09/26/19 0733   Advance Directives (For Healthcare)   Advance Directive  (If Adv Dir status is received, view document under Adv Dir in header or Chart Review Media tab) Advance Directive currently in Epic.         DNR. 2 signature paper signed per 2 physicians and scanned in [facility].

## 2019-09-26 NOTE — ED TRIAGE NOTES
Arrives per [facility] from the [facility].  Presents with respiratory distress. [facility] reports that symptoms started at 1430 today per report of NH. No family present. Paperwork reflects that patient is DNR

## 2019-09-26 NOTE — NURSING
Patient received Ativan 0.5 mg IVP, resting more comfortably at this time. RR 29, O2 sat 100%. Was able to cough wet sounding cough, suctioned orally small amount whitish secretions. Will continue to monitor. Daughter at bedside.

## 2019-09-26 NOTE — PT/OT/SLP EVAL
"Physical Therapy Evaluation    Patient Name:  Eddy Cunningham   MRN:  [id number]    Recommendations:     Discharge Recommendations:  nursing facility, basic   Discharge Equipment Recommendations: none   Barriers to discharge: None    Assessment:     Eddy Cunningham is a 79 y.o. female admitted with a medical diagnosis of Sepsis.  She presents with the following impairments/functional limitations:  decreased ROM, decreased upper extremity function, decreased lower extremity function, impaired joint extensibility, impaired coordination, impaired functional mobilty, impaired self care skills, impaired endurance, weakness, abnormal tone. Patient seen on bed with daughter(Vanessa) at bedside. Patient is a nursing home resident with hx of Parkinsons Ds.,  (+)Peg Tube and bed bound. Nursing home staffs uses dion lift for transfers and place patient in a reclining chair at bedside during certain time of the day. Currently patient is severely body  rigid and joints stiffness with bilat hands/Fingers flexion contractures and bilateral Ankle Plantarflexed contractures. Patient is totally dependent with mobility. Patient may benefit for acute Skilled PT tx to promote and inc Passive ROM ex on all extremities, bed mobility and decrease burden of care.    Rehab Prognosis: Fair; patient would benefit from acute skilled PT services to address these deficits and reach maximum level of function.    Recent Surgery: * No surgery found *      Plan:     During this hospitalization, patient to be seen 5 x/week to address the identified rehab impairments via therapeutic activities, therapeutic exercises and progress toward the following goals:    · Plan of Care Expires:  10/02/19    Subjective     Chief Complaint: General Body Rigidity and Multiple Joints stiffness  Patient/Family Comments/goals: "To lossen up and move better for bed positioning".  Pain/Comfort:  · Pain Rating 1: 0/10  · Pain Rating Post-Intervention 1: 0/10    Patients " cultural, spiritual, Amish conflicts given the current situation: no    Living Environment:  Nursing Home([place] ) resident.   Prior to admission, patients level of function was bed bound and uses dion lift for transfers. Patient stays on a reclining chair at beside for a certain day time.  Equipment used at home: hospital bed.  DME owned (not currently used): none.  Upon discharge, patient will have assistance from nursing home staffs and daughter support.    Objective:     Communicated with patient and daughter(Vanessa) prior to session.  Patient found supine with PICC line, peripheral IV, pulse ox (continuous), telemetry, SCD, oxygen, PEG Tube, blood pressure cuff  upon PT entry to room.    General Precautions: Standard, other (see comments)(standard)   Orthopedic Precautions:N/A   Braces: N/A     Exams:  · Cognitive Exam:  Patient is oriented to Person  · Gross Motor Coordination:  impaired  · Skin Integrity/Edema:      · -       healed pressure sore at the coccyx area(per daughter)  · -       Skin integrity: Visible skin intact  · RUE ROM: Deficits: Limited  with joints stiffness and bilat hands/fingers flexion contractures  · RUE Strength: Deficits: Limited  with joints stiffness.  · LUE ROM: Deficits: Limited  with joints stiffness and bilat hands/fingers flexion contracture  · LUE Strength: Deficits: Limited  with joints stiffness   · RLE ROM: Deficits: Limited  with joints stiffness and bilat ankle platarflexion contracture.  · RLE Strength: Deficits: Limited  with joints stiffness  · LLE ROM: Deficits: Limited  with joints stiffness and bilat ankle platarflexion contracture  · LLE Strength: Deficits: Limited  with joints stiffness    Functional Mobility:  · Bed Mobility:     · Rolling Left:  unable  · Rolling Right: unable  · Supine to Sit: unable  · Sit to Supine: unable  · Balance: Sitting Static on edge of the bed: Unable      Therapeutic Activities and Exercises:   Completed PT eval. Provided  patient PROM ex on bilat LE/UE.    AM-PAC 6 CLICK MOBILITY  Total Score:6     Patient left supine with all lines intact, call button in reach, nurse Chioma notified and daughter(Vanessa) present.    GOALS:   Multidisciplinary Problems     Physical Therapy Goals        Problem: Physical Therapy Goal    Goal Priority Disciplines Outcome Goal Variances Interventions   Physical Therapy Goal     PT, PT/OT      Description:  Goals to be met by: 7 visits    Patient will increase functional independence with mobility by performin. Able to tolerate PROM  Ex on bilat LE/UE x 7 minutes to help decrease joints stiffness.  2. Able to perform and tolerate rolling to sides x 5 reps with max/moderate assistance.  3. Supine to sit with Maximum Assistance  4. 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)     Parkinson disease     RLS (restless legs syndrome)     Stress incontinence     Venous stasis ulcers        Past Surgical History:   Procedure Laterality Date    CARPAL TUNNEL RELEASE      Right    CHOLECYSTECTOMY      DEBRIDEMENT OF SACRAL WOUND N/A 2018    Procedure: DEBRIDEMENT OF SACRAL DECUBITUS SKIN, SUBCUTANEOUS TISSUE, FAT,  AND FASCIA;  Surgeon: Fabio Serna MD;  Location: [facility] OR;  Service: General;  Laterality: N/A;    GASTROSTOMY TUBE PLACEMENT      PARTIAL HYSTERECTOMY      ROTATOR CUFF REPAIR      TONSILLECTOMY, ADENOIDECTOMY      tonsiles    TOTAL KNEE ARTHROPLASTY         Time Tracking:     PT Received On: 19  PT Start Time: 1215     PT Stop Time: 1243  PT Total Time (min): 28 min     Billable Minutes: Evaluation 15  and Therapeutic Exercise 12      Alfred Stone, PT  09/26/2019

## 2019-09-26 NOTE — PLAN OF CARE
09/26/19 1413   Medicare Message   Important Message from Medicare regarding Discharge Appeal Rights Given to patient/caregiver;Explained to patient/caregiver;Signed/date by patient/caregiver   Date IMM was signed 09/25/19   Time IMM was signed 2100

## 2019-09-26 NOTE — ASSESSMENT & PLAN NOTE
No need for speech eval - patient does not swallow, but will ask for nutrition to come by and verify feeds/goals.

## 2019-09-26 NOTE — PLAN OF CARE
Patient admitted with sepsis. Max temp 100.2 degrees.  Breath sounds with scattered coarse rhonchi. Difficulty coughing up secretions. Oral suctioning done with small amount whitish secretions noted. Chronic Arvizu catheter changed today. Urine slightly cloudy. PEG tube feedings restarted this afternoon. Turned Q2 hr, no skin breakdowns, healed sacral area noted. Daughter at bedside, very supportive.

## 2019-09-26 NOTE — CONSULTS
"  [facility]  Adult Nutrition  Consult Note    SUMMARY     Recommendations    Recommendation:   1. Isosource 1.5 @ goal rate 55 mL/hr to provide 1980 kcals, 90 g Pro, 1008 mL enteral water   2. Begin at 30 mL/hr per MD and increase by 10-20 mL as tolerated   3. Monitor for residuals    Goals: Pt to tolerate TF at goal rate by f/u  Nutrition Goal Status: new  Communication of RD Recs: other (comment)(POC, reviewed with RN)    Reason for Assessment    Reason For Assessment: consult  Diagnosis: infection/sepsis  Relevant Medical History: COPD, dementia, GERD, HTN, hypopotassemia, hypothyroidism, parkinson's  General Information Comments: Pt has h/o dysphagia and peg feedings PTA (Jevity 1.5 continuous at 55cc/hr). 5% wt loss in <4 months. Pt was asleep during visit but spoke with daughter who stated that pt has been on PEG feeds for >3 years and has had no issues with tolerance. Spoke with nurse who stated that they will begin feedings at 30 mL due to possible signs of aspiration and will increase as tolerated. NFPE not conducted per pt sleeping and receiving adequate nutrition through PEG feedings.  Nutrition Discharge Planning: Peg feeds    Nutrition Risk Screen    Nutrition Risk Screen: dysphagia or difficulty swallowing, tube feeding or parenteral nutrition    Nutrition/Diet History    Spiritual, Cultural Beliefs, Mu-ism Practices, Values that Affect Care: no  Food Allergies: NKFA  Factors Affecting Nutritional Intake: NPO, difficulty/impaired swallowing  Nutrition Support Formula Prior to Admit: Jevity 1.5  Nutrition Support Rate Prior to Admit: 55 (ml)  Nutrtion Support Frequency Prior to Admit: continuous    Anthropometrics    Temp: 98.4 °F (36.9 °C)  Height Method: Estimated  Height: 5' 3" (160 cm)  Height (inches): 63 in  Weight Method: Bed Scale  Weight: 60.8 kg (134 lb)  Weight (lb): 134 lb  Ideal Body Weight (IBW), Female: 115 lb  % Ideal Body Weight, Female (lb): 116.52 lb  BMI " (Calculated): 23.8  BMI Grade: 18.5-24.9 - normal     Lab/Procedures/Meds    Pertinent Labs Reviewed: reviewed  Pertinent Labs Comments: BUN 25, albumin 3.3  Pertinent Medications Reviewed: reviewed  Pertinent Medications Comments: docusate sodium    Estimated/Assessed Needs    Weight Used For Calorie Calculations: 60.8 kg (134 lb)  Energy Calorie Requirements (kcal): 1823(30 kcal/kg)  Energy Need Method: Kcal/kg(25-30 kcal/kg)  Protein Requirements: 73 g(1.2 g/kg)  Weight Used For Protein Calculations: 60.8 kg (134 lb)  Fluid Requirements (mL): 1 mL/kcal or per MD  Estimated Fluid Requirement Method: RDA Method  RDA Method (mL): 1823  CHO Requirement: 50% of calories    Nutrition Prescription Ordered    Current Diet Order: NPO    Evaluation of Received Nutrient/Fluid Intake    I/O: reviewed  Comments: LBM 9/24  % Intake of Estimated Energy Needs: 0 - 25 %  % Meal Intake: NPO    Nutrition Risk    Level of Risk/Frequency of Follow-up: (2x/wk)     Assessment and Plan  Nutrition Problem:  Inadequate enteral nutrition infusion    Related to (etiology):   TF not currently running    Signs and Symptoms (as evidenced by):   Pt NPO with no TF running at the moment    Interventions:  Tube Feed rec  Spoke with RN about TF  Spoke with daughter about pt's usually TF formula and rate    Nutrition Diagnosis Status:   New    Monitor and Evaluation    Food and Nutrient Intake: energy intake, enteral nutrition intake  Food and Nutrient Adminstration: enteral and parenteral nutrition administration  Physical Activity and Function: nutrition-related ADLs and IADLs  Anthropometric Measurements: weight change  Biochemical Data, Medical Tests and Procedures: inflammatory profile, gastrointestinal profile, electrolyte and renal panel, glucose/endocrine profile, lipid profile  Nutrition-Focused Physical Findings: overall appearance     Nutrition Follow-Up    RD Follow-up?: Yes           Evelyn Che, Karrial LDN

## 2019-09-26 NOTE — SUBJECTIVE & OBJECTIVE
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)     Parkinson disease     RLS (restless legs syndrome)     Stress incontinence     Venous stasis ulcers        Past Surgical History:   Procedure Laterality Date    CARPAL TUNNEL RELEASE      Right    CHOLECYSTECTOMY      DEBRIDEMENT OF SACRAL WOUND N/A 7/26/2018    Procedure: DEBRIDEMENT OF SACRAL DECUBITUS SKIN, SUBCUTANEOUS TISSUE, FAT,  AND FASCIA;  Surgeon: Fabio Serna MD;  Location: [facility] OR;  Service: General;  Laterality: N/A;    GASTROSTOMY TUBE PLACEMENT      PARTIAL HYSTERECTOMY      ROTATOR CUFF REPAIR      TONSILLECTOMY, ADENOIDECTOMY      tonsiles    TOTAL KNEE ARTHROPLASTY         Review of patient's allergies indicates:   Allergen Reactions    Oxycodone      Hallucination    Sulfa (sulfonamide antibiotics)      Other reaction(s): when on contact       No current facility-administered medications on file prior to encounter.      Current Outpatient Medications on File Prior to Encounter   Medication Sig    amino ac/protein hydr/whey pro (LIQUACEL ORAL) 30 mLs 2 (two) times daily.    carbidopa-levodopa  mg (SINEMET)  mg per tablet 1 tablet by Per G Tube route 5 (five) times daily.    collagenase ointment Apply topically once daily.    levothyroxine (SYNTHROID) 75 MCG tablet 1 tablet (75 mcg total) by Per G Tube route before breakfast.    omeprazole (PRILOSEC) 40 MG capsule Open 40 mg capsule into g-tube once daily    pramipexole (MIRAPEX) 0.25 MG tablet 1 tablet (0.25 mg total) by Per G Tube route 3 (three) times daily.    rivastigmine (EXELON) 4.6 mg/24 hour PT24 Place 1 patch onto the skin once daily.      trazodone (DESYREL) 50  MG tablet 1 tablet (50 mg total) by Per G Tube route every evening.    ascorbic acid, vitamin C, (VITAMIN C) 500 MG tablet 500 mg once daily.    aspirin 81 MG Chew 1 tablet (81 mg total) by Per G Tube route once daily.    docusate sodium (COLACE) 100 MG capsule 1 capsule (100 mg total) by Per G Tube route once daily.    furosemide (LASIX) 40 MG tablet Take 40 mg by mouth once daily.    HYDROcodone-acetaminophen (NORCO) 7.5-325 mg per tablet Take 1 tablet by mouth every 12 (twelve) hours as needed for Pain.    multivitamin (ONE DAILY MULTIVITAMIN) per tablet 1 tablet once daily.    potassium chloride 10% (KAYCIEL) 20 mEq/15 mL solution 15 mLs (20 mEq total) by Per G Tube route 2 (two) times daily.    ranitidine (ZANTAC) 150 MG capsule 75 mg 2 (two) times daily.     senna (SENOKOT) 8.6 mg tablet 1 tablet by Per G Tube route once daily.    tiZANidine 2 mg Cap Take 4 mg by mouth nightly as needed.     Family History     None        Tobacco Use    Smoking status: Former Smoker    Smokeless tobacco: Never Used   Substance and Sexual Activity    Alcohol use: No    Drug use: No    Sexual activity: Not on file     Review of Systems   Unable to perform ROS: Dementia   Constitutional: Positive for fever.   HENT: Positive for congestion and trouble swallowing.    Respiratory: Positive for cough and shortness of breath.    Genitourinary: Positive for difficulty urinating.   Neurological: Positive for weakness.     Objective:     Vital Signs (Most Recent):  Temp: 100.2 °F (37.9 °C)(axillary) (09/26/19 0734)  Pulse: 82 (09/26/19 0801)  Resp: (!) 26 (09/26/19 0801)  BP: (!) 96/52 (09/26/19 0600)  SpO2: 100 % (09/26/19 0801) Vital Signs (24h Range):  Temp:  [98 °F (36.7 °C)-101.2 °F (38.4 °C)] 100.2 °F (37.9 °C)  Pulse:  [] 82  Resp:  [22-35] 26  SpO2:  [95 %-100 %] 100 %  BP: ()/(48-74) 96/52     Weight: 60.8 kg (134 lb)  Body mass index is 23.74 kg/m².    Physical Exam   Constitutional: She is oriented  to person, place, and time. She appears well-developed. No distress.   Frail elderly woman   HENT:   Head: Normocephalic and atraumatic.   venti in place   Eyes: Pupils are equal, round, and reactive to light. Conjunctivae are normal.   Neck: Normal range of motion. Neck supple. No thyromegaly present.   Cardiovascular: Normal rate, regular rhythm, normal heart sounds and intact distal pulses.   Pulmonary/Chest: Effort normal. No respiratory distress. She has no wheezes. She has rales.   Coarse rhonchi with UAN.  Rhonchi slightly more pronounced on right   Abdominal: Soft. Bowel sounds are normal. There is no tenderness.   G tube in place   Genitourinary:   Genitourinary Comments: Arvizu in place   Musculoskeletal: Normal range of motion. She exhibits no edema.   Lymphadenopathy:     She has no cervical adenopathy.   Neurological: She is alert and oriented to person, place, and time.   Skin: Skin is warm and dry. No rash noted. There is erythema (surrounding old gtube site).   Psychiatric: She has a normal mood and affect.   echolalia   Nursing note and vitals reviewed.        CRANIAL NERVES     CN III, IV, VI   Pupils are equal, round, and reactive to light.       Significant Labs:   CBC:   Recent Labs   Lab 09/25/19 1959 09/26/19 0339   WBC 14.15* 11.88   HGB 13.0 11.9*   HCT 42.7 38.6    228     CMP:   Recent Labs   Lab 09/25/19 1959 09/26/19 0339    139   K 4.0 4.2    103   CO2 26 26   * 104   BUN 26* 25*   CREATININE 0.6 0.6   CALCIUM 9.5 9.0   PROT 7.4 6.4   ALBUMIN 3.9 3.3*   BILITOT 0.6 0.7   ALKPHOS 109 86   AST 26 23   ALT 17 20   ANIONGAP 10 10   EGFRNONAA >60 >60     Lactic Acid:   Recent Labs   Lab 09/25/19 1959 09/25/19 2338 09/26/19 0339   LACTATE 0.9 0.9 1.5     Urine Culture: No results for input(s): LABURIN in the last 48 hours.  Urine Studies:   Recent Labs   Lab 09/25/19 1948   COLORU Yellow   APPEARANCEUA Cloudy*   PHUR 7.0   SPECGRAV 1.025   PROTEINUA 3+*    GLUCUA Negative   KETONESU Trace*   BILIRUBINUA Negative   OCCULTUA 3+*   NITRITE Positive*   UROBILINOGEN Negative   LEUKOCYTESUR 1+*   RBCUA 2   WBCUA 5   BACTERIA Moderate*   SQUAMEPITHEL 40   HYALINECASTS 0       Significant Imaging: I have reviewed all pertinent imaging results/findings within the past 24 hours.     CXR:  The patient's head and chin obscures evaluation of the lung apices bilaterally.  No definite infiltrates.  Normal size heart.

## 2019-09-26 NOTE — ASSESSMENT & PLAN NOTE
Likely secondary to UTI.  Blood and urine cultures pending.  Treat with zosyn for both aspiration and UTI.  For now, stay in ICU.  Received fluids initially.  Blood pressures okay.    Of note is her normal lactate and procalcitonin.  Maybe this is just a viral URI. For now supportive care and cover with abx until cultures return.

## 2019-09-26 NOTE — PROGRESS NOTES
Staff Handoff  Bedside report received from ERINN Medeiros. Patient resting quietly in bed  with no complaints. Respirations noisy on expiration. O2 sat 95% on ventimask at 28%. Occasional moist non-productive cough noted. Denies pain, no distress noted.   Assessment per flow sheet.      Resident Handoff

## 2019-09-26 NOTE — NURSING
Patient became more tachypneic with RR 40-50's. O2 sat decreased to 66% on 2 liters O2 n/c. Encouraged to cough to clear secretions and placed on 100% NRB mask. HOB elevated. Attempted to suction orally with minimal whitish secretions noted. O2 sats improved to 75%, Dr. Marvin notified. [facility] at bedside. New orders noted.

## 2019-09-26 NOTE — PROGRESS NOTES
Patient new admit with suspected aspiration/UTI; current nursing home resident bed bound, severely contracted. Arouses to voice with appropriate response at times, repeats self often. Currently on venti-mask with copious thick upper airway secretions, frequent suctioning required. Reddened area around PEG tube and under breasts, skin intact. Vitals now stable, T-max 100.6ax responsive to Tylenol earlier in shift.  Instructed family on POC with verbalized understanding. DNR per chart.

## 2019-09-26 NOTE — HPI
79 year old female was sent here from the NH for increased chest congestion as well as fever that started yesterday. She has known parkinsons and is bed bound with h/o dysphagia and peg feedings (continuous at 55cc/hr) and chronic indwelling zee catheter. Per her daughter, she has been getting excellent care, her decubitis is completely healed and she has had no recent issues until yesterday. She has continued to get all of her same meds. She has had no obvious alteration in mental status and is still having more good days than bad - singing to old country western songs in her room at the NH.

## 2019-09-26 NOTE — HOSPITAL COURSE
In the ER manju was found to be febrile, requiring O2 and hypotensive. She was placed in the ICU secondary to severe sepsis. She received fluids and was started on abx. She is still febrile this morning.    9/27  AF  BP low, but stable on IVF. MAP 59   WBC improved to 6     9/28  AFVSS. No hypotension.  On 2 liters NC. Drops to 90 on RA     9/29  AFVSS. No hypotension   Singing this morning

## 2019-09-26 NOTE — PROGRESS NOTES
Pt seen on video rounds  Upper airway sounds present- being suctioned  Concern for pneumonia possibly aspiration but CXR no major opacity lower zones  UA with signs of infection  LA normal- follow  If not better will need to expand abx

## 2019-09-26 NOTE — PLAN OF CARE
Problem: Adult Inpatient Plan of Care  Goal: Plan of Care Review  Outcome: Ongoing, Progressing  Flowsheets (Taken 9/26/2019 1618)  Plan of Care Reviewed With: daughter     Recommendation:   1. Isosource 1.5 @ goal rate 55 mL/hr to provide 1980 kcals, 90 g Pro, 1008 mL enteral water   2. Begin at 30 mL/hr per MD and increase by 10-20 mL as tolerated   3. Monitor for residuals    Interventions:  Tube Feed rec  Spoke with RN about TF  Spoke with daughter about pt's usually TF formula and rate    Goals: Pt to tolerate TF at goal rate by f/u  Nutrition Goal Status: new  Nutrition Discharge Planning: Peg feeds    Evelyn Che, Provisional LDN

## 2019-09-27 LAB
ANION GAP SERPL CALC-SCNC: 10 MMOL/L (ref 8–16)
BACTERIA UR CULT: NORMAL
BACTERIA UR CULT: NORMAL
BASOPHILS # BLD AUTO: 0.01 K/UL (ref 0–0.2)
BASOPHILS NFR BLD: 0.2 % (ref 0–1.9)
BUN SERPL-MCNC: 21 MG/DL (ref 8–23)
CALCIUM SERPL-MCNC: 8.8 MG/DL (ref 8.7–10.5)
CHLORIDE SERPL-SCNC: 103 MMOL/L (ref 95–110)
CO2 SERPL-SCNC: 24 MMOL/L (ref 23–29)
CREAT SERPL-MCNC: 0.6 MG/DL (ref 0.5–1.4)
DIFFERENTIAL METHOD: ABNORMAL
EOSINOPHIL # BLD AUTO: 0.1 K/UL (ref 0–0.5)
EOSINOPHIL NFR BLD: 1.4 % (ref 0–8)
ERYTHROCYTE [DISTWIDTH] IN BLOOD BY AUTOMATED COUNT: 15.5 % (ref 11.5–14.5)
EST. GFR  (AFRICAN AMERICAN): >60 ML/MIN/1.73 M^2
EST. GFR  (NON AFRICAN AMERICAN): >60 ML/MIN/1.73 M^2
GLUCOSE SERPL-MCNC: 124 MG/DL (ref 70–110)
HCT VFR BLD AUTO: 33.6 % (ref 37–48.5)
HGB BLD-MCNC: 10 G/DL (ref 12–16)
IMM GRANULOCYTES # BLD AUTO: 0.01 K/UL (ref 0–0.04)
IMM GRANULOCYTES NFR BLD AUTO: 0.2 % (ref 0–0.5)
LYMPHOCYTES # BLD AUTO: 0.4 K/UL (ref 1–4.8)
LYMPHOCYTES NFR BLD: 6.7 % (ref 18–48)
MCH RBC QN AUTO: 29 PG (ref 27–31)
MCHC RBC AUTO-ENTMCNC: 29.8 G/DL (ref 32–36)
MCV RBC AUTO: 97 FL (ref 82–98)
MONOCYTES # BLD AUTO: 0.4 K/UL (ref 0.3–1)
MONOCYTES NFR BLD: 6 % (ref 4–15)
NEUTROPHILS # BLD AUTO: 5.4 K/UL (ref 1.8–7.7)
NEUTROPHILS NFR BLD: 85.5 % (ref 38–73)
NRBC BLD-RTO: 0 /100 WBC
PLATELET # BLD AUTO: 169 K/UL (ref 150–350)
PMV BLD AUTO: 10.2 FL (ref 9.2–12.9)
POTASSIUM SERPL-SCNC: 4.3 MMOL/L (ref 3.5–5.1)
RBC # BLD AUTO: 3.45 M/UL (ref 4–5.4)
SODIUM SERPL-SCNC: 137 MMOL/L (ref 136–145)
TSH SERPL DL<=0.005 MIU/L-ACNC: 1.69 UIU/ML (ref 0.4–4)
WBC # BLD AUTO: 6.29 K/UL (ref 3.9–12.7)

## 2019-09-27 PROCEDURE — 99232 SBSQ HOSP IP/OBS MODERATE 35: CPT | Mod: ,,, | Performed by: FAMILY MEDICINE

## 2019-09-27 PROCEDURE — 84443 ASSAY THYROID STIM HORMONE: CPT

## 2019-09-27 PROCEDURE — 36415 COLL VENOUS BLD VENIPUNCTURE: CPT

## 2019-09-27 PROCEDURE — 25000003 PHARM REV CODE 250: Performed by: FAMILY MEDICINE

## 2019-09-27 PROCEDURE — 94640 AIRWAY INHALATION TREATMENT: CPT

## 2019-09-27 PROCEDURE — 94761 N-INVAS EAR/PLS OXIMETRY MLT: CPT

## 2019-09-27 PROCEDURE — 27000221 HC OXYGEN, UP TO 24 HOURS

## 2019-09-27 PROCEDURE — 80048 BASIC METABOLIC PNL TOTAL CA: CPT

## 2019-09-27 PROCEDURE — 63600175 PHARM REV CODE 636 W HCPCS: Performed by: FAMILY MEDICINE

## 2019-09-27 PROCEDURE — 99232 PR SUBSEQUENT HOSPITAL CARE,LEVL II: ICD-10-PCS | Mod: ,,, | Performed by: FAMILY MEDICINE

## 2019-09-27 PROCEDURE — 99900035 HC TECH TIME PER 15 MIN (STAT)

## 2019-09-27 PROCEDURE — 20000000 HC ICU ROOM

## 2019-09-27 PROCEDURE — 25000242 PHARM REV CODE 250 ALT 637 W/ HCPCS: Performed by: FAMILY MEDICINE

## 2019-09-27 PROCEDURE — 85025 COMPLETE CBC W/AUTO DIFF WBC: CPT

## 2019-09-27 PROCEDURE — 97110 THERAPEUTIC EXERCISES: CPT

## 2019-09-27 RX ORDER — SODIUM CHLORIDE 9 MG/ML
INJECTION, SOLUTION INTRAVENOUS CONTINUOUS
Status: DISCONTINUED | OUTPATIENT
Start: 2019-09-27 | End: 2019-09-27

## 2019-09-27 RX ORDER — HYDROCODONE BITARTRATE AND ACETAMINOPHEN 7.5; 325 MG/15ML; MG/15ML
15 SOLUTION ORAL EVERY 4 HOURS PRN
Status: DISCONTINUED | OUTPATIENT
Start: 2019-09-27 | End: 2019-09-29 | Stop reason: HOSPADM

## 2019-09-27 RX ORDER — ACETAMINOPHEN 500 MG
1000 TABLET ORAL EVERY 8 HOURS PRN
Status: DISCONTINUED | OUTPATIENT
Start: 2019-09-27 | End: 2019-09-29 | Stop reason: HOSPADM

## 2019-09-27 RX ORDER — SODIUM CHLORIDE 9 MG/ML
INJECTION, SOLUTION INTRAVENOUS CONTINUOUS
Status: DISCONTINUED | OUTPATIENT
Start: 2019-09-27 | End: 2019-09-29 | Stop reason: HOSPADM

## 2019-09-27 RX ADMIN — HYDROCODONE BITARTRATE AND ACETAMINOPHEN 15 ML: 7.5; 325 SOLUTION ORAL at 11:09

## 2019-09-27 RX ADMIN — PIPERACILLIN AND TAZOBACTAM 4.5 G: 4; .5 INJECTION, POWDER, LYOPHILIZED, FOR SOLUTION INTRAVENOUS; PARENTERAL at 10:09

## 2019-09-27 RX ADMIN — PIPERACILLIN AND TAZOBACTAM 4.5 G: 4; .5 INJECTION, POWDER, LYOPHILIZED, FOR SOLUTION INTRAVENOUS; PARENTERAL at 05:09

## 2019-09-27 RX ADMIN — LEVOTHYROXINE SODIUM 75 MCG: 75 TABLET ORAL at 05:09

## 2019-09-27 RX ADMIN — CARBIDOPA AND LEVODOPA 1 TABLET: 25; 100 TABLET ORAL at 05:09

## 2019-09-27 RX ADMIN — PRAMIPEXOLE DIHYDROCHLORIDE 0.25 MG: 0.12 TABLET ORAL at 09:09

## 2019-09-27 RX ADMIN — SODIUM CHLORIDE: 0.9 INJECTION, SOLUTION INTRAVENOUS at 09:09

## 2019-09-27 RX ADMIN — IPRATROPIUM BROMIDE AND ALBUTEROL SULFATE 3 ML: .5; 3 SOLUTION RESPIRATORY (INHALATION) at 03:09

## 2019-09-27 RX ADMIN — CARBIDOPA AND LEVODOPA 1 TABLET: 25; 100 TABLET ORAL at 10:09

## 2019-09-27 RX ADMIN — TRAZODONE HYDROCHLORIDE 50 MG: 50 TABLET ORAL at 09:09

## 2019-09-27 RX ADMIN — GUAIFENESIN 600 MG: 200 SOLUTION ORAL at 10:09

## 2019-09-27 RX ADMIN — HYDROCODONE BITARTRATE AND ACETAMINOPHEN 15 ML: 7.5; 325 SOLUTION ORAL at 04:09

## 2019-09-27 RX ADMIN — CARBIDOPA AND LEVODOPA 1 TABLET: 25; 100 TABLET ORAL at 02:09

## 2019-09-27 RX ADMIN — IPRATROPIUM BROMIDE AND ALBUTEROL SULFATE 3 ML: .5; 3 SOLUTION RESPIRATORY (INHALATION) at 07:09

## 2019-09-27 RX ADMIN — ACETAMINOPHEN 1000 MG: 500 TABLET, FILM COATED ORAL at 10:09

## 2019-09-27 RX ADMIN — RIVASTIGMINE 1 PATCH: 4.6 PATCH, EXTENDED RELEASE TRANSDERMAL at 10:09

## 2019-09-27 RX ADMIN — PIPERACILLIN AND TAZOBACTAM 4.5 G: 4; .5 INJECTION, POWDER, LYOPHILIZED, FOR SOLUTION INTRAVENOUS; PARENTERAL at 01:09

## 2019-09-27 RX ADMIN — CARBIDOPA AND LEVODOPA 1 TABLET: 25; 100 TABLET ORAL at 09:09

## 2019-09-27 RX ADMIN — PRAMIPEXOLE DIHYDROCHLORIDE 0.25 MG: 0.12 TABLET ORAL at 10:09

## 2019-09-27 RX ADMIN — DEXTROSE, SODIUM CHLORIDE, AND POTASSIUM CHLORIDE: 5; .45; .15 INJECTION INTRAVENOUS at 05:09

## 2019-09-27 RX ADMIN — SENNA 1 TABLET: 8.6 TABLET, COATED ORAL at 10:09

## 2019-09-27 RX ADMIN — GUAIFENESIN 600 MG: 200 SOLUTION ORAL at 09:09

## 2019-09-27 RX ADMIN — HYDROCODONE BITARTRATE AND ACETAMINOPHEN 15 ML: 7.5; 325 SOLUTION ORAL at 07:09

## 2019-09-27 RX ADMIN — PRAMIPEXOLE DIHYDROCHLORIDE 0.25 MG: 0.12 TABLET ORAL at 04:09

## 2019-09-27 RX ADMIN — IPRATROPIUM BROMIDE AND ALBUTEROL SULFATE 3 ML: .5; 3 SOLUTION RESPIRATORY (INHALATION) at 08:09

## 2019-09-27 RX ADMIN — IPRATROPIUM BROMIDE AND ALBUTEROL SULFATE 3 ML: .5; 3 SOLUTION RESPIRATORY (INHALATION) at 11:09

## 2019-09-27 RX ADMIN — SODIUM CHLORIDE: 0.9 INJECTION, SOLUTION INTRAVENOUS at 10:09

## 2019-09-27 RX ADMIN — HYDROCODONE BITARTRATE AND ACETAMINOPHEN 15 ML: 7.5; 325 SOLUTION ORAL at 02:09

## 2019-09-27 RX ADMIN — ASPIRIN 81 MG 81 MG: 81 TABLET ORAL at 10:09

## 2019-09-27 NOTE — PLAN OF CARE
Pt admitted to ICU with SOB, temp 102 and UTI. Pt is a long term resident of [facility]. Severly contracted BUE and BLE. Pt with indwelling zee. New zee placed on day shift. Adequate UOP sheila in color. Temp max 97.9. Skin intact with noted scarring to LLE, original Peg tube site and sacral area. Tube feedings initiated on day shift. Pt tolerating at rate of 30 ml/hr via pump. No residuals. Tube feedings goal 55 ml/hr. Currently at 40 ml/hr. Pt turned every 2 hours and prn for comfort. HOB remains up greater than 45 degrees for aspiration precautions. Respiratory tx every 4 hours with oral suctioning as needed as well as oral and lip care perfomed ever 4 hours and prn. POC discussed with PT. No evidence of learning. No family at bedside to review Pt's POC during evening shift. Will continue to monitor.

## 2019-09-27 NOTE — ASSESSMENT & PLAN NOTE
Cont on synthroid  Lab Results   Component Value Date    TSH 4.071 (H) 06/10/2018     Order TSH

## 2019-09-27 NOTE — ASSESSMENT & PLAN NOTE
Likely secondary to UTI.  Blood and urine cultures pending.  Treat with zosyn for both aspiration and UTI.  For now, stay in ICU as he BP remains low at times  Received fluids initially.    Of note is her normal lactate and procalcitonin.  Maybe this is just a viral URI. For now supportive care and cover with abx until cultures return.

## 2019-09-27 NOTE — NURSING
"Daughter at bedside states "since her PEG surgery she is in more pain, they give her the pain medication every four hours at the nursing home." Called and updated Dr Sandoval on status, vitals, condition, daughter request for pain medication frequency to be changed, continued moaning and facial grimacing noted at times, patient continues to deny pain; however, appears with body language to be in pain/umcomforatble despite continued position change and pillows for support. Patient also noted to have limited verbal communication ability (see history). New orders noted, will continue to monitor and update MD as needed.  "

## 2019-09-27 NOTE — NURSING
No falls or near misses noted on shift, No skin issues or wounds, SCDs for DVT prevention, Unable to verbalize pain complaints, personal preferences music on from home tablet and also ibed music, plan of care discussed with patient and daughter at bedside, daughter verbalized understanding, evidence of learning noted from daughter, unable to determine patient's level of learning, normal saline gtt as ordered, plan is to maintain ICU status, tube feedings tolerated-rate increased to 50ml/hr, zee maintained and patent.

## 2019-09-27 NOTE — NURSING
Dr Sandoval at bedside, updated MD on vitals, status, condition, moaning noted, denies pain, confused to time/situation, able to state place correctly, verbalizes understanding of name / Awaiting new orders.

## 2019-09-27 NOTE — PROGRESS NOTES
[facility] Hospital Medicine  Progress Note    Patient Name: Eddy Cunningham  MRN: [id number]  Patient Class: IP- Inpatient   Admission Date: 9/25/2019  Length of Stay: 2 days  Attending Physician: Kunal Sandoval MD  Primary Care Provider: Ayaka Pollard MD        Subjective:     Principal Problem:Sepsis        HPI:  79 year old female was sent here from the NH for increased chest congestion as well as fever that started yesterday. She has known parkinsons and is bed bound with h/o dysphagia and peg feedings (continuous at 55cc/hr) and chronic indwelling zee catheter. Per her daughter, she has been getting excellent care, her decubitis is completely healed and she has had no recent issues until yesterday. She has continued to get all of her same meds. She has had no obvious alteration in mental status and is still having more good days than bad - singing to old country western songs in her room at the NH.    Overview/Hospital Course:  In the ER manju was found to be febrile, requiring O2 and hypotensive. She was placed in the ICU secondary to severe sepsis. She received fluids and was started on abx. She is still febrile this morning.    9/27  AF  BP low, but stable on IVF. MAP 59   WBC improved to 6       Interval History: see HC     Review of Systems   Constitutional: Negative for fever.   Respiratory: Negative for cough and shortness of breath.    Cardiovascular: Negative for chest pain.   Gastrointestinal: Negative for diarrhea and vomiting.   Genitourinary: Negative for dysuria.   Musculoskeletal: Negative for arthralgias and myalgias.   Neurological: Positive for weakness.     Objective:     Vital Signs (Most Recent):  Temp: 97.5 °F (36.4 °C) (09/27/19 0722)  Pulse: 79 (09/27/19 0728)  Resp: (!) 22 (09/27/19 0728)  BP: (!) 104/28 (09/27/19 0500)  SpO2: 100 % (09/27/19 0728) Vital Signs (24h Range):  Temp:  [97.5 °F (36.4 °C)-98.5 °F (36.9 °C)] 97.5 °F (36.4 °C)  Pulse:  [61-99]  79  Resp:  [14-42] 22  SpO2:  [64 %-100 %] 100 %  BP: ()/(28-66) 104/28     Weight: 60.8 kg (134 lb)  Body mass index is 23.74 kg/m².    Intake/Output Summary (Last 24 hours) at 9/27/2019 0811  Last data filed at 9/27/2019 0600  Gross per 24 hour   Intake 3691.33 ml   Output 1200 ml   Net 2491.33 ml      Physical Exam   Constitutional: She is oriented to person, place, and time. She appears well-developed. No distress.   Frail elderly woman   HENT:   Head: Normocephalic and atraumatic.   O2 NC    Eyes: Pupils are equal, round, and reactive to light. Conjunctivae are normal.   Neck: Normal range of motion. Neck supple. No thyromegaly present.   Cardiovascular: Normal rate, regular rhythm, normal heart sounds and intact distal pulses.   Pulmonary/Chest: Effort normal. No respiratory distress. She has no wheezes. She has no rales.   Coarse rhonchi with UAN.  Rhonchi slightly more pronounced on right   Abdominal: Soft. Bowel sounds are normal. There is no tenderness.   G tube in place   Genitourinary:   Genitourinary Comments: Arvizu in place   Musculoskeletal: Normal range of motion. She exhibits no edema.   Lymphadenopathy:     She has no cervical adenopathy.   Neurological: She is alert and oriented to person, place, and time.   Skin: Skin is warm and dry. No rash noted. There is erythema (surrounding old gtube site).   Psychiatric: She has a normal mood and affect.   echolalia   Nursing note and vitals reviewed.      Significant Labs:   Blood Culture:   Recent Labs   Lab 09/25/19 1959   LABBLOO No Growth to date  No Growth to date  No Growth to date  No Growth to date     CBC:   Recent Labs   Lab 09/25/19 1959 09/26/19 0339 09/27/19  0515   WBC 14.15* 11.88 6.29   HGB 13.0 11.9* 10.0*   HCT 42.7 38.6 33.6*    228 169     CMP:   Recent Labs   Lab 09/25/19 1959 09/26/19 0339 09/27/19  0515    139 137   K 4.0 4.2 4.3    103 103   CO2 26 26 24   * 104 124*   BUN 26* 25* 21    CREATININE 0.6 0.6 0.6   CALCIUM 9.5 9.0 8.8   PROT 7.4 6.4  --    ALBUMIN 3.9 3.3*  --    BILITOT 0.6 0.7  --    ALKPHOS 109 86  --    AST 26 23  --    ALT 17 20  --    ANIONGAP 10 10 10   EGFRNONAA >60 >60 >60     Urine Culture: No results for input(s): LABURIN in the last 48 hours.    Significant Imaging: I have reviewed and interpreted all pertinent imaging results/findings within the past 24 hours.      Assessment/Plan:      * Sepsis  Likely secondary to UTI.  Blood and urine cultures pending.  Treat with zosyn for both aspiration and UTI.  For now, stay in ICU as he BP remains low at times  Received fluids initially.    Of note is her normal lactate and procalcitonin.  Maybe this is just a viral URI. For now supportive care and cover with abx until cultures return.      Inadequate dietary energy intake        Hypothyroidism due to acquired atrophy of thyroid  Cont on synthroid  Lab Results   Component Value Date    TSH 4.071 (H) 06/10/2018     Order TSH       Constipation  Cont on home bowel regimen donnie with cont home narcotics      Functional quadriplegia  Turn frequently.  PT for ROM      Acute cystitis without hematuria  Change abx to zosyn to cover both urine and lungs. Day 2       Aspiration into airway  Resumed feeds slowly with goal to get back to 55/hr.  Check residuals and watch for aspiration.  Avoid PPI if possible.      Esophageal dysphagia  No need for speech eval - patient does not swallow, but will ask for nutrition to come by and verify feeds/goals.      Memory disorder  Cont exelon patch      Mood disorder  Cont home trazodone      Parkinson's disease  Cont sinemet        VTE Risk Mitigation (From admission, onward)         Ordered     IP VTE HIGH RISK PATIENT  Once      09/25/19 2154     Place sequential compression device  Until discontinued      09/25/19 2154                Critical care time spent on the evaluation and treatment of severe organ dysfunction, review of pertinent labs and  imaging studies, discussions with consulting providers and discussions with patient/family: 25 minutes.      Kunal Sandoval MD  Department of Hospital Medicine   [facility]

## 2019-09-27 NOTE — PT/OT/SLP PROGRESS
"Physical Therapy Treatment    Patient Name:  Eddy Cunningham   MRN:  [id number]    Recommendations:     Discharge Recommendations:  nursing facility, basic   Discharge Equipment Recommendations: none   Barriers to discharge: None    Assessment:     Eddy Cunningham is a 79 y.o. female admitted with a medical diagnosis of Sepsis.  She presents with the following impairments/functional limitations:  decreased ROM, decreased upper extremity function, decreased lower extremity function, weakness, impaired self care skills, impaired functional mobilty, abnormal tone, impaired coordination, impaired endurance Patient remains severe general body rigiditywith plantarflexed contactures on bilat ankle and flexion contractures on bilat Hands/Fingers. Patient tolerated fairly well gentle PROM ex on all extremities in all possible planes .    Rehab Prognosis: Fair; patient would benefit from acute skilled PT services to address these deficits and reach maximum level of function.    Recent Surgery: * No surgery found *      Plan:     During this hospitalization, patient to be seen 5 x/week to address the identified rehab impairments via therapeutic activities, therapeutic exercises and progress toward the following goals:    · Plan of Care Expires:  10/02/19    Subjective     Chief Complaint: General Body Stiffness; bed bound  Patient/Family Comments/goals: "To help lossen up body joints"  Pain/Comfort:  · Pain Rating 1: 0/10  · Pain Rating Post-Intervention 1: 0/10      Objective:     Communicated with patient and daughter(Vanessa) prior to session.  Patient found supine with peripheral IV, SCD, PEG Tube, telemetry, PICC line, pulse ox (continuous), blood pressure cuff upon PT entry to room.     General Precautions: Standard, other (see comments)(standard)   Orthopedic Precautions:N/A   Braces: N/A     Functional Mobility:  · Bed Mobility:     · Rolling Left:  unable  · Rolling Right: unable  · Supine to Sit: unable  · Sit to " Supine: unable      AM-PAC 6 CLICK MOBILITY  Turning over in bed (including adjusting bedclothes, sheets and blankets)?: 1  Sitting down on and standing up from a chair with arms (e.g., wheelchair, bedside commode, etc.): 1  Moving from lying on back to sitting on the side of the bed?: 1  Moving to and from a bed to a chair (including a wheelchair)?: 1  Need to walk in hospital room?: 1  Climbing 3-5 steps with a railing?: 1  Basic Mobility Total Score: 6       Therapeutic Activities and Exercises:   Provided gentle PROM ex on bilateral LE and UE x 10 reps on each possible planes    Patient left supine with all lines intact, call button in reach, nurse Demar notified and daughter(Vanessa) present..    GOALS:   Multidisciplinary Problems     Physical Therapy Goals        Problem: Physical Therapy Goal    Goal Priority Disciplines Outcome Goal Variances Interventions   Physical Therapy Goal     PT, PT/OT Ongoing, Progressing     Description:  Goals to be met by: 7 visits    Patient will increase functional independence with mobility by performin. Able to tolerate PROM  Ex on bilat LE/UE x 7 minutes to help decrease joints stiffness.  2. Able to perform and tolerate rolling to sides x 5 reps with max/moderate assistance.  3. Supine to sit with Maximum Assistance  4. Sit to supine with Maximum Assistance                         Time Tracking:     PT Received On: 19  PT Start Time: 1340     PT Stop Time: 1356  PT Total Time (min): 16 min     Billable Minutes: Therapeutic Exercise 15    Treatment Type: Treatment  PT/PTA: PT           Alfred Stone, PT  2019

## 2019-09-27 NOTE — NURSING
Called and clarified IVF orders with Dr Sandoval, also for colace (patient is PEG tube dependent for PO medications). New orders noted. Will continue to monitor and update MD as needed.

## 2019-09-27 NOTE — NURSING
Received report from ERINN Cao    Patient noted to be moaning in room, Awake, no family at bedside. Physical to follow. Tube feedings and IVF noted to be infusing.

## 2019-09-27 NOTE — PHYSICIAN QUERY
PT Name: Eddy Cunningham  MR #: [id number]    Physician Query Form - Cause and Effect Relationship Clarification      CDS/: MARIVEL Casey, RN, CDS               Contact information:[email or web address]    This form is a permanent document in the medical record.     Query Date: September 27, 2019    By submitting this query, we are merely seeking further clarification of documentation. Please utilize your independent clinical judgment when addressing the question(s) below.    The Medical record contains the following:  Supporting Clinical Findings   Location in record    sent here from the NH      chronic indwelling zee catheter.                                                                         Sepsis- Likely secondary to UTI.        Pt with indwelling zee. New zee placed on day shift                                                                                                           H&P, Dr. Marvin, 9/26             Nurses notes, 9/27 at 5:18AM         Provider, please clarify if there is any correlation between UTI and Zee catheter.           Are the conditions:      [ x ] Due to or associated with each other   [  ] Unrelated to each other   [  ] Other (Please Specify): _________________________   [  ] Clinically Undetermined

## 2019-09-27 NOTE — PLAN OF CARE
09/27/19 1459   Discharge Assessment   Assessment Type Discharge Planning Assessment   Confirmed/corrected address and phone number on facesheet? Yes   Assessment information obtained from? Caregiver   Prior to hospitilization cognitive status: Unable to Assess   Prior to hospitalization functional status: Completely Dependent   Current cognitive status: Unable to Assess   Current Functional Status: Completely Dependent   Facility Arrived From: [facility]    Lives With facility resident   Able to Return to Prior Arrangements yes   Is patient able to care for self after discharge? No   Who are your caregiver(s) and their phone number(s)? Opal Galindo (Daughter) [phone]   Patient's perception of discharge disposition nursing home   Readmission Within the Last 30 Days no previous admission in last 30 days   Patient currently being followed by outpatient case management? No   Patient currently receives any other outside agency services? No   Equipment Currently Used at Home hospital bed;lift device  (Juli-chair)   Do you have any problems affording any of your prescribed medications? No   Does the patient have transportation home? Yes   Transportation Anticipated other (see comments)  (Patient bedbound needing ambulance transportation home. )   Discharge Plan A Return to nursing home   DME Needed Upon Discharge  none   Patient/Family in Agreement with Plan yes       No post-acute care needs identified at this time. SW to continue to monitor needs throughout hospital stay.     Ivone Stewart LMSW

## 2019-09-27 NOTE — SUBJECTIVE & OBJECTIVE
Interval History: see      Review of Systems   Constitutional: Negative for fever.   Respiratory: Negative for cough and shortness of breath.    Cardiovascular: Negative for chest pain.   Gastrointestinal: Negative for diarrhea and vomiting.   Genitourinary: Negative for dysuria.   Musculoskeletal: Negative for arthralgias and myalgias.   Neurological: Positive for weakness.     Objective:     Vital Signs (Most Recent):  Temp: 97.5 °F (36.4 °C) (09/27/19 0722)  Pulse: 79 (09/27/19 0728)  Resp: (!) 22 (09/27/19 0728)  BP: (!) 104/28 (09/27/19 0500)  SpO2: 100 % (09/27/19 0728) Vital Signs (24h Range):  Temp:  [97.5 °F (36.4 °C)-98.5 °F (36.9 °C)] 97.5 °F (36.4 °C)  Pulse:  [61-99] 79  Resp:  [14-42] 22  SpO2:  [64 %-100 %] 100 %  BP: ()/(28-66) 104/28     Weight: 60.8 kg (134 lb)  Body mass index is 23.74 kg/m².    Intake/Output Summary (Last 24 hours) at 9/27/2019 0811  Last data filed at 9/27/2019 0600  Gross per 24 hour   Intake 3691.33 ml   Output 1200 ml   Net 2491.33 ml      Physical Exam   Constitutional: She is oriented to person, place, and time. She appears well-developed. No distress.   Frail elderly woman   HENT:   Head: Normocephalic and atraumatic.   O2 NC    Eyes: Pupils are equal, round, and reactive to light. Conjunctivae are normal.   Neck: Normal range of motion. Neck supple. No thyromegaly present.   Cardiovascular: Normal rate, regular rhythm, normal heart sounds and intact distal pulses.   Pulmonary/Chest: Effort normal. No respiratory distress. She has no wheezes. She has no rales.   Coarse rhonchi with UAN.  Rhonchi slightly more pronounced on right   Abdominal: Soft. Bowel sounds are normal. There is no tenderness.   G tube in place   Genitourinary:   Genitourinary Comments: Arvizu in place   Musculoskeletal: Normal range of motion. She exhibits no edema.   Lymphadenopathy:     She has no cervical adenopathy.   Neurological: She is alert and oriented to person, place, and time.   Skin:  Skin is warm and dry. No rash noted. There is erythema (surrounding old gtube site).   Psychiatric: She has a normal mood and affect.   echolalia   Nursing note and vitals reviewed.      Significant Labs:   Blood Culture:   Recent Labs   Lab 09/25/19 1959   LABBLOO No Growth to date  No Growth to date  No Growth to date  No Growth to date     CBC:   Recent Labs   Lab 09/25/19 1959 09/26/19 0339 09/27/19  0515   WBC 14.15* 11.88 6.29   HGB 13.0 11.9* 10.0*   HCT 42.7 38.6 33.6*    228 169     CMP:   Recent Labs   Lab 09/25/19 1959 09/26/19 0339 09/27/19  0515    139 137   K 4.0 4.2 4.3    103 103   CO2 26 26 24   * 104 124*   BUN 26* 25* 21   CREATININE 0.6 0.6 0.6   CALCIUM 9.5 9.0 8.8   PROT 7.4 6.4  --    ALBUMIN 3.9 3.3*  --    BILITOT 0.6 0.7  --    ALKPHOS 109 86  --    AST 26 23  --    ALT 17 20  --    ANIONGAP 10 10 10   EGFRNONAA >60 >60 >60     Urine Culture: No results for input(s): LABURIN in the last 48 hours.    Significant Imaging: I have reviewed and interpreted all pertinent imaging results/findings within the past 24 hours.

## 2019-09-27 NOTE — PLAN OF CARE
Problem: Physical Therapy Goal  Goal: Physical Therapy Goal  Description  Goals to be met by: 7 visits    Patient will increase functional independence with mobility by performin. Able to tolerate PROM  Ex on bilat LE/UE x 7 minutes to help decrease joints stiffness.  2. Able to perform and tolerate rolling to sides x 5 reps with max/moderate assistance.  3. Supine to sit with Maximum Assistance  4. Sit to supine with Maximum Assistance        Outcome: Ongoing, Progressing

## 2019-09-27 NOTE — PROGRESS NOTES
eICU Note :    eICU Rounds with bedside RN and eICU RN:    Problem: Sepsis with Pneumonia     Pertinent History and labs reviewed : 80 y/o f With history of dysphagia and PEG feedings and indwelling Arvizu's catheter  Problem List:  2019-09: Sepsis      Treatment /Intervention given:  1. Sepsis due to UTI on Zosyn for aspiration pneumonia and UTI  2.  Hypothyroidism due to acquired atrophy of thyroid: Synthroid replacement  3. functional quadriplegia:  4.acute cystitis without hematuria  5.PUD DVT prophylaxis:SCDs and PPI    Felipa Giang M.D  eICU Physician

## 2019-09-27 NOTE — ASSESSMENT & PLAN NOTE
Resumed feeds slowly with goal to get back to 55/hr.  Check residuals and watch for aspiration.  Avoid PPI if possible.

## 2019-09-28 LAB
ALBUMIN SERPL BCP-MCNC: 3 G/DL (ref 3.5–5.2)
ALP SERPL-CCNC: 92 U/L (ref 55–135)
ALT SERPL W/O P-5'-P-CCNC: 5 U/L (ref 10–44)
ANION GAP SERPL CALC-SCNC: 8 MMOL/L (ref 8–16)
AST SERPL-CCNC: 18 U/L (ref 10–40)
BASOPHILS # BLD AUTO: 0.02 K/UL (ref 0–0.2)
BASOPHILS NFR BLD: 0.5 % (ref 0–1.9)
BILIRUB SERPL-MCNC: 0.4 MG/DL (ref 0.1–1)
BUN SERPL-MCNC: 15 MG/DL (ref 8–23)
CALCIUM SERPL-MCNC: 8.7 MG/DL (ref 8.7–10.5)
CHLORIDE SERPL-SCNC: 108 MMOL/L (ref 95–110)
CO2 SERPL-SCNC: 25 MMOL/L (ref 23–29)
CREAT SERPL-MCNC: 0.6 MG/DL (ref 0.5–1.4)
DIFFERENTIAL METHOD: ABNORMAL
EOSINOPHIL # BLD AUTO: 0.1 K/UL (ref 0–0.5)
EOSINOPHIL NFR BLD: 3.5 % (ref 0–8)
ERYTHROCYTE [DISTWIDTH] IN BLOOD BY AUTOMATED COUNT: 15.2 % (ref 11.5–14.5)
EST. GFR  (AFRICAN AMERICAN): >60 ML/MIN/1.73 M^2
EST. GFR  (NON AFRICAN AMERICAN): >60 ML/MIN/1.73 M^2
GLUCOSE SERPL-MCNC: 131 MG/DL (ref 70–110)
HCT VFR BLD AUTO: 32 % (ref 37–48.5)
HGB BLD-MCNC: 9.8 G/DL (ref 12–16)
IMM GRANULOCYTES # BLD AUTO: 0.04 K/UL (ref 0–0.04)
IMM GRANULOCYTES NFR BLD AUTO: 1 % (ref 0–0.5)
LYMPHOCYTES # BLD AUTO: 0.6 K/UL (ref 1–4.8)
LYMPHOCYTES NFR BLD: 15 % (ref 18–48)
MCH RBC QN AUTO: 30 PG (ref 27–31)
MCHC RBC AUTO-ENTMCNC: 30.6 G/DL (ref 32–36)
MCV RBC AUTO: 98 FL (ref 82–98)
MONOCYTES # BLD AUTO: 0.4 K/UL (ref 0.3–1)
MONOCYTES NFR BLD: 9.7 % (ref 4–15)
NEUTROPHILS # BLD AUTO: 2.8 K/UL (ref 1.8–7.7)
NEUTROPHILS NFR BLD: 70.3 % (ref 38–73)
NRBC BLD-RTO: 0 /100 WBC
PLATELET # BLD AUTO: 177 K/UL (ref 150–350)
PMV BLD AUTO: 10.4 FL (ref 9.2–12.9)
POTASSIUM SERPL-SCNC: 4.3 MMOL/L (ref 3.5–5.1)
PROT SERPL-MCNC: 6 G/DL (ref 6–8.4)
RBC # BLD AUTO: 3.27 M/UL (ref 4–5.4)
SODIUM SERPL-SCNC: 141 MMOL/L (ref 136–145)
WBC # BLD AUTO: 4.01 K/UL (ref 3.9–12.7)

## 2019-09-28 PROCEDURE — 25000003 PHARM REV CODE 250: Performed by: FAMILY MEDICINE

## 2019-09-28 PROCEDURE — 99232 SBSQ HOSP IP/OBS MODERATE 35: CPT | Mod: ,,, | Performed by: FAMILY MEDICINE

## 2019-09-28 PROCEDURE — 85025 COMPLETE CBC W/AUTO DIFF WBC: CPT

## 2019-09-28 PROCEDURE — 25000242 PHARM REV CODE 250 ALT 637 W/ HCPCS: Performed by: FAMILY MEDICINE

## 2019-09-28 PROCEDURE — 94761 N-INVAS EAR/PLS OXIMETRY MLT: CPT

## 2019-09-28 PROCEDURE — 11000001 HC ACUTE MED/SURG PRIVATE ROOM

## 2019-09-28 PROCEDURE — 27000221 HC OXYGEN, UP TO 24 HOURS

## 2019-09-28 PROCEDURE — 63600175 PHARM REV CODE 636 W HCPCS: Performed by: FAMILY MEDICINE

## 2019-09-28 PROCEDURE — 36415 COLL VENOUS BLD VENIPUNCTURE: CPT

## 2019-09-28 PROCEDURE — 80053 COMPREHEN METABOLIC PANEL: CPT

## 2019-09-28 PROCEDURE — 94640 AIRWAY INHALATION TREATMENT: CPT

## 2019-09-28 PROCEDURE — 99232 PR SUBSEQUENT HOSPITAL CARE,LEVL II: ICD-10-PCS | Mod: ,,, | Performed by: FAMILY MEDICINE

## 2019-09-28 RX ORDER — LACTULOSE 10 G/15ML
20 SOLUTION ORAL 2 TIMES DAILY
Status: DISCONTINUED | OUTPATIENT
Start: 2019-09-28 | End: 2019-09-29 | Stop reason: HOSPADM

## 2019-09-28 RX ADMIN — IPRATROPIUM BROMIDE AND ALBUTEROL SULFATE 3 ML: .5; 3 SOLUTION RESPIRATORY (INHALATION) at 03:09

## 2019-09-28 RX ADMIN — CARBIDOPA AND LEVODOPA 1 TABLET: 25; 100 TABLET ORAL at 03:09

## 2019-09-28 RX ADMIN — CARBIDOPA AND LEVODOPA 1 TABLET: 25; 100 TABLET ORAL at 06:09

## 2019-09-28 RX ADMIN — HYDROCODONE BITARTRATE AND ACETAMINOPHEN 15 ML: 7.5; 325 SOLUTION ORAL at 06:09

## 2019-09-28 RX ADMIN — IPRATROPIUM BROMIDE AND ALBUTEROL SULFATE 3 ML: .5; 3 SOLUTION RESPIRATORY (INHALATION) at 11:09

## 2019-09-28 RX ADMIN — HYDROCODONE BITARTRATE AND ACETAMINOPHEN 15 ML: 7.5; 325 SOLUTION ORAL at 03:09

## 2019-09-28 RX ADMIN — IPRATROPIUM BROMIDE AND ALBUTEROL SULFATE 3 ML: .5; 3 SOLUTION RESPIRATORY (INHALATION) at 12:09

## 2019-09-28 RX ADMIN — PRAMIPEXOLE DIHYDROCHLORIDE 0.25 MG: 0.12 TABLET ORAL at 03:09

## 2019-09-28 RX ADMIN — SENNA 1 TABLET: 8.6 TABLET, COATED ORAL at 09:09

## 2019-09-28 RX ADMIN — CARBIDOPA AND LEVODOPA 1 TABLET: 25; 100 TABLET ORAL at 09:09

## 2019-09-28 RX ADMIN — PIPERACILLIN AND TAZOBACTAM 4.5 G: 4; .5 INJECTION, POWDER, LYOPHILIZED, FOR SOLUTION INTRAVENOUS; PARENTERAL at 02:09

## 2019-09-28 RX ADMIN — GUAIFENESIN 600 MG: 200 SOLUTION ORAL at 09:09

## 2019-09-28 RX ADMIN — IPRATROPIUM BROMIDE AND ALBUTEROL SULFATE 3 ML: .5; 3 SOLUTION RESPIRATORY (INHALATION) at 07:09

## 2019-09-28 RX ADMIN — ASPIRIN 81 MG 81 MG: 81 TABLET ORAL at 09:09

## 2019-09-28 RX ADMIN — HYDROCODONE BITARTRATE AND ACETAMINOPHEN 15 ML: 7.5; 325 SOLUTION ORAL at 09:09

## 2019-09-28 RX ADMIN — PRAMIPEXOLE DIHYDROCHLORIDE 0.25 MG: 0.12 TABLET ORAL at 09:09

## 2019-09-28 RX ADMIN — SODIUM CHLORIDE: 0.9 INJECTION, SOLUTION INTRAVENOUS at 06:09

## 2019-09-28 RX ADMIN — SODIUM CHLORIDE: 0.9 INJECTION, SOLUTION INTRAVENOUS at 03:09

## 2019-09-28 RX ADMIN — RIVASTIGMINE 1 PATCH: 4.6 PATCH, EXTENDED RELEASE TRANSDERMAL at 09:09

## 2019-09-28 RX ADMIN — LEVOTHYROXINE SODIUM 75 MCG: 75 TABLET ORAL at 06:09

## 2019-09-28 RX ADMIN — TRAZODONE HYDROCHLORIDE 50 MG: 50 TABLET ORAL at 09:09

## 2019-09-28 RX ADMIN — LACTULOSE 20 G: 20 SOLUTION ORAL at 04:09

## 2019-09-28 NOTE — NURSING
Sinus Arrhthymia noted on bedside monitor with oral suctioning, patient unable to expel sputum/secretions self, tolerates suctioning poorly, noted to bear-down, facial redness, educated and provided emotional support with slow-deep breathing as able. Daughter at bedside. See telemetry strips on chart (14:20pm-14:22pm). Called to update Dr Sandoval via cell phone, awaiting call back, O2 noted to remain stable, no tachypnea noted, patient recovered in less than a minute. Will continue to monitor.

## 2019-09-28 NOTE — ASSESSMENT & PLAN NOTE
Change abx to zosyn to cover both urine and lungs. Day 3  Urine Cx is indeterminate. Will stop zosyn   Downgrade to floor

## 2019-09-28 NOTE — SUBJECTIVE & OBJECTIVE
Interval History: see      Review of Systems   Constitutional: Negative for fever.   Respiratory: Negative for cough and shortness of breath.    Cardiovascular: Negative for chest pain.   Gastrointestinal: Negative for abdominal pain.     Objective:     Vital Signs (Most Recent):  Temp: 97.6 °F (36.4 °C) (09/28/19 0742)  Pulse: 76 (09/28/19 0800)  Resp: 16 (09/28/19 0800)  BP: (!) 101/43 (09/28/19 0800)  SpO2: 100 % (09/28/19 0800) Vital Signs (24h Range):  Temp:  [97.4 °F (36.3 °C)-99.8 °F (37.7 °C)] 97.6 °F (36.4 °C)  Pulse:  [72-97] 76  Resp:  [15-34] 16  SpO2:  [93 %-100 %] 100 %  BP: ()/(31-57) 101/43     Weight: 60.8 kg (134 lb)  Body mass index is 23.74 kg/m².    Intake/Output Summary (Last 24 hours) at 9/28/2019 0821  Last data filed at 9/28/2019 0800  Gross per 24 hour   Intake 4611.67 ml   Output 1940 ml   Net 2671.67 ml      Physical Exam   Constitutional: She is oriented to person, place, and time. She appears well-developed. No distress.   Frail elderly woman   HENT:   Head: Normocephalic and atraumatic.   O2 NC    Eyes: Pupils are equal, round, and reactive to light. Conjunctivae are normal.   Neck: Normal range of motion. Neck supple. No thyromegaly present.   Cardiovascular: Normal rate, regular rhythm, normal heart sounds and intact distal pulses.   Pulmonary/Chest: Effort normal. No respiratory distress. She has no wheezes. She has no rales.   Coarse rhonchi with UAN.  Rhonchi slightly more pronounced on right   Abdominal: Soft. Bowel sounds are normal. There is no tenderness.   G tube in place   Genitourinary:   Genitourinary Comments: Arvizu in place   Musculoskeletal: Normal range of motion. She exhibits no edema.   Lymphadenopathy:     She has no cervical adenopathy.   Neurological: She is alert and oriented to person, place, and time.   Skin: Skin is warm and dry. No rash noted. There is erythema (surrounding old gtube site).   Psychiatric: She has a normal mood and affect.   Nursing  note and vitals reviewed.      Significant Labs:   CBC:   Recent Labs   Lab 09/27/19  0515 09/28/19  0354   WBC 6.29 4.01   HGB 10.0* 9.8*   HCT 33.6* 32.0*    177     CMP:   Recent Labs   Lab 09/27/19  0515 09/28/19  0354    141   K 4.3 4.3    108   CO2 24 25   * 131*   BUN 21 15   CREATININE 0.6 0.6   CALCIUM 8.8 8.7   PROT  --  6.0   ALBUMIN  --  3.0*   BILITOT  --  0.4   ALKPHOS  --  92   AST  --  18   ALT  --  5*   ANIONGAP 10 8   EGFRNONAA >60 >60       Significant Imaging: I have reviewed and interpreted all pertinent imaging results/findings within the past 24 hours.

## 2019-09-28 NOTE — PLAN OF CARE
Pt remains in ICU. Receiving Extended dose of zosyn IV.   IVF NS infusing at a rate of 100 ml/hr via pump. Pt tolerating O2 NC at 2 liters. Oral suctioned performed with oral care every 4 hours and PRN. Pt C/O generalized body aches. Prn pain meds given via peg tube every 4 hours. Unable to give Pt full relief of generalized pain. Pt turned and repositioned every 2 hours and prn for comfort. HOB remains up greater than 45 degrees while receiving tube feedings via Peg tube. Isosource 1.5 goal reached at 55 ml/hr as ordered. No gastric residuals noted. Water flushes received as ordered. Pt with thick creamy secretions suctioned from back of throat with Pt's good strong cough. Scd's remain in use. Skin intact with noted scarring to areas of healed wounds. Order on chart Pt is a DNR. VSS thru out evening. Pt afebrile. Unable to discuss POC with Pt. Due to Mental status and ability to comprehend. No Family at bedside this PM shift.

## 2019-09-28 NOTE — NURSING
Transferred to Med/Surg bed 303, remaining primary nurse for patient on med/surg floor until next nurse comes on shift, all personal items at bedside, stable condition, transferred via stretcher with 2L NC O2 portable, no telemetry per MD orders, non-stocked medications from central pharmacy sent with patient, chart sent as well, SELENA Olivarez assisted with transfer, daughter at bedside, wall suction set up on arrival for standby suctioning as needed, IVF remain at 100 ml/hr, Tube feedings remain on hold, notified ERINN Pagan (charge RN) of arrival to unit. House supervisor ERINN wen aware as well.

## 2019-09-28 NOTE — NURSING
Dr Sandoval at bedside, updated MD on vitals, status, condition, no noted BM on shift or last shift-(per report), New orders noted, will maintain IVF and tube feedings rate and flushes as previously ordered.

## 2019-09-28 NOTE — PROGRESS NOTES
[facility] Medicine  Progress Note    Patient Name: Eddy Cunningham  MRN: [id number]  Patient Class: IP- Inpatient   Admission Date: 9/25/2019  Length of Stay: 3 days  Attending Physician: Kunal Sandoval MD  Primary Care Provider: Ayaka Pollard MD        Subjective:     Principal Problem:Sepsis        HPI:  79 year old female was sent here from the NH for increased chest congestion as well as fever that started yesterday. She has known parkinsons and is bed bound with h/o dysphagia and peg feedings (continuous at 55cc/hr) and chronic indwelling zee catheter. Per her daughter, she has been getting excellent care, her decubitis is completely healed and she has had no recent issues until yesterday. She has continued to get all of her same meds. She has had no obvious alteration in mental status and is still having more good days than bad - singing to old country western songs in her room at the NH.    Overview/Hospital Course:  In the ER manju was found to be febrile, requiring O2 and hypotensive. She was placed in the ICU secondary to severe sepsis. She received fluids and was started on abx. She is still febrile this morning.    9/27  AF  BP low, but stable on IVF. MAP 59   WBC improved to 6     9/28  AFVSS. No hypotension.  On 2 liters NC. Drops to 90 on RA         Interval History: see      Review of Systems   Constitutional: Negative for fever.   Respiratory: Negative for cough and shortness of breath.    Cardiovascular: Negative for chest pain.   Gastrointestinal: Negative for abdominal pain.     Objective:     Vital Signs (Most Recent):  Temp: 97.6 °F (36.4 °C) (09/28/19 0742)  Pulse: 76 (09/28/19 0800)  Resp: 16 (09/28/19 0800)  BP: (!) 101/43 (09/28/19 0800)  SpO2: 100 % (09/28/19 0800) Vital Signs (24h Range):  Temp:  [97.4 °F (36.3 °C)-99.8 °F (37.7 °C)] 97.6 °F (36.4 °C)  Pulse:  [72-97] 76  Resp:  [15-34] 16  SpO2:  [93 %-100 %] 100 %  BP: ()/(31-57) 101/43      Weight: 60.8 kg (134 lb)  Body mass index is 23.74 kg/m².    Intake/Output Summary (Last 24 hours) at 9/28/2019 0821  Last data filed at 9/28/2019 0800  Gross per 24 hour   Intake 4611.67 ml   Output 1940 ml   Net 2671.67 ml      Physical Exam   Constitutional: She is oriented to person, place, and time. She appears well-developed. No distress.   Frail elderly woman   HENT:   Head: Normocephalic and atraumatic.   O2 NC    Eyes: Pupils are equal, round, and reactive to light. Conjunctivae are normal.   Neck: Normal range of motion. Neck supple. No thyromegaly present.   Cardiovascular: Normal rate, regular rhythm, normal heart sounds and intact distal pulses.   Pulmonary/Chest: Effort normal. No respiratory distress. She has no wheezes. She has no rales.   Coarse rhonchi with UAN.  Rhonchi slightly more pronounced on right   Abdominal: Soft. Bowel sounds are normal. There is no tenderness.   G tube in place   Genitourinary:   Genitourinary Comments: Arvizu in place   Musculoskeletal: Normal range of motion. She exhibits no edema.   Lymphadenopathy:     She has no cervical adenopathy.   Neurological: She is alert and oriented to person, place, and time.   Skin: Skin is warm and dry. No rash noted. There is erythema (surrounding old gtube site).   Psychiatric: She has a normal mood and affect.   Nursing note and vitals reviewed.      Significant Labs:   CBC:   Recent Labs   Lab 09/27/19  0515 09/28/19  0354   WBC 6.29 4.01   HGB 10.0* 9.8*   HCT 33.6* 32.0*    177     CMP:   Recent Labs   Lab 09/27/19  0515 09/28/19  0354    141   K 4.3 4.3    108   CO2 24 25   * 131*   BUN 21 15   CREATININE 0.6 0.6   CALCIUM 8.8 8.7   PROT  --  6.0   ALBUMIN  --  3.0*   BILITOT  --  0.4   ALKPHOS  --  92   AST  --  18   ALT  --  5*   ANIONGAP 10 8   EGFRNONAA >60 >60       Significant Imaging: I have reviewed and interpreted all pertinent imaging results/findings within the past 24  hours.      Assessment/Plan:      * Sepsis  Likely secondary to UTI.  Blood cultures NGTD. Urine culture with multiple organisms, none in predominance.  Stop Zosyn.  Will downgrade to floor.   Received fluids initially. Will continue til Bun creatinine is normalized    Of note is her normal lactate and procalcitonin.  Maybe this is just a viral URI. For now supportive care and cover with abx until cultures return.      Inadequate dietary energy intake        Hypothyroidism due to acquired atrophy of thyroid  Cont on synthroid  Lab Results   Component Value Date    TSH 1.688 09/27/2019          Constipation  Cont on home bowel regimen donnie with cont home narcotics      Functional quadriplegia  Turn frequently.  PT for ROM      Acute cystitis without hematuria  Change abx to zosyn to cover both urine and lungs. Day 3  Urine Cx is indeterminate. Will stop zosyn   Downgrade to floor         Aspiration into airway  Resumed feeds slowly with goal to get back to 55/hr. Now at 55/hr and she is tolerating well   Check residuals and watch for aspiration.  Avoid PPI if possible.      Esophageal dysphagia  No need for speech eval - patient does not swallow, but will ask for nutrition to come by and verify feeds/goals.      Memory disorder  Cont exelon patch      Mood disorder  Cont home trazodone      Parkinson's disease  Cont sinemet        VTE Risk Mitigation (From admission, onward)         Ordered     IP VTE HIGH RISK PATIENT  Once      09/25/19 2154     Place sequential compression device  Until discontinued      09/25/19 2154                Critical care time spent on the evaluation and treatment of severe organ dysfunction, review of pertinent labs and imaging studies, discussions with consulting providers and discussions with patient/family: 30 minutes.      Kunal Sandoval MD  Department of Hospital Medicine   [facility]

## 2019-09-28 NOTE — ASSESSMENT & PLAN NOTE
Likely secondary to UTI.  Blood cultures NGTD. Urine culture with multiple organisms, none in predominance.  Stop Zosyn.  Will downgrade to floor.   Received fluids initially. Will continue til Bun creatinine is normalized    Of note is her normal lactate and procalcitonin.  Maybe this is just a viral URI. For now supportive care and cover with abx until cultures return.

## 2019-09-28 NOTE — ASSESSMENT & PLAN NOTE
Resumed feeds slowly with goal to get back to 55/hr. Now at 55/hr and she is tolerating well   Check residuals and watch for aspiration.  Avoid PPI if possible.

## 2019-09-28 NOTE — NURSING
"Friend of patient (female) at bedside, visiting with patient. Pleasant visit. Name"Dena Dumont"-("I visit with her at the home, please tell her daughter I came stop by.")  "

## 2019-09-28 NOTE — NURSING
"Called and updated Dr Sandoval on vitals, status, condition, tolerates oral suctioning poorly, continued sinus arrhthymia episodes noted when oral suctioning performed due to patient being unable to expel oral secretions/sputum on own. Thick white/tan sputum noted with oral suctioning per RT staff at bedside. Patient abdomen noted to be slightly distended/firm to touch, complains of abdomen pain.    Tube feedings on hold due to status and pain complaints, patient unable to verbalized details of pain, only can state "my stomach." No residuals noted from PEG. On assessment, abdomen is not tender to touch. New orders noted. Will continue to monitor, patient to be transferred to med/surg as previously ordered. Reviewed ordered breathing treatments with MD as well (see MAR).    Daughter at bedside, updated on plan of care, verbalized understanding. Unable to determine patient's level of understanding (see history).  "

## 2019-09-28 NOTE — NURSING
Bedside fan supplied for comfort, music per home tablet placed on as well. Patient noted to be singing along to songs, emotional support provided as needed.

## 2019-09-29 VITALS
RESPIRATION RATE: 18 BRPM | WEIGHT: 134 LBS | DIASTOLIC BLOOD PRESSURE: 80 MMHG | HEART RATE: 92 BPM | TEMPERATURE: 99 F | OXYGEN SATURATION: 94 % | BODY MASS INDEX: 23.74 KG/M2 | SYSTOLIC BLOOD PRESSURE: 144 MMHG | HEIGHT: 63 IN

## 2019-09-29 LAB
ALBUMIN SERPL BCP-MCNC: 3 G/DL (ref 3.5–5.2)
ALP SERPL-CCNC: 84 U/L (ref 55–135)
ALT SERPL W/O P-5'-P-CCNC: 5 U/L (ref 10–44)
ANION GAP SERPL CALC-SCNC: 8 MMOL/L (ref 8–16)
AST SERPL-CCNC: 17 U/L (ref 10–40)
BASOPHILS # BLD AUTO: 0.01 K/UL (ref 0–0.2)
BASOPHILS NFR BLD: 0.3 % (ref 0–1.9)
BILIRUB SERPL-MCNC: 0.4 MG/DL (ref 0.1–1)
BUN SERPL-MCNC: 10 MG/DL (ref 8–23)
CALCIUM SERPL-MCNC: 8.8 MG/DL (ref 8.7–10.5)
CHLORIDE SERPL-SCNC: 110 MMOL/L (ref 95–110)
CO2 SERPL-SCNC: 25 MMOL/L (ref 23–29)
CREAT SERPL-MCNC: 0.5 MG/DL (ref 0.5–1.4)
DIFFERENTIAL METHOD: ABNORMAL
EOSINOPHIL # BLD AUTO: 0.2 K/UL (ref 0–0.5)
EOSINOPHIL NFR BLD: 4.6 % (ref 0–8)
ERYTHROCYTE [DISTWIDTH] IN BLOOD BY AUTOMATED COUNT: 14.8 % (ref 11.5–14.5)
EST. GFR  (AFRICAN AMERICAN): >60 ML/MIN/1.73 M^2
EST. GFR  (NON AFRICAN AMERICAN): >60 ML/MIN/1.73 M^2
GLUCOSE SERPL-MCNC: 105 MG/DL (ref 70–110)
HCT VFR BLD AUTO: 34.1 % (ref 37–48.5)
HGB BLD-MCNC: 10 G/DL (ref 12–16)
IMM GRANULOCYTES # BLD AUTO: 0.01 K/UL (ref 0–0.04)
IMM GRANULOCYTES NFR BLD AUTO: 0.3 % (ref 0–0.5)
LYMPHOCYTES # BLD AUTO: 1 K/UL (ref 1–4.8)
LYMPHOCYTES NFR BLD: 26.8 % (ref 18–48)
MCH RBC QN AUTO: 29.2 PG (ref 27–31)
MCHC RBC AUTO-ENTMCNC: 29.3 G/DL (ref 32–36)
MCV RBC AUTO: 99 FL (ref 82–98)
MONOCYTES # BLD AUTO: 0.4 K/UL (ref 0.3–1)
MONOCYTES NFR BLD: 10 % (ref 4–15)
NEUTROPHILS # BLD AUTO: 2.2 K/UL (ref 1.8–7.7)
NEUTROPHILS NFR BLD: 58 % (ref 38–73)
NRBC BLD-RTO: 0 /100 WBC
PLATELET # BLD AUTO: 187 K/UL (ref 150–350)
PMV BLD AUTO: 10.1 FL (ref 9.2–12.9)
POTASSIUM SERPL-SCNC: 4.1 MMOL/L (ref 3.5–5.1)
PROT SERPL-MCNC: 6 G/DL (ref 6–8.4)
RBC # BLD AUTO: 3.43 M/UL (ref 4–5.4)
SODIUM SERPL-SCNC: 143 MMOL/L (ref 136–145)
WBC # BLD AUTO: 3.7 K/UL (ref 3.9–12.7)

## 2019-09-29 PROCEDURE — 99239 HOSP IP/OBS DSCHRG MGMT >30: CPT | Mod: ,,, | Performed by: FAMILY MEDICINE

## 2019-09-29 PROCEDURE — 85025 COMPLETE CBC W/AUTO DIFF WBC: CPT

## 2019-09-29 PROCEDURE — 25000003 PHARM REV CODE 250: Performed by: FAMILY MEDICINE

## 2019-09-29 PROCEDURE — 94761 N-INVAS EAR/PLS OXIMETRY MLT: CPT

## 2019-09-29 PROCEDURE — 80053 COMPREHEN METABOLIC PANEL: CPT

## 2019-09-29 PROCEDURE — 99239 PR HOSPITAL DISCHARGE DAY,>30 MIN: ICD-10-PCS | Mod: ,,, | Performed by: FAMILY MEDICINE

## 2019-09-29 PROCEDURE — 94640 AIRWAY INHALATION TREATMENT: CPT

## 2019-09-29 PROCEDURE — 27000221 HC OXYGEN, UP TO 24 HOURS

## 2019-09-29 PROCEDURE — 36415 COLL VENOUS BLD VENIPUNCTURE: CPT

## 2019-09-29 PROCEDURE — 25000242 PHARM REV CODE 250 ALT 637 W/ HCPCS: Performed by: FAMILY MEDICINE

## 2019-09-29 PROCEDURE — 27000492 HC SLEEVE, SCD T/L

## 2019-09-29 PROCEDURE — 63600175 PHARM REV CODE 636 W HCPCS: Performed by: FAMILY MEDICINE

## 2019-09-29 RX ADMIN — IPRATROPIUM BROMIDE AND ALBUTEROL SULFATE 3 ML: .5; 3 SOLUTION RESPIRATORY (INHALATION) at 03:09

## 2019-09-29 RX ADMIN — SODIUM CHLORIDE: 0.9 INJECTION, SOLUTION INTRAVENOUS at 03:09

## 2019-09-29 RX ADMIN — HYDROCODONE BITARTRATE AND ACETAMINOPHEN 15 ML: 7.5; 325 SOLUTION ORAL at 12:09

## 2019-09-29 RX ADMIN — GUAIFENESIN 600 MG: 200 SOLUTION ORAL at 09:09

## 2019-09-29 RX ADMIN — ASPIRIN 81 MG 81 MG: 81 TABLET ORAL at 09:09

## 2019-09-29 RX ADMIN — PRAMIPEXOLE DIHYDROCHLORIDE 0.25 MG: 0.12 TABLET ORAL at 09:09

## 2019-09-29 RX ADMIN — RIVASTIGMINE 1 PATCH: 4.6 PATCH, EXTENDED RELEASE TRANSDERMAL at 09:09

## 2019-09-29 RX ADMIN — IPRATROPIUM BROMIDE AND ALBUTEROL SULFATE 3 ML: .5; 3 SOLUTION RESPIRATORY (INHALATION) at 07:09

## 2019-09-29 RX ADMIN — CARBIDOPA AND LEVODOPA 1 TABLET: 25; 100 TABLET ORAL at 09:09

## 2019-09-29 RX ADMIN — CARBIDOPA AND LEVODOPA 1 TABLET: 25; 100 TABLET ORAL at 05:09

## 2019-09-29 RX ADMIN — SENNA 1 TABLET: 8.6 TABLET, COATED ORAL at 09:09

## 2019-09-29 RX ADMIN — LACTULOSE 20 G: 20 SOLUTION ORAL at 09:09

## 2019-09-29 RX ADMIN — LEVOTHYROXINE SODIUM 75 MCG: 75 TABLET ORAL at 05:09

## 2019-09-29 NOTE — DISCHARGE SUMMARY
[facility] Medicine  Discharge Summary      Patient Name: Eddy Cunningham  MRN: [id number]  Admission Date: 9/25/2019  Hospital Length of Stay: 4 days  Discharge Date and Time:  09/29/2019 10:10 AM  Attending Physician: Kunal Sandoval MD   Discharging Provider: Kunal Sandoval MD  Primary Care Provider: Ayaka Pollard MD      HPI:   79 year old female was sent here from the NH for increased chest congestion as well as fever that started yesterday. She has known parkinsons and is bed bound with h/o dysphagia and peg feedings (continuous at 55cc/hr) and chronic indwelling zee catheter. Per her daughter, she has been getting excellent care, her decubitis is completely healed and she has had no recent issues until yesterday. She has continued to get all of her same meds. She has had no obvious alteration in mental status and is still having more good days than bad - singing to old country western songs in her room at the NH.    * No surgery found *      Hospital Course:   In the ER manju was found to be febrile, requiring O2 and hypotensive. She was placed in the ICU secondary to severe sepsis. She received fluids and was started on abx. She is still febrile this morning.    9/27  AF  BP low, but stable on IVF. MAP 59   WBC improved to 6     9/28  AFVSS. No hypotension.  On 2 liters NC. Drops to 90 on RA     9/29  AFVSS. No hypotension   Singing this morning        Consults:   Consults (From admission, onward)        Status Ordering Provider     Inpatient consult to Registered Dietitian/Nutritionist  Once     Provider:  (Not yet assigned)    Completed [employer]STR[employer]S          * Sepsis  Likely secondary to UTI.  Blood cultures NGTD. Urine culture with multiple organisms, none in predominance.  Stop Zosyn.  Will downgrade to floor 9/28  Received fluids initially. Will continue til Bun creatinine is normalized    Of note is her normal lactate and procalcitonin.  Maybe this is  just a viral URI. For now supportive care and cover with abx until cultures return. Cx are negative. abx stopped 9/28       Inadequate dietary energy intake        Hypothyroidism due to acquired atrophy of thyroid  Cont on synthroid  Lab Results   Component Value Date    TSH 1.688 09/27/2019          Constipation  Cont on home bowel regimen donnie with cont home narcotics      Functional quadriplegia  Turn frequently.  PT for ROM      Acute cystitis without hematuria  Change abx to zosyn to cover both urine and lungs. Day 3  Urine Cx is indeterminate. Zosyn stopped after 3 days   Downgrade to floor 9/28  D/C to NH 9/29         Aspiration into airway  Resumed feeds slowly with goal to get back to 55/hr. Now at 55/hr and she is tolerating well   Check residuals and watch for aspiration.  Avoid PPI if possible.      Esophageal dysphagia  No need for speech eval - patient does not swallow, but will ask for nutrition to come by and verify feeds/goals.      Memory disorder  Cont exelon patch      Mood disorder  Cont home trazodone      Parkinson's disease  Cont sinemet        Final Active Diagnoses:    Diagnosis Date Noted POA    PRINCIPAL PROBLEM:  Sepsis [A41.9] 09/25/2019 Yes    Hypothyroidism due to acquired atrophy of thyroid [E03.4] 06/11/2018 Yes    Inadequate dietary energy intake [E63.9] 06/11/2018 Yes    Constipation [K59.00] 04/18/2018 Yes    Functional quadriplegia [R53.2] 05/19/2017 Yes    Acute cystitis without hematuria [N30.00] 05/17/2017 Yes    Aspiration into airway [T17.908A] 05/17/2017 Yes    Esophageal dysphagia [R13.10] 05/17/2017 Yes    Memory disorder [R41.3] 03/14/2013 Yes    Mood disorder [F39] 11/13/2012 Yes    Parkinson's disease [G20] 11/13/2012 Yes      Problems Resolved During this Admission:       Discharged Condition: good    Disposition: Home or Self Care    Follow Up:    Patient Instructions:   No discharge procedures on file.    Significant Diagnostic Studies: Labs:   CMP    Recent Labs   Lab 09/28/19  0354 09/29/19 0712    143   K 4.3 4.1    110   CO2 25 25   * 105   BUN 15 10   CREATININE 0.6 0.5   CALCIUM 8.7 8.8   PROT 6.0 6.0   ALBUMIN 3.0* 3.0*   BILITOT 0.4 0.4   ALKPHOS 92 84   AST 18 17   ALT 5* 5*   ANIONGAP 8 8   ESTGFRAFRICA >60 >60   EGFRNONAA >60 >60    and CBC   Recent Labs   Lab 09/28/19  0354 09/29/19 0712   WBC 4.01 3.70*   HGB 9.8* 10.0*   HCT 32.0* 34.1*    187       Pending Diagnostic Studies:     None         Medications:  Reconciled Home Medications:      Medication List      CONTINUE taking these medications    aspirin 81 MG Chew  1 tablet (81 mg total) by Per G Tube route once daily.     carbidopa-levodopa  mg  mg per tablet  Commonly known as:  SINEMET  1 tablet by Per G Tube route 5 (five) times daily.     collagenase ointment  Commonly known as:  SANTYL  Apply topically once daily.     docusate sodium 100 MG capsule  Commonly known as:  COLACE  1 capsule (100 mg total) by Per G Tube route once daily.     EXELON 4.6 mg/24 hr Pt24  Generic drug:  rivastigmine  Place 1 patch onto the skin once daily.     furosemide 40 MG tablet  Commonly known as:  LASIX  Take 40 mg by mouth once daily.     HYDROcodone-acetaminophen 7.5-325 mg per tablet  Commonly known as:  NORCO  Take 1 tablet by mouth every 12 (twelve) hours as needed for Pain.     levothyroxine 75 MCG tablet  Commonly known as:  SYNTHROID  1 tablet (75 mcg total) by Per G Tube route before breakfast.     LIQUACEL ORAL  30 mLs 2 (two) times daily.     omeprazole 40 MG capsule  Commonly known as:  PRILOSEC  Open 40 mg capsule into g-tube once daily     ONE DAILY MULTIVITAMIN per tablet  Generic drug:  multivitamin  1 tablet once daily.     potassium chloride 10% 20 mEq/15 mL oral solution  Commonly known as:  KAYCIEL  15 mLs (20 mEq total) by Per G Tube route 2 (two) times daily.     pramipexole 0.25 MG tablet  Commonly known as:  MIRAPEX  1 tablet (0.25 mg total) by  Per G Tube route 3 (three) times daily.     ranitidine 150 MG capsule  Commonly known as:  ZANTAC  75 mg 2 (two) times daily.     senna 8.6 mg tablet  Commonly known as:  SENOKOT  1 tablet by Per G Tube route once daily.     tiZANidine 2 mg Cap  Take 4 mg by mouth nightly as needed.     traZODone 50 MG tablet  Commonly known as:  DESYREL  1 tablet (50 mg total) by Per G Tube route every evening.     VITAMIN C 500 MG tablet  Generic drug:  ascorbic acid (vitamin C)  500 mg once daily.            Indwelling Lines/Drains at time of discharge:   Lines/Drains/Airways     Drain                 Gastrostomy/Enterostomy 08/11/18 0600 Percutaneous endoscopic gastrostomy (PEG)  days         Urethral Catheter 09/26/19 1000 Latex 16 Fr. 3 days          Pressure Ulcer                 Pressure Injury 08/22/18 2215 Coccyx 402 days                Time spent on the discharge of patient: 31 minutes  Patient was seen and examined on the date of discharge and determined to be suitable for discharge.         Kunal Sandoval MD  Department of Hospital Medicine  [facility]

## 2019-09-29 NOTE — PROGRESS NOTES
[facility] Medicine  Progress Note    Patient Name: Eddy Cunningham  MRN: [id number]  Patient Class: IP- Inpatient   Admission Date: 9/25/2019  Length of Stay: 4 days  Attending Physician: Kunal Sandoval MD  Primary Care Provider: Ayaka Pollard MD        Subjective:     Principal Problem:Sepsis        HPI:  79 year old female was sent here from the NH for increased chest congestion as well as fever that started yesterday. She has known parkinsons and is bed bound with h/o dysphagia and peg feedings (continuous at 55cc/hr) and chronic indwelling zee catheter. Per her daughter, she has been getting excellent care, her decubitis is completely healed and she has had no recent issues until yesterday. She has continued to get all of her same meds. She has had no obvious alteration in mental status and is still having more good days than bad - singing to old country western songs in her room at the NH.    Overview/Hospital Course:  In the ER manju was found to be febrile, requiring O2 and hypotensive. She was placed in the ICU secondary to severe sepsis. She received fluids and was started on abx. She is still febrile this morning.    9/27  AF  BP low, but stable on IVF. MAP 59   WBC improved to 6     9/28  AFVSS. No hypotension.  On 2 liters NC. Drops to 90 on RA     9/29  AFVSS. No hypotension   Singing this morning       Interval History: see      Review of Systems   Constitutional: Negative for fever.   Respiratory: Negative for shortness of breath.    Cardiovascular: Negative for chest pain.   Musculoskeletal: Positive for arthralgias.     Objective:     Vital Signs (Most Recent):  Temp: 97.1 °F (36.2 °C) (09/29/19 0831)  Pulse: 81 (09/29/19 0831)  Resp: 20 (09/29/19 0831)  BP: (!) 122/57 (09/29/19 0831)  SpO2: 97 % (09/29/19 0831) Vital Signs (24h Range):  Temp:  [96.2 °F (35.7 °C)-98.9 °F (37.2 °C)] 97.1 °F (36.2 °C)  Pulse:  [64-93] 81  Resp:  [12-26] 20  SpO2:  [92 %-100  %] 97 %  BP: ()/(45-65) 122/57     Weight: 60.8 kg (134 lb)  Body mass index is 23.74 kg/m².    Intake/Output Summary (Last 24 hours) at 9/29/2019 0956  Last data filed at 9/29/2019 0552  Gross per 24 hour   Intake 1919.99 ml   Output 1825 ml   Net 94.99 ml      Physical Exam   Constitutional: She is oriented to person, place, and time. She appears well-developed. No distress.   Frail elderly woman   HENT:   Head: Normocephalic and atraumatic.   O2 NC    Eyes: Pupils are equal, round, and reactive to light. Conjunctivae are normal.   Neck: Normal range of motion. Neck supple. No thyromegaly present.   Cardiovascular: Normal rate, regular rhythm, normal heart sounds and intact distal pulses.   Pulmonary/Chest: Effort normal. No respiratory distress. She has no wheezes. She has no rales.   Abdominal: Soft. Bowel sounds are normal. There is no tenderness.   G tube in place   Genitourinary:   Genitourinary Comments: Arvizu in place   Musculoskeletal: Normal range of motion. She exhibits no edema.   Lymphadenopathy:     She has no cervical adenopathy.   Neurological: She is alert and oriented to person, place, and time. She exhibits abnormal muscle tone.   Skin: Skin is warm and dry. No rash noted. There is erythema (surrounding old gtube site).   Psychiatric: She has a normal mood and affect.   Nursing note and vitals reviewed.      Significant Labs:   CBC:   Recent Labs   Lab 09/28/19  0354 09/29/19  0712   WBC 4.01 3.70*   HGB 9.8* 10.0*   HCT 32.0* 34.1*    187     CMP:   Recent Labs   Lab 09/28/19  0354 09/29/19  0712    143   K 4.3 4.1    110   CO2 25 25   * 105   BUN 15 10   CREATININE 0.6 0.5   CALCIUM 8.7 8.8   PROT 6.0 6.0   ALBUMIN 3.0* 3.0*   BILITOT 0.4 0.4   ALKPHOS 92 84   AST 18 17   ALT 5* 5*   ANIONGAP 8 8   EGFRNONAA >60 >60       Significant Imaging: I have reviewed and interpreted all pertinent imaging results/findings within the past 24 hours.      Assessment/Plan:       * Sepsis  Likely secondary to UTI.  Blood cultures NGTD. Urine culture with multiple organisms, none in predominance.  Stop Zosyn.  Will downgrade to floor 9/28  Received fluids initially. Will continue til Bun creatinine is normalized    Of note is her normal lactate and procalcitonin.  Maybe this is just a viral URI. For now supportive care and cover with abx until cultures return. Cx are negative. abx stopped 9/28       Inadequate dietary energy intake        Hypothyroidism due to acquired atrophy of thyroid  Cont on synthroid  Lab Results   Component Value Date    TSH 1.688 09/27/2019          Constipation  Cont on home bowel regimen donnie with cont home narcotics      Functional quadriplegia  Turn frequently.  PT for ROM      Acute cystitis without hematuria  Change abx to zosyn to cover both urine and lungs. Day 3  Urine Cx is indeterminate. Zosyn stopped after 3 days   Downgrade to floor 9/28  D/C to NH 9/29         Aspiration into airway  Resumed feeds slowly with goal to get back to 55/hr. Now at 55/hr and she is tolerating well   Check residuals and watch for aspiration.  Avoid PPI if possible.      Esophageal dysphagia  No need for speech eval - patient does not swallow, but will ask for nutrition to come by and verify feeds/goals.      Memory disorder  Cont exelon patch      Mood disorder  Cont home trazodone      Parkinson's disease  Cont sinemet        VTE Risk Mitigation (From admission, onward)         Ordered     IP VTE HIGH RISK PATIENT  Once      09/25/19 2154     Place sequential compression device  Until discontinued      09/25/19 2154                      Kunal Sandoval MD  Department of Hospital Medicine   [facility]

## 2019-09-29 NOTE — SUBJECTIVE & OBJECTIVE
Interval History: see      Review of Systems   Constitutional: Negative for fever.   Respiratory: Negative for shortness of breath.    Cardiovascular: Negative for chest pain.   Musculoskeletal: Positive for arthralgias.     Objective:     Vital Signs (Most Recent):  Temp: 97.1 °F (36.2 °C) (09/29/19 0831)  Pulse: 81 (09/29/19 0831)  Resp: 20 (09/29/19 0831)  BP: (!) 122/57 (09/29/19 0831)  SpO2: 97 % (09/29/19 0831) Vital Signs (24h Range):  Temp:  [96.2 °F (35.7 °C)-98.9 °F (37.2 °C)] 97.1 °F (36.2 °C)  Pulse:  [64-93] 81  Resp:  [12-26] 20  SpO2:  [92 %-100 %] 97 %  BP: ()/(45-65) 122/57     Weight: 60.8 kg (134 lb)  Body mass index is 23.74 kg/m².    Intake/Output Summary (Last 24 hours) at 9/29/2019 0956  Last data filed at 9/29/2019 0552  Gross per 24 hour   Intake 1919.99 ml   Output 1825 ml   Net 94.99 ml      Physical Exam   Constitutional: She is oriented to person, place, and time. She appears well-developed. No distress.   Frail elderly woman   HENT:   Head: Normocephalic and atraumatic.   O2 NC    Eyes: Pupils are equal, round, and reactive to light. Conjunctivae are normal.   Neck: Normal range of motion. Neck supple. No thyromegaly present.   Cardiovascular: Normal rate, regular rhythm, normal heart sounds and intact distal pulses.   Pulmonary/Chest: Effort normal. No respiratory distress. She has no wheezes. She has no rales.   Abdominal: Soft. Bowel sounds are normal. There is no tenderness.   G tube in place   Genitourinary:   Genitourinary Comments: Arvizu in place   Musculoskeletal: Normal range of motion. She exhibits no edema.   Lymphadenopathy:     She has no cervical adenopathy.   Neurological: She is alert and oriented to person, place, and time. She exhibits abnormal muscle tone.   Skin: Skin is warm and dry. No rash noted. There is erythema (surrounding old gtube site).   Psychiatric: She has a normal mood and affect.   Nursing note and vitals reviewed.      Significant Labs:   CBC:    Recent Labs   Lab 09/28/19  0354 09/29/19 0712   WBC 4.01 3.70*   HGB 9.8* 10.0*   HCT 32.0* 34.1*    187     CMP:   Recent Labs   Lab 09/28/19 0354 09/29/19 0712    143   K 4.3 4.1    110   CO2 25 25   * 105   BUN 15 10   CREATININE 0.6 0.5   CALCIUM 8.7 8.8   PROT 6.0 6.0   ALBUMIN 3.0* 3.0*   BILITOT 0.4 0.4   ALKPHOS 92 84   AST 18 17   ALT 5* 5*   ANIONGAP 8 8   EGFRNONAA >60 >60       Significant Imaging: I have reviewed and interpreted all pertinent imaging results/findings within the past 24 hours.

## 2019-09-29 NOTE — ASSESSMENT & PLAN NOTE
Change abx to zosyn to cover both urine and lungs. Day 3  Urine Cx is indeterminate. Zosyn stopped after 3 days   Downgrade to floor 9/28  D/C to NH 9/29

## 2019-09-29 NOTE — NURSING
Report given to DotRN at bedside, patient resting well, no residual on PEG check, placement verified with oncoming RN by ausculation with air bolus, at 3.5 cm zain, tube feedings resumed at 25ml/hr at this time with ordered flushes at 175ml/hr qid. Daughter at bedside. Stable condition, patient resting well.

## 2019-09-29 NOTE — ASSESSMENT & PLAN NOTE
Likely secondary to UTI.  Blood cultures NGTD. Urine culture with multiple organisms, none in predominance.  Stop Zosyn.  Will downgrade to floor 9/28  Received fluids initially. Will continue til Bun creatinine is normalized    Of note is her normal lactate and procalcitonin.  Maybe this is just a viral URI. For now supportive care and cover with abx until cultures return. Cx are negative. abx stopped 9/28

## 2019-09-29 NOTE — NURSING
DC IV & saad well. Flush PEG tube c water & saad well. Call [facility] for transport 7 report to [facility].

## 2019-09-29 NOTE — PLAN OF CARE
Patient rested well throughout shift. 2 L nasal cannula. Alert, responds to voice, disoriented to time and situation. Answers questions appropriately. IVF at 100 ml/hr continued. NPO maintained. PEG tube at 3.5 cm zain. Dressing intact and dry. Feedings at 25 ml/hr with 175 flush. Residual checked every 4 hours. Patient denies abdominal discomfort or pain. Abdomen slightly firm and non tender. Patient being suctioned PRN due to thick oral secretions. SCDs in use. Bed alarm engaged. Patient listens to music and sings along. Arvizu catheter draining to gravity. No kinks. Patient free from fall or injury. Plan of care reviewed with patient. Patient verbalizes understanding.

## 2019-09-29 NOTE — PLAN OF CARE
No falls or near misses noted on shift, No skin issues or wounds, SCDs for DVT prevention, noted pain and prn medication administered (see MAR), personal preferences provided as requested for music via personal tablet, plan of care discussed with patient and daughter at bedside, daughter verbalized understanding, evidence of learning noted, agrees with plan of care, IVF gtt as ordered, tube feeding to be restarted slowly due to previous abdomen pain noted, no s/s of pain at this time, plan is to maintain Med/Surg status with possible discharge back to nursing home Monday per Dr Sandoval. Lactulose administered for bowels as well. Unable to determine patient's level of learning due to condition (see history).

## 2019-09-30 NOTE — PLAN OF CARE
09/30/19 0727   Post-Acute Status   Post-Acute Authorization Placement;Other   Other Status See Comments   Discharge Delays None known at this time       Patient is a established senior living resident at [facility]. She will return to the facility post-discharge.     Ivone Stewart LMSW

## 2019-09-30 NOTE — PLAN OF CARE
09/30/19 0724   Medicare Message   Important Message from Medicare regarding Discharge Appeal Rights Given to patient/caregiver;Explained to patient/caregiver;Signed/date by patient/caregiver   Date IMM was signed 09/27/19   Time IMM was signed [id number]

## 2019-09-30 NOTE — PLAN OF CARE
09/30/19 0726   Final Note   Assessment Type Final Discharge Note   Anticipated Discharge Disposition assisted Nu   What phone number can be called within the next 1-3 days to see how you are doing after discharge? [phone]       Patient returning to [facility] upon discharge.     Ivone Stewart LMSW

## 2019-10-01 LAB
BACTERIA BLD CULT: NORMAL
BACTERIA BLD CULT: NORMAL

## 2019-11-01 NOTE — PROGRESS NOTES
Advised Dr. Perez bp 197/92.     HPI:  Eddy Cunningham is a 77 y.o. female with parkinson's disease    Patient has not seen me since 2015  She has been to the hospital several times this year for PEG malfunction, diverticulitis, sepsis.  She is no longer on stalevo- uses carbidopa QID and Mirapex TID.  She is on exelon for memory  She complains of tremor which is mild, rigid  muscles , and long standing head and neck pain- pain is in the posterior neck.She has ultram to use for pain  She has poor sleep at night.  She is bound to a neal-chair. No longer falling  Head is forward bent. She has signed a waiver to maintain on purreed food and uses peg at night.   There is no agitation.    Review of Systems   Constitutional: Negative for fever.   HENT: Negative for nosebleeds.    Eyes: Negative for pain.   Respiratory: Negative for hemoptysis.    Cardiovascular: Negative for leg swelling.   Gastrointestinal: Negative for blood in stool.   Genitourinary: Negative for hematuria.   Musculoskeletal: Negative for falls.   Skin: Negative for rash.   Neurological: Positive for tremors. Negative for headaches.   Psychiatric/Behavioral: The patient is not nervous/anxious.        Exam:   Gen Appearance, well developed/nourished in no apparent distress  CV: 2+ distal pulses with no edema or swelling  Neuro:  MS: Awake, alert, Sustains attention. recent recall is well intact,/remote memory intact, Language is full to spontaneous speech/comprehension. Fund of Knowledge is full.   CN: PERRL, Extraoccular movements and visual fields are full. Normal facial strength, Tongue and Palate are midline and strong. Shoulder Shrug symmetric and strong. Masked face and again prominent bradykinesia. Some minor delusions  Motor: Normal bulk, tone increased throughout mild to moderate tremor of the bilateral hands but there no tremor of outstretched hands and left leg 5/5 strength bilateral upper/lower extremities with 2+ reflexes   Neck is dystonic to the left tilting left again  today-- There are no dyskinesias  Sensory: Romberg negative and intact vibration  Cerebellar: Finger-nose,Rapid alternating movements intact with bradykinesia only  Gait: Not done/ patient had forward bent posture in chair- with palpable spasms today/ anterocollis    CT C spine 2015: degenerative arthritis.     Assessment/Recommendation:  Eddy Cunningham is a 77 y.o. female with parkinson's disease    1. Patient currently has PEG for dysphagia from Parkinson's. Speech therapy recommended pleasure feeds in 5/2017  2. She is no longer on Stelvo but is using Mirapex and Sinemet instead. Tremor is well controlled. She is more akinetic rigid. Consider hospice consult if patient has increased  medical complications over the next several months.   3. Exelon patch to continue at current dose- patient has minor delusions which are reassured by nursing.   4. She has neck and posterior neck pain from cervical dystonia, long standing. She declines botox treatment. Try low dose Tizanadine nightly PRN per orders unless sedation.     RTC 6 months  CC: LINUS MITCHELL

## 2019-11-04 PROBLEM — M86.249: Status: ACTIVE | Noted: 2019-11-04

## 2019-12-07 ENCOUNTER — HOSPITAL ENCOUNTER (EMERGENCY)
Facility: HOSPITAL | Age: 79
Discharge: ANOTHER HOSPITAL | End: 2019-12-07
Attending: PAIN MEDICINE
Payer: MEDICARE

## 2019-12-07 VITALS
TEMPERATURE: 96 F | SYSTOLIC BLOOD PRESSURE: 129 MMHG | RESPIRATION RATE: 22 BRPM | OXYGEN SATURATION: 96 % | HEART RATE: 80 BPM | BODY MASS INDEX: 20.31 KG/M2 | DIASTOLIC BLOOD PRESSURE: 60 MMHG | HEIGHT: 68 IN | WEIGHT: 134 LBS

## 2019-12-07 DIAGNOSIS — L03.90 CELLULITIS: ICD-10-CM

## 2019-12-07 DIAGNOSIS — Z48.89 ENCOUNTER FOR POST SURGICAL WOUND CHECK: Primary | ICD-10-CM

## 2019-12-07 LAB
ALBUMIN SERPL BCP-MCNC: 3.8 G/DL (ref 3.5–5.2)
ALP SERPL-CCNC: 133 U/L (ref 55–135)
ALT SERPL W/O P-5'-P-CCNC: 13 U/L (ref 10–44)
ANION GAP SERPL CALC-SCNC: 9 MMOL/L (ref 8–16)
AST SERPL-CCNC: 30 U/L (ref 10–40)
BASOPHILS # BLD AUTO: 0.05 K/UL (ref 0–0.2)
BASOPHILS NFR BLD: 0.7 % (ref 0–1.9)
BILIRUB SERPL-MCNC: 0.8 MG/DL (ref 0.1–1)
BUN SERPL-MCNC: 22 MG/DL (ref 8–23)
CALCIUM SERPL-MCNC: 9.7 MG/DL (ref 8.7–10.5)
CHLORIDE SERPL-SCNC: 100 MMOL/L (ref 95–110)
CO2 SERPL-SCNC: 30 MMOL/L (ref 23–29)
CREAT SERPL-MCNC: 0.6 MG/DL (ref 0.5–1.4)
DIFFERENTIAL METHOD: ABNORMAL
EOSINOPHIL # BLD AUTO: 0.2 K/UL (ref 0–0.5)
EOSINOPHIL NFR BLD: 2.3 % (ref 0–8)
ERYTHROCYTE [DISTWIDTH] IN BLOOD BY AUTOMATED COUNT: 14.2 % (ref 11.5–14.5)
EST. GFR  (AFRICAN AMERICAN): >60 ML/MIN/1.73 M^2
EST. GFR  (NON AFRICAN AMERICAN): >60 ML/MIN/1.73 M^2
GLUCOSE SERPL-MCNC: 85 MG/DL (ref 70–110)
HCT VFR BLD AUTO: 40.4 % (ref 37–48.5)
HGB BLD-MCNC: 12.6 G/DL (ref 12–16)
IMM GRANULOCYTES # BLD AUTO: 0.02 K/UL (ref 0–0.04)
IMM GRANULOCYTES NFR BLD AUTO: 0.3 % (ref 0–0.5)
LACTATE SERPL-SCNC: 0.9 MMOL/L (ref 0.5–2.2)
LYMPHOCYTES # BLD AUTO: 1.5 K/UL (ref 1–4.8)
LYMPHOCYTES NFR BLD: 21.2 % (ref 18–48)
MCH RBC QN AUTO: 30.4 PG (ref 27–31)
MCHC RBC AUTO-ENTMCNC: 31.2 G/DL (ref 32–36)
MCV RBC AUTO: 98 FL (ref 82–98)
MONOCYTES # BLD AUTO: 0.6 K/UL (ref 0.3–1)
MONOCYTES NFR BLD: 8.6 % (ref 4–15)
NEUTROPHILS # BLD AUTO: 4.6 K/UL (ref 1.8–7.7)
NEUTROPHILS NFR BLD: 66.9 % (ref 38–73)
NRBC BLD-RTO: 0 /100 WBC
PLATELET # BLD AUTO: 217 K/UL (ref 150–350)
PMV BLD AUTO: 10.5 FL (ref 9.2–12.9)
POTASSIUM SERPL-SCNC: 4 MMOL/L (ref 3.5–5.1)
PROT SERPL-MCNC: 7.1 G/DL (ref 6–8.4)
RBC # BLD AUTO: 4.14 M/UL (ref 4–5.4)
SODIUM SERPL-SCNC: 139 MMOL/L (ref 136–145)
WBC # BLD AUTO: 6.88 K/UL (ref 3.9–12.7)

## 2019-12-07 PROCEDURE — 83605 ASSAY OF LACTIC ACID: CPT

## 2019-12-07 PROCEDURE — 96365 THER/PROPH/DIAG IV INF INIT: CPT

## 2019-12-07 PROCEDURE — 63600175 PHARM REV CODE 636 W HCPCS: Performed by: PAIN MEDICINE

## 2019-12-07 PROCEDURE — 80053 COMPREHEN METABOLIC PANEL: CPT

## 2019-12-07 PROCEDURE — 99285 EMERGENCY DEPT VISIT HI MDM: CPT | Mod: 25

## 2019-12-07 PROCEDURE — 85025 COMPLETE CBC W/AUTO DIFF WBC: CPT

## 2019-12-07 PROCEDURE — 87040 BLOOD CULTURE FOR BACTERIA: CPT

## 2019-12-07 RX ADMIN — PIPERACILLIN AND TAZOBACTAM 4.5 G: 4; .5 INJECTION, POWDER, LYOPHILIZED, FOR SOLUTION INTRAVENOUS; PARENTERAL at 08:12

## 2019-12-08 PROBLEM — Z48.89 ENCOUNTER FOR POST SURGICAL WOUND CHECK: Status: ACTIVE | Noted: 2019-12-08

## 2019-12-08 PROBLEM — T81.31XA SURGICAL WOUND DEHISCENCE: Status: ACTIVE | Noted: 2019-12-08

## 2019-12-08 NOTE — ED NOTES
The patient is awake, alert. Normal respiratory effort and rate noted, resting comfortably. VSS, no change from previous assessment. Bed in low, locked position, SR x2. Will continue to monitor.

## 2019-12-08 NOTE — ED NOTES
The patient is awake, alert. Respirations are spontaneous, normal respiratory effort and rate noted, no distress noted, resting comfortably. Bed in low, locked position, SR x2. Will continue to monitor.

## 2019-12-08 NOTE — ED PROVIDER NOTES
Encounter Date: 12/7/2019       History   No chief complaint on file.    This is a 79-year-old nursing home resident.  Patient is not communicative.  Patient is contracted.  Patient recently in November 8 received and left wrist surgery by Orthopedics with possible all with looks like got tendon transfer also carpal tunnel release in debridement for osteomyelitis.  She presents today would open wound with cellulitis and abscess drainage.        Review of patient's allergies indicates:   Allergen Reactions    Oxycodone      Hallucination    Sulfa (sulfonamide antibiotics)      Other reaction(s): when on contact     Past Medical History:   Diagnosis Date    Bedbound     COPD (chronic obstructive pulmonary disease)     Dementia     Depression     Elevated C-reactive protein (CRP)     Falls frequently     Arvizu catheter in place     Gastrostomy tube in place     GERD (gastroesophageal reflux disease)     History of pressure ulcer     Homocystinuria     Hypertension     Hypopotassemia     Hypothyroidism     Narcolepsy without cataplexy(347.00)     Osteomyelitis of left hand 11/2019    Parkinson disease     RLS (restless legs syndrome)     Stress incontinence     Venous stasis ulcers      Past Surgical History:   Procedure Laterality Date    CARPAL TUNNEL RELEASE      Right    CARPAL TUNNEL RELEASE Left 11/8/2019    Procedure: RELEASE, CARPAL TUNNEL;  Surgeon: Renan Gómez MD;  Location: [facility] OR;  Service: Orthopedics;  Laterality: Left;    CHOLECYSTECTOMY      DEBRIDEMENT OF SACRAL WOUND N/A 7/26/2018    Procedure: DEBRIDEMENT OF SACRAL DECUBITUS SKIN, SUBCUTANEOUS TISSUE, FAT,  AND FASCIA;  Surgeon: Fabio Serna MD;  Location: [facility] OR;  Service: General;  Laterality: N/A;    GASTROSTOMY TUBE PLACEMENT      INCISION AND DRAINAGE OF HAND Left 11/8/2019    Procedure: INCISION AND DRAINAGE, HAND, LT THUMB;  Surgeon: Renan Gómez MD;  Location: [facility] OR;  Service: Orthopedics;  Laterality:  Left;    PARTIAL HYSTERECTOMY      ROTATOR CUFF REPAIR      TONSILLECTOMY, ADENOIDECTOMY      tonsiles    TOTAL KNEE ARTHROPLASTY      TRANSFER OF HAND TENDON Left 11/8/2019    Procedure: TRANSFER, TENDON, HAND, FLEXOR DIGITORUM SUPERFICIALIS AND FLEXOR DIGITORUM PROFUNDUS TRANSFER; fpl tendon lengthing;  Surgeon: Renan Gómez MD;  Location: [facility] OR;  Service: Orthopedics;  Laterality: Left;     No family history on file.  Social History     Tobacco Use    Smoking status: Former Smoker    Smokeless tobacco: Never Used   Substance Use Topics    Alcohol use: No    Drug use: No     Review of Systems   Constitutional: Negative for fever.   HENT: Negative for sore throat.    Respiratory: Negative for shortness of breath.    Cardiovascular: Negative for chest pain.   Gastrointestinal: Negative for nausea.   Genitourinary: Negative for dysuria.   Musculoskeletal: Negative for back pain.   Skin: Positive for rash and wound.   Neurological: Negative for weakness.   Hematological: Does not bruise/bleed easily.       Physical Exam     Initial Vitals   BP Pulse Resp Temp SpO2   -- -- -- -- --      MAP       --         Physical Exam    Nursing note and vitals reviewed.  Constitutional: She appears well-developed.   HENT:   Head: Normocephalic and atraumatic.   Eyes: Pupils are equal, round, and reactive to light.   Neck: Normal range of motion.   Cardiovascular: Normal rate.   Pulmonary/Chest: Breath sounds normal.   Abdominal: Soft.   Neurological: She is alert and oriented to person, place, and time. She has normal strength.   Skin: Rash and abscess noted. There is erythema.   Open wound with abscess and drainage in erythema with cellulitis.   Psychiatric: She has a normal mood and affect.         ED Course   Procedures  Labs Reviewed   CULTURE, BLOOD   CULTURE, BLOOD   CBC W/ AUTO DIFFERENTIAL   COMPREHENSIVE METABOLIC PANEL   LACTIC ACID, PLASMA          Imaging Results    None                                           Clinical Impression:       ICD-10-CM ICD-9-CM   1. Encounter for post surgical wound check Z48.89 V58.49   2. Cellulitis L03.90 682.9         Disposition:   Disposition: Transferred  Condition: Stable   Transfer to [facility].  Dr. Green accepted                   Kameron Acosta MD  12/07/19 3258

## 2019-12-08 NOTE — ED NOTES
Called [facility] & pt's daughter for update, verbal consent by the daughter was given for transfer.

## 2019-12-08 NOTE — ED NOTES
Pt transferred to [facility] via [facility], VSS, transported with transfer form, face sheet, shared EMR, report given to Reyna.

## 2019-12-08 NOTE — ED TRIAGE NOTES
79 y.o. female presents to ER ED 04/ED 04   Chief Complaint   Patient presents with    Post-op Problem     left wrist   pt arrived via [facility] from [facility] c/o dehisced surgical incision to left wrist with yellow/bloody drainage swelling, & redness. No acute distress noted.

## 2019-12-09 PROBLEM — R63.8 INCREASED NUTRITIONAL NEEDS: Status: ACTIVE | Noted: 2019-12-09

## 2019-12-10 PROBLEM — T14.8XXA DEEP TISSUE INJURY: Status: ACTIVE | Noted: 2019-12-10

## 2019-12-10 PROBLEM — L89.91 PRESSURE INJURY, STAGE 1: Status: ACTIVE | Noted: 2019-12-10

## 2019-12-10 PROBLEM — R32 INCONTINENCE: Status: ACTIVE | Noted: 2019-12-10

## 2019-12-13 LAB
BACTERIA BLD CULT: NORMAL
BACTERIA BLD CULT: NORMAL

## 2019-12-31 NOTE — EICU
EICU video rounds done, pt appears asleep, no distress noted, IVF infusing at 100cc/hr  
Video assessment complete. Pt resting , no distress noted.  
Video rounding completed. Ventimask 35 % on. VS: 98/46(68) P 87-SR, Sa02 95% R 40 tachypnea noted. Doctor's notes from ER express families concerns about not intubating her. Comfort measures per RN.  
Video rounds completed. Patient lying in bed with no distress. Vitals are WNL. Bedside nurse denies needs at this time.  
Video rounds completed. Patient sleeping in bed at this time with oxygen via face mask. BP 88/45 (58), HR 74, Resp 23, Sat 100%. No distress noted. Dr. Mina rounding with patient.  
eICU video:    79 yr old female admitted for Sepsis, from SNF.Mostly UTI. Functional quadriplegia. Dysphagia. PD.    Video:  VS stable.    Plan:  - continue care  - on SCD  
pmd

## 2021-06-16 NOTE — NURSING
Received report from ERINN Cao    Occasional moaning noted, otherwise resting well. No family at bedside. Physical to follow.   24hrs

## 2022-02-24 NOTE — PLAN OF CARE
07/23/18 1014   Discharge Assessment   Assessment Type Discharge Planning Assessment   Confirmed/corrected address and phone number on facesheet? Yes   Assessment information obtained from? Medical Record   Expected Length of Stay (days) 3   Communicated expected length of stay with patient/caregiver yes   Prior to hospitilization cognitive status: Not Oriented to Person;Not Oriented to Place;Not Oriented to Time   Prior to hospitalization functional status: Needs Assistance   Current cognitive status: Not Oriented to Person;Not Oriented to Place;Not Oriented to Time   Current Functional Status: Needs Assistance   Lives With facility resident   Able to Return to Prior Arrangements yes   Is patient able to care for self after discharge? No   Who are your caregiver(s) and their phone number(s)? [facility]   Patient's perception of discharge disposition nursing home   Readmission Within The Last 30 Days no previous admission in last 30 days   Patient currently being followed by outpatient case management? No   Patient currently receives any other outside agency services? No   Equipment Currently Used at Home none   Do you have any problems affording any of your prescribed medications? No   Is the patient taking medications as prescribed? yes   Does the patient have transportation home? Yes   Transportation Available ambulance   Does the patient receive services at the [facility]? No   Discharge Plan A Return to nursing home   Discharge Plan B Return to Nursing Home   Patient/Family In Agreement With Plan yes       Pt is a 78 year old female admitted for Dehydration. Pt is a resident of [facility].  No other Social Work needs noted at this time. Will continue to follow and offer support as needed.    Chavo Jenkins LMSW       Patient requests telephone visit today for multiple issues. Her blood pressure has improved with stopping the allergy medication (hasn't taken Claritin since 2/8/22) and her allergies aren't too bad.  Medication list reviewed.  Jeimy Santos CMA([facility])..................2/24/2022   11:12 AM

## 2023-08-26 NOTE — ED NOTES
Lab Results   Component Value Date    HGBA1C 6.6 (H) 07/07/2022       Recent Labs     08/25/23  1646 08/25/23  2133 08/26/23  0719 08/26/23  1129   POCGLU 150* 181* 101 84       Blood Sugar Average: Last 72 hrs:  (P) [id number]   Patient is on linagliptin at home. Continue Humalog sliding scale with Accu-Cheks before every meal and at bedtime.   Patient has been a diabetic diet  lantus 15 lispro 5 units The patient is awake, alert, aware of environment. Airway is open and patent, respirations are spontaneous, normal respiratory effort and rate noted, no distress noted. Bed in low, locked position, SR x2. Will continue to monitor.
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
## 2024-08-27 NOTE — ASSESSMENT & PLAN NOTE
Called and spoke to pt. She is undecided at this time about surgery. Dr. Mondragon spoke to pt as well and answered questions.     Pt would like a call on 9-3-24 , will make decision on moving forward , rescheduling or just cancelling . Will call pt on that day to follow up.   Resume exelon

## 2024-10-02 NOTE — ED PROVIDER NOTES
"Encounter Date: 3/14/2018       History     Chief Complaint   Patient presents with    Peg Tube Placement     Arrived via EMS from [facility] for PEG Tube replacement. EMS reports tube came out Sunday. Hx of dementia, reports baseline mental status     Pt BIB EMS from nursing home due to PEG tube being dislodged. Reportedly it has been dislodged for 3-4 days. Pt has a zee cath in place that the patient states a "nurse put in there".  She cannot remember when that was put in. State she thinks something (food or drink was put in there, but not sure when). States she has not been eating or drinking well. Denies headache or SOB or chest pain.           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  Social History   Substance Use Topics    Smoking status: Former Smoker    Smokeless tobacco: Never Used    Alcohol use No     Review of Systems   Unable to perform ROS: Dementia       Physical Exam     Initial Vitals [03/14/18 2039]   BP Pulse Resp Temp SpO2   135/70 74 18 97.7 °F (36.5 °C) 98 %      MAP       91.67         Physical " Exam    Vitals reviewed.  Constitutional: She appears well-developed and well-nourished.   HENT:   Head: Normocephalic and atraumatic.   Nose: Nose normal.   Mouth/Throat: No oropharyngeal exudate.   Eyes: EOM are normal. Pupils are equal, round, and reactive to light.   Neck: Normal range of motion. Neck supple. No JVD present.   Cardiovascular: Regular rhythm and normal heart sounds. Exam reveals no gallop and no friction rub.    No murmur heard.  Pulmonary/Chest: Breath sounds normal. No stridor. No respiratory distress. She has no wheezes. She has no rhonchi. She has no rales. She exhibits no tenderness.   Abdominal: Soft. Bowel sounds are normal. She exhibits no distension and no mass. There is no tenderness. There is no rebound and no guarding.       Musculoskeletal: Normal range of motion. She exhibits no edema or tenderness.   Neurological: She is alert and oriented to person, place, and time. She has normal strength. No sensory deficit.   Skin: Skin is warm and dry.   Psychiatric: Her speech is delayed. She is slowed. She is inattentive.         ED Course   Feeding Tube  Date/Time: 3/14/2018 11:30 PM  Performed by: CINDI CISSE  Authorized by: CINDI CISSE   Consent: Verbal consent obtained.  Patient identity confirmed: verbally with patient  Indications: tube dislodged    Sedation:  Patient sedated: no  Local anesthesia used: yes  Anesthesia: see MAR for details    Anesthesia:  Local anesthesia used: yes  Patient position: supine  Procedure type: replacement  Tube size: 18 Fr  Endoscope used: no  Bulb inflation fluid: normal saline  Placement/position confirmation: x-ray  Tube placement difficulty: minimal  Complications: No  Patient tolerance: Patient tolerated the procedure well with no immediate complications        Labs Reviewed   COMPREHENSIVE METABOLIC PANEL - Abnormal; Notable for the following:        Result Value    BUN, Bld 31 (*)     AST 69 (*)     All other components within  normal limits   CBC W/ AUTO DIFFERENTIAL - Abnormal; Notable for the following:     MCHC 31.9 (*)     All other components within normal limits   PROTIME-INR             Medical Decision Making:   History:   Old Medical Records: I decided to obtain old medical records.  Initial Assessment:   Medical decision-making:    The patient received a medical screening exam. If performed, the EKG was independently evaluated by me and is pending final cardiology evaluation.  If performed, all radiographic studies were independently evaluated by me and are pending final radiology evaluation. If labs were ordered, they were reviewed. Vital signs are independently assessed by me.  If performed, the pulse oximetry was independently evaluated by me.  I decided to obtain the patient's past medical record.  If available, I reviewed the patient's past medical record, including most recent labs and radiology reports.    ED Management:  PEG tube placed per the procedure noted above without complication.  Confirmed by x-ray.  Patient has a prerenal azotemia and was administered IV fluids.  Otherwise patient has no metabolic disturbances or signs of severe electrolyte abnormality requiring inpatient admission at this time.  Patient is cleared for medical discharge back to the nursing home.    JOSHUA Zuñiga M.D. 11:31 PM 3/14/2018                        Clinical Impression:   The primary encounter diagnosis was PEG tube malfunction. Diagnoses of Dehydration and Prerenal azotemia were also pertinent to this visit.                           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 Otezla Pregnancy And Lactation Text: This medication is Pregnancy Category C and it isn't known if it is safe during pregnancy. It is unknown if it is excreted in breast milk.